# Patient Record
Sex: FEMALE | Race: WHITE | NOT HISPANIC OR LATINO | Employment: UNEMPLOYED | URBAN - METROPOLITAN AREA
[De-identification: names, ages, dates, MRNs, and addresses within clinical notes are randomized per-mention and may not be internally consistent; named-entity substitution may affect disease eponyms.]

---

## 2020-01-01 ENCOUNTER — HOSPITAL ENCOUNTER (INPATIENT)
Facility: HOSPITAL | Age: 55
LOS: 28 days | DRG: 885 | End: 2020-08-08
Attending: EMERGENCY MEDICINE | Admitting: EMERGENCY MEDICINE
Payer: MEDICAID

## 2020-01-01 VITALS
HEART RATE: 113 BPM | BODY MASS INDEX: 24.26 KG/M2 | SYSTOLIC BLOOD PRESSURE: 94 MMHG | OXYGEN SATURATION: 97 % | TEMPERATURE: 98 F | WEIGHT: 154.56 LBS | DIASTOLIC BLOOD PRESSURE: 50 MMHG | HEIGHT: 67 IN

## 2020-01-01 DIAGNOSIS — R94.31 QT PROLONGATION: ICD-10-CM

## 2020-01-01 DIAGNOSIS — C49.A0 MALIGNANT GASTROINTESTINAL STROMAL TUMOR, UNSPECIFIED SITE: ICD-10-CM

## 2020-01-01 DIAGNOSIS — F31.62 BIPOLAR 1 DISORDER, MIXED, MODERATE: ICD-10-CM

## 2020-01-01 DIAGNOSIS — D64.9 ANEMIA: ICD-10-CM

## 2020-01-01 DIAGNOSIS — Z51.5 PALLIATIVE CARE ENCOUNTER: ICD-10-CM

## 2020-01-01 DIAGNOSIS — D64.9 ANEMIA, UNSPECIFIED TYPE: Primary | ICD-10-CM

## 2020-01-01 DIAGNOSIS — F54 PSYCHOLOGICAL FACTORS AFFECTING MEDICAL CONDITION: ICD-10-CM

## 2020-01-01 DIAGNOSIS — C49.A2 MALIGNANT GASTROINTESTINAL STROMAL TUMOR (GIST) OF STOMACH: ICD-10-CM

## 2020-01-01 DIAGNOSIS — F22 DELUSIONAL DISORDER: ICD-10-CM

## 2020-01-01 DIAGNOSIS — Z71.89 ADVANCE CARE PLANNING: ICD-10-CM

## 2020-01-01 DIAGNOSIS — D64.9 SYMPTOMATIC ANEMIA: ICD-10-CM

## 2020-01-01 DIAGNOSIS — F22 DELUSION: ICD-10-CM

## 2020-01-01 DIAGNOSIS — R00.0 TACHYCARDIA: ICD-10-CM

## 2020-01-01 LAB
ABO + RH BLD: NORMAL
ALBUMIN SERPL BCP-MCNC: 1.6 G/DL (ref 3.5–5.2)
ALBUMIN SERPL BCP-MCNC: 1.7 G/DL (ref 3.5–5.2)
ALBUMIN SERPL BCP-MCNC: 1.7 G/DL (ref 3.5–5.2)
ALBUMIN SERPL BCP-MCNC: 1.8 G/DL (ref 3.5–5.2)
ALBUMIN SERPL BCP-MCNC: 1.9 G/DL (ref 3.5–5.2)
ALBUMIN SERPL BCP-MCNC: 2 G/DL (ref 3.5–5.2)
ALBUMIN SERPL BCP-MCNC: 2.1 G/DL (ref 3.5–5.2)
ALBUMIN SERPL BCP-MCNC: 2.1 G/DL (ref 3.5–5.2)
ALBUMIN SERPL BCP-MCNC: 2.2 G/DL (ref 3.5–5.2)
ALBUMIN SERPL BCP-MCNC: 2.3 G/DL (ref 3.5–5.2)
ALBUMIN SERPL BCP-MCNC: 2.5 G/DL (ref 3.5–5.2)
ALBUMIN SERPL BCP-MCNC: 2.9 G/DL (ref 3.5–5.2)
ALP SERPL-CCNC: 102 U/L (ref 55–135)
ALP SERPL-CCNC: 102 U/L (ref 55–135)
ALP SERPL-CCNC: 103 U/L (ref 55–135)
ALP SERPL-CCNC: 105 U/L (ref 55–135)
ALP SERPL-CCNC: 123 U/L (ref 55–135)
ALP SERPL-CCNC: 129 U/L (ref 55–135)
ALP SERPL-CCNC: 131 U/L (ref 55–135)
ALP SERPL-CCNC: 131 U/L (ref 55–135)
ALP SERPL-CCNC: 163 U/L (ref 55–135)
ALP SERPL-CCNC: 167 U/L (ref 55–135)
ALP SERPL-CCNC: 172 U/L (ref 55–135)
ALP SERPL-CCNC: 183 U/L (ref 55–135)
ALP SERPL-CCNC: 185 U/L (ref 55–135)
ALP SERPL-CCNC: 261 U/L (ref 55–135)
ALP SERPL-CCNC: 337 U/L (ref 55–135)
ALP SERPL-CCNC: 364 U/L (ref 55–135)
ALP SERPL-CCNC: 84 U/L (ref 55–135)
ALP SERPL-CCNC: 84 U/L (ref 55–135)
ALP SERPL-CCNC: 90 U/L (ref 55–135)
ALP SERPL-CCNC: 95 U/L (ref 55–135)
ALP SERPL-CCNC: 99 U/L (ref 55–135)
ALP SERPL-CCNC: 99 U/L (ref 55–135)
ALT SERPL W/O P-5'-P-CCNC: 10 U/L (ref 10–44)
ALT SERPL W/O P-5'-P-CCNC: 11 U/L (ref 10–44)
ALT SERPL W/O P-5'-P-CCNC: 11 U/L (ref 10–44)
ALT SERPL W/O P-5'-P-CCNC: 112 U/L (ref 10–44)
ALT SERPL W/O P-5'-P-CCNC: 12 U/L (ref 10–44)
ALT SERPL W/O P-5'-P-CCNC: 12 U/L (ref 10–44)
ALT SERPL W/O P-5'-P-CCNC: 14 U/L (ref 10–44)
ALT SERPL W/O P-5'-P-CCNC: 15 U/L (ref 10–44)
ALT SERPL W/O P-5'-P-CCNC: 18 U/L (ref 10–44)
ALT SERPL W/O P-5'-P-CCNC: 28 U/L (ref 10–44)
ALT SERPL W/O P-5'-P-CCNC: 36 U/L (ref 10–44)
ALT SERPL W/O P-5'-P-CCNC: 6 U/L (ref 10–44)
ALT SERPL W/O P-5'-P-CCNC: 6 U/L (ref 10–44)
ALT SERPL W/O P-5'-P-CCNC: 7 U/L (ref 10–44)
ALT SERPL W/O P-5'-P-CCNC: 7 U/L (ref 10–44)
ALT SERPL W/O P-5'-P-CCNC: 8 U/L (ref 10–44)
ALT SERPL W/O P-5'-P-CCNC: 9 U/L (ref 10–44)
AMPHET+METHAMPHET UR QL: NEGATIVE
AMPHET+METHAMPHET UR QL: NEGATIVE
ANION GAP SERPL CALC-SCNC: 11 MMOL/L (ref 8–16)
ANION GAP SERPL CALC-SCNC: 21 MMOL/L (ref 8–16)
ANION GAP SERPL CALC-SCNC: 22 MMOL/L (ref 8–16)
ANION GAP SERPL CALC-SCNC: 3 MMOL/L (ref 8–16)
ANION GAP SERPL CALC-SCNC: 3 MMOL/L (ref 8–16)
ANION GAP SERPL CALC-SCNC: 4 MMOL/L (ref 8–16)
ANION GAP SERPL CALC-SCNC: 5 MMOL/L (ref 8–16)
ANION GAP SERPL CALC-SCNC: 6 MMOL/L (ref 8–16)
ANION GAP SERPL CALC-SCNC: 7 MMOL/L (ref 8–16)
ANION GAP SERPL CALC-SCNC: 8 MMOL/L (ref 8–16)
ANION GAP SERPL CALC-SCNC: 9 MMOL/L (ref 8–16)
ANISOCYTOSIS BLD QL SMEAR: ABNORMAL
ANISOCYTOSIS BLD QL SMEAR: SLIGHT
APAP SERPL-MCNC: <3 UG/ML (ref 10–20)
APTT BLDCRRT: <21 SEC (ref 21–32)
AST SERPL-CCNC: 12 U/L (ref 10–40)
AST SERPL-CCNC: 13 U/L (ref 10–40)
AST SERPL-CCNC: 14 U/L (ref 10–40)
AST SERPL-CCNC: 150 U/L (ref 10–40)
AST SERPL-CCNC: 16 U/L (ref 10–40)
AST SERPL-CCNC: 20 U/L (ref 10–40)
AST SERPL-CCNC: 20 U/L (ref 10–40)
AST SERPL-CCNC: 25 U/L (ref 10–40)
AST SERPL-CCNC: 26 U/L (ref 10–40)
AST SERPL-CCNC: 33 U/L (ref 10–40)
AST SERPL-CCNC: 43 U/L (ref 10–40)
AST SERPL-CCNC: 46 U/L (ref 10–40)
BACTERIA #/AREA URNS AUTO: ABNORMAL /HPF
BACTERIA BLD CULT: NORMAL
BACTERIA BLD CULT: NORMAL
BACTERIA FLD CULT: NORMAL
BARBITURATES UR QL SCN>200 NG/ML: NEGATIVE
BARBITURATES UR QL SCN>200 NG/ML: NEGATIVE
BASO STIPL BLD QL SMEAR: ABNORMAL
BASOPHILS # BLD AUTO: 0.01 K/UL (ref 0–0.2)
BASOPHILS # BLD AUTO: 0.02 K/UL (ref 0–0.2)
BASOPHILS # BLD AUTO: 0.03 K/UL (ref 0–0.2)
BASOPHILS # BLD AUTO: 0.04 K/UL (ref 0–0.2)
BASOPHILS # BLD AUTO: 0.04 K/UL (ref 0–0.2)
BASOPHILS # BLD AUTO: 0.05 K/UL (ref 0–0.2)
BASOPHILS # BLD AUTO: 0.07 K/UL (ref 0–0.2)
BASOPHILS NFR BLD: 0.1 % (ref 0–1.9)
BASOPHILS NFR BLD: 0.2 % (ref 0–1.9)
BASOPHILS NFR BLD: 0.3 % (ref 0–1.9)
BASOPHILS NFR BLD: 0.5 % (ref 0–1.9)
BENZODIAZ UR QL SCN>200 NG/ML: NEGATIVE
BENZODIAZ UR QL SCN>200 NG/ML: NEGATIVE
BILIRUB DIRECT SERPL-MCNC: 0.3 MG/DL (ref 0.1–0.3)
BILIRUB SERPL-MCNC: 0.2 MG/DL (ref 0.1–1)
BILIRUB SERPL-MCNC: 0.3 MG/DL (ref 0.1–1)
BILIRUB SERPL-MCNC: 0.3 MG/DL (ref 0.1–1)
BILIRUB SERPL-MCNC: 0.4 MG/DL (ref 0.1–1)
BILIRUB SERPL-MCNC: 0.5 MG/DL (ref 0.1–1)
BILIRUB SERPL-MCNC: 0.6 MG/DL (ref 0.1–1)
BILIRUB SERPL-MCNC: 0.7 MG/DL (ref 0.1–1)
BILIRUB UR QL STRIP: NEGATIVE
BILIRUB UR QL STRIP: NEGATIVE
BLD GP AB SCN CELLS X3 SERPL QL: NORMAL
BLD PROD TYP BPU: NORMAL
BLOOD UNIT EXPIRATION DATE: NORMAL
BLOOD UNIT TYPE CODE: 5100
BLOOD UNIT TYPE: NORMAL
BUN SERPL-MCNC: 10 MG/DL (ref 6–20)
BUN SERPL-MCNC: 19 MG/DL (ref 6–20)
BUN SERPL-MCNC: 21 MG/DL (ref 6–20)
BUN SERPL-MCNC: 5 MG/DL (ref 6–20)
BUN SERPL-MCNC: 5 MG/DL (ref 6–20)
BUN SERPL-MCNC: 6 MG/DL (ref 6–20)
BUN SERPL-MCNC: 7 MG/DL (ref 6–20)
BUN SERPL-MCNC: 8 MG/DL (ref 6–20)
BURR CELLS BLD QL SMEAR: ABNORMAL
BZE UR QL SCN: NEGATIVE
BZE UR QL SCN: NEGATIVE
CALCIUM SERPL-MCNC: 7.6 MG/DL (ref 8.7–10.5)
CALCIUM SERPL-MCNC: 7.8 MG/DL (ref 8.7–10.5)
CALCIUM SERPL-MCNC: 7.9 MG/DL (ref 8.7–10.5)
CALCIUM SERPL-MCNC: 7.9 MG/DL (ref 8.7–10.5)
CALCIUM SERPL-MCNC: 8 MG/DL (ref 8.7–10.5)
CALCIUM SERPL-MCNC: 8.1 MG/DL (ref 8.7–10.5)
CALCIUM SERPL-MCNC: 8.2 MG/DL (ref 8.7–10.5)
CALCIUM SERPL-MCNC: 8.2 MG/DL (ref 8.7–10.5)
CALCIUM SERPL-MCNC: 8.3 MG/DL (ref 8.7–10.5)
CALCIUM SERPL-MCNC: 8.4 MG/DL (ref 8.7–10.5)
CALCIUM SERPL-MCNC: 8.4 MG/DL (ref 8.7–10.5)
CALCIUM SERPL-MCNC: 9.2 MG/DL (ref 8.7–10.5)
CANNABINOIDS UR QL SCN: NEGATIVE
CANNABINOIDS UR QL SCN: NEGATIVE
CHLORIDE SERPL-SCNC: 100 MMOL/L (ref 95–110)
CHLORIDE SERPL-SCNC: 101 MMOL/L (ref 95–110)
CHLORIDE SERPL-SCNC: 102 MMOL/L (ref 95–110)
CHLORIDE SERPL-SCNC: 103 MMOL/L (ref 95–110)
CHLORIDE SERPL-SCNC: 104 MMOL/L (ref 95–110)
CHLORIDE SERPL-SCNC: 104 MMOL/L (ref 95–110)
CHLORIDE SERPL-SCNC: 105 MMOL/L (ref 95–110)
CHLORIDE SERPL-SCNC: 96 MMOL/L (ref 95–110)
CHLORIDE SERPL-SCNC: 97 MMOL/L (ref 95–110)
CHLORIDE SERPL-SCNC: 97 MMOL/L (ref 95–110)
CHLORIDE SERPL-SCNC: 98 MMOL/L (ref 95–110)
CK SERPL-CCNC: 42 U/L (ref 20–180)
CLARITY UR REFRACT.AUTO: ABNORMAL
CLARITY UR REFRACT.AUTO: CLEAR
CO2 SERPL-SCNC: 11 MMOL/L (ref 23–29)
CO2 SERPL-SCNC: 24 MMOL/L (ref 23–29)
CO2 SERPL-SCNC: 24 MMOL/L (ref 23–29)
CO2 SERPL-SCNC: 25 MMOL/L (ref 23–29)
CO2 SERPL-SCNC: 26 MMOL/L (ref 23–29)
CO2 SERPL-SCNC: 26 MMOL/L (ref 23–29)
CO2 SERPL-SCNC: 27 MMOL/L (ref 23–29)
CO2 SERPL-SCNC: 28 MMOL/L (ref 23–29)
CO2 SERPL-SCNC: 29 MMOL/L (ref 23–29)
CO2 SERPL-SCNC: 29 MMOL/L (ref 23–29)
CO2 SERPL-SCNC: 9 MMOL/L (ref 23–29)
CODING SYSTEM: NORMAL
COLOR UR AUTO: ABNORMAL
COLOR UR AUTO: YELLOW
CREAT SERPL-MCNC: 0.6 MG/DL (ref 0.5–1.4)
CREAT SERPL-MCNC: 0.7 MG/DL (ref 0.5–1.4)
CREAT SERPL-MCNC: 0.8 MG/DL (ref 0.5–1.4)
CREAT SERPL-MCNC: 0.8 MG/DL (ref 0.5–1.4)
CREAT SERPL-MCNC: 1.3 MG/DL (ref 0.5–1.4)
CREAT SERPL-MCNC: 1.4 MG/DL (ref 0.5–1.4)
CREAT UR-MCNC: 48 MG/DL (ref 15–325)
CREAT UR-MCNC: 48 MG/DL (ref 15–325)
DAT IGG-SP REAG RBC-IMP: NORMAL
DIFFERENTIAL METHOD: ABNORMAL
DISPENSE STATUS: NORMAL
EOSINOPHIL # BLD AUTO: 0 K/UL (ref 0–0.5)
EOSINOPHIL # BLD AUTO: 0.1 K/UL (ref 0–0.5)
EOSINOPHIL # BLD AUTO: 0.2 K/UL (ref 0–0.5)
EOSINOPHIL NFR BLD: 0 % (ref 0–8)
EOSINOPHIL NFR BLD: 0.1 % (ref 0–8)
EOSINOPHIL NFR BLD: 0.2 % (ref 0–8)
EOSINOPHIL NFR BLD: 0.2 % (ref 0–8)
EOSINOPHIL NFR BLD: 0.3 % (ref 0–8)
EOSINOPHIL NFR BLD: 0.4 % (ref 0–8)
EOSINOPHIL NFR BLD: 0.4 % (ref 0–8)
EOSINOPHIL NFR BLD: 0.5 % (ref 0–8)
EOSINOPHIL NFR BLD: 0.5 % (ref 0–8)
EOSINOPHIL NFR BLD: 0.6 % (ref 0–8)
EOSINOPHIL NFR BLD: 0.7 % (ref 0–8)
EOSINOPHIL NFR BLD: 0.8 % (ref 0–8)
EOSINOPHIL NFR BLD: 1 % (ref 0–8)
EOSINOPHIL NFR BLD: 1.1 % (ref 0–8)
EOSINOPHIL NFR BLD: 1.3 % (ref 0–8)
EOSINOPHIL NFR BLD: 1.4 % (ref 0–8)
EOSINOPHIL NFR BLD: 1.4 % (ref 0–8)
EOSINOPHIL NFR BLD: 1.6 % (ref 0–8)
EOSINOPHIL NFR BLD: 1.7 % (ref 0–8)
ERYTHROCYTE [DISTWIDTH] IN BLOOD BY AUTOMATED COUNT: 16.6 % (ref 11.5–14.5)
ERYTHROCYTE [DISTWIDTH] IN BLOOD BY AUTOMATED COUNT: 16.6 % (ref 11.5–14.5)
ERYTHROCYTE [DISTWIDTH] IN BLOOD BY AUTOMATED COUNT: 16.9 % (ref 11.5–14.5)
ERYTHROCYTE [DISTWIDTH] IN BLOOD BY AUTOMATED COUNT: 17.2 % (ref 11.5–14.5)
ERYTHROCYTE [DISTWIDTH] IN BLOOD BY AUTOMATED COUNT: 17.4 % (ref 11.5–14.5)
ERYTHROCYTE [DISTWIDTH] IN BLOOD BY AUTOMATED COUNT: 17.7 % (ref 11.5–14.5)
ERYTHROCYTE [DISTWIDTH] IN BLOOD BY AUTOMATED COUNT: 17.8 % (ref 11.5–14.5)
ERYTHROCYTE [DISTWIDTH] IN BLOOD BY AUTOMATED COUNT: 18.1 % (ref 11.5–14.5)
ERYTHROCYTE [DISTWIDTH] IN BLOOD BY AUTOMATED COUNT: 18.3 % (ref 11.5–14.5)
ERYTHROCYTE [DISTWIDTH] IN BLOOD BY AUTOMATED COUNT: 18.5 % (ref 11.5–14.5)
ERYTHROCYTE [DISTWIDTH] IN BLOOD BY AUTOMATED COUNT: 18.5 % (ref 11.5–14.5)
ERYTHROCYTE [DISTWIDTH] IN BLOOD BY AUTOMATED COUNT: 18.6 % (ref 11.5–14.5)
ERYTHROCYTE [DISTWIDTH] IN BLOOD BY AUTOMATED COUNT: 18.8 % (ref 11.5–14.5)
ERYTHROCYTE [DISTWIDTH] IN BLOOD BY AUTOMATED COUNT: 18.9 % (ref 11.5–14.5)
ERYTHROCYTE [DISTWIDTH] IN BLOOD BY AUTOMATED COUNT: 19.1 % (ref 11.5–14.5)
ERYTHROCYTE [DISTWIDTH] IN BLOOD BY AUTOMATED COUNT: 19.1 % (ref 11.5–14.5)
ERYTHROCYTE [DISTWIDTH] IN BLOOD BY AUTOMATED COUNT: 19.4 % (ref 11.5–14.5)
ERYTHROCYTE [DISTWIDTH] IN BLOOD BY AUTOMATED COUNT: 19.5 % (ref 11.5–14.5)
ERYTHROCYTE [DISTWIDTH] IN BLOOD BY AUTOMATED COUNT: 19.6 % (ref 11.5–14.5)
ERYTHROCYTE [DISTWIDTH] IN BLOOD BY AUTOMATED COUNT: 19.6 % (ref 11.5–14.5)
ERYTHROCYTE [DISTWIDTH] IN BLOOD BY AUTOMATED COUNT: 19.8 % (ref 11.5–14.5)
ERYTHROCYTE [DISTWIDTH] IN BLOOD BY AUTOMATED COUNT: 19.9 % (ref 11.5–14.5)
ERYTHROCYTE [DISTWIDTH] IN BLOOD BY AUTOMATED COUNT: 20.1 % (ref 11.5–14.5)
EST. GFR  (AFRICAN AMERICAN): 49.5 ML/MIN/1.73 M^2
EST. GFR  (AFRICAN AMERICAN): 54.1 ML/MIN/1.73 M^2
EST. GFR  (AFRICAN AMERICAN): >60 ML/MIN/1.73 M^2
EST. GFR  (NON AFRICAN AMERICAN): 42.9 ML/MIN/1.73 M^2
EST. GFR  (NON AFRICAN AMERICAN): 47 ML/MIN/1.73 M^2
EST. GFR  (NON AFRICAN AMERICAN): >60 ML/MIN/1.73 M^2
ESTIMATED AVG GLUCOSE: 134 MG/DL (ref 68–131)
ETHANOL SERPL-MCNC: <10 MG/DL
ETHANOL UR-MCNC: <10 MG/DL
FERRITIN SERPL-MCNC: 56 NG/ML (ref 20–300)
FOLATE SERPL-MCNC: 7.4 NG/ML (ref 4–24)
GIANT PLATELETS BLD QL SMEAR: PRESENT
GLUCOSE SERPL-MCNC: 154 MG/DL (ref 70–110)
GLUCOSE SERPL-MCNC: 183 MG/DL (ref 70–110)
GLUCOSE SERPL-MCNC: 184 MG/DL (ref 70–110)
GLUCOSE SERPL-MCNC: 187 MG/DL (ref 70–110)
GLUCOSE SERPL-MCNC: 201 MG/DL (ref 70–110)
GLUCOSE SERPL-MCNC: 206 MG/DL (ref 70–110)
GLUCOSE SERPL-MCNC: 228 MG/DL (ref 70–110)
GLUCOSE SERPL-MCNC: 234 MG/DL (ref 70–110)
GLUCOSE SERPL-MCNC: 235 MG/DL (ref 70–110)
GLUCOSE SERPL-MCNC: 240 MG/DL (ref 70–110)
GLUCOSE SERPL-MCNC: 264 MG/DL (ref 70–110)
GLUCOSE SERPL-MCNC: 282 MG/DL (ref 70–110)
GLUCOSE SERPL-MCNC: 282 MG/DL (ref 70–110)
GLUCOSE SERPL-MCNC: 285 MG/DL (ref 70–110)
GLUCOSE SERPL-MCNC: 285 MG/DL (ref 70–110)
GLUCOSE SERPL-MCNC: 290 MG/DL (ref 70–110)
GLUCOSE SERPL-MCNC: 309 MG/DL (ref 70–110)
GLUCOSE SERPL-MCNC: 314 MG/DL (ref 70–110)
GLUCOSE SERPL-MCNC: 315 MG/DL (ref 70–110)
GLUCOSE SERPL-MCNC: 315 MG/DL (ref 70–110)
GLUCOSE SERPL-MCNC: 352 MG/DL (ref 70–110)
GLUCOSE SERPL-MCNC: 363 MG/DL (ref 70–110)
GLUCOSE UR QL STRIP: ABNORMAL
GLUCOSE UR QL STRIP: ABNORMAL
HAPTOGLOB SERPL-MCNC: 239 MG/DL (ref 30–250)
HAPTOGLOB SERPL-MCNC: <10 MG/DL (ref 30–250)
HAPTOGLOB SERPL-MCNC: <10 MG/DL (ref 30–250)
HBA1C MFR BLD HPLC: 6.3 % (ref 4–5.6)
HCT VFR BLD AUTO: 19.4 % (ref 37–48.5)
HCT VFR BLD AUTO: 20.4 % (ref 37–48.5)
HCT VFR BLD AUTO: 20.5 % (ref 37–48.5)
HCT VFR BLD AUTO: 20.7 % (ref 37–48.5)
HCT VFR BLD AUTO: 21.1 % (ref 37–48.5)
HCT VFR BLD AUTO: 21.2 % (ref 37–48.5)
HCT VFR BLD AUTO: 21.4 % (ref 37–48.5)
HCT VFR BLD AUTO: 21.8 % (ref 37–48.5)
HCT VFR BLD AUTO: 22.2 % (ref 37–48.5)
HCT VFR BLD AUTO: 22.5 % (ref 37–48.5)
HCT VFR BLD AUTO: 22.7 % (ref 37–48.5)
HCT VFR BLD AUTO: 22.7 % (ref 37–48.5)
HCT VFR BLD AUTO: 23 % (ref 37–48.5)
HCT VFR BLD AUTO: 23.3 % (ref 37–48.5)
HCT VFR BLD AUTO: 23.4 % (ref 37–48.5)
HCT VFR BLD AUTO: 24 % (ref 37–48.5)
HCT VFR BLD AUTO: 24 % (ref 37–48.5)
HCT VFR BLD AUTO: 24.2 % (ref 37–48.5)
HCT VFR BLD AUTO: 24.4 % (ref 37–48.5)
HCT VFR BLD AUTO: 24.4 % (ref 37–48.5)
HCT VFR BLD AUTO: 24.9 % (ref 37–48.5)
HCT VFR BLD AUTO: 25.3 % (ref 37–48.5)
HCT VFR BLD AUTO: 25.8 % (ref 37–48.5)
HCT VFR BLD AUTO: 25.9 % (ref 37–48.5)
HCT VFR BLD AUTO: 26 % (ref 37–48.5)
HCT VFR BLD AUTO: 26.3 % (ref 37–48.5)
HCT VFR BLD AUTO: 26.5 % (ref 37–48.5)
HCT VFR BLD AUTO: 26.9 % (ref 37–48.5)
HCT VFR BLD AUTO: 27 % (ref 37–48.5)
HCT VFR BLD AUTO: 27.2 % (ref 37–48.5)
HGB BLD-MCNC: 6 G/DL (ref 12–16)
HGB BLD-MCNC: 6.2 G/DL (ref 12–16)
HGB BLD-MCNC: 6.3 G/DL (ref 12–16)
HGB BLD-MCNC: 6.4 G/DL (ref 12–16)
HGB BLD-MCNC: 6.4 G/DL (ref 12–16)
HGB BLD-MCNC: 6.5 G/DL (ref 12–16)
HGB BLD-MCNC: 6.6 G/DL (ref 12–16)
HGB BLD-MCNC: 6.6 G/DL (ref 12–16)
HGB BLD-MCNC: 6.7 G/DL (ref 12–16)
HGB BLD-MCNC: 6.8 G/DL (ref 12–16)
HGB BLD-MCNC: 6.9 G/DL (ref 12–16)
HGB BLD-MCNC: 7 G/DL (ref 12–16)
HGB BLD-MCNC: 7.1 G/DL (ref 12–16)
HGB BLD-MCNC: 7.3 G/DL (ref 12–16)
HGB BLD-MCNC: 7.4 G/DL (ref 12–16)
HGB BLD-MCNC: 7.6 G/DL (ref 12–16)
HGB BLD-MCNC: 7.8 G/DL (ref 12–16)
HGB BLD-MCNC: 7.9 G/DL (ref 12–16)
HGB BLD-MCNC: 8 G/DL (ref 12–16)
HGB BLD-MCNC: 8 G/DL (ref 12–16)
HGB BLD-MCNC: 8.1 G/DL (ref 12–16)
HGB BLD-MCNC: 8.1 G/DL (ref 12–16)
HGB BLD-MCNC: 8.2 G/DL (ref 12–16)
HGB BLD-MCNC: 8.3 G/DL (ref 12–16)
HGB BLD-MCNC: 8.3 G/DL (ref 12–16)
HGB UR QL STRIP: NEGATIVE
HGB UR QL STRIP: NEGATIVE
HOWELL-JOLLY BOD BLD QL SMEAR: ABNORMAL
HOWELL-JOLLY BOD BLD QL SMEAR: ABNORMAL
HYALINE CASTS UR QL AUTO: 0 /LPF
HYPOCHROMIA BLD QL SMEAR: ABNORMAL
IMM GRANULOCYTES # BLD AUTO: 0.04 K/UL (ref 0–0.04)
IMM GRANULOCYTES # BLD AUTO: 0.05 K/UL (ref 0–0.04)
IMM GRANULOCYTES # BLD AUTO: 0.05 K/UL (ref 0–0.04)
IMM GRANULOCYTES # BLD AUTO: 0.06 K/UL (ref 0–0.04)
IMM GRANULOCYTES # BLD AUTO: 0.07 K/UL (ref 0–0.04)
IMM GRANULOCYTES # BLD AUTO: 0.09 K/UL (ref 0–0.04)
IMM GRANULOCYTES # BLD AUTO: 0.09 K/UL (ref 0–0.04)
IMM GRANULOCYTES # BLD AUTO: 0.1 K/UL (ref 0–0.04)
IMM GRANULOCYTES # BLD AUTO: 0.11 K/UL (ref 0–0.04)
IMM GRANULOCYTES # BLD AUTO: 0.12 K/UL (ref 0–0.04)
IMM GRANULOCYTES # BLD AUTO: 0.13 K/UL (ref 0–0.04)
IMM GRANULOCYTES # BLD AUTO: 0.15 K/UL (ref 0–0.04)
IMM GRANULOCYTES # BLD AUTO: 0.17 K/UL (ref 0–0.04)
IMM GRANULOCYTES # BLD AUTO: 0.18 K/UL (ref 0–0.04)
IMM GRANULOCYTES # BLD AUTO: 0.18 K/UL (ref 0–0.04)
IMM GRANULOCYTES # BLD AUTO: 0.2 K/UL (ref 0–0.04)
IMM GRANULOCYTES # BLD AUTO: 0.2 K/UL (ref 0–0.04)
IMM GRANULOCYTES # BLD AUTO: 0.21 K/UL (ref 0–0.04)
IMM GRANULOCYTES # BLD AUTO: 0.28 K/UL (ref 0–0.04)
IMM GRANULOCYTES # BLD AUTO: 0.28 K/UL (ref 0–0.04)
IMM GRANULOCYTES # BLD AUTO: 0.34 K/UL (ref 0–0.04)
IMM GRANULOCYTES # BLD AUTO: 0.39 K/UL (ref 0–0.04)
IMM GRANULOCYTES # BLD AUTO: 0.39 K/UL (ref 0–0.04)
IMM GRANULOCYTES # BLD AUTO: 0.41 K/UL (ref 0–0.04)
IMM GRANULOCYTES # BLD AUTO: 0.52 K/UL (ref 0–0.04)
IMM GRANULOCYTES # BLD AUTO: 0.52 K/UL (ref 0–0.04)
IMM GRANULOCYTES # BLD AUTO: 0.59 K/UL (ref 0–0.04)
IMM GRANULOCYTES NFR BLD AUTO: 0.4 % (ref 0–0.5)
IMM GRANULOCYTES NFR BLD AUTO: 0.5 % (ref 0–0.5)
IMM GRANULOCYTES NFR BLD AUTO: 0.6 % (ref 0–0.5)
IMM GRANULOCYTES NFR BLD AUTO: 0.7 % (ref 0–0.5)
IMM GRANULOCYTES NFR BLD AUTO: 0.9 % (ref 0–0.5)
IMM GRANULOCYTES NFR BLD AUTO: 1 % (ref 0–0.5)
IMM GRANULOCYTES NFR BLD AUTO: 1 % (ref 0–0.5)
IMM GRANULOCYTES NFR BLD AUTO: 1.2 % (ref 0–0.5)
IMM GRANULOCYTES NFR BLD AUTO: 1.4 % (ref 0–0.5)
IMM GRANULOCYTES NFR BLD AUTO: 1.6 % (ref 0–0.5)
IMM GRANULOCYTES NFR BLD AUTO: 1.6 % (ref 0–0.5)
IMM GRANULOCYTES NFR BLD AUTO: 2.1 % (ref 0–0.5)
IMM GRANULOCYTES NFR BLD AUTO: 2.5 % (ref 0–0.5)
IMM GRANULOCYTES NFR BLD AUTO: 2.6 % (ref 0–0.5)
IMM GRANULOCYTES NFR BLD AUTO: 2.6 % (ref 0–0.5)
IMM GRANULOCYTES NFR BLD AUTO: 3.2 % (ref 0–0.5)
IMM GRANULOCYTES NFR BLD AUTO: 3.5 % (ref 0–0.5)
IMM GRANULOCYTES NFR BLD AUTO: 3.5 % (ref 0–0.5)
IMM GRANULOCYTES NFR BLD AUTO: 4.6 % (ref 0–0.5)
INR PPP: 1 (ref 0.8–1.2)
IRON SERPL-MCNC: 17 UG/DL (ref 30–160)
KETONES UR QL STRIP: NEGATIVE
KETONES UR QL STRIP: NEGATIVE
LACTATE SERPL-SCNC: >12 MMOL/L (ref 0.5–2.2)
LDH SERPL L TO P-CCNC: 289 U/L (ref 110–260)
LDH SERPL L TO P-CCNC: 463 U/L (ref 110–260)
LEUKOCYTE ESTERASE UR QL STRIP: ABNORMAL
LEUKOCYTE ESTERASE UR QL STRIP: NEGATIVE
LIPASE SERPL-CCNC: 22 U/L (ref 4–60)
LYMPHOCYTES # BLD AUTO: 1.6 K/UL (ref 1–4.8)
LYMPHOCYTES # BLD AUTO: 1.8 K/UL (ref 1–4.8)
LYMPHOCYTES # BLD AUTO: 2.1 K/UL (ref 1–4.8)
LYMPHOCYTES # BLD AUTO: 2.1 K/UL (ref 1–4.8)
LYMPHOCYTES # BLD AUTO: 2.2 K/UL (ref 1–4.8)
LYMPHOCYTES # BLD AUTO: 2.2 K/UL (ref 1–4.8)
LYMPHOCYTES # BLD AUTO: 2.4 K/UL (ref 1–4.8)
LYMPHOCYTES # BLD AUTO: 2.5 K/UL (ref 1–4.8)
LYMPHOCYTES # BLD AUTO: 2.5 K/UL (ref 1–4.8)
LYMPHOCYTES # BLD AUTO: 2.7 K/UL (ref 1–4.8)
LYMPHOCYTES # BLD AUTO: 2.8 K/UL (ref 1–4.8)
LYMPHOCYTES # BLD AUTO: 2.8 K/UL (ref 1–4.8)
LYMPHOCYTES # BLD AUTO: 2.9 K/UL (ref 1–4.8)
LYMPHOCYTES # BLD AUTO: 2.9 K/UL (ref 1–4.8)
LYMPHOCYTES # BLD AUTO: 3 K/UL (ref 1–4.8)
LYMPHOCYTES # BLD AUTO: 3.1 K/UL (ref 1–4.8)
LYMPHOCYTES # BLD AUTO: 3.3 K/UL (ref 1–4.8)
LYMPHOCYTES # BLD AUTO: 3.4 K/UL (ref 1–4.8)
LYMPHOCYTES # BLD AUTO: 3.6 K/UL (ref 1–4.8)
LYMPHOCYTES # BLD AUTO: 3.6 K/UL (ref 1–4.8)
LYMPHOCYTES # BLD AUTO: 4.1 K/UL (ref 1–4.8)
LYMPHOCYTES NFR BLD: 11.3 % (ref 18–48)
LYMPHOCYTES NFR BLD: 11.9 % (ref 18–48)
LYMPHOCYTES NFR BLD: 11.9 % (ref 18–48)
LYMPHOCYTES NFR BLD: 14.1 % (ref 18–48)
LYMPHOCYTES NFR BLD: 15.5 % (ref 18–48)
LYMPHOCYTES NFR BLD: 15.6 % (ref 18–48)
LYMPHOCYTES NFR BLD: 16.2 % (ref 18–48)
LYMPHOCYTES NFR BLD: 16.5 % (ref 18–48)
LYMPHOCYTES NFR BLD: 16.5 % (ref 18–48)
LYMPHOCYTES NFR BLD: 16.8 % (ref 18–48)
LYMPHOCYTES NFR BLD: 17.4 % (ref 18–48)
LYMPHOCYTES NFR BLD: 18.5 % (ref 18–48)
LYMPHOCYTES NFR BLD: 19.6 % (ref 18–48)
LYMPHOCYTES NFR BLD: 20.5 % (ref 18–48)
LYMPHOCYTES NFR BLD: 20.7 % (ref 18–48)
LYMPHOCYTES NFR BLD: 21.2 % (ref 18–48)
LYMPHOCYTES NFR BLD: 21.3 % (ref 18–48)
LYMPHOCYTES NFR BLD: 21.3 % (ref 18–48)
LYMPHOCYTES NFR BLD: 22.5 % (ref 18–48)
LYMPHOCYTES NFR BLD: 22.5 % (ref 18–48)
LYMPHOCYTES NFR BLD: 22.7 % (ref 18–48)
LYMPHOCYTES NFR BLD: 22.9 % (ref 18–48)
LYMPHOCYTES NFR BLD: 23.7 % (ref 18–48)
LYMPHOCYTES NFR BLD: 23.9 % (ref 18–48)
LYMPHOCYTES NFR BLD: 24.8 % (ref 18–48)
LYMPHOCYTES NFR BLD: 25 % (ref 18–48)
LYMPHOCYTES NFR BLD: 25.1 % (ref 18–48)
LYMPHOCYTES NFR BLD: 25.6 % (ref 18–48)
LYMPHOCYTES NFR BLD: 7.3 % (ref 18–48)
MAGNESIUM SERPL-MCNC: 1.6 MG/DL (ref 1.6–2.6)
MAGNESIUM SERPL-MCNC: 1.6 MG/DL (ref 1.6–2.6)
MAGNESIUM SERPL-MCNC: 1.7 MG/DL (ref 1.6–2.6)
MAGNESIUM SERPL-MCNC: 1.8 MG/DL (ref 1.6–2.6)
MAGNESIUM SERPL-MCNC: 1.9 MG/DL (ref 1.6–2.6)
MAGNESIUM SERPL-MCNC: 1.9 MG/DL (ref 1.6–2.6)
MAGNESIUM SERPL-MCNC: 2 MG/DL (ref 1.6–2.6)
MCH RBC QN AUTO: 28.1 PG (ref 27–31)
MCH RBC QN AUTO: 28.6 PG (ref 27–31)
MCH RBC QN AUTO: 28.7 PG (ref 27–31)
MCH RBC QN AUTO: 28.7 PG (ref 27–31)
MCH RBC QN AUTO: 28.9 PG (ref 27–31)
MCH RBC QN AUTO: 29 PG (ref 27–31)
MCH RBC QN AUTO: 29.1 PG (ref 27–31)
MCH RBC QN AUTO: 29.2 PG (ref 27–31)
MCH RBC QN AUTO: 29.2 PG (ref 27–31)
MCH RBC QN AUTO: 29.3 PG (ref 27–31)
MCH RBC QN AUTO: 29.3 PG (ref 27–31)
MCH RBC QN AUTO: 29.4 PG (ref 27–31)
MCH RBC QN AUTO: 29.5 PG (ref 27–31)
MCH RBC QN AUTO: 29.6 PG (ref 27–31)
MCH RBC QN AUTO: 30 PG (ref 27–31)
MCH RBC QN AUTO: 30.1 PG (ref 27–31)
MCH RBC QN AUTO: 30.2 PG (ref 27–31)
MCH RBC QN AUTO: 30.5 PG (ref 27–31)
MCH RBC QN AUTO: 30.5 PG (ref 27–31)
MCH RBC QN AUTO: 30.7 PG (ref 27–31)
MCH RBC QN AUTO: 30.8 PG (ref 27–31)
MCH RBC QN AUTO: 30.9 PG (ref 27–31)
MCH RBC QN AUTO: 30.9 PG (ref 27–31)
MCH RBC QN AUTO: 31.5 PG (ref 27–31)
MCHC RBC AUTO-ENTMCNC: 28.3 G/DL (ref 32–36)
MCHC RBC AUTO-ENTMCNC: 28.7 G/DL (ref 32–36)
MCHC RBC AUTO-ENTMCNC: 29.4 G/DL (ref 32–36)
MCHC RBC AUTO-ENTMCNC: 29.5 G/DL (ref 32–36)
MCHC RBC AUTO-ENTMCNC: 29.5 G/DL (ref 32–36)
MCHC RBC AUTO-ENTMCNC: 29.9 G/DL (ref 32–36)
MCHC RBC AUTO-ENTMCNC: 30 G/DL (ref 32–36)
MCHC RBC AUTO-ENTMCNC: 30 G/DL (ref 32–36)
MCHC RBC AUTO-ENTMCNC: 30.1 G/DL (ref 32–36)
MCHC RBC AUTO-ENTMCNC: 30.2 G/DL (ref 32–36)
MCHC RBC AUTO-ENTMCNC: 30.3 G/DL (ref 32–36)
MCHC RBC AUTO-ENTMCNC: 30.4 G/DL (ref 32–36)
MCHC RBC AUTO-ENTMCNC: 30.5 G/DL (ref 32–36)
MCHC RBC AUTO-ENTMCNC: 30.6 G/DL (ref 32–36)
MCHC RBC AUTO-ENTMCNC: 30.6 G/DL (ref 32–36)
MCHC RBC AUTO-ENTMCNC: 30.7 G/DL (ref 32–36)
MCHC RBC AUTO-ENTMCNC: 30.9 G/DL (ref 32–36)
MCHC RBC AUTO-ENTMCNC: 31.3 G/DL (ref 32–36)
MCHC RBC AUTO-ENTMCNC: 31.4 G/DL (ref 32–36)
MCHC RBC AUTO-ENTMCNC: 31.5 G/DL (ref 32–36)
MCHC RBC AUTO-ENTMCNC: 31.7 G/DL (ref 32–36)
MCHC RBC AUTO-ENTMCNC: 32.1 G/DL (ref 32–36)
MCV RBC AUTO: 100 FL (ref 82–98)
MCV RBC AUTO: 100 FL (ref 82–98)
MCV RBC AUTO: 102 FL (ref 82–98)
MCV RBC AUTO: 103 FL (ref 82–98)
MCV RBC AUTO: 93 FL (ref 82–98)
MCV RBC AUTO: 94 FL (ref 82–98)
MCV RBC AUTO: 95 FL (ref 82–98)
MCV RBC AUTO: 96 FL (ref 82–98)
MCV RBC AUTO: 97 FL (ref 82–98)
MCV RBC AUTO: 97 FL (ref 82–98)
MCV RBC AUTO: 98 FL (ref 82–98)
MCV RBC AUTO: 99 FL (ref 82–98)
MCV RBC AUTO: 99 FL (ref 82–98)
METHADONE UR QL SCN>300 NG/ML: NEGATIVE
METHADONE UR QL SCN>300 NG/ML: NEGATIVE
MICROSCOPIC COMMENT: ABNORMAL
MONOCYTES # BLD AUTO: 0.8 K/UL (ref 0.3–1)
MONOCYTES # BLD AUTO: 0.8 K/UL (ref 0.3–1)
MONOCYTES # BLD AUTO: 0.9 K/UL (ref 0.3–1)
MONOCYTES # BLD AUTO: 1.1 K/UL (ref 0.3–1)
MONOCYTES # BLD AUTO: 1.2 K/UL (ref 0.3–1)
MONOCYTES # BLD AUTO: 1.2 K/UL (ref 0.3–1)
MONOCYTES # BLD AUTO: 1.3 K/UL (ref 0.3–1)
MONOCYTES # BLD AUTO: 1.4 K/UL (ref 0.3–1)
MONOCYTES # BLD AUTO: 1.5 K/UL (ref 0.3–1)
MONOCYTES # BLD AUTO: 1.6 K/UL (ref 0.3–1)
MONOCYTES # BLD AUTO: 1.7 K/UL (ref 0.3–1)
MONOCYTES # BLD AUTO: 1.8 K/UL (ref 0.3–1)
MONOCYTES # BLD AUTO: 2.2 K/UL (ref 0.3–1)
MONOCYTES # BLD AUTO: 2.3 K/UL (ref 0.3–1)
MONOCYTES NFR BLD: 10.3 % (ref 4–15)
MONOCYTES NFR BLD: 10.7 % (ref 4–15)
MONOCYTES NFR BLD: 11 % (ref 4–15)
MONOCYTES NFR BLD: 11 % (ref 4–15)
MONOCYTES NFR BLD: 11.4 % (ref 4–15)
MONOCYTES NFR BLD: 11.5 % (ref 4–15)
MONOCYTES NFR BLD: 11.5 % (ref 4–15)
MONOCYTES NFR BLD: 11.8 % (ref 4–15)
MONOCYTES NFR BLD: 12 % (ref 4–15)
MONOCYTES NFR BLD: 12.1 % (ref 4–15)
MONOCYTES NFR BLD: 12.2 % (ref 4–15)
MONOCYTES NFR BLD: 12.2 % (ref 4–15)
MONOCYTES NFR BLD: 12.3 % (ref 4–15)
MONOCYTES NFR BLD: 12.6 % (ref 4–15)
MONOCYTES NFR BLD: 12.7 % (ref 4–15)
MONOCYTES NFR BLD: 12.7 % (ref 4–15)
MONOCYTES NFR BLD: 12.9 % (ref 4–15)
MONOCYTES NFR BLD: 13.7 % (ref 4–15)
MONOCYTES NFR BLD: 5.2 % (ref 4–15)
MONOCYTES NFR BLD: 6.3 % (ref 4–15)
MONOCYTES NFR BLD: 6.6 % (ref 4–15)
MONOCYTES NFR BLD: 6.8 % (ref 4–15)
MONOCYTES NFR BLD: 7.5 % (ref 4–15)
MONOCYTES NFR BLD: 7.9 % (ref 4–15)
MONOCYTES NFR BLD: 8.1 % (ref 4–15)
MONOCYTES NFR BLD: 8.8 % (ref 4–15)
MONOCYTES NFR BLD: 9 % (ref 4–15)
MONOCYTES NFR BLD: 9.3 % (ref 4–15)
MONOCYTES NFR BLD: 9.4 % (ref 4–15)
NEUTROPHILS # BLD AUTO: 11.5 K/UL (ref 1.8–7.7)
NEUTROPHILS # BLD AUTO: 11.9 K/UL (ref 1.8–7.7)
NEUTROPHILS # BLD AUTO: 12.3 K/UL (ref 1.8–7.7)
NEUTROPHILS # BLD AUTO: 12.7 K/UL (ref 1.8–7.7)
NEUTROPHILS # BLD AUTO: 13.3 K/UL (ref 1.8–7.7)
NEUTROPHILS # BLD AUTO: 13.9 K/UL (ref 1.8–7.7)
NEUTROPHILS # BLD AUTO: 15.7 K/UL (ref 1.8–7.7)
NEUTROPHILS # BLD AUTO: 18.6 K/UL (ref 1.8–7.7)
NEUTROPHILS # BLD AUTO: 19.8 K/UL (ref 1.8–7.7)
NEUTROPHILS # BLD AUTO: 6.5 K/UL (ref 1.8–7.7)
NEUTROPHILS # BLD AUTO: 7.2 K/UL (ref 1.8–7.7)
NEUTROPHILS # BLD AUTO: 7.4 K/UL (ref 1.8–7.7)
NEUTROPHILS # BLD AUTO: 7.5 K/UL (ref 1.8–7.7)
NEUTROPHILS # BLD AUTO: 7.8 K/UL (ref 1.8–7.7)
NEUTROPHILS # BLD AUTO: 8.1 K/UL (ref 1.8–7.7)
NEUTROPHILS # BLD AUTO: 8.5 K/UL (ref 1.8–7.7)
NEUTROPHILS # BLD AUTO: 8.5 K/UL (ref 1.8–7.7)
NEUTROPHILS # BLD AUTO: 8.7 K/UL (ref 1.8–7.7)
NEUTROPHILS # BLD AUTO: 8.8 K/UL (ref 1.8–7.7)
NEUTROPHILS # BLD AUTO: 8.8 K/UL (ref 1.8–7.7)
NEUTROPHILS # BLD AUTO: 9 K/UL (ref 1.8–7.7)
NEUTROPHILS # BLD AUTO: 9.1 K/UL (ref 1.8–7.7)
NEUTROPHILS # BLD AUTO: 9.2 K/UL (ref 1.8–7.7)
NEUTROPHILS # BLD AUTO: 9.3 K/UL (ref 1.8–7.7)
NEUTROPHILS # BLD AUTO: 9.4 K/UL (ref 1.8–7.7)
NEUTROPHILS # BLD AUTO: 9.4 K/UL (ref 1.8–7.7)
NEUTROPHILS NFR BLD: 56.5 % (ref 38–73)
NEUTROPHILS NFR BLD: 57.8 % (ref 38–73)
NEUTROPHILS NFR BLD: 59.5 % (ref 38–73)
NEUTROPHILS NFR BLD: 61.5 % (ref 38–73)
NEUTROPHILS NFR BLD: 62.3 % (ref 38–73)
NEUTROPHILS NFR BLD: 62.3 % (ref 38–73)
NEUTROPHILS NFR BLD: 63.4 % (ref 38–73)
NEUTROPHILS NFR BLD: 63.7 % (ref 38–73)
NEUTROPHILS NFR BLD: 63.8 % (ref 38–73)
NEUTROPHILS NFR BLD: 63.8 % (ref 38–73)
NEUTROPHILS NFR BLD: 63.9 % (ref 38–73)
NEUTROPHILS NFR BLD: 64.7 % (ref 38–73)
NEUTROPHILS NFR BLD: 65.1 % (ref 38–73)
NEUTROPHILS NFR BLD: 68.1 % (ref 38–73)
NEUTROPHILS NFR BLD: 68.4 % (ref 38–73)
NEUTROPHILS NFR BLD: 68.4 % (ref 38–73)
NEUTROPHILS NFR BLD: 68.5 % (ref 38–73)
NEUTROPHILS NFR BLD: 69.8 % (ref 38–73)
NEUTROPHILS NFR BLD: 70.7 % (ref 38–73)
NEUTROPHILS NFR BLD: 71 % (ref 38–73)
NEUTROPHILS NFR BLD: 71 % (ref 38–73)
NEUTROPHILS NFR BLD: 72 % (ref 38–73)
NEUTROPHILS NFR BLD: 72.2 % (ref 38–73)
NEUTROPHILS NFR BLD: 75.5 % (ref 38–73)
NEUTROPHILS NFR BLD: 75.7 % (ref 38–73)
NEUTROPHILS NFR BLD: 76.1 % (ref 38–73)
NEUTROPHILS NFR BLD: 79 % (ref 38–73)
NEUTROPHILS NFR BLD: 81.4 % (ref 38–73)
NEUTROPHILS NFR BLD: 83.6 % (ref 38–73)
NITRITE UR QL STRIP: NEGATIVE
NITRITE UR QL STRIP: NEGATIVE
NRBC BLD-RTO: 0 /100 WBC
NRBC BLD-RTO: 1 /100 WBC
NUM UNITS TRANS PACKED RBC: NORMAL
OPIATES UR QL SCN: NORMAL
OPIATES UR QL SCN: NORMAL
OVALOCYTES BLD QL SMEAR: ABNORMAL
PATH REV BLD -IMP: NORMAL
PCP UR QL SCN>25 NG/ML: NEGATIVE
PCP UR QL SCN>25 NG/ML: NEGATIVE
PH UR STRIP: 5 [PH] (ref 5–8)
PH UR STRIP: 6 [PH] (ref 5–8)
PHOSPHATE SERPL-MCNC: 2.5 MG/DL (ref 2.7–4.5)
PHOSPHATE SERPL-MCNC: 2.8 MG/DL (ref 2.7–4.5)
PHOSPHATE SERPL-MCNC: 2.9 MG/DL (ref 2.7–4.5)
PHOSPHATE SERPL-MCNC: 2.9 MG/DL (ref 2.7–4.5)
PHOSPHATE SERPL-MCNC: 3 MG/DL (ref 2.7–4.5)
PHOSPHATE SERPL-MCNC: 3 MG/DL (ref 2.7–4.5)
PHOSPHATE SERPL-MCNC: 3.2 MG/DL (ref 2.7–4.5)
PHOSPHATE SERPL-MCNC: 3.4 MG/DL (ref 2.7–4.5)
PHOSPHATE SERPL-MCNC: 3.5 MG/DL (ref 2.7–4.5)
PHOSPHATE SERPL-MCNC: 3.6 MG/DL (ref 2.7–4.5)
PHOSPHATE SERPL-MCNC: 3.6 MG/DL (ref 2.7–4.5)
PHOSPHATE SERPL-MCNC: 6.8 MG/DL (ref 2.7–4.5)
PLATELET # BLD AUTO: 276 K/UL (ref 150–350)
PLATELET # BLD AUTO: 298 K/UL (ref 150–350)
PLATELET # BLD AUTO: 309 K/UL (ref 150–350)
PLATELET # BLD AUTO: 317 K/UL (ref 150–350)
PLATELET # BLD AUTO: 318 K/UL (ref 150–350)
PLATELET # BLD AUTO: 326 K/UL (ref 150–350)
PLATELET # BLD AUTO: 327 K/UL (ref 150–350)
PLATELET # BLD AUTO: 330 K/UL (ref 150–350)
PLATELET # BLD AUTO: 331 K/UL (ref 150–350)
PLATELET # BLD AUTO: 345 K/UL (ref 150–350)
PLATELET # BLD AUTO: 360 K/UL (ref 150–350)
PLATELET # BLD AUTO: 362 K/UL (ref 150–350)
PLATELET # BLD AUTO: 390 K/UL (ref 150–350)
PLATELET # BLD AUTO: 396 K/UL (ref 150–350)
PLATELET # BLD AUTO: 407 K/UL (ref 150–350)
PLATELET # BLD AUTO: 414 K/UL (ref 150–350)
PLATELET # BLD AUTO: 419 K/UL (ref 150–350)
PLATELET # BLD AUTO: 425 K/UL (ref 150–350)
PLATELET # BLD AUTO: 449 K/UL (ref 150–350)
PLATELET # BLD AUTO: 451 K/UL (ref 150–350)
PLATELET # BLD AUTO: 457 K/UL (ref 150–350)
PLATELET # BLD AUTO: 457 K/UL (ref 150–350)
PLATELET # BLD AUTO: 458 K/UL (ref 150–350)
PLATELET # BLD AUTO: 459 K/UL (ref 150–350)
PLATELET # BLD AUTO: 485 K/UL (ref 150–350)
PLATELET # BLD AUTO: 488 K/UL (ref 150–350)
PLATELET # BLD AUTO: 520 K/UL (ref 150–350)
PLATELET BLD QL SMEAR: ABNORMAL
PMV BLD AUTO: 10 FL (ref 9.2–12.9)
PMV BLD AUTO: 10.1 FL (ref 9.2–12.9)
PMV BLD AUTO: 10.2 FL (ref 9.2–12.9)
PMV BLD AUTO: 10.4 FL (ref 9.2–12.9)
PMV BLD AUTO: 10.4 FL (ref 9.2–12.9)
PMV BLD AUTO: 10.5 FL (ref 9.2–12.9)
PMV BLD AUTO: 10.5 FL (ref 9.2–12.9)
PMV BLD AUTO: 9 FL (ref 9.2–12.9)
PMV BLD AUTO: 9.1 FL (ref 9.2–12.9)
PMV BLD AUTO: 9.2 FL (ref 9.2–12.9)
PMV BLD AUTO: 9.3 FL (ref 9.2–12.9)
PMV BLD AUTO: 9.5 FL (ref 9.2–12.9)
PMV BLD AUTO: 9.6 FL (ref 9.2–12.9)
PMV BLD AUTO: 9.7 FL (ref 9.2–12.9)
PMV BLD AUTO: 9.7 FL (ref 9.2–12.9)
PMV BLD AUTO: 9.8 FL (ref 9.2–12.9)
PMV BLD AUTO: 9.8 FL (ref 9.2–12.9)
PMV BLD AUTO: 9.9 FL (ref 9.2–12.9)
POCT GLUCOSE: 143 MG/DL (ref 70–110)
POCT GLUCOSE: 144 MG/DL (ref 70–110)
POCT GLUCOSE: 169 MG/DL (ref 70–110)
POCT GLUCOSE: 175 MG/DL (ref 70–110)
POCT GLUCOSE: 176 MG/DL (ref 70–110)
POCT GLUCOSE: 179 MG/DL (ref 70–110)
POCT GLUCOSE: 187 MG/DL (ref 70–110)
POCT GLUCOSE: 191 MG/DL (ref 70–110)
POCT GLUCOSE: 195 MG/DL (ref 70–110)
POCT GLUCOSE: 197 MG/DL (ref 70–110)
POCT GLUCOSE: 197 MG/DL (ref 70–110)
POCT GLUCOSE: 198 MG/DL (ref 70–110)
POCT GLUCOSE: 199 MG/DL (ref 70–110)
POCT GLUCOSE: 202 MG/DL (ref 70–110)
POCT GLUCOSE: 202 MG/DL (ref 70–110)
POCT GLUCOSE: 209 MG/DL (ref 70–110)
POCT GLUCOSE: 210 MG/DL (ref 70–110)
POCT GLUCOSE: 211 MG/DL (ref 70–110)
POCT GLUCOSE: 212 MG/DL (ref 70–110)
POCT GLUCOSE: 214 MG/DL (ref 70–110)
POCT GLUCOSE: 217 MG/DL (ref 70–110)
POCT GLUCOSE: 219 MG/DL (ref 70–110)
POCT GLUCOSE: 219 MG/DL (ref 70–110)
POCT GLUCOSE: 221 MG/DL (ref 70–110)
POCT GLUCOSE: 224 MG/DL (ref 70–110)
POCT GLUCOSE: 224 MG/DL (ref 70–110)
POCT GLUCOSE: 226 MG/DL (ref 70–110)
POCT GLUCOSE: 227 MG/DL (ref 70–110)
POCT GLUCOSE: 229 MG/DL (ref 70–110)
POCT GLUCOSE: 232 MG/DL (ref 70–110)
POCT GLUCOSE: 234 MG/DL (ref 70–110)
POCT GLUCOSE: 236 MG/DL (ref 70–110)
POCT GLUCOSE: 239 MG/DL (ref 70–110)
POCT GLUCOSE: 240 MG/DL (ref 70–110)
POCT GLUCOSE: 242 MG/DL (ref 70–110)
POCT GLUCOSE: 246 MG/DL (ref 70–110)
POCT GLUCOSE: 246 MG/DL (ref 70–110)
POCT GLUCOSE: 249 MG/DL (ref 70–110)
POCT GLUCOSE: 249 MG/DL (ref 70–110)
POCT GLUCOSE: 250 MG/DL (ref 70–110)
POCT GLUCOSE: 251 MG/DL (ref 70–110)
POCT GLUCOSE: 251 MG/DL (ref 70–110)
POCT GLUCOSE: 253 MG/DL (ref 70–110)
POCT GLUCOSE: 255 MG/DL (ref 70–110)
POCT GLUCOSE: 256 MG/DL (ref 70–110)
POCT GLUCOSE: 259 MG/DL (ref 70–110)
POCT GLUCOSE: 261 MG/DL (ref 70–110)
POCT GLUCOSE: 261 MG/DL (ref 70–110)
POCT GLUCOSE: 269 MG/DL (ref 70–110)
POCT GLUCOSE: 271 MG/DL (ref 70–110)
POCT GLUCOSE: 274 MG/DL (ref 70–110)
POCT GLUCOSE: 277 MG/DL (ref 70–110)
POCT GLUCOSE: 279 MG/DL (ref 70–110)
POCT GLUCOSE: 286 MG/DL (ref 70–110)
POCT GLUCOSE: 287 MG/DL (ref 70–110)
POCT GLUCOSE: 291 MG/DL (ref 70–110)
POCT GLUCOSE: 297 MG/DL (ref 70–110)
POCT GLUCOSE: 298 MG/DL (ref 70–110)
POCT GLUCOSE: 300 MG/DL (ref 70–110)
POCT GLUCOSE: 302 MG/DL (ref 70–110)
POCT GLUCOSE: 303 MG/DL (ref 70–110)
POCT GLUCOSE: 303 MG/DL (ref 70–110)
POCT GLUCOSE: 304 MG/DL (ref 70–110)
POCT GLUCOSE: 304 MG/DL (ref 70–110)
POCT GLUCOSE: 309 MG/DL (ref 70–110)
POCT GLUCOSE: 310 MG/DL (ref 70–110)
POCT GLUCOSE: 314 MG/DL (ref 70–110)
POCT GLUCOSE: 314 MG/DL (ref 70–110)
POCT GLUCOSE: 318 MG/DL (ref 70–110)
POCT GLUCOSE: 321 MG/DL (ref 70–110)
POCT GLUCOSE: 325 MG/DL (ref 70–110)
POCT GLUCOSE: 328 MG/DL (ref 70–110)
POCT GLUCOSE: 329 MG/DL (ref 70–110)
POCT GLUCOSE: 333 MG/DL (ref 70–110)
POCT GLUCOSE: 333 MG/DL (ref 70–110)
POCT GLUCOSE: 335 MG/DL (ref 70–110)
POCT GLUCOSE: 340 MG/DL (ref 70–110)
POCT GLUCOSE: 341 MG/DL (ref 70–110)
POCT GLUCOSE: 343 MG/DL (ref 70–110)
POCT GLUCOSE: 347 MG/DL (ref 70–110)
POCT GLUCOSE: 394 MG/DL (ref 70–110)
POCT GLUCOSE: 398 MG/DL (ref 70–110)
POIKILOCYTOSIS BLD QL SMEAR: SLIGHT
POLYCHROMASIA BLD QL SMEAR: ABNORMAL
POTASSIUM SERPL-SCNC: 2.9 MMOL/L (ref 3.5–5.1)
POTASSIUM SERPL-SCNC: 3 MMOL/L (ref 3.5–5.1)
POTASSIUM SERPL-SCNC: 3.3 MMOL/L (ref 3.5–5.1)
POTASSIUM SERPL-SCNC: 3.8 MMOL/L (ref 3.5–5.1)
POTASSIUM SERPL-SCNC: 3.9 MMOL/L (ref 3.5–5.1)
POTASSIUM SERPL-SCNC: 4.1 MMOL/L (ref 3.5–5.1)
POTASSIUM SERPL-SCNC: 4.1 MMOL/L (ref 3.5–5.1)
POTASSIUM SERPL-SCNC: 4.2 MMOL/L (ref 3.5–5.1)
POTASSIUM SERPL-SCNC: 4.2 MMOL/L (ref 3.5–5.1)
POTASSIUM SERPL-SCNC: 4.3 MMOL/L (ref 3.5–5.1)
POTASSIUM SERPL-SCNC: 4.3 MMOL/L (ref 3.5–5.1)
POTASSIUM SERPL-SCNC: 4.4 MMOL/L (ref 3.5–5.1)
POTASSIUM SERPL-SCNC: 4.4 MMOL/L (ref 3.5–5.1)
POTASSIUM SERPL-SCNC: 4.9 MMOL/L (ref 3.5–5.1)
POTASSIUM SERPL-SCNC: 5 MMOL/L (ref 3.5–5.1)
PROT SERPL-MCNC: 5.2 G/DL (ref 6–8.4)
PROT SERPL-MCNC: 5.2 G/DL (ref 6–8.4)
PROT SERPL-MCNC: 5.4 G/DL (ref 6–8.4)
PROT SERPL-MCNC: 5.5 G/DL (ref 6–8.4)
PROT SERPL-MCNC: 5.6 G/DL (ref 6–8.4)
PROT SERPL-MCNC: 5.7 G/DL (ref 6–8.4)
PROT SERPL-MCNC: 5.8 G/DL (ref 6–8.4)
PROT SERPL-MCNC: 5.8 G/DL (ref 6–8.4)
PROT SERPL-MCNC: 5.9 G/DL (ref 6–8.4)
PROT SERPL-MCNC: 6 G/DL (ref 6–8.4)
PROT SERPL-MCNC: 6.2 G/DL (ref 6–8.4)
PROT SERPL-MCNC: 6.5 G/DL (ref 6–8.4)
PROT SERPL-MCNC: 7 G/DL (ref 6–8.4)
PROT UR QL STRIP: ABNORMAL
PROT UR QL STRIP: NEGATIVE
PROTHROMBIN TIME: 10.1 SEC (ref 9–12.5)
RBC # BLD AUTO: 1.99 M/UL (ref 4–5.4)
RBC # BLD AUTO: 2.06 M/UL (ref 4–5.4)
RBC # BLD AUTO: 2.1 M/UL (ref 4–5.4)
RBC # BLD AUTO: 2.15 M/UL (ref 4–5.4)
RBC # BLD AUTO: 2.2 M/UL (ref 4–5.4)
RBC # BLD AUTO: 2.21 M/UL (ref 4–5.4)
RBC # BLD AUTO: 2.22 M/UL (ref 4–5.4)
RBC # BLD AUTO: 2.22 M/UL (ref 4–5.4)
RBC # BLD AUTO: 2.23 M/UL (ref 4–5.4)
RBC # BLD AUTO: 2.25 M/UL (ref 4–5.4)
RBC # BLD AUTO: 2.27 M/UL (ref 4–5.4)
RBC # BLD AUTO: 2.29 M/UL (ref 4–5.4)
RBC # BLD AUTO: 2.3 M/UL (ref 4–5.4)
RBC # BLD AUTO: 2.36 M/UL (ref 4–5.4)
RBC # BLD AUTO: 2.36 M/UL (ref 4–5.4)
RBC # BLD AUTO: 2.44 M/UL (ref 4–5.4)
RBC # BLD AUTO: 2.49 M/UL (ref 4–5.4)
RBC # BLD AUTO: 2.53 M/UL (ref 4–5.4)
RBC # BLD AUTO: 2.53 M/UL (ref 4–5.4)
RBC # BLD AUTO: 2.54 M/UL (ref 4–5.4)
RBC # BLD AUTO: 2.65 M/UL (ref 4–5.4)
RBC # BLD AUTO: 2.65 M/UL (ref 4–5.4)
RBC # BLD AUTO: 2.66 M/UL (ref 4–5.4)
RBC # BLD AUTO: 2.67 M/UL (ref 4–5.4)
RBC # BLD AUTO: 2.75 M/UL (ref 4–5.4)
RBC # BLD AUTO: 2.75 M/UL (ref 4–5.4)
RBC # BLD AUTO: 2.76 M/UL (ref 4–5.4)
RBC # BLD AUTO: 2.77 M/UL (ref 4–5.4)
RBC # BLD AUTO: 2.82 M/UL (ref 4–5.4)
RBC #/AREA URNS AUTO: 2 /HPF (ref 0–4)
RETICS/RBC NFR AUTO: 12.4 % (ref 0.5–2.5)
RETICS/RBC NFR AUTO: 4.9 % (ref 0.5–2.5)
SARS-COV-2 RDRP RESP QL NAA+PROBE: NEGATIVE
SARS-COV-2 RDRP RESP QL NAA+PROBE: NEGATIVE
SARS-COV-2 RNA RESP QL NAA+PROBE: NOT DETECTED
SATURATED IRON: 4 % (ref 20–50)
SODIUM SERPL-SCNC: 128 MMOL/L (ref 136–145)
SODIUM SERPL-SCNC: 129 MMOL/L (ref 136–145)
SODIUM SERPL-SCNC: 131 MMOL/L (ref 136–145)
SODIUM SERPL-SCNC: 131 MMOL/L (ref 136–145)
SODIUM SERPL-SCNC: 132 MMOL/L (ref 136–145)
SODIUM SERPL-SCNC: 133 MMOL/L (ref 136–145)
SODIUM SERPL-SCNC: 134 MMOL/L (ref 136–145)
SODIUM SERPL-SCNC: 135 MMOL/L (ref 136–145)
SODIUM SERPL-SCNC: 136 MMOL/L (ref 136–145)
SODIUM SERPL-SCNC: 137 MMOL/L (ref 136–145)
SODIUM SERPL-SCNC: 139 MMOL/L (ref 136–145)
SODIUM SERPL-SCNC: 139 MMOL/L (ref 136–145)
SODIUM SERPL-SCNC: 140 MMOL/L (ref 136–145)
SP GR UR STRIP: 1.01 (ref 1–1.03)
SP GR UR STRIP: 1.02 (ref 1–1.03)
SPHEROCYTES BLD QL SMEAR: ABNORMAL
SQUAMOUS #/AREA URNS AUTO: 1 /HPF
TARGETS BLD QL SMEAR: ABNORMAL
TOTAL IRON BINDING CAPACITY: 451 UG/DL (ref 250–450)
TOXICOLOGY INFORMATION: NORMAL
TOXICOLOGY INFORMATION: NORMAL
TRANS ERYTHROCYTES VOL PATIENT: NORMAL ML
TRANSFERRIN SERPL-MCNC: 305 MG/DL (ref 200–375)
TSH SERPL DL<=0.005 MIU/L-ACNC: 3.16 UIU/ML (ref 0.4–4)
URATE CRY UR QL COMP ASSIST: ABNORMAL
URN SPEC COLLECT METH UR: ABNORMAL
URN SPEC COLLECT METH UR: ABNORMAL
VIT B12 SERPL-MCNC: 192 PG/ML (ref 210–950)
WBC # BLD AUTO: 11.44 K/UL (ref 3.9–12.7)
WBC # BLD AUTO: 11.79 K/UL (ref 3.9–12.7)
WBC # BLD AUTO: 11.81 K/UL (ref 3.9–12.7)
WBC # BLD AUTO: 12.17 K/UL (ref 3.9–12.7)
WBC # BLD AUTO: 12.42 K/UL (ref 3.9–12.7)
WBC # BLD AUTO: 12.6 K/UL (ref 3.9–12.7)
WBC # BLD AUTO: 12.84 K/UL (ref 3.9–12.7)
WBC # BLD AUTO: 12.9 K/UL (ref 3.9–12.7)
WBC # BLD AUTO: 13 K/UL (ref 3.9–12.7)
WBC # BLD AUTO: 13.11 K/UL (ref 3.9–12.7)
WBC # BLD AUTO: 13.32 K/UL (ref 3.9–12.7)
WBC # BLD AUTO: 13.32 K/UL (ref 3.9–12.7)
WBC # BLD AUTO: 13.54 K/UL (ref 3.9–12.7)
WBC # BLD AUTO: 13.87 K/UL (ref 3.9–12.7)
WBC # BLD AUTO: 14.53 K/UL (ref 3.9–12.7)
WBC # BLD AUTO: 14.73 K/UL (ref 3.9–12.7)
WBC # BLD AUTO: 14.73 K/UL (ref 3.9–12.7)
WBC # BLD AUTO: 14.99 K/UL (ref 3.9–12.7)
WBC # BLD AUTO: 14.99 K/UL (ref 3.9–12.7)
WBC # BLD AUTO: 15.07 K/UL (ref 3.9–12.7)
WBC # BLD AUTO: 17.64 K/UL (ref 3.9–12.7)
WBC # BLD AUTO: 17.67 K/UL (ref 3.9–12.7)
WBC # BLD AUTO: 17.94 K/UL (ref 3.9–12.7)
WBC # BLD AUTO: 18.38 K/UL (ref 3.9–12.7)
WBC # BLD AUTO: 18.86 K/UL (ref 3.9–12.7)
WBC # BLD AUTO: 20.71 K/UL (ref 3.9–12.7)
WBC # BLD AUTO: 22.21 K/UL (ref 3.9–12.7)
WBC # BLD AUTO: 24.29 K/UL (ref 3.9–12.7)
WBC # BLD AUTO: 9.97 K/UL (ref 3.9–12.7)
WBC #/AREA URNS AUTO: 6 /HPF (ref 0–5)
YEAST UR QL AUTO: ABNORMAL

## 2020-01-01 PROCEDURE — 80053 COMPREHEN METABOLIC PANEL: CPT

## 2020-01-01 PROCEDURE — 99225 PR SUBSEQUENT OBSERVATION CARE,LEVEL II: ICD-10-PCS | Mod: ,,, | Performed by: INTERNAL MEDICINE

## 2020-01-01 PROCEDURE — 99215 PR OFFICE/OUTPT VISIT, EST, LEVL V, 40-54 MIN: ICD-10-PCS | Mod: ,,, | Performed by: INTERNAL MEDICINE

## 2020-01-01 PROCEDURE — 96375 TX/PRO/DX INJ NEW DRUG ADDON: CPT | Mod: 59 | Performed by: EMERGENCY MEDICINE

## 2020-01-01 PROCEDURE — 11000001 HC ACUTE MED/SURG PRIVATE ROOM

## 2020-01-01 PROCEDURE — 99232 PR SUBSEQUENT HOSPITAL CARE,LEVL II: ICD-10-PCS | Mod: ,,, | Performed by: INTERNAL MEDICINE

## 2020-01-01 PROCEDURE — 99213 OFFICE O/P EST LOW 20 MIN: CPT | Mod: ,,, | Performed by: PSYCHIATRY & NEUROLOGY

## 2020-01-01 PROCEDURE — 93005 ELECTROCARDIOGRAM TRACING: CPT

## 2020-01-01 PROCEDURE — 99232 PR SUBSEQUENT HOSPITAL CARE,LEVL II: ICD-10-PCS | Mod: ,,, | Performed by: HOSPITALIST

## 2020-01-01 PROCEDURE — 99232 SBSQ HOSP IP/OBS MODERATE 35: CPT | Mod: AF,HB,S$PBB, | Performed by: PSYCHIATRY & NEUROLOGY

## 2020-01-01 PROCEDURE — 25000003 PHARM REV CODE 250: Performed by: HOSPITALIST

## 2020-01-01 PROCEDURE — U0002 COVID-19 LAB TEST NON-CDC: HCPCS

## 2020-01-01 PROCEDURE — 63600175 PHARM REV CODE 636 W HCPCS: Performed by: HOSPITALIST

## 2020-01-01 PROCEDURE — 82728 ASSAY OF FERRITIN: CPT

## 2020-01-01 PROCEDURE — 85025 COMPLETE CBC W/AUTO DIFF WBC: CPT

## 2020-01-01 PROCEDURE — 99204 OFFICE O/P NEW MOD 45 MIN: CPT | Mod: ,,, | Performed by: INTERNAL MEDICINE

## 2020-01-01 PROCEDURE — 99232 SBSQ HOSP IP/OBS MODERATE 35: CPT | Mod: ,,, | Performed by: HOSPITALIST

## 2020-01-01 PROCEDURE — 96372 THER/PROPH/DIAG INJ SC/IM: CPT | Mod: 59 | Performed by: EMERGENCY MEDICINE

## 2020-01-01 PROCEDURE — G0378 HOSPITAL OBSERVATION PER HR: HCPCS

## 2020-01-01 PROCEDURE — 93010 ELECTROCARDIOGRAM REPORT: CPT | Mod: ,,, | Performed by: INTERNAL MEDICINE

## 2020-01-01 PROCEDURE — 25000003 PHARM REV CODE 250: Performed by: PSYCHIATRY & NEUROLOGY

## 2020-01-01 PROCEDURE — 25000003 PHARM REV CODE 250: Performed by: INTERNAL MEDICINE

## 2020-01-01 PROCEDURE — 99232 PR SUBSEQUENT HOSPITAL CARE,LEVL II: ICD-10-PCS | Mod: AF,HB,S$PBB, | Performed by: PSYCHIATRY & NEUROLOGY

## 2020-01-01 PROCEDURE — 99226 PR SUBSEQUENT OBSERVATION CARE,LEVEL III: ICD-10-PCS | Mod: ,,, | Performed by: HOSPITALIST

## 2020-01-01 PROCEDURE — 36415 COLL VENOUS BLD VENIPUNCTURE: CPT

## 2020-01-01 PROCEDURE — 99232 SBSQ HOSP IP/OBS MODERATE 35: CPT | Mod: ,,, | Performed by: INTERNAL MEDICINE

## 2020-01-01 PROCEDURE — 63600175 PHARM REV CODE 636 W HCPCS: Performed by: INTERNAL MEDICINE

## 2020-01-01 PROCEDURE — 99233 PR SUBSEQUENT HOSPITAL CARE,LEVL III: ICD-10-PCS | Mod: ,,, | Performed by: HOSPITALIST

## 2020-01-01 PROCEDURE — 94761 N-INVAS EAR/PLS OXIMETRY MLT: CPT

## 2020-01-01 PROCEDURE — 84100 ASSAY OF PHOSPHORUS: CPT

## 2020-01-01 PROCEDURE — 83605 ASSAY OF LACTIC ACID: CPT

## 2020-01-01 PROCEDURE — 85025 COMPLETE CBC W/AUTO DIFF WBC: CPT | Mod: 91

## 2020-01-01 PROCEDURE — 83690 ASSAY OF LIPASE: CPT

## 2020-01-01 PROCEDURE — 99233 PR SUBSEQUENT HOSPITAL CARE,LEVL III: ICD-10-PCS | Mod: AF,HB,S$PBB, | Performed by: PSYCHIATRY & NEUROLOGY

## 2020-01-01 PROCEDURE — 99233 SBSQ HOSP IP/OBS HIGH 50: CPT | Mod: AF,HB,, | Performed by: PSYCHIATRY & NEUROLOGY

## 2020-01-01 PROCEDURE — P9021 RED BLOOD CELLS UNIT: HCPCS

## 2020-01-01 PROCEDURE — 83735 ASSAY OF MAGNESIUM: CPT

## 2020-01-01 PROCEDURE — 93010 EKG 12-LEAD: ICD-10-PCS | Mod: 59,76,, | Performed by: INTERNAL MEDICINE

## 2020-01-01 PROCEDURE — 99497 PR ADVNCD CARE PLAN 30 MIN: ICD-10-PCS | Mod: ,,, | Performed by: CLINICAL NURSE SPECIALIST

## 2020-01-01 PROCEDURE — 25000003 PHARM REV CODE 250: Performed by: PHYSICIAN ASSISTANT

## 2020-01-01 PROCEDURE — 99233 PR SUBSEQUENT HOSPITAL CARE,LEVL III: ICD-10-PCS | Mod: ,,, | Performed by: INTERNAL MEDICINE

## 2020-01-01 PROCEDURE — 96376 TX/PRO/DX INJ SAME DRUG ADON: CPT | Performed by: EMERGENCY MEDICINE

## 2020-01-01 PROCEDURE — 99233 PR SUBSEQUENT HOSPITAL CARE,LEVL III: ICD-10-PCS | Mod: AF,HB,, | Performed by: PSYCHIATRY & NEUROLOGY

## 2020-01-01 PROCEDURE — 82550 ASSAY OF CK (CPK): CPT

## 2020-01-01 PROCEDURE — 36430 TRANSFUSION BLD/BLD COMPNT: CPT

## 2020-01-01 PROCEDURE — 99233 PR SUBSEQUENT HOSPITAL CARE,LEVL III: ICD-10-PCS | Mod: ,,, | Performed by: CLINICAL NURSE SPECIALIST

## 2020-01-01 PROCEDURE — 99233 SBSQ HOSP IP/OBS HIGH 50: CPT | Mod: AF,HB,S$PBB, | Performed by: PSYCHIATRY & NEUROLOGY

## 2020-01-01 PROCEDURE — 85610 PROTHROMBIN TIME: CPT

## 2020-01-01 PROCEDURE — 93010 EKG 12-LEAD: ICD-10-PCS | Mod: 77,,, | Performed by: INTERNAL MEDICINE

## 2020-01-01 PROCEDURE — 80053 COMPREHEN METABOLIC PANEL: CPT | Mod: 91

## 2020-01-01 PROCEDURE — 93010 EKG 12-LEAD: ICD-10-PCS | Mod: ,,, | Performed by: INTERNAL MEDICINE

## 2020-01-01 PROCEDURE — 83615 LACTATE (LD) (LDH) ENZYME: CPT

## 2020-01-01 PROCEDURE — 99497 ADVNCD CARE PLAN 30 MIN: CPT | Mod: ,,, | Performed by: CLINICAL NURSE SPECIALIST

## 2020-01-01 PROCEDURE — 99231 PR SUBSEQUENT HOSPITAL CARE,LEVL I: ICD-10-PCS | Mod: ,,, | Performed by: INTERNAL MEDICINE

## 2020-01-01 PROCEDURE — 99215 OFFICE O/P EST HI 40 MIN: CPT | Mod: ,,, | Performed by: INTERNAL MEDICINE

## 2020-01-01 PROCEDURE — 99233 SBSQ HOSP IP/OBS HIGH 50: CPT | Mod: ,,, | Performed by: CLINICAL NURSE SPECIALIST

## 2020-01-01 PROCEDURE — C9113 INJ PANTOPRAZOLE SODIUM, VIA: HCPCS | Performed by: HOSPITALIST

## 2020-01-01 PROCEDURE — 99220 PR INITIAL OBSERVATION CARE,LEVL III: CPT | Mod: ,,, | Performed by: HOSPITALIST

## 2020-01-01 PROCEDURE — 96375 TX/PRO/DX INJ NEW DRUG ADDON: CPT | Performed by: EMERGENCY MEDICINE

## 2020-01-01 PROCEDURE — 99285 PR EMERGENCY DEPT VISIT,LEVEL V: ICD-10-PCS | Mod: ,,, | Performed by: PHYSICIAN ASSISTANT

## 2020-01-01 PROCEDURE — 85045 AUTOMATED RETICULOCYTE COUNT: CPT

## 2020-01-01 PROCEDURE — 63600175 PHARM REV CODE 636 W HCPCS: Performed by: PSYCHIATRY & NEUROLOGY

## 2020-01-01 PROCEDURE — 82248 BILIRUBIN DIRECT: CPT

## 2020-01-01 PROCEDURE — 99232 PR SUBSEQUENT HOSPITAL CARE,LEVL II: ICD-10-PCS | Mod: AF,HB,, | Performed by: PSYCHIATRY & NEUROLOGY

## 2020-01-01 PROCEDURE — 99226 PR SUBSEQUENT OBSERVATION CARE,LEVEL III: CPT | Mod: ,,, | Performed by: HOSPITALIST

## 2020-01-01 PROCEDURE — 83540 ASSAY OF IRON: CPT

## 2020-01-01 PROCEDURE — 83010 ASSAY OF HAPTOGLOBIN QUANT: CPT

## 2020-01-01 PROCEDURE — 36410 VNPNXR 3YR/> PHY/QHP DX/THER: CPT

## 2020-01-01 PROCEDURE — 82607 VITAMIN B-12: CPT

## 2020-01-01 PROCEDURE — 81001 URINALYSIS AUTO W/SCOPE: CPT

## 2020-01-01 PROCEDURE — 86920 COMPATIBILITY TEST SPIN: CPT

## 2020-01-01 PROCEDURE — 93010 ELECTROCARDIOGRAM REPORT: CPT | Mod: 59,76,, | Performed by: INTERNAL MEDICINE

## 2020-01-01 PROCEDURE — P9016 RBC LEUKOCYTES REDUCED: HCPCS

## 2020-01-01 PROCEDURE — 99231 SBSQ HOSP IP/OBS SF/LOW 25: CPT | Mod: ,,, | Performed by: INTERNAL MEDICINE

## 2020-01-01 PROCEDURE — 99233 SBSQ HOSP IP/OBS HIGH 50: CPT | Mod: ,,, | Performed by: INTERNAL MEDICINE

## 2020-01-01 PROCEDURE — 82746 ASSAY OF FOLIC ACID SERUM: CPT

## 2020-01-01 PROCEDURE — 99204 OFFICE O/P NEW MOD 45 MIN: CPT | Mod: ,,, | Performed by: SURGERY

## 2020-01-01 PROCEDURE — 25000003 PHARM REV CODE 250: Performed by: FAMILY MEDICINE

## 2020-01-01 PROCEDURE — 87070 CULTURE OTHR SPECIMN AEROBIC: CPT

## 2020-01-01 PROCEDURE — 99900035 HC TECH TIME PER 15 MIN (STAT)

## 2020-01-01 PROCEDURE — 99239 PR HOSPITAL DISCHARGE DAY,>30 MIN: ICD-10-PCS | Mod: ,,, | Performed by: INTERNAL MEDICINE

## 2020-01-01 PROCEDURE — 99232 SBSQ HOSP IP/OBS MODERATE 35: CPT | Mod: AF,HB,, | Performed by: PSYCHIATRY & NEUROLOGY

## 2020-01-01 PROCEDURE — 99204 PR OFFICE/OUTPT VISIT, NEW, LEVL IV, 45-59 MIN: ICD-10-PCS | Mod: ,,, | Performed by: SURGERY

## 2020-01-01 PROCEDURE — 86901 BLOOD TYPING SEROLOGIC RH(D): CPT

## 2020-01-01 PROCEDURE — 99356 PR PROLONGED SERV,INPATIENT,1ST HR: CPT | Mod: ,,, | Performed by: CLINICAL NURSE SPECIALIST

## 2020-01-01 PROCEDURE — 93010 ELECTROCARDIOGRAM REPORT: CPT | Mod: 77,,, | Performed by: INTERNAL MEDICINE

## 2020-01-01 PROCEDURE — 87040 BLOOD CULTURE FOR BACTERIA: CPT

## 2020-01-01 PROCEDURE — 80329 ANALGESICS NON-OPIOID 1 OR 2: CPT

## 2020-01-01 PROCEDURE — 99285 EMERGENCY DEPT VISIT HI MDM: CPT | Mod: 25

## 2020-01-01 PROCEDURE — 99231 PR SUBSEQUENT HOSPITAL CARE,LEVL I: ICD-10-PCS | Mod: AF,HB,, | Performed by: PSYCHIATRY & NEUROLOGY

## 2020-01-01 PROCEDURE — 96376 TX/PRO/DX INJ SAME DRUG ADON: CPT | Mod: 59 | Performed by: EMERGENCY MEDICINE

## 2020-01-01 PROCEDURE — 27000221 HC OXYGEN, UP TO 24 HOURS

## 2020-01-01 PROCEDURE — 83036 HEMOGLOBIN GLYCOSYLATED A1C: CPT

## 2020-01-01 PROCEDURE — 85018 HEMOGLOBIN: CPT

## 2020-01-01 PROCEDURE — 99202 OFFICE O/P NEW SF 15 MIN: CPT | Mod: ,,, | Performed by: PSYCHIATRY & NEUROLOGY

## 2020-01-01 PROCEDURE — 96374 THER/PROPH/DIAG INJ IV PUSH: CPT | Performed by: EMERGENCY MEDICINE

## 2020-01-01 PROCEDURE — 99239 HOSP IP/OBS DSCHRG MGMT >30: CPT | Mod: ,,, | Performed by: INTERNAL MEDICINE

## 2020-01-01 PROCEDURE — 25500020 PHARM REV CODE 255: Performed by: HOSPITALIST

## 2020-01-01 PROCEDURE — 86850 RBC ANTIBODY SCREEN: CPT

## 2020-01-01 PROCEDURE — 99204 PR OFFICE/OUTPT VISIT, NEW, LEVL IV, 45-59 MIN: ICD-10-PCS | Mod: ,,, | Performed by: INTERNAL MEDICINE

## 2020-01-01 PROCEDURE — 84443 ASSAY THYROID STIM HORMONE: CPT

## 2020-01-01 PROCEDURE — 97803 MED NUTRITION INDIV SUBSEQ: CPT

## 2020-01-01 PROCEDURE — 99285 EMERGENCY DEPT VISIT HI MDM: CPT | Mod: ,,, | Performed by: PHYSICIAN ASSISTANT

## 2020-01-01 PROCEDURE — 80307 DRUG TEST PRSMV CHEM ANLYZR: CPT

## 2020-01-01 PROCEDURE — C1751 CATH, INF, PER/CENT/MIDLINE: HCPCS

## 2020-01-01 PROCEDURE — 99233 SBSQ HOSP IP/OBS HIGH 50: CPT | Mod: ,,, | Performed by: HOSPITALIST

## 2020-01-01 PROCEDURE — 99356 PR PROLONGED SERV,INPATIENT,1ST HR: ICD-10-PCS | Mod: ,,, | Performed by: CLINICAL NURSE SPECIALIST

## 2020-01-01 PROCEDURE — 99202 PR OFFICE/OUTPT VISIT, NEW, LEVL II, 15-29 MIN: ICD-10-PCS | Mod: ,,, | Performed by: PSYCHIATRY & NEUROLOGY

## 2020-01-01 PROCEDURE — 86860 RBC ANTIBODY ELUTION: CPT

## 2020-01-01 PROCEDURE — 90792 PR PSYCHIATRIC DIAGNOSTIC EVALUATION W/MEDICAL SERVICES: ICD-10-PCS | Mod: ,,, | Performed by: PSYCHIATRY & NEUROLOGY

## 2020-01-01 PROCEDURE — 85730 THROMBOPLASTIN TIME PARTIAL: CPT

## 2020-01-01 PROCEDURE — 85014 HEMATOCRIT: CPT

## 2020-01-01 PROCEDURE — U0003 INFECTIOUS AGENT DETECTION BY NUCLEIC ACID (DNA OR RNA); SEVERE ACUTE RESPIRATORY SYNDROME CORONAVIRUS 2 (SARS-COV-2) (CORONAVIRUS DISEASE [COVID-19]), AMPLIFIED PROBE TECHNIQUE, MAKING USE OF HIGH THROUGHPUT TECHNOLOGIES AS DESCRIBED BY CMS-2020-01-R: HCPCS

## 2020-01-01 PROCEDURE — 99220 PR INITIAL OBSERVATION CARE,LEVL III: ICD-10-PCS | Mod: ,,, | Performed by: HOSPITALIST

## 2020-01-01 PROCEDURE — 25000003 PHARM REV CODE 250: Performed by: NURSE PRACTITIONER

## 2020-01-01 PROCEDURE — 86880 COOMBS TEST DIRECT: CPT

## 2020-01-01 PROCEDURE — 99225 PR SUBSEQUENT OBSERVATION CARE,LEVEL II: CPT | Mod: ,,, | Performed by: INTERNAL MEDICINE

## 2020-01-01 PROCEDURE — 76937 US GUIDE VASCULAR ACCESS: CPT

## 2020-01-01 PROCEDURE — 25500020 PHARM REV CODE 255: Performed by: STUDENT IN AN ORGANIZED HEALTH CARE EDUCATION/TRAINING PROGRAM

## 2020-01-01 PROCEDURE — 99231 SBSQ HOSP IP/OBS SF/LOW 25: CPT | Mod: AF,HB,, | Performed by: PSYCHIATRY & NEUROLOGY

## 2020-01-01 PROCEDURE — 96374 THER/PROPH/DIAG INJ IV PUSH: CPT | Mod: 59 | Performed by: EMERGENCY MEDICINE

## 2020-01-01 PROCEDURE — 90792 PSYCH DIAG EVAL W/MED SRVCS: CPT | Mod: ,,, | Performed by: PSYCHIATRY & NEUROLOGY

## 2020-01-01 PROCEDURE — 99213 PR OFFICE/OUTPT VISIT, EST, LEVL III, 20-29 MIN: ICD-10-PCS | Mod: ,,, | Performed by: PSYCHIATRY & NEUROLOGY

## 2020-01-01 PROCEDURE — 80320 DRUG SCREEN QUANTALCOHOLS: CPT

## 2020-01-01 PROCEDURE — 81003 URINALYSIS AUTO W/O SCOPE: CPT

## 2020-01-01 RX ORDER — SODIUM CHLORIDE 450 MG/100ML
INJECTION, SOLUTION INTRAVENOUS CONTINUOUS
Status: ACTIVE | OUTPATIENT
Start: 2020-01-01 | End: 2020-01-01

## 2020-01-01 RX ORDER — NALOXONE HCL 0.4 MG/ML
0.4 VIAL (ML) INJECTION
Status: DISCONTINUED | OUTPATIENT
Start: 2020-01-01 | End: 2020-01-01 | Stop reason: HOSPADM

## 2020-01-01 RX ORDER — DIPHENHYDRAMINE HYDROCHLORIDE 50 MG/ML
50 INJECTION INTRAMUSCULAR; INTRAVENOUS EVERY 6 HOURS PRN
Status: DISCONTINUED | OUTPATIENT
Start: 2020-01-01 | End: 2020-01-01 | Stop reason: HOSPADM

## 2020-01-01 RX ORDER — HYDROMORPHONE HYDROCHLORIDE 1 MG/ML
1 INJECTION, SOLUTION INTRAMUSCULAR; INTRAVENOUS; SUBCUTANEOUS EVERY 6 HOURS PRN
Status: DISCONTINUED | OUTPATIENT
Start: 2020-01-01 | End: 2020-01-01

## 2020-01-01 RX ORDER — ARIPIPRAZOLE 5 MG/1
15 TABLET ORAL DAILY
Status: COMPLETED | OUTPATIENT
Start: 2020-01-01 | End: 2020-01-01

## 2020-01-01 RX ORDER — CYANOCOBALAMIN 1000 UG/ML
1000 INJECTION, SOLUTION INTRAMUSCULAR; SUBCUTANEOUS
Status: DISCONTINUED | OUTPATIENT
Start: 2020-08-16 | End: 2020-01-01 | Stop reason: HOSPADM

## 2020-01-01 RX ORDER — METOCLOPRAMIDE HYDROCHLORIDE 5 MG/ML
10 INJECTION INTRAMUSCULAR; INTRAVENOUS EVERY 6 HOURS PRN
Status: DISCONTINUED | OUTPATIENT
Start: 2020-01-01 | End: 2020-01-01 | Stop reason: HOSPADM

## 2020-01-01 RX ORDER — VANCOMYCIN HCL IN 5 % DEXTROSE 1G/250ML
1000 PLASTIC BAG, INJECTION (ML) INTRAVENOUS
Status: DISCONTINUED | OUTPATIENT
Start: 2020-01-01 | End: 2020-01-01

## 2020-01-01 RX ORDER — POTASSIUM CHLORIDE 750 MG/1
30 CAPSULE, EXTENDED RELEASE ORAL ONCE
Status: DISCONTINUED | OUTPATIENT
Start: 2020-01-01 | End: 2020-01-01 | Stop reason: HOSPADM

## 2020-01-01 RX ORDER — LORAZEPAM 2 MG/ML
2 INJECTION INTRAMUSCULAR EVERY 6 HOURS PRN
Status: DISCONTINUED | OUTPATIENT
Start: 2020-01-01 | End: 2020-01-01 | Stop reason: HOSPADM

## 2020-01-01 RX ORDER — OXYCODONE HYDROCHLORIDE 5 MG/1
5 TABLET ORAL EVERY 8 HOURS
Status: DISCONTINUED | OUTPATIENT
Start: 2020-01-01 | End: 2020-01-01

## 2020-01-01 RX ORDER — HYDROMORPHONE HYDROCHLORIDE 1 MG/ML
0.5 INJECTION, SOLUTION INTRAMUSCULAR; INTRAVENOUS; SUBCUTANEOUS EVERY 6 HOURS PRN
Status: DISCONTINUED | OUTPATIENT
Start: 2020-01-01 | End: 2020-01-01

## 2020-01-01 RX ORDER — PROCHLORPERAZINE EDISYLATE 5 MG/ML
5 INJECTION INTRAMUSCULAR; INTRAVENOUS EVERY 6 HOURS PRN
Status: DISCONTINUED | OUTPATIENT
Start: 2020-01-01 | End: 2020-01-01

## 2020-01-01 RX ORDER — POTASSIUM CHLORIDE 20 MEQ/1
40 TABLET, EXTENDED RELEASE ORAL ONCE
Status: DISCONTINUED | OUTPATIENT
Start: 2020-01-01 | End: 2020-01-01 | Stop reason: HOSPADM

## 2020-01-01 RX ORDER — HYDROMORPHONE HYDROCHLORIDE 2 MG/1
4 TABLET ORAL ONCE
Status: COMPLETED | OUTPATIENT
Start: 2020-01-01 | End: 2020-01-01

## 2020-01-01 RX ORDER — ARIPIPRAZOLE 30 MG/1
30 TABLET ORAL DAILY
Status: COMPLETED | OUTPATIENT
Start: 2020-01-01 | End: 2020-01-01

## 2020-01-01 RX ORDER — HYDROCODONE BITARTRATE AND ACETAMINOPHEN 500; 5 MG/1; MG/1
TABLET ORAL
Status: DISCONTINUED | OUTPATIENT
Start: 2020-01-01 | End: 2020-01-01

## 2020-01-01 RX ORDER — IBUPROFEN 200 MG
16 TABLET ORAL
Status: DISCONTINUED | OUTPATIENT
Start: 2020-01-01 | End: 2020-01-01 | Stop reason: HOSPADM

## 2020-01-01 RX ORDER — AMOXICILLIN 250 MG
1 CAPSULE ORAL 2 TIMES DAILY PRN
Status: DISCONTINUED | OUTPATIENT
Start: 2020-01-01 | End: 2020-01-01 | Stop reason: HOSPADM

## 2020-01-01 RX ORDER — HALOPERIDOL 5 MG/ML
5 INJECTION INTRAMUSCULAR EVERY 6 HOURS PRN
Status: DISCONTINUED | OUTPATIENT
Start: 2020-01-01 | End: 2020-01-01 | Stop reason: HOSPADM

## 2020-01-01 RX ORDER — HALOPERIDOL 5 MG/1
5 TABLET ORAL NIGHTLY
Status: DISCONTINUED | OUTPATIENT
Start: 2020-01-01 | End: 2020-01-01

## 2020-01-01 RX ORDER — OLANZAPINE 10 MG/2ML
10 INJECTION, POWDER, FOR SOLUTION INTRAMUSCULAR EVERY 8 HOURS PRN
Status: DISCONTINUED | OUTPATIENT
Start: 2020-01-01 | End: 2020-01-01 | Stop reason: HOSPADM

## 2020-01-01 RX ORDER — HYDROMORPHONE HYDROCHLORIDE 1 MG/ML
0.2 INJECTION, SOLUTION INTRAMUSCULAR; INTRAVENOUS; SUBCUTANEOUS EVERY 4 HOURS PRN
Status: DISCONTINUED | OUTPATIENT
Start: 2020-01-01 | End: 2020-01-01

## 2020-01-01 RX ORDER — PANTOPRAZOLE SODIUM 40 MG/10ML
40 INJECTION, POWDER, LYOPHILIZED, FOR SOLUTION INTRAVENOUS 2 TIMES DAILY
Status: DISCONTINUED | OUTPATIENT
Start: 2020-01-01 | End: 2020-01-01

## 2020-01-01 RX ORDER — SODIUM CHLORIDE 9 MG/ML
INJECTION, SOLUTION INTRAVENOUS CONTINUOUS
Status: ACTIVE | OUTPATIENT
Start: 2020-01-01 | End: 2020-01-01

## 2020-01-01 RX ORDER — LIDOCAINE 50 MG/G
1 PATCH TOPICAL
Status: DISCONTINUED | OUTPATIENT
Start: 2020-01-01 | End: 2020-01-01 | Stop reason: HOSPADM

## 2020-01-01 RX ORDER — SODIUM CHLORIDE 0.9 % (FLUSH) 0.9 %
5 SYRINGE (ML) INJECTION
Status: DISCONTINUED | OUTPATIENT
Start: 2020-01-01 | End: 2020-01-01 | Stop reason: HOSPADM

## 2020-01-01 RX ORDER — CYANOCOBALAMIN 1000 UG/ML
1000 INJECTION, SOLUTION INTRAMUSCULAR; SUBCUTANEOUS DAILY
Status: DISPENSED | OUTPATIENT
Start: 2020-01-01 | End: 2020-01-01

## 2020-01-01 RX ORDER — ACETAMINOPHEN 325 MG/1
650 TABLET ORAL EVERY 4 HOURS PRN
Status: DISCONTINUED | OUTPATIENT
Start: 2020-01-01 | End: 2020-01-01 | Stop reason: HOSPADM

## 2020-01-01 RX ORDER — IBUPROFEN 200 MG
24 TABLET ORAL
Status: DISCONTINUED | OUTPATIENT
Start: 2020-01-01 | End: 2020-01-01 | Stop reason: HOSPADM

## 2020-01-01 RX ORDER — CYANOCOBALAMIN 1000 UG/ML
1000 INJECTION, SOLUTION INTRAMUSCULAR; SUBCUTANEOUS
Status: DISCONTINUED | OUTPATIENT
Start: 2020-01-01 | End: 2020-01-01 | Stop reason: HOSPADM

## 2020-01-01 RX ORDER — HYDROMORPHONE HYDROCHLORIDE 1 MG/ML
0.5 INJECTION, SOLUTION INTRAMUSCULAR; INTRAVENOUS; SUBCUTANEOUS ONCE
Status: DISCONTINUED | OUTPATIENT
Start: 2020-01-01 | End: 2020-01-01

## 2020-01-01 RX ORDER — GLUCAGON 1 MG
1 KIT INJECTION
Status: DISCONTINUED | OUTPATIENT
Start: 2020-01-01 | End: 2020-01-01 | Stop reason: HOSPADM

## 2020-01-01 RX ORDER — HYDROMORPHONE HYDROCHLORIDE 1 MG/ML
0.2 INJECTION, SOLUTION INTRAMUSCULAR; INTRAVENOUS; SUBCUTANEOUS EVERY 6 HOURS PRN
Status: DISCONTINUED | OUTPATIENT
Start: 2020-01-01 | End: 2020-01-01

## 2020-01-01 RX ORDER — METRONIDAZOLE 500 MG/1
500 TABLET ORAL EVERY 8 HOURS
Status: DISCONTINUED | OUTPATIENT
Start: 2020-01-01 | End: 2020-01-01 | Stop reason: HOSPADM

## 2020-01-01 RX ORDER — SODIUM CHLORIDE 0.9 % (FLUSH) 0.9 %
10 SYRINGE (ML) INJECTION
Status: DISCONTINUED | OUTPATIENT
Start: 2020-01-01 | End: 2020-01-01 | Stop reason: HOSPADM

## 2020-01-01 RX ORDER — HYDROMORPHONE HYDROCHLORIDE 1 MG/ML
2 INJECTION, SOLUTION INTRAMUSCULAR; INTRAVENOUS; SUBCUTANEOUS EVERY 4 HOURS PRN
Status: DISCONTINUED | OUTPATIENT
Start: 2020-01-01 | End: 2020-01-01

## 2020-01-01 RX ORDER — SODIUM,POTASSIUM PHOSPHATES 280-250MG
2 POWDER IN PACKET (EA) ORAL ONCE
Status: COMPLETED | OUTPATIENT
Start: 2020-01-01 | End: 2020-01-01

## 2020-01-01 RX ORDER — OXYCODONE HYDROCHLORIDE 5 MG/1
5 TABLET ORAL ONCE AS NEEDED
Status: COMPLETED | OUTPATIENT
Start: 2020-01-01 | End: 2020-01-01

## 2020-01-01 RX ORDER — ARIPIPRAZOLE 5 MG/1
10 TABLET ORAL DAILY
Status: DISCONTINUED | OUTPATIENT
Start: 2020-01-01 | End: 2020-01-01

## 2020-01-01 RX ORDER — METFORMIN HYDROCHLORIDE 500 MG/1
500 TABLET ORAL
COMMUNITY
Start: 2020-01-01 | End: 2021-06-04

## 2020-01-01 RX ORDER — PROMETHAZINE HYDROCHLORIDE 12.5 MG/1
12.5 TABLET ORAL EVERY 6 HOURS PRN
Status: DISCONTINUED | OUTPATIENT
Start: 2020-01-01 | End: 2020-01-01

## 2020-01-01 RX ORDER — ONDANSETRON 2 MG/ML
4 INJECTION INTRAMUSCULAR; INTRAVENOUS DAILY PRN
Status: CANCELLED | OUTPATIENT
Start: 2020-01-01

## 2020-01-01 RX ORDER — PANTOPRAZOLE SODIUM 40 MG/1
40 TABLET, DELAYED RELEASE ORAL 2 TIMES DAILY
Status: DISCONTINUED | OUTPATIENT
Start: 2020-01-01 | End: 2020-01-01 | Stop reason: HOSPADM

## 2020-01-01 RX ORDER — PROCHLORPERAZINE EDISYLATE 5 MG/ML
5 INJECTION INTRAMUSCULAR; INTRAVENOUS EVERY 6 HOURS PRN
Status: DISCONTINUED | OUTPATIENT
Start: 2020-01-01 | End: 2020-01-01 | Stop reason: HOSPADM

## 2020-01-01 RX ORDER — CIPROFLOXACIN 500 MG/1
500 TABLET ORAL EVERY 12 HOURS
Status: DISCONTINUED | OUTPATIENT
Start: 2020-01-01 | End: 2020-01-01 | Stop reason: HOSPADM

## 2020-01-01 RX ORDER — HYDROMORPHONE HYDROCHLORIDE 2 MG/1
2 TABLET ORAL EVERY 4 HOURS PRN
Status: DISCONTINUED | OUTPATIENT
Start: 2020-01-01 | End: 2020-01-01

## 2020-01-01 RX ORDER — OLANZAPINE 10 MG/2ML
10 INJECTION, POWDER, FOR SOLUTION INTRAMUSCULAR EVERY 8 HOURS PRN
Status: DISCONTINUED | OUTPATIENT
Start: 2020-01-01 | End: 2020-01-01

## 2020-01-01 RX ORDER — SODIUM CHLORIDE 450 MG/100ML
INJECTION, SOLUTION INTRAVENOUS CONTINUOUS
Status: DISCONTINUED | OUTPATIENT
Start: 2020-01-01 | End: 2020-01-01

## 2020-01-01 RX ORDER — HALOPERIDOL 5 MG/ML
2 INJECTION INTRAMUSCULAR EVERY 6 HOURS PRN
Status: DISCONTINUED | OUTPATIENT
Start: 2020-01-01 | End: 2020-01-01

## 2020-01-01 RX ORDER — POTASSIUM CHLORIDE 20 MEQ/1
40 TABLET, EXTENDED RELEASE ORAL ONCE
Status: COMPLETED | OUTPATIENT
Start: 2020-01-01 | End: 2020-01-01

## 2020-01-01 RX ORDER — OXYCODONE HYDROCHLORIDE 5 MG/1
5 TABLET ORAL EVERY 6 HOURS
Status: DISCONTINUED | OUTPATIENT
Start: 2020-01-01 | End: 2020-01-01

## 2020-01-01 RX ORDER — ARIPIPRAZOLE 5 MG/1
15 TABLET ORAL DAILY
Status: DISCONTINUED | OUTPATIENT
Start: 2020-01-01 | End: 2020-01-01

## 2020-01-01 RX ORDER — ARIPIPRAZOLE 30 MG/1
30 TABLET ORAL DAILY
Status: DISCONTINUED | OUTPATIENT
Start: 2020-01-01 | End: 2020-01-01

## 2020-01-01 RX ORDER — HYDROXYZINE HYDROCHLORIDE 50 MG/1
50 TABLET, FILM COATED ORAL NIGHTLY
Status: DISCONTINUED | OUTPATIENT
Start: 2020-01-01 | End: 2020-01-01 | Stop reason: HOSPADM

## 2020-01-01 RX ORDER — INSULIN ASPART 100 [IU]/ML
1-10 INJECTION, SOLUTION INTRAVENOUS; SUBCUTANEOUS
Status: DISCONTINUED | OUTPATIENT
Start: 2020-01-01 | End: 2020-01-01 | Stop reason: HOSPADM

## 2020-01-01 RX ORDER — IBUPROFEN 600 MG/1
600 TABLET ORAL EVERY 6 HOURS PRN
Status: DISCONTINUED | OUTPATIENT
Start: 2020-01-01 | End: 2020-01-01 | Stop reason: HOSPADM

## 2020-01-01 RX ORDER — OXYCODONE HYDROCHLORIDE 10 MG/1
20 TABLET ORAL EVERY 6 HOURS PRN
Status: DISCONTINUED | OUTPATIENT
Start: 2020-01-01 | End: 2020-01-01

## 2020-01-01 RX ORDER — MIRTAZAPINE 15 MG/1
15 TABLET, FILM COATED ORAL NIGHTLY
Status: DISCONTINUED | OUTPATIENT
Start: 2020-01-01 | End: 2020-01-01

## 2020-01-01 RX ORDER — HYDROMORPHONE HYDROCHLORIDE 1 MG/ML
1 INJECTION, SOLUTION INTRAMUSCULAR; INTRAVENOUS; SUBCUTANEOUS EVERY 4 HOURS PRN
Status: DISCONTINUED | OUTPATIENT
Start: 2020-01-01 | End: 2020-01-01 | Stop reason: HOSPADM

## 2020-01-01 RX ORDER — HYDROCODONE BITARTRATE AND ACETAMINOPHEN 500; 5 MG/1; MG/1
TABLET ORAL
Status: DISCONTINUED | OUTPATIENT
Start: 2020-01-01 | End: 2020-01-01 | Stop reason: HOSPADM

## 2020-01-01 RX ORDER — ONDANSETRON 8 MG/1
8 TABLET, ORALLY DISINTEGRATING ORAL
COMMUNITY
Start: 2020-01-01 | End: 2021-05-14

## 2020-01-01 RX ORDER — MORPHINE SULFATE/0.9% NACL/PF 1 MG/ML
0.5 PLASTIC BAG, INJECTION (ML) INTRAVENOUS CONTINUOUS
Status: DISCONTINUED | OUTPATIENT
Start: 2020-01-01 | End: 2020-01-01 | Stop reason: HOSPADM

## 2020-01-01 RX ORDER — POLYETHYLENE GLYCOL 3350, SODIUM SULFATE ANHYDROUS, SODIUM BICARBONATE, SODIUM CHLORIDE, POTASSIUM CHLORIDE 236; 22.74; 6.74; 5.86; 2.97 G/4L; G/4L; G/4L; G/4L; G/4L
4000 POWDER, FOR SOLUTION ORAL ONCE
Status: COMPLETED | OUTPATIENT
Start: 2020-01-01 | End: 2020-01-01

## 2020-01-01 RX ORDER — ONDANSETRON 2 MG/ML
8 INJECTION INTRAMUSCULAR; INTRAVENOUS EVERY 8 HOURS PRN
Status: DISCONTINUED | OUTPATIENT
Start: 2020-01-01 | End: 2020-01-01

## 2020-01-01 RX ORDER — HYDROCODONE BITARTRATE AND ACETAMINOPHEN 10; 325 MG/1; MG/1
1 TABLET ORAL EVERY 4 HOURS PRN
Status: DISCONTINUED | OUTPATIENT
Start: 2020-01-01 | End: 2020-01-01

## 2020-01-01 RX ORDER — HYDROMORPHONE HYDROCHLORIDE 1 MG/ML
0.2 INJECTION, SOLUTION INTRAMUSCULAR; INTRAVENOUS; SUBCUTANEOUS ONCE
Status: COMPLETED | OUTPATIENT
Start: 2020-01-01 | End: 2020-01-01

## 2020-01-01 RX ORDER — HYDROMORPHONE HYDROCHLORIDE 1 MG/ML
1 INJECTION, SOLUTION INTRAMUSCULAR; INTRAVENOUS; SUBCUTANEOUS EVERY 4 HOURS PRN
Status: DISCONTINUED | OUTPATIENT
Start: 2020-01-01 | End: 2020-01-01

## 2020-01-01 RX ORDER — NALOXONE HYDROCHLORIDE 4 MG/.1ML
4 SPRAY NASAL
COMMUNITY
Start: 2020-01-01

## 2020-01-01 RX ORDER — OLANZAPINE 10 MG/2ML
5 INJECTION, POWDER, FOR SOLUTION INTRAMUSCULAR ONCE AS NEEDED
Status: DISCONTINUED | OUTPATIENT
Start: 2020-01-01 | End: 2020-01-01

## 2020-01-01 RX ORDER — ONDANSETRON 4 MG/1
4 TABLET, ORALLY DISINTEGRATING ORAL EVERY 8 HOURS PRN
Status: DISCONTINUED | OUTPATIENT
Start: 2020-01-01 | End: 2020-01-01 | Stop reason: HOSPADM

## 2020-01-01 RX ORDER — TALC
6 POWDER (GRAM) TOPICAL NIGHTLY PRN
Status: DISCONTINUED | OUTPATIENT
Start: 2020-01-01 | End: 2020-01-01 | Stop reason: HOSPADM

## 2020-01-01 RX ORDER — HYDROCODONE BITARTRATE AND ACETAMINOPHEN 5; 325 MG/1; MG/1
1 TABLET ORAL EVERY 4 HOURS PRN
Status: DISCONTINUED | OUTPATIENT
Start: 2020-01-01 | End: 2020-01-01

## 2020-01-01 RX ADMIN — PROMETHAZINE HYDROCHLORIDE 12.5 MG: 12.5 TABLET ORAL at 11:07

## 2020-01-01 RX ADMIN — PROCHLORPERAZINE EDISYLATE 5 MG: 5 INJECTION INTRAMUSCULAR; INTRAVENOUS at 12:08

## 2020-01-01 RX ADMIN — HYDROMORPHONE HYDROCHLORIDE 0.2 MG: 1 INJECTION, SOLUTION INTRAMUSCULAR; INTRAVENOUS; SUBCUTANEOUS at 02:07

## 2020-01-01 RX ADMIN — METRONIDAZOLE 500 MG: 500 TABLET ORAL at 09:08

## 2020-01-01 RX ADMIN — PROCHLORPERAZINE EDISYLATE 5 MG: 5 INJECTION INTRAMUSCULAR; INTRAVENOUS at 09:08

## 2020-01-01 RX ADMIN — ACETAMINOPHEN 650 MG: 325 TABLET ORAL at 08:07

## 2020-01-01 RX ADMIN — HYDROMORPHONE HYDROCHLORIDE 1 MG: 1 INJECTION, SOLUTION INTRAMUSCULAR; INTRAVENOUS; SUBCUTANEOUS at 09:07

## 2020-01-01 RX ADMIN — CIPROFLOXACIN HYDROCHLORIDE 500 MG: 500 TABLET, FILM COATED ORAL at 08:07

## 2020-01-01 RX ADMIN — ARIPIPRAZOLE 15 MG: 5 TABLET ORAL at 08:08

## 2020-01-01 RX ADMIN — PANTOPRAZOLE SODIUM 40 MG: 40 TABLET, DELAYED RELEASE ORAL at 08:08

## 2020-01-01 RX ADMIN — INSULIN ASPART 4 UNITS: 100 INJECTION, SOLUTION INTRAVENOUS; SUBCUTANEOUS at 05:07

## 2020-01-01 RX ADMIN — PANTOPRAZOLE SODIUM 40 MG: 40 TABLET, DELAYED RELEASE ORAL at 08:07

## 2020-01-01 RX ADMIN — METOCLOPRAMIDE 10 MG: 5 INJECTION, SOLUTION INTRAMUSCULAR; INTRAVENOUS at 10:08

## 2020-01-01 RX ADMIN — INSULIN ASPART 8 UNITS: 100 INJECTION, SOLUTION INTRAVENOUS; SUBCUTANEOUS at 05:07

## 2020-01-01 RX ADMIN — INSULIN ASPART 8 UNITS: 100 INJECTION, SOLUTION INTRAVENOUS; SUBCUTANEOUS at 08:07

## 2020-01-01 RX ADMIN — HYDROMORPHONE HYDROCHLORIDE 1 MG: 1 INJECTION, SOLUTION INTRAMUSCULAR; INTRAVENOUS; SUBCUTANEOUS at 03:07

## 2020-01-01 RX ADMIN — SENNOSIDES AND DOCUSATE SODIUM 1 TABLET: 8.6; 5 TABLET ORAL at 01:07

## 2020-01-01 RX ADMIN — OXYCODONE HYDROCHLORIDE 20 MG: 10 TABLET ORAL at 09:07

## 2020-01-01 RX ADMIN — MIRTAZAPINE 15 MG: 15 TABLET, FILM COATED ORAL at 08:07

## 2020-01-01 RX ADMIN — HYDROMORPHONE HYDROCHLORIDE 1 MG: 1 INJECTION, SOLUTION INTRAMUSCULAR; INTRAVENOUS; SUBCUTANEOUS at 12:07

## 2020-01-01 RX ADMIN — HYDROMORPHONE HYDROCHLORIDE 1 MG: 1 INJECTION, SOLUTION INTRAMUSCULAR; INTRAVENOUS; SUBCUTANEOUS at 01:07

## 2020-01-01 RX ADMIN — LORAZEPAM 2 MG: 2 INJECTION INTRAMUSCULAR; INTRAVENOUS at 11:07

## 2020-01-01 RX ADMIN — HYDROMORPHONE HYDROCHLORIDE 1 MG: 1 INJECTION, SOLUTION INTRAMUSCULAR; INTRAVENOUS; SUBCUTANEOUS at 06:07

## 2020-01-01 RX ADMIN — PANTOPRAZOLE SODIUM 40 MG: 40 INJECTION, POWDER, LYOPHILIZED, FOR SOLUTION INTRAVENOUS at 09:07

## 2020-01-01 RX ADMIN — HYDROMORPHONE HYDROCHLORIDE 0.2 MG: 1 INJECTION, SOLUTION INTRAMUSCULAR; INTRAVENOUS; SUBCUTANEOUS at 10:07

## 2020-01-01 RX ADMIN — HYDROMORPHONE HYDROCHLORIDE 2 MG: 1 INJECTION, SOLUTION INTRAMUSCULAR; INTRAVENOUS; SUBCUTANEOUS at 12:08

## 2020-01-01 RX ADMIN — HYDROMORPHONE HYDROCHLORIDE 1 MG: 1 INJECTION, SOLUTION INTRAMUSCULAR; INTRAVENOUS; SUBCUTANEOUS at 02:07

## 2020-01-01 RX ADMIN — PANTOPRAZOLE SODIUM 40 MG: 40 TABLET, DELAYED RELEASE ORAL at 09:07

## 2020-01-01 RX ADMIN — HYDROMORPHONE HYDROCHLORIDE 2 MG: 1 INJECTION, SOLUTION INTRAMUSCULAR; INTRAVENOUS; SUBCUTANEOUS at 06:08

## 2020-01-01 RX ADMIN — HYDROMORPHONE HYDROCHLORIDE 2 MG: 1 INJECTION, SOLUTION INTRAMUSCULAR; INTRAVENOUS; SUBCUTANEOUS at 05:08

## 2020-01-01 RX ADMIN — HYDROMORPHONE HYDROCHLORIDE 1 MG: 1 INJECTION, SOLUTION INTRAMUSCULAR; INTRAVENOUS; SUBCUTANEOUS at 11:07

## 2020-01-01 RX ADMIN — METRONIDAZOLE 500 MG: 500 TABLET ORAL at 02:07

## 2020-01-01 RX ADMIN — HYDROMORPHONE HYDROCHLORIDE 2 MG: 1 INJECTION, SOLUTION INTRAMUSCULAR; INTRAVENOUS; SUBCUTANEOUS at 01:08

## 2020-01-01 RX ADMIN — ONDANSETRON 4 MG: 4 TABLET, ORALLY DISINTEGRATING ORAL at 08:08

## 2020-01-01 RX ADMIN — ONDANSETRON 4 MG: 4 TABLET, ORALLY DISINTEGRATING ORAL at 05:07

## 2020-01-01 RX ADMIN — PROCHLORPERAZINE EDISYLATE 5 MG: 5 INJECTION INTRAMUSCULAR; INTRAVENOUS at 02:08

## 2020-01-01 RX ADMIN — HYDROMORPHONE HYDROCHLORIDE 1 MG: 1 INJECTION, SOLUTION INTRAMUSCULAR; INTRAVENOUS; SUBCUTANEOUS at 10:07

## 2020-01-01 RX ADMIN — HYDROMORPHONE HYDROCHLORIDE 2 MG: 1 INJECTION, SOLUTION INTRAMUSCULAR; INTRAVENOUS; SUBCUTANEOUS at 04:08

## 2020-01-01 RX ADMIN — CIPROFLOXACIN HYDROCHLORIDE 500 MG: 500 TABLET, FILM COATED ORAL at 09:08

## 2020-01-01 RX ADMIN — LIDOCAINE 1 PATCH: 50 PATCH TOPICAL at 12:08

## 2020-01-01 RX ADMIN — ARIPIPRAZOLE 30 MG: 30 TABLET ORAL at 12:07

## 2020-01-01 RX ADMIN — OXYCODONE HYDROCHLORIDE 20 MG: 10 TABLET ORAL at 11:07

## 2020-01-01 RX ADMIN — METRONIDAZOLE 500 MG: 500 TABLET ORAL at 05:08

## 2020-01-01 RX ADMIN — CIPROFLOXACIN HYDROCHLORIDE 500 MG: 500 TABLET, FILM COATED ORAL at 09:07

## 2020-01-01 RX ADMIN — HYDROCODONE BITARTRATE AND ACETAMINOPHEN 1 TABLET: 10; 325 TABLET ORAL at 09:07

## 2020-01-01 RX ADMIN — CYANOCOBALAMIN 1000 MCG: 1000 INJECTION INTRAMUSCULAR; SUBCUTANEOUS at 09:07

## 2020-01-01 RX ADMIN — HYDROCODONE BITARTRATE AND ACETAMINOPHEN 1 TABLET: 10; 325 TABLET ORAL at 04:07

## 2020-01-01 RX ADMIN — ARIPIPRAZOLE 30 MG: 30 TABLET ORAL at 11:07

## 2020-01-01 RX ADMIN — PROMETHAZINE HYDROCHLORIDE 12.5 MG: 12.5 TABLET ORAL at 08:07

## 2020-01-01 RX ADMIN — PANTOPRAZOLE SODIUM 40 MG: 40 TABLET, DELAYED RELEASE ORAL at 09:08

## 2020-01-01 RX ADMIN — METOCLOPRAMIDE 10 MG: 5 INJECTION, SOLUTION INTRAMUSCULAR; INTRAVENOUS at 12:08

## 2020-01-01 RX ADMIN — ARIPIPRAZOLE 30 MG: 30 TABLET ORAL at 08:07

## 2020-01-01 RX ADMIN — INSULIN ASPART 4 UNITS: 100 INJECTION, SOLUTION INTRAVENOUS; SUBCUTANEOUS at 12:07

## 2020-01-01 RX ADMIN — METRONIDAZOLE 500 MG: 500 TABLET ORAL at 09:07

## 2020-01-01 RX ADMIN — MIRTAZAPINE 15 MG: 15 TABLET, FILM COATED ORAL at 09:07

## 2020-01-01 RX ADMIN — OXYCODONE HYDROCHLORIDE 20 MG: 10 TABLET ORAL at 05:07

## 2020-01-01 RX ADMIN — HYDROMORPHONE HYDROCHLORIDE 0.5 MG: 1 INJECTION, SOLUTION INTRAMUSCULAR; INTRAVENOUS; SUBCUTANEOUS at 12:07

## 2020-01-01 RX ADMIN — LIDOCAINE 1 PATCH: 50 PATCH TOPICAL at 11:07

## 2020-01-01 RX ADMIN — HYDROMORPHONE HYDROCHLORIDE 4 MG: 2 TABLET ORAL at 02:07

## 2020-01-01 RX ADMIN — HYDROMORPHONE HYDROCHLORIDE 0.2 MG: 1 INJECTION, SOLUTION INTRAMUSCULAR; INTRAVENOUS; SUBCUTANEOUS at 06:07

## 2020-01-01 RX ADMIN — POTASSIUM & SODIUM PHOSPHATES POWDER PACK 280-160-250 MG 2 PACKET: 280-160-250 PACK at 01:07

## 2020-01-01 RX ADMIN — ARIPIPRAZOLE 30 MG: 30 TABLET ORAL at 09:07

## 2020-01-01 RX ADMIN — HYDROMORPHONE HYDROCHLORIDE 1 MG: 1 INJECTION, SOLUTION INTRAMUSCULAR; INTRAVENOUS; SUBCUTANEOUS at 08:07

## 2020-01-01 RX ADMIN — INSULIN ASPART 6 UNITS: 100 INJECTION, SOLUTION INTRAVENOUS; SUBCUTANEOUS at 08:07

## 2020-01-01 RX ADMIN — HYDROMORPHONE HYDROCHLORIDE 0.2 MG: 1 INJECTION, SOLUTION INTRAMUSCULAR; INTRAVENOUS; SUBCUTANEOUS at 09:07

## 2020-01-01 RX ADMIN — ACETAMINOPHEN 650 MG: 325 TABLET ORAL at 08:08

## 2020-01-01 RX ADMIN — INSULIN ASPART 2 UNITS: 100 INJECTION, SOLUTION INTRAVENOUS; SUBCUTANEOUS at 12:07

## 2020-01-01 RX ADMIN — ONDANSETRON 4 MG: 4 TABLET, ORALLY DISINTEGRATING ORAL at 02:07

## 2020-01-01 RX ADMIN — HYDROMORPHONE HYDROCHLORIDE 0.2 MG: 1 INJECTION, SOLUTION INTRAMUSCULAR; INTRAVENOUS; SUBCUTANEOUS at 01:07

## 2020-01-01 RX ADMIN — INSULIN ASPART 6 UNITS: 100 INJECTION, SOLUTION INTRAVENOUS; SUBCUTANEOUS at 11:07

## 2020-01-01 RX ADMIN — CIPROFLOXACIN HYDROCHLORIDE 500 MG: 500 TABLET, FILM COATED ORAL at 08:08

## 2020-01-01 RX ADMIN — LORAZEPAM 2 MG: 2 INJECTION INTRAMUSCULAR; INTRAVENOUS at 10:07

## 2020-01-01 RX ADMIN — HYDROMORPHONE HYDROCHLORIDE 0.2 MG: 1 INJECTION, SOLUTION INTRAMUSCULAR; INTRAVENOUS; SUBCUTANEOUS at 04:07

## 2020-01-01 RX ADMIN — INSULIN ASPART 4 UNITS: 100 INJECTION, SOLUTION INTRAVENOUS; SUBCUTANEOUS at 04:07

## 2020-01-01 RX ADMIN — HYDROMORPHONE HYDROCHLORIDE 1 MG: 1 INJECTION, SOLUTION INTRAMUSCULAR; INTRAVENOUS; SUBCUTANEOUS at 04:07

## 2020-01-01 RX ADMIN — METRONIDAZOLE 500 MG: 500 TABLET ORAL at 04:07

## 2020-01-01 RX ADMIN — ARIPIPRAZOLE 10 MG: 5 TABLET ORAL at 09:07

## 2020-01-01 RX ADMIN — INSULIN ASPART 4 UNITS: 100 INJECTION, SOLUTION INTRAVENOUS; SUBCUTANEOUS at 11:07

## 2020-01-01 RX ADMIN — ONDANSETRON 4 MG: 4 TABLET, ORALLY DISINTEGRATING ORAL at 09:07

## 2020-01-01 RX ADMIN — MIRTAZAPINE 15 MG: 15 TABLET, FILM COATED ORAL at 09:08

## 2020-01-01 RX ADMIN — INSULIN ASPART 6 UNITS: 100 INJECTION, SOLUTION INTRAVENOUS; SUBCUTANEOUS at 04:07

## 2020-01-01 RX ADMIN — HYDROXYZINE HYDROCHLORIDE 50 MG: 50 TABLET, FILM COATED ORAL at 08:07

## 2020-01-01 RX ADMIN — METRONIDAZOLE 500 MG: 500 TABLET ORAL at 01:07

## 2020-01-01 RX ADMIN — METRONIDAZOLE 500 MG: 500 TABLET ORAL at 06:08

## 2020-01-01 RX ADMIN — METRONIDAZOLE 500 MG: 500 TABLET ORAL at 06:07

## 2020-01-01 RX ADMIN — SODIUM CHLORIDE 500 ML: 0.9 INJECTION, SOLUTION INTRAVENOUS at 11:08

## 2020-01-01 RX ADMIN — ONDANSETRON 4 MG: 4 TABLET, ORALLY DISINTEGRATING ORAL at 05:08

## 2020-01-01 RX ADMIN — METRONIDAZOLE 500 MG: 500 TABLET ORAL at 05:07

## 2020-01-01 RX ADMIN — POTASSIUM CHLORIDE 40 MEQ: 1500 TABLET, EXTENDED RELEASE ORAL at 10:07

## 2020-01-01 RX ADMIN — INSULIN ASPART 2 UNITS: 100 INJECTION, SOLUTION INTRAVENOUS; SUBCUTANEOUS at 05:07

## 2020-01-01 RX ADMIN — ONDANSETRON 4 MG: 4 TABLET, ORALLY DISINTEGRATING ORAL at 11:07

## 2020-01-01 RX ADMIN — HYDROXYZINE HYDROCHLORIDE 50 MG: 50 TABLET, FILM COATED ORAL at 09:08

## 2020-01-01 RX ADMIN — METRONIDAZOLE 500 MG: 500 TABLET ORAL at 02:08

## 2020-01-01 RX ADMIN — PROMETHAZINE HYDROCHLORIDE 12.5 MG: 12.5 TABLET ORAL at 04:07

## 2020-01-01 RX ADMIN — ONDANSETRON 4 MG: 4 TABLET, ORALLY DISINTEGRATING ORAL at 04:07

## 2020-01-01 RX ADMIN — CYANOCOBALAMIN 1000 MCG: 1000 INJECTION INTRAMUSCULAR; SUBCUTANEOUS at 10:07

## 2020-01-01 RX ADMIN — OXYCODONE HYDROCHLORIDE 5 MG: 5 TABLET ORAL at 02:07

## 2020-01-01 RX ADMIN — ARIPIPRAZOLE 30 MG: 30 TABLET ORAL at 08:08

## 2020-01-01 RX ADMIN — METRONIDAZOLE 500 MG: 500 TABLET ORAL at 10:07

## 2020-01-01 RX ADMIN — INSULIN ASPART 8 UNITS: 100 INJECTION, SOLUTION INTRAVENOUS; SUBCUTANEOUS at 09:08

## 2020-01-01 RX ADMIN — HALOPERIDOL LACTATE 5 MG: 5 INJECTION, SOLUTION INTRAMUSCULAR at 10:07

## 2020-01-01 RX ADMIN — METRONIDAZOLE 500 MG: 500 TABLET ORAL at 04:08

## 2020-01-01 RX ADMIN — METRONIDAZOLE 500 MG: 500 TABLET ORAL at 08:07

## 2020-01-01 RX ADMIN — ONDANSETRON 4 MG: 4 TABLET, ORALLY DISINTEGRATING ORAL at 09:08

## 2020-01-01 RX ADMIN — HYDROMORPHONE HYDROCHLORIDE 1 MG: 1 INJECTION, SOLUTION INTRAMUSCULAR; INTRAVENOUS; SUBCUTANEOUS at 05:07

## 2020-01-01 RX ADMIN — ONDANSETRON 4 MG: 4 TABLET, ORALLY DISINTEGRATING ORAL at 03:07

## 2020-01-01 RX ADMIN — PROMETHAZINE HYDROCHLORIDE 12.5 MG: 12.5 TABLET ORAL at 05:07

## 2020-01-01 RX ADMIN — HYDROXYZINE HYDROCHLORIDE 50 MG: 50 TABLET, FILM COATED ORAL at 09:07

## 2020-01-01 RX ADMIN — INSULIN ASPART 6 UNITS: 100 INJECTION, SOLUTION INTRAVENOUS; SUBCUTANEOUS at 01:08

## 2020-01-01 RX ADMIN — VANCOMYCIN HYDROCHLORIDE 1500 MG: 1.5 INJECTION, POWDER, LYOPHILIZED, FOR SOLUTION INTRAVENOUS at 05:07

## 2020-01-01 RX ADMIN — INSULIN ASPART 2 UNITS: 100 INJECTION, SOLUTION INTRAVENOUS; SUBCUTANEOUS at 08:07

## 2020-01-01 RX ADMIN — POLYETHYLENE GLYCOL 3350, SODIUM SULFATE ANHYDROUS, SODIUM BICARBONATE, SODIUM CHLORIDE, POTASSIUM CHLORIDE 4000 ML: 236; 22.74; 6.74; 5.86; 2.97 POWDER, FOR SOLUTION ORAL at 03:07

## 2020-01-01 RX ADMIN — INSULIN ASPART 3 UNITS: 100 INJECTION, SOLUTION INTRAVENOUS; SUBCUTANEOUS at 08:07

## 2020-01-01 RX ADMIN — HYDROMORPHONE HYDROCHLORIDE 1 MG: 1 INJECTION, SOLUTION INTRAMUSCULAR; INTRAVENOUS; SUBCUTANEOUS at 06:08

## 2020-01-01 RX ADMIN — PANTOPRAZOLE SODIUM 40 MG: 40 INJECTION, POWDER, LYOPHILIZED, FOR SOLUTION INTRAVENOUS at 10:07

## 2020-01-01 RX ADMIN — HYDROMORPHONE HYDROCHLORIDE 1 MG: 1 INJECTION, SOLUTION INTRAMUSCULAR; INTRAVENOUS; SUBCUTANEOUS at 10:08

## 2020-01-01 RX ADMIN — MIRTAZAPINE 15 MG: 15 TABLET, FILM COATED ORAL at 08:08

## 2020-01-01 RX ADMIN — Medication 6 MG: at 08:07

## 2020-01-01 RX ADMIN — HYDROMORPHONE HYDROCHLORIDE 2 MG: 1 INJECTION, SOLUTION INTRAMUSCULAR; INTRAVENOUS; SUBCUTANEOUS at 09:08

## 2020-01-01 RX ADMIN — ONDANSETRON 4 MG: 4 TABLET, ORALLY DISINTEGRATING ORAL at 10:07

## 2020-01-01 RX ADMIN — ARIPIPRAZOLE 10 MG: 5 TABLET ORAL at 10:07

## 2020-01-01 RX ADMIN — METOCLOPRAMIDE 10 MG: 5 INJECTION, SOLUTION INTRAMUSCULAR; INTRAVENOUS at 06:07

## 2020-01-01 RX ADMIN — PROMETHAZINE HYDROCHLORIDE 12.5 MG: 12.5 TABLET ORAL at 06:07

## 2020-01-01 RX ADMIN — CYANOCOBALAMIN 1000 MCG: 1000 INJECTION, SOLUTION INTRAMUSCULAR at 10:07

## 2020-01-01 RX ADMIN — HYDROMORPHONE HYDROCHLORIDE 2 MG: 1 INJECTION, SOLUTION INTRAMUSCULAR; INTRAVENOUS; SUBCUTANEOUS at 08:08

## 2020-01-01 RX ADMIN — ONDANSETRON 4 MG: 4 TABLET, ORALLY DISINTEGRATING ORAL at 08:07

## 2020-01-01 RX ADMIN — PROMETHAZINE HYDROCHLORIDE 12.5 MG: 12.5 TABLET ORAL at 05:08

## 2020-01-01 RX ADMIN — PANTOPRAZOLE SODIUM 40 MG: 40 INJECTION, POWDER, LYOPHILIZED, FOR SOLUTION INTRAVENOUS at 08:07

## 2020-01-01 RX ADMIN — SODIUM CHLORIDE 500 ML: 0.9 INJECTION, SOLUTION INTRAVENOUS at 06:08

## 2020-01-01 RX ADMIN — PROCHLORPERAZINE EDISYLATE 5 MG: 5 INJECTION INTRAMUSCULAR; INTRAVENOUS at 08:08

## 2020-01-01 RX ADMIN — INSULIN ASPART 3 UNITS: 100 INJECTION, SOLUTION INTRAVENOUS; SUBCUTANEOUS at 10:07

## 2020-01-01 RX ADMIN — ARIPIPRAZOLE LAUROXIL 441 MG: 441 INJECTION, SUSPENSION, EXTENDED RELEASE INTRAMUSCULAR at 01:07

## 2020-01-01 RX ADMIN — DIPHENHYDRAMINE HYDROCHLORIDE 50 MG: 50 INJECTION, SOLUTION INTRAMUSCULAR; INTRAVENOUS at 11:07

## 2020-01-01 RX ADMIN — HYDROCODONE BITARTRATE AND ACETAMINOPHEN 1 TABLET: 10; 325 TABLET ORAL at 06:07

## 2020-01-01 RX ADMIN — SODIUM CHLORIDE 500 ML: 0.9 INJECTION, SOLUTION INTRAVENOUS at 09:08

## 2020-01-01 RX ADMIN — INSULIN ASPART 4 UNITS: 100 INJECTION, SOLUTION INTRAVENOUS; SUBCUTANEOUS at 12:08

## 2020-01-01 RX ADMIN — ONDANSETRON 8 MG: 2 INJECTION INTRAMUSCULAR; INTRAVENOUS at 04:07

## 2020-01-01 RX ADMIN — CIPROFLOXACIN HYDROCHLORIDE 500 MG: 500 TABLET, FILM COATED ORAL at 10:07

## 2020-01-01 RX ADMIN — SODIUM CHLORIDE 500 ML: 0.9 INJECTION, SOLUTION INTRAVENOUS at 12:07

## 2020-01-01 RX ADMIN — ONDANSETRON 8 MG: 2 INJECTION INTRAMUSCULAR; INTRAVENOUS at 09:07

## 2020-01-01 RX ADMIN — INSULIN ASPART 2 UNITS: 100 INJECTION, SOLUTION INTRAVENOUS; SUBCUTANEOUS at 12:08

## 2020-01-01 RX ADMIN — HYDROCODONE BITARTRATE AND ACETAMINOPHEN 1 TABLET: 10; 325 TABLET ORAL at 11:07

## 2020-01-01 RX ADMIN — Medication 6 MG: at 09:07

## 2020-01-01 RX ADMIN — HALOPERIDOL LACTATE 5 MG: 5 INJECTION, SOLUTION INTRAMUSCULAR at 02:08

## 2020-01-01 RX ADMIN — PROCHLORPERAZINE EDISYLATE 5 MG: 5 INJECTION INTRAMUSCULAR; INTRAVENOUS at 05:08

## 2020-01-01 RX ADMIN — PROCHLORPERAZINE EDISYLATE 5 MG: 5 INJECTION INTRAMUSCULAR; INTRAVENOUS at 03:08

## 2020-01-01 RX ADMIN — METRONIDAZOLE 500 MG: 500 TABLET ORAL at 10:08

## 2020-01-01 RX ADMIN — HYDROMORPHONE HYDROCHLORIDE 1 MG: 1 INJECTION, SOLUTION INTRAMUSCULAR; INTRAVENOUS; SUBCUTANEOUS at 12:08

## 2020-01-01 RX ADMIN — INSULIN ASPART 4 UNITS: 100 INJECTION, SOLUTION INTRAVENOUS; SUBCUTANEOUS at 10:07

## 2020-01-01 RX ADMIN — ARIPIPRAZOLE 15 MG: 5 TABLET ORAL at 09:07

## 2020-01-01 RX ADMIN — ONDANSETRON 4 MG: 4 TABLET, ORALLY DISINTEGRATING ORAL at 04:08

## 2020-01-01 RX ADMIN — METOCLOPRAMIDE 10 MG: 5 INJECTION, SOLUTION INTRAMUSCULAR; INTRAVENOUS at 04:08

## 2020-01-01 RX ADMIN — INSULIN ASPART 10 UNITS: 100 INJECTION, SOLUTION INTRAVENOUS; SUBCUTANEOUS at 08:07

## 2020-01-01 RX ADMIN — IBUPROFEN 600 MG: 600 TABLET ORAL at 09:08

## 2020-01-01 RX ADMIN — METRONIDAZOLE 500 MG: 500 TABLET ORAL at 03:07

## 2020-01-01 RX ADMIN — INSULIN ASPART 8 UNITS: 100 INJECTION, SOLUTION INTRAVENOUS; SUBCUTANEOUS at 12:07

## 2020-01-01 RX ADMIN — HALOPERIDOL LACTATE 2 MG: 5 INJECTION, SOLUTION INTRAMUSCULAR at 12:07

## 2020-01-01 RX ADMIN — INSULIN ASPART 8 UNITS: 100 INJECTION, SOLUTION INTRAVENOUS; SUBCUTANEOUS at 04:07

## 2020-01-01 RX ADMIN — INSULIN ASPART 6 UNITS: 100 INJECTION, SOLUTION INTRAVENOUS; SUBCUTANEOUS at 05:07

## 2020-01-01 RX ADMIN — DIPHENHYDRAMINE HYDROCHLORIDE 50 MG: 50 INJECTION, SOLUTION INTRAMUSCULAR; INTRAVENOUS at 02:08

## 2020-01-01 RX ADMIN — HYDROMORPHONE HYDROCHLORIDE 1 MG: 1 INJECTION, SOLUTION INTRAMUSCULAR; INTRAVENOUS; SUBCUTANEOUS at 07:07

## 2020-01-01 RX ADMIN — INSULIN ASPART 8 UNITS: 100 INJECTION, SOLUTION INTRAVENOUS; SUBCUTANEOUS at 04:08

## 2020-01-01 RX ADMIN — PIPERACILLIN AND TAZOBACTAM 4.5 G: 4; .5 INJECTION, POWDER, FOR SOLUTION INTRAVENOUS at 05:07

## 2020-01-01 RX ADMIN — IOHEXOL 75 ML: 350 INJECTION, SOLUTION INTRAVENOUS at 11:07

## 2020-01-01 RX ADMIN — PROMETHAZINE HYDROCHLORIDE 12.5 MG: 12.5 TABLET ORAL at 04:08

## 2020-01-01 RX ADMIN — PROCHLORPERAZINE EDISYLATE 5 MG: 5 INJECTION INTRAMUSCULAR; INTRAVENOUS at 06:08

## 2020-01-01 RX ADMIN — HYDROCODONE BITARTRATE AND ACETAMINOPHEN 1 TABLET: 10; 325 TABLET ORAL at 07:07

## 2020-01-01 RX ADMIN — MORPHINE SULFATE 0.5 MG/HR: 10 INJECTION INTRAVENOUS at 08:08

## 2020-01-01 RX ADMIN — LIDOCAINE 1 PATCH: 50 PATCH TOPICAL at 12:07

## 2020-01-01 RX ADMIN — BACITRACIN, NEOMYCIN, POLYMYXIN B 1 EACH: 400; 3.5; 5 OINTMENT TOPICAL at 07:07

## 2020-01-01 RX ADMIN — ONDANSETRON 4 MG: 4 TABLET, ORALLY DISINTEGRATING ORAL at 07:07

## 2020-01-01 RX ADMIN — MIRTAZAPINE 15 MG: 15 TABLET, FILM COATED ORAL at 11:07

## 2020-01-01 RX ADMIN — CIPROFLOXACIN HYDROCHLORIDE 500 MG: 500 TABLET, FILM COATED ORAL at 02:07

## 2020-01-01 RX ADMIN — LORAZEPAM 2 MG: 2 INJECTION INTRAMUSCULAR; INTRAVENOUS at 02:08

## 2020-01-01 RX ADMIN — INSULIN ASPART 6 UNITS: 100 INJECTION, SOLUTION INTRAVENOUS; SUBCUTANEOUS at 09:08

## 2020-01-01 RX ADMIN — INSULIN ASPART 8 UNITS: 100 INJECTION, SOLUTION INTRAVENOUS; SUBCUTANEOUS at 11:07

## 2020-01-01 RX ADMIN — INSULIN ASPART 3 UNITS: 100 INJECTION, SOLUTION INTRAVENOUS; SUBCUTANEOUS at 09:07

## 2020-01-01 RX ADMIN — INSULIN ASPART 8 UNITS: 100 INJECTION, SOLUTION INTRAVENOUS; SUBCUTANEOUS at 08:08

## 2020-01-01 RX ADMIN — SODIUM CHLORIDE: 0.9 INJECTION, SOLUTION INTRAVENOUS at 11:07

## 2020-01-01 RX ADMIN — PROMETHAZINE HYDROCHLORIDE 12.5 MG: 12.5 TABLET ORAL at 12:08

## 2020-01-01 RX ADMIN — INSULIN ASPART 4 UNITS: 100 INJECTION, SOLUTION INTRAVENOUS; SUBCUTANEOUS at 08:07

## 2020-01-01 RX ADMIN — HYDROMORPHONE HYDROCHLORIDE 2 MG: 2 TABLET ORAL at 08:08

## 2020-01-01 RX ADMIN — ARIPIPRAZOLE 15 MG: 5 TABLET ORAL at 09:08

## 2020-01-01 RX ADMIN — INSULIN ASPART 8 UNITS: 100 INJECTION, SOLUTION INTRAVENOUS; SUBCUTANEOUS at 12:08

## 2020-01-01 RX ADMIN — ONDANSETRON 4 MG: 4 TABLET, ORALLY DISINTEGRATING ORAL at 01:07

## 2020-01-01 RX ADMIN — INSULIN ASPART 4 UNITS: 100 INJECTION, SOLUTION INTRAVENOUS; SUBCUTANEOUS at 05:08

## 2020-01-01 RX ADMIN — METRONIDAZOLE 500 MG: 500 TABLET ORAL at 01:08

## 2020-01-01 RX ADMIN — INSULIN ASPART 3 UNITS: 100 INJECTION, SOLUTION INTRAVENOUS; SUBCUTANEOUS at 09:08

## 2020-01-01 RX ADMIN — INSULIN ASPART 2 UNITS: 100 INJECTION, SOLUTION INTRAVENOUS; SUBCUTANEOUS at 09:08

## 2020-01-01 RX ADMIN — PROMETHAZINE HYDROCHLORIDE 12.5 MG: 12.5 TABLET ORAL at 01:07

## 2020-01-01 RX ADMIN — HYDROMORPHONE HYDROCHLORIDE 0.2 MG: 1 INJECTION, SOLUTION INTRAMUSCULAR; INTRAVENOUS; SUBCUTANEOUS at 05:07

## 2020-01-01 RX ADMIN — HALOPERIDOL 5 MG: 5 TABLET ORAL at 09:08

## 2020-01-01 RX ADMIN — INSULIN ASPART 6 UNITS: 100 INJECTION, SOLUTION INTRAVENOUS; SUBCUTANEOUS at 08:08

## 2020-01-01 RX ADMIN — INSULIN ASPART 4 UNITS: 100 INJECTION, SOLUTION INTRAVENOUS; SUBCUTANEOUS at 08:08

## 2020-01-01 RX ADMIN — DIPHENHYDRAMINE HYDROCHLORIDE 50 MG: 50 INJECTION, SOLUTION INTRAMUSCULAR; INTRAVENOUS at 10:07

## 2020-01-01 RX ADMIN — ONDANSETRON 8 MG: 2 INJECTION INTRAMUSCULAR; INTRAVENOUS at 12:07

## 2020-01-01 RX ADMIN — HYDROCODONE BITARTRATE AND ACETAMINOPHEN 1 TABLET: 10; 325 TABLET ORAL at 10:07

## 2020-01-01 RX ADMIN — IOHEXOL 15 ML: 350 INJECTION, SOLUTION INTRAVENOUS at 05:07

## 2020-01-01 RX ADMIN — PROCHLORPERAZINE EDISYLATE 5 MG: 5 INJECTION INTRAMUSCULAR; INTRAVENOUS at 04:08

## 2020-01-01 RX ADMIN — PANTOPRAZOLE SODIUM 40 MG: 40 TABLET, DELAYED RELEASE ORAL at 10:07

## 2020-01-01 RX ADMIN — INSULIN ASPART 2 UNITS: 100 INJECTION, SOLUTION INTRAVENOUS; SUBCUTANEOUS at 04:08

## 2020-01-01 RX ADMIN — HYDROMORPHONE HYDROCHLORIDE 2 MG: 1 INJECTION, SOLUTION INTRAMUSCULAR; INTRAVENOUS; SUBCUTANEOUS at 02:08

## 2020-01-01 RX ADMIN — ARIPIPRAZOLE 30 MG: 30 TABLET ORAL at 10:07

## 2020-01-01 RX ADMIN — HYDROMORPHONE HYDROCHLORIDE 2 MG: 1 INJECTION, SOLUTION INTRAMUSCULAR; INTRAVENOUS; SUBCUTANEOUS at 10:08

## 2020-01-01 RX ADMIN — PIPERACILLIN AND TAZOBACTAM 4.5 G: 4; .5 INJECTION, POWDER, FOR SOLUTION INTRAVENOUS at 11:07

## 2020-01-01 RX ADMIN — ONDANSETRON 4 MG: 4 TABLET, ORALLY DISINTEGRATING ORAL at 12:08

## 2020-01-01 RX ADMIN — HALOPERIDOL LACTATE 5 MG: 5 INJECTION, SOLUTION INTRAMUSCULAR at 11:07

## 2020-01-01 RX ADMIN — INSULIN ASPART 2 UNITS: 100 INJECTION, SOLUTION INTRAVENOUS; SUBCUTANEOUS at 09:07

## 2020-01-01 RX ADMIN — INSULIN ASPART 2 UNITS: 100 INJECTION, SOLUTION INTRAVENOUS; SUBCUTANEOUS at 04:07

## 2020-01-01 RX ADMIN — INSULIN ASPART 8 UNITS: 100 INJECTION, SOLUTION INTRAVENOUS; SUBCUTANEOUS at 10:07

## 2020-01-01 RX ADMIN — HYDROMORPHONE HYDROCHLORIDE 4 MG: 2 TABLET ORAL at 10:07

## 2020-07-11 PROBLEM — M54.41 CHRONIC BILATERAL LOW BACK PAIN WITH RIGHT-SIDED SCIATICA: Status: ACTIVE | Noted: 2019-01-01

## 2020-07-11 PROBLEM — D64.9 ANEMIA: Status: ACTIVE | Noted: 2020-01-01

## 2020-07-11 PROBLEM — G89.29 CHRONIC BILATERAL LOW BACK PAIN WITH RIGHT-SIDED SCIATICA: Status: ACTIVE | Noted: 2019-01-01

## 2020-07-11 PROBLEM — F31.70 BIPOLAR DISORDER IN PARTIAL REMISSION: Status: ACTIVE | Noted: 2019-01-01

## 2020-07-11 PROBLEM — G89.29 CHRONIC NECK PAIN: Status: ACTIVE | Noted: 2019-01-01

## 2020-07-11 PROBLEM — E11.9 TYPE 2 DIABETES MELLITUS WITHOUT COMPLICATION, WITHOUT LONG-TERM CURRENT USE OF INSULIN: Status: ACTIVE | Noted: 2019-01-01

## 2020-07-11 PROBLEM — F17.210 CIGARETTE NICOTINE DEPENDENCE WITHOUT COMPLICATION: Status: ACTIVE | Noted: 2019-01-01

## 2020-07-11 PROBLEM — I82.432 ACUTE DEEP VEIN THROMBOSIS (DVT) OF POPLITEAL VEIN OF LEFT LOWER EXTREMITY: Status: ACTIVE | Noted: 2020-01-01

## 2020-07-11 PROBLEM — F22 DELUSIONAL DISORDER: Status: ACTIVE | Noted: 2020-01-01

## 2020-07-11 PROBLEM — C49.A2 MALIGNANT GASTROINTESTINAL STROMAL TUMOR (GIST) OF STOMACH: Status: ACTIVE | Noted: 2019-01-01

## 2020-07-11 PROBLEM — M54.2 CHRONIC NECK PAIN: Status: ACTIVE | Noted: 2019-01-01

## 2020-07-11 PROBLEM — E87.6 HYPOKALEMIA: Status: ACTIVE | Noted: 2020-01-01
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
## 2020-07-12 NOTE — NURSING
"Patient admitted to [facility] from ED, awake, alert, delusional. Refusing to give hand over personal belongings, including cellphone, stating "my  is going to be mad as hell that ya'll are taking his [employer] shit!" Security called by charge nurse. Security arrived and 4 point restraints were applied, belongings taken out of room to charge office. Sitter at bedside.  "
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
## 2020-07-12 NOTE — PROGRESS NOTES
Progress Note  Hospital Medicine    Admit Date: 7/11/2020  Length of Stay:  LOS: 0 days     SUBJECTIVE:         Follow-up For:  Symptomatic anemia    HPI/Interval history (See H&P for complete P,F,SHx) :     Ms. Violeta Boudreaux is a 54 y.o. female with Bipolar Disorder, delusional disorder, GIST on Sunitinib, and recent LLE DVT s/p IVC filter (June 2020) who [place] [facility] [place].  Her estranged  reports that she showed up at his house, claiming that she was  to Geovany Ring from [employer] and he was being hid inside the estranged 's house. He reports that she is homeless, and hasn't been taking her psych medications.  She denies any complaints at this time.  Labs on arrival showed a Hemoglobin 6.2 down from 9.7 in June 2020.  She endorses midepigastric abdominal abdomina.  She denies any blood in her stools, dark stools, or vaginal bleeding.  She mentions that a few weeks ago, she was having bad headaches and took a lot of Advil, then causing her to have hematemesis.  She denies further bleeding or use of Aspirin or NSAIDS.  Of note, at the end of May, she was hospitalized at [facility] for delusional disorder.  At that time, she was found to have a Hemoglobin 6.4.  She was given blood and her hemolgobin improved to 9.7.  It was thought that her bleeding with 2/2 her GIST tumor.  She was not evaluated by GI.  Also during that admission, she was found to have a LLE DVT.  HemeOnc suggested IVC filter, given her lack of consistency with medications.  She was discharged to the [facility].  She was discharged on Abilify 10mg and Mirtazapine 15mg qHS.       In the ER, she was PECed for grave disability.  SUMIT was positive.  She was transfused 1 unit PRBCs.  She mentions that Bert Gorman will pay for every drop of blood we take from her, and that she wants to return to her house in [place] when discharged.  She was admitted to Hospital Medicine for GI and Psych evaluations.        Interval  history  7/12   presenting to the ED via DANIELLE with OPC stating patient was at her estranged 's house  threatening she would kill the estranged .  PEC placed for delusional behavior. Work-up significant for H/H 6.2/20 (baseline 9/30 through care everywhere). Rectal exam performed without evidence of blood or melena. FOBT positive.   alert, delusional. Refusing to give  personal belongings, and agitation with staff.  4 point restraints were applied. repeat CBC at 6.6 . transfusion with 1 unit of PRBC. GI recommends EGD and colonoscopy for further evaluation of iron deficiency anemia patient adamantly refuses exam and EGD/ colonoscopy    Review of Systems:   Pain scale:   Constitutional: neg for fever or chills     Respiratory: neg for cough or shortness of breath  Cardiovascular: neg for chest pain or palpitations  Gastrointestinal: neg for nausea or vomiting, neg for abdominal pain or change in bowel habits  Genitourinary: neg for hematuria or dysuria  Integument/Breast: neg for rash or pruritis  Hematologic/Lymphatic: neg for easy bruising or lymphadenopathy  Musculoskeletal: neg for arthralgias or myalgias  Neurological: neg for seizures or tremors  Behavioral/Psych: neg for depression or anxiety+ delusions    OBJECTIVE:     Vital Signs Range (Last 24H):  Temp:  [98 °F (36.7 °C)-98.9 °F (37.2 °C)]   Pulse:  [65-92]   Resp:  [16-18]   BP: ()/(53-58)   SpO2:  [96 %-99 %]     Physical Exam:  Constitutional: Appears well-developed and well-nourished.   Head: Normocephalic and atraumatic. sitter at the bedside  Neck: Normal range of motion. Neck supple.   Cardiovascular: Normal heart rate.  Regular heart rhythm.  Pulmonary/Chest: Effort normal.   Abdominal: No distension.  tender epigastrium  Musculoskeletal: Normal range of motion. No edema. restraints  Neurological: Alert and oriented to person, place, and time.   Skin: Skin is warm and dry.   Psychiatric: Normal mood and affect. delusional        Medications:  Medication list was reviewed and changes noted under Assessment/Plan.      Current Facility-Administered Medications:     0.9%  NaCl infusion (for blood administration), , Intravenous, Q24H PRN, Ghassan Roca PA-C    acetaminophen tablet 650 mg, 650 mg, Oral, Q4H PRN, Tracy Ospina MD    ARIPiprazole tablet 10 mg, 10 mg, Oral, Daily, Tracy Ospina MD    dextrose 50% injection 12.5 g, 12.5 g, Intravenous, PRN, Tracy Ospina MD    dextrose 50% injection 25 g, 25 g, Intravenous, PRN, Tracy Ospina MD    glucagon (human recombinant) injection 1 mg, 1 mg, Intramuscular, PRN, Tracy Ospina MD    glucose chewable tablet 16 g, 16 g, Oral, PRN, Tracy Ospina MD    glucose chewable tablet 24 g, 24 g, Oral, PRN, Tracy Ospina MD    HYDROcodone-acetaminophen  mg per tablet 1 tablet, 1 tablet, Oral, Q4H PRN, Tracy Ospina MD, 1 tablet at 07/12/20 0428    HYDROcodone-acetaminophen 5-325 mg per tablet 1 tablet, 1 tablet, Oral, Q4H PRN, Tracy Ospina MD    insulin aspart U-100 pen 1-10 Units, 1-10 Units, Subcutaneous, QID (AC + HS) PRN, Tracy Ospina MD    melatonin tablet 6 mg, 6 mg, Oral, Nightly PRN, Tracy Ospina MD    mirtazapine tablet 15 mg, 15 mg, Oral, QHS, Tracy Ospina MD, 15 mg at 07/11/20 2338    OLANZapine injection 5 mg, 5 mg, Intramuscular, Once PRN, Vick Monge PA-C    ondansetron injection 8 mg, 8 mg, Intravenous, Q8H PRN, Tracy Ospina MD, 8 mg at 07/12/20 0054    pantoprazole injection 40 mg, 40 mg, Intravenous, BID, Tracy Ospina MD, 40 mg at 07/11/20 2246    promethazine (PHENERGAN) 25 mg in dextrose 5 % 50 mL IVPB, 25 mg, Intravenous, Q6H PRN, Tracy Ospina MD    senna-docusate 8.6-50 mg per tablet 1 tablet, 1 tablet, Oral, BID PRN, Tracy Ospina MD    sodium chloride 0.9% flush 5 mL, 5 mL, Intravenous, PRN, Tracy Ospina MD    sodium chloride, acetaminophen, dextrose 50%, dextrose 50%, glucagon (human  "recombinant), glucose, glucose, HYDROcodone-acetaminophen, HYDROcodone-acetaminophen, insulin aspart U-100, melatonin, OLANZapine, ondansetron, promethazine (PHENERGAN) IVPB, senna-docusate 8.6-50 mg, sodium chloride 0.9%    Laboratory/Diagnostic Data:  Reviewed and noted in plan where applicable- Please see chart for full lab data.    Recent Labs   Lab 07/11/20 1932   WBC 12.17   HGB 6.2*   HCT 20.4*   *       Recent Labs   Lab 07/11/20 1932      K 2.9*      CO2 24   BUN 10   CREATININE 0.8   *   CALCIUM 9.2   MG 1.8       Recent Labs   Lab 07/11/20 1932 07/11/20 1956   ALKPHOS 123  --    ALT 12  --    AST 13  --    ALBUMIN 2.9*  --    PROT 7.0  --    BILITOT 0.5  --    INR  --  1.0        Microbiology labs for the last week  Microbiology Results (last 7 days)     ** No results found for the last 168 hours. **           Imaging Results    None         Estimated body mass index is 23.49 kg/m² as calculated from the following:    Height as of this encounter: 5' 7" (1.702 m).    Weight as of this encounter: 68 kg (150 lb).    I & O (Last 24H):  No intake or output data in the 24 hours ending 07/12/20 0654    Body mass index is 23.49 kg/m².    Estimated Creatinine Clearance: 79.1 mL/min (based on SCr of 0.8 mg/dL).      Cardiac:      ASSESSMENT/PLAN:     Active Problems:      Active Hospital Problems    Diagnosis  POA    *Symptomatic anemia [D64.9]GIB Pathway initiated  Likely bleeding from GISt  Hgb 6.2 on admit, down from 9.7 beginning of June  Check iron studies and hemolysis labs  Protonix 40mg IV BID  GI consulted, appreciate assistance  Continue diet as no procedures on Sunday  Type and screen, blood consent obtained  CBCq 8h.  Transfuse for Hemoglobin <7.  Transfuse 1 unit PRBCs  7/12 haptoglobin normal and reticulocyte count elevated.  Gastroenterology consulted  repeat CBC at 6.6 . transfusion with 1 unit of PRBC. GI recommends EGD and colonoscopy for further evaluation of iron " deficiency anemia patient adamantly refuses exam and EGD/ colonoscopy  Yes    Hypokalemia [E87.6]K 2.9-->3  Check Mag  Replaced      B12 deficiency- levels of 192 started on vitamin B12 subcutaneous dissection  Yes    Acute deep vein thrombosis (DVT) of popliteal vein of left lower extremity [I82.432]June 2020  S/p IVC filter     Yes     S/p IVC filter June 2020      Delusional disorder [F22]/12   presenting to the ED via DANIELLE with OPC stating patient was at her estranged 's house  threatening she would kill the estranged .  PEC placed for delusional behavior. Work-up significant for H/H 6.2/20 (baseline 9/30 through care everywhere). Rectal exam performed without evidence of blood or melena. FOBT positive.   alert, delusional. Refusing to give r personal belongings, and agitation with staff.  4 point restraints were applied.  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   Last A1c reviewed-   Lab Results   Component Value Date    HGBA1C 6.3 (H) 07/11/2020     Home DM regimen:  Metformin  SSI with POCT accuchecks and Diabetic die  Yes      Resolved Hospital Problems   No resolved problems to display.         Disposition- PECd, likely psychiatric hospital    DVT prophylaxis addressed with:  SCds          Subsequent Hospital Care     Level 3 33455 Total visit time was 35 minutes or greater with greater than 50% of time spent in counseling and coordination of care.     We discussed in detail the plan of care, the patient's response to treatment, the discharge plan.   Total time includes time spent reviewing the medical record, examining the patient, writing notes and communicating with other professionals.         Mukund Chi MD  Attending Staff Physician  [facility]  pager- [phone] [id number] - [phone]

## 2020-07-12 NOTE — ED NOTES
Pt states she is unable to provide a urine specimen at this time. Reminded patient again that a specimen is needed.

## 2020-07-12 NOTE — SUBJECTIVE & OBJECTIVE
History reviewed. No pertinent past medical history.  Past Surgical History:   Procedure Laterality Date    CHOLECYSTECTOMY       Family History     None        Tobacco Use    Smoking status: Current Every Day Smoker     Packs/day: 10.00     Types: Cigarettes    Smokeless tobacco: Never Used   Substance and Sexual Activity    Alcohol use: No    Drug use: No    Sexual activity: Not on file     Review of patient's allergies indicates:   Allergen Reactions    Toradol [ketorolac] Hives       No current facility-administered medications on file prior to encounter.      Current Outpatient Medications on File Prior to Encounter   Medication Sig    metFORMIN (GLUCOPHAGE) 500 MG tablet Take 500 mg by mouth.    naloxone (NARCAN) 4 mg/actuation Spry 4 mg by Nasal route.    ondansetron (ZOFRAN-ODT) 8 MG TbDL Take 8 mg by mouth.    clonazePAM (KLONOPIN) 1 MG tablet Take 1 mg by mouth every evening.    loperamide (IMODIUM) 2 mg capsule Take 1 capsule (2 mg total) by mouth 3 (three) times daily.    omeprazole (PRILOSEC) 20 MG capsule Take 1 capsule (20 mg total) by mouth once daily.    ondansetron (ZOFRAN) 4 MG tablet Take 1 tablet (4 mg total) by mouth every 8 (eight) hours as needed.    oxycodone-acetaminophen (PERCOCET) 5-325 mg per tablet 1-2 tab po q4-6 hrs prn pain, take with small meal/applesauce    sucralfate (CARAFATE) 1 gram tablet Take 1 tablet (1 g total) by mouth 4 (four) times daily before meals and nightly.    ziprasidone (GEODON) 80 MG capsule Take 200 mg by mouth 2 (two) times daily with meals.     Psychotherapeutics (From admission, onward)    Start     Stop Route Frequency Ordered    07/12/20 0900  ARIPiprazole tablet 10 mg      -- Oral Daily 07/11/20 2132 07/12/20 0044  OLANZapine injection 5 mg      12/08 1544 IM Once as needed 07/11/20 2344    07/11/20 2245  mirtazapine tablet 15 mg      -- Oral Nightly 07/11/20 2132        Review of Systems  Strengths and Liabilities: Strength: Patient is  "intelligent., Liability: Patient has no suport network.    Objective:     Vital Signs (Most Recent):  Temp: 98.6 °F (37 °C) (07/12/20 1300)  Pulse: 95 (07/12/20 1300)  Resp: 18 (07/12/20 1300)  BP: (!) 107/58 (07/12/20 1300)  SpO2: 96 % (07/12/20 1300) Vital Signs (24h Range):  Temp:  [98 °F (36.7 °C)-98.9 °F (37.2 °C)] 98.6 °F (37 °C)  Pulse:  [65-95] 95  Resp:  [16-20] 18  SpO2:  [95 %-99 %] 96 %  BP: ()/(53-58) 107/58     Height: 5' 7" (170.2 cm)  Weight: 68 kg (150 lb)  Body mass index is 23.49 kg/m².    No intake or output data in the 24 hours ending 07/12/20 1537    Physical Exam    Physical Exam  Psychiatric:      Comments: CONSTITUTIONAL  General Appearance: unremarkable, age appropriate   assistance.    PSYCHIATRIC   Level of Consciousness: drowsy  Orientation: person, place, month of year  Grooming: fair, casually dressed  Behavior/Cooperation: reluctant to participate  Psychomotor: unremarkable and within normal limits  Speech: normal tone, normal rate, normal pitch, normal volume  Language: Fluent, answers questions appropriately, follows commands.  Mood: "fine"  Affect: blunted  Thought Process: normal and logical  Associations: normal and logical  Thought Content: normal, no suicidality, no homicidality, delusions, or paranoia, delusions: yes  Memory: Grossly intact  Attention: intact  Fund of Knowledge: Aware of current events  Insight: limited  Judgment: good      Significant Labs: All pertinent labs within the past 24 hours have been reviewed.    Significant Imaging: I have reviewed all pertinent imaging results/findings within the past 24 hours.  "

## 2020-07-12 NOTE — SUBJECTIVE & OBJECTIVE
Patient History           Medical as of 7/13/2020    Past Medical History: Patient provided no pertinent medical history.           Surgical as of 7/13/2020     Past Surgical History     Procedure Laterality Date Comments Source    CHOLECYSTECTOMY -- -- -- Provider                  Family as of 7/13/2020    None           Tobacco Use as of 7/13/2020     Smoking Status Smoking Start Date Smoking Quit Date Packs/Day Years Used    Current Every Day Smoker -- -- 10.00 --    Types Comments Smokeless Tobacco Status Smokeless Tobacco Quit Date Source     Cigarettes -- Never Used -- Provider            Alcohol Use as of 7/13/2020     Alcohol Use Drinks/Week Alcohol/Week Comments Source    No   -- -- Provider    Frequency Typical Drinks Binge Drinking        -- -- --              Drug Use as of 7/13/2020     Drug Use Types Frequency Comments Source    No -- -- -- Provider            Sexual Activity as of 7/13/2020     Sexually Active Birth Control Partners Comments Source    -- -- -- -- Provider            Activities of Daily Living as of 7/13/2020    None           Social Documentation as of 7/13/2020    None           Occupational as of 7/13/2020    None           Socioeconomic as of 7/13/2020     Marital Status Spouse Name Number of Children Years Education Education Level Preferred Language Ethnicity Race Source    Single -- -- -- -- English /White White --    Financial Resource Strain Food Insecurity: Worry Food Insecurity: Inability Transportation Needs: Medical Transportation Needs: Non-medical    -- -- -- -- --            Pertinent History     Question Response Comments    Lives with -- --    Place in Birth Order -- --    Lives in -- --    Number of Siblings -- --    Raised by -- --    Legal Involvement -- --    Childhood Trauma -- --    Criminal History of -- --    Financial Status -- --    Highest Level of Education -- --    Does patient have access to a firearm? -- --     Service -- --     Primary Leisure Activity -- --    Spirituality -- --        History reviewed. No pertinent past medical history.  Past Surgical History:   Procedure Laterality Date    CHOLECYSTECTOMY       Family History     None        Tobacco Use    Smoking status: Current Every Day Smoker     Packs/day: 10.00     Types: Cigarettes    Smokeless tobacco: Never Used   Substance and Sexual Activity    Alcohol use: No    Drug use: No    Sexual activity: Not on file     Review of patient's allergies indicates:   Allergen Reactions    Toradol [ketorolac] Hives       No current facility-administered medications on file prior to encounter.      Current Outpatient Medications on File Prior to Encounter   Medication Sig    metFORMIN (GLUCOPHAGE) 500 MG tablet Take 500 mg by mouth.    naloxone (NARCAN) 4 mg/actuation Spry 4 mg by Nasal route.    ondansetron (ZOFRAN-ODT) 8 MG TbDL Take 8 mg by mouth.    clonazePAM (KLONOPIN) 1 MG tablet Take 1 mg by mouth every evening.    loperamide (IMODIUM) 2 mg capsule Take 1 capsule (2 mg total) by mouth 3 (three) times daily.    omeprazole (PRILOSEC) 20 MG capsule Take 1 capsule (20 mg total) by mouth once daily.    ondansetron (ZOFRAN) 4 MG tablet Take 1 tablet (4 mg total) by mouth every 8 (eight) hours as needed.    oxycodone-acetaminophen (PERCOCET) 5-325 mg per tablet 1-2 tab po q4-6 hrs prn pain, take with small meal/applesauce    sucralfate (CARAFATE) 1 gram tablet Take 1 tablet (1 g total) by mouth 4 (four) times daily before meals and nightly.    ziprasidone (GEODON) 80 MG capsule Take 200 mg by mouth 2 (two) times daily with meals.     Psychotherapeutics (From admission, onward)    Start     Stop Route Frequency Ordered    07/12/20 0900  ARIPiprazole tablet 10 mg      -- Oral Daily 07/11/20 2132 07/12/20 0044  OLANZapine injection 5 mg      12/08 1544 IM Once as needed 07/11/20 2344    07/11/20 2245  mirtazapine tablet 15 mg      -- Oral Nightly 07/11/20 2132        Review  "of Systems  MEDICAL REVIEW OF SYSTEMS  History obtained from the patient VS unobtainable from patient due to mental status / lack of cooperation   General : NO chills or fever   Eyes: NO  visual changes   ENT: NO hearing change, nasal discharge or sore throat   Endocrine: NO weight changes or polydipsia/polyuria   Dermatological: NO rashes   Respiratory: NO cough, shortness of breath   Cardiovascular: NO chest pain, palpitations or racing heart   Gastrointestinal: NO nausea, vomiting, constipation or diarrhea, YES abdominal pain   Musculoskeletal: NO muscle pain or stiffness   Neurological: NO confusion, dizziness, headaches or tremors   Psychiatric: please see HPI      Strengths and Liabilities: Strength: Patient is intelligent., Liability: Patient is defensive.    Objective:     Vital Signs (Most Recent):  Temp: 98.6 °F (37 °C) (07/12/20 1300)  Pulse: 95 (07/12/20 1300)  Resp: 18 (07/12/20 1300)  BP: (!) 107/58 (07/12/20 1300)  SpO2: 96 % (07/12/20 1300) Vital Signs (24h Range):  Temp:  [98 °F (36.7 °C)-98.9 °F (37.2 °C)] 98.6 °F (37 °C)  Pulse:  [65-95] 95  Resp:  [16-20] 18  SpO2:  [95 %-99 %] 96 %  BP: ()/(53-58) 107/58     Height: 5' 7" (170.2 cm)  Weight: 68 kg (150 lb)  Body mass index is 23.49 kg/m².    No intake or output data in the 24 hours ending 07/12/20 1443    Physical Exam:  General appearance: NAD  Head: NCAT  Lungs: CTAB, no increased work of breath  Heart: RRR  Abdomen: soft, NT/ND  Extremities: extremities normal, atraumatic  Skin: warm, dry        Mental Status Exam:  Appearance: older than stated age  Behavior/Cooperation: cooperative, eye contact normal  Speech: normal tone, normal rate, normal pitch, normal volume  Mood: fine  Affect: normal and euthymic  Thought Process: logical  Thought Content: delusions: yes , erotomanic, persecutory  Orientation: grossly intact  Memory: Grossly intact  Attention Span/Concentration: Normal  Insight: poor  Judgment: poor    Significant " Labs: All pertinent labs within the past 24 hours have been reviewed.    Significant Imaging: I have reviewed all pertinent imaging results/findings within the past 24 hours.

## 2020-07-12 NOTE — NURSING
"Attempted to reassess patient's need for restraints. Patient stating she would "beat my ass" if she wasn't in restraints.  "

## 2020-07-12 NOTE — SUBJECTIVE & OBJECTIVE
History reviewed. No pertinent past medical history.    Past Surgical History:   Procedure Laterality Date    CHOLECYSTECTOMY         Review of patient's allergies indicates:   Allergen Reactions    Toradol [ketorolac] Hives     Family History     None        Tobacco Use    Smoking status: Current Every Day Smoker     Packs/day: 10.00     Types: Cigarettes    Smokeless tobacco: Never Used   Substance and Sexual Activity    Alcohol use: No    Drug use: No    Sexual activity: Not on file     Review of Systems   Unable to perform ROS: Mental status change     Objective:     Vital Signs (Most Recent):  Temp: 98.1 °F (36.7 °C) (07/12/20 0819)  Pulse: 71 (07/12/20 1117)  Resp: 20 (07/12/20 0927)  BP: (!) 115/55 (07/12/20 0819)  SpO2: 95 % (07/12/20 0819) Vital Signs (24h Range):  Temp:  [98 °F (36.7 °C)-98.9 °F (37.2 °C)] 98.1 °F (36.7 °C)  Pulse:  [65-92] 71  Resp:  [16-20] 20  SpO2:  [95 %-99 %] 95 %  BP: ()/(53-58) 115/55     Weight: 68 kg (150 lb) (07/11/20 1903)  Body mass index is 23.49 kg/m².    No intake or output data in the 24 hours ending 07/12/20 1142    Lines/Drains/Airways     Peripheral Intravenous Line                 Peripheral IV - Single Lumen 07/11/20 2215 20 G Right;Posterior Forearm less than 1 day                Physical Exam    Patient refused all physical exam maneuvers     Significant Labs:  CBC:   Recent Labs   Lab 07/12/20  0651 07/12/20  0818 07/12/20  1022   WBC 24.29* 20.71* 17.94*   HGB 6.8* 6.8* 6.5*   HCT 21.2* 22.2* 20.5*    326 327     CMP:   Recent Labs   Lab 07/12/20  0651   *   CALCIUM 8.4*   ALBUMIN 2.5*   PROT 5.9*      K 3.0*   CO2 27      BUN 8   CREATININE 0.8   ALKPHOS 103   ALT 10   AST 12   BILITOT 0.5     Coagulation:   Recent Labs   Lab 07/11/20 1956   INR 1.0   APTT <21.0       Significant Imaging:  Imaging results within the past 24 hours have been reviewed.

## 2020-07-12 NOTE — CONSULTS
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  Reportedly with grandiose delusions of being marred to Geovany Ring from [employer].  She was also noted to have anemia with Hgb 9.7->6.2 on initial labs.  She reports in ED that she took aleve for headaches recently.      Upon my interview, she refused to participate in interview or exam.  Starred directly at wall and would not respond to commands or questions.  Would not participate in physical exam.     Remains HDS with stable VS.  Hgb notable for 6.2-->6.5 1u pRBCs.  Otherwise labs notable for iron deficiency.    History reviewed. No pertinent past medical history.    Past Surgical History:   Procedure Laterality Date    CHOLECYSTECTOMY         Review of patient's allergies indicates:   Allergen Reactions    Toradol [ketorolac] Hives     Family History     None        Tobacco Use    Smoking status: Current Every Day Smoker     Packs/day: 10.00     Types: Cigarettes    Smokeless tobacco: Never Used   Substance and Sexual Activity    Alcohol use: No    Drug use: No    Sexual activity: Not on file     Review of Systems   Unable to perform ROS: Mental status change     Objective:     Vital Signs (Most Recent):  Temp: 98.1 °F (36.7 °C) (07/12/20 0819)  Pulse: 71 (07/12/20 1117)  Resp: 20 (07/12/20 0927)  BP: (!) 115/55 (07/12/20 0819)  SpO2: 95 %  (07/12/20 0819) Vital Signs (24h Range):  Temp:  [98 °F (36.7 °C)-98.9 °F (37.2 °C)] 98.1 °F (36.7 °C)  Pulse:  [65-92] 71  Resp:  [16-20] 20  SpO2:  [95 %-99 %] 95 %  BP: ()/(53-58) 115/55     Weight: 68 kg (150 lb) (07/11/20 1903)  Body mass index is 23.49 kg/m².    No intake or output data in the 24 hours ending 07/12/20 1142    Lines/Drains/Airways     Peripheral Intravenous Line                 Peripheral IV - Single Lumen 07/11/20 2215 20 G Right;Posterior Forearm less than 1 day                Physical Exam    Patient refused all physical exam maneuvers     Significant Labs:  CBC:   Recent Labs   Lab 07/12/20  0651 07/12/20  0818 07/12/20  1022   WBC 24.29* 20.71* 17.94*   HGB 6.8* 6.8* 6.5*   HCT 21.2* 22.2* 20.5*    326 327     CMP:   Recent Labs   Lab 07/12/20  0651   *   CALCIUM 8.4*   ALBUMIN 2.5*   PROT 5.9*      K 3.0*   CO2 27      BUN 8   CREATININE 0.8   ALKPHOS 103   ALT 10   AST 12   BILITOT 0.5     Coagulation:   Recent Labs   Lab 07/11/20  1956   INR 1.0   APTT <21.0       Significant Imaging:  Imaging results within the past 24 hours have been reviewed.    Assessment/Plan:     * Symptomatic anemia  Patient refused all interview and exam during time spent.  -Patient warrants evaluation for iron deficiency anemia with EGD/Colonoscopy but patient must be willing/able to tolerate prep and anesthesia prior to discussing procedure  -If patient's mental status improves/she would like to discuss further evaluation of YOUNG please re-consult GI  -PO PPI BID  -Tranfuse Hgb <7, Plt<50  -Trend CBC          Thank you for your consult. I will sign off. Please contact us if you have any additional questions.    Prosper Jerome MD  Gastroenterology  [facility]

## 2020-07-12 NOTE — CONSULTS
Delusional disorder  - known history with consistent delusion  - consistent delusion of marriage to Geovany Ring    Bipolar disorder in partial remission  - on Abilify Aristada BAUM, last received this month according to patient  - Recommend calling FACT team ([phone]) to confirm BAUM type, dose, and when last given  - Continue mirtazapine 15mg nightly  - Continue Abilify 10mg daily  - Continue PEC at this time, but will re-evaulate need for PEC tomorrow
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
## 2020-07-12 NOTE — H&P
Hospital Medicine  History and Physical  [facility]      Patient Name: Violeta Boudreaux  MRN:  [id number]  Hospital Medicine Team: Networked reference to record PCT  Tracy Ospina MD  Date of Admission:  7/11/2020     Principal Problem:  Symptomatic anemia   Primary Care Physician: Matthew Mccall MD       History of Present Illness:    Ms. Violeta Boudreaux is a 54 y.o. 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 for delusions.  Her estranged  reports that she showed up at his house, claiming that she was  to Geovany Ring from [employer] and he was being hid inside the estranged 's house. He reports that she is homeless, and hasn't been taking her psych medications.  She denies any complaints at this time.  Labs on arrival showed a Hemoglobin 6.2 down from 9.7 in June 2020.  She endorses midepigastric abdominal abdomina.  She denies any blood in her stools, dark stools, or vaginal bleeding.  She mentions that a few weeks ago, she was having bad headaches and took a lot of Advil, then causing her to have hematemesis.  She denies further bleeding or use of Aspirin or NSAIDS.  Of note, at the end of May, she was hospitalized at [facility] for delusional disorder.  At that time, she was found to have a Hemoglobin 6.4.  She was given blood and her hemolgobin improved to 9.7.  It was thought that her bleeding with 2/2 her GIST tumor.  She was not evaluated by GI.  Also during that admission, she was found to have a LLE DVT.  HemeOnc suggested IVC filter, given her lack of consistency with medications.  She was [facility] to the [facility].  She was discharged on Abilify 10mg and Mirtazapine 15mg qHS.      In the ER, she was PECed for grave disability.  SUMIT was positive.  She was transfused 1 unit PRBCs.  She mentions that Bert Gorman will pay for every drop of blood we take from her, and that  she wants to return to her house in [place] when discharged.  She was admitted to Hospital Medicine for GI and Psych evaluations.        Review of Systems:  Constitutional: Negative for chills, fever, fatigue, weakness  HENT: Negative for sore throat, trouble swallowing.    Eyes: Negative for photophobia, visual disturbance.   Respiratory: Negative for cough, shortness of breath.    Cardiovascular: Negative for chest pain, palpitations, leg swelling.   Gastrointestinal: Negative for abdominal pain, nausea, vomiting, diarrhea, constipation  Endocrine: Negative for cold intolerance, heat intolerance.   Genitourinary: Negative for dysuria, frequency.   Musculoskeletal: Negative for arthralgias, myalgias.   Skin: Negative for rash, wound, erythema   Neurological: Negative for numbness, paresthesias  Psychiatric/Behavioral: + delusions  All other systems reviewed and are negative.      Past Medical History: Patient has no past medical history on file.      Past Surgical History: Patient has a past surgical history that includes Cholecystectomy.      Social History: Patient reports that she has been smoking cigarettes. She has been smoking about 10.00 packs per day. She has never used smokeless tobacco. She reports that she does not drink alcohol or use drugs.      Family History: Patient's family history is not on file.      Medications: Scheduled Meds:   [START ON 7/12/2020] ARIPiprazole  10 mg Oral Daily    mirtazapine  15 mg Oral QHS    pantoprazole  40 mg Intravenous BID    potassium chloride  40 mEq Oral Once     Continuous Infusions:  PRN Meds:.sodium chloride, acetaminophen, dextrose 50%, dextrose 50%, glucagon (human recombinant), glucose, glucose, HYDROcodone-acetaminophen, HYDROcodone-acetaminophen, insulin aspart U-100, melatonin, ondansetron, promethazine (PHENERGAN) IVPB, senna-docusate 8.6-50 mg, sodium chloride 0.9%      Allergies: Patient is allergic to toradol [ketorolac].      Physical  Exam:    Temp:  [98.7 °F (37.1 °C)]   Pulse:  [92]   Resp:  [18]   BP: (113)/(58)   SpO2:  [97 %]     Constitutional: Appears well developed and well nourished  Head: Normocephalic and atraumatic.   Mouth/Throat: Oropharynx is clear and moist.   Eyes: EOM are normal. Pupils are equal, round, and reactive to light. No scleral icterus.   Neck: Normal range of motion. Neck supple.   Cardiovascular: Normal heart rate.  Regular heart rhythm.  No murmur heard.  Pulmonary/Chest: Effort normal. No respiratory distress. No wheezes, rales, or rhonchi  Abdominal: Soft. Bowel sounds are normal.  No distension.  TTP in midepigastrium  Musculoskeletal: Normal range of motion. No edema.   Neurological: Alert and oriented to person, place, and time.   Skin: Skin is warm and dry.   Psychiatric: Normal mood and affect. Behavior is normal.       No intake or output data in the 24 hours ending 07/11/20 2139  Recent Labs   Lab 07/11/20 1932   WBC 12.17   HGB 6.2*   HCT 20.4*   *     Recent Labs   Lab 07/11/20 1932      K 2.9*      CO2 24   BUN 10   CREATININE 0.8   *   CALCIUM 9.2     Recent Labs   Lab 07/11/20 1932 07/11/20 1956   ALKPHOS 123  --    ALT 12  --    AST 13  --    ALBUMIN 2.9*  --    PROT 7.0  --    BILITOT 0.5  --    INR  --  1.0      No results for input(s): LACTATE in the last 72 hours.   No results for input(s): CPK, CPKMB, MB, TROPONINI in the last 72 hours.      Assessment and Plan:    Ms. Violeta Boudreaux is a 54 y.o. female who presented to [facility] on 7/11/2020 with anemia.    GI Hemorrhage  Symptomatic anemia  · GIB Pathway initiated  · Likely bleeding from GISt  · Hgb 6.2 on admit, down from 9.7 beginning of June  · Check iron studies and hemolysis labs  · Protonix 40mg IV BID  · GI consulted, appreciate assistance  · Continue diet as no procedures on Sunday  · Type and screen, blood consent obtained  · CBCq 8h.  Transfuse for Hemoglobin <7.  Transfuse 1 unit PRBCs    Acute DVT of  LLE  · June 2020  · S/p IVC filter    Bipolar Disorder  Delusional Disorder  · PECed in the ER  · Psych consult  · Recent admission to [facility] from [facility] beginning of June  · Continue Abilify 10mg  · Continue Remeron 15mg PO qHS    GIST  · Known tumor  · Follows with HemeOnc at [facility]  · On Sunitinib outpatient    Hypokalemia  · K 2.9  · Check Mag  · Replace    Type 2 DM without Long Term Insulin Use  · HbA1c pending  · Home DM regimen:  Metformin  · SSI with POCT accuchecks and Diabetic diet       Diet:  Diabetic  VTE PPx:  IVC filter and holding with anemia  Goals of Care:  Full      Disposition:  Pending Psych likely [facility]  Discharge Needs:  TBD      Tracy Ospina MD  [facility] Medicine  Cell:  [phone]  Spectra:  [id number]
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
## 2020-07-12 NOTE — ASSESSMENT & PLAN NOTE
Patient refused all interview and exam during time spent.  -Patient warrants evaluation for iron deficiency anemia with EGD/Colonoscopy but patient must be willing/able to tolerate prep and anesthesia prior to discussing procedure  -If patient's mental status improves/she would like to discuss further evaluation of YOUNG please re-consult GI  -PO PPI BID  -Tranfuse Hgb <7, Plt<50  -Trend CBC
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
## 2020-07-13 NOTE — PROGRESS NOTES
Progress Note  Hospital Medicine    Admit Date: 7/11/2020  Length of Stay:  LOS: 0 days     SUBJECTIVE:         Follow-up For:  Symptomatic anemia    HPI/Interval history (See H&P for complete P,F,SHx) :     Ms. Violeta Boudreaux is a 54 y.o. female with Bipolar Disorder, delusional disorder, GIST on Sunitinib, and recent LLE DVT s/p IVC filter (June 2020) who presents to the ER by [facility] for delusions.  Her estranged  reports that she showed up at his house, claiming that she was  to Geovany Ring from [employer] and he was being hid inside the estranged 's house. He reports that she is homeless, and hasn't been taking her psych medications.  She denies any complaints at this time.  Labs on arrival showed a Hemoglobin 6.2 down from 9.7 in June 2020.  She endorses midepigastric abdominal abdomina.  She denies any blood in her stools, dark stools, or vaginal bleeding.  She mentions that a few weeks ago, she was having bad headaches and took a lot of Advil, then causing her to have hematemesis.  She denies further bleeding or use of Aspirin or NSAIDS.  Of note, at the end of May, she was hospitalized at [facility] for delusional disorder.  At that time, she was found to have a Hemoglobin 6.4.  She was given blood and her hemolgobin improved to 9.7.  It was thought that her bleeding with 2/2 her GIST tumor.  She was not evaluated by GI.  Also during that admission, she was found to have a LLE DVT.  HemeOnc suggested IVC filter, given her lack of consistency with medications.  She was discharged to the [facility].  She was discharged on Abilify 10mg and Mirtazapine 15mg qHS.       In the ER, she was PECed for grave disability.  SUMIT was positive.  She was transfused 1 unit PRBCs.  She mentions that Bert [employer]orman will pay for every drop of blood we take from her, and that she wants to return to her house in [place] when discharged.  She was admitted to Hospital Medicine for GI and Psych evaluations.        Interval  history  7/12   presenting to the ED via [facility] with [facility] stating patient was at her estranged 's house  threatening she would kill the estranged .  PEC placed for delusional behavior. Work-up significant for H/H 6.2/20 (baseline 9/30 through care everywhere). Rectal exam performed without evidence of blood or melena. FOBT positive.   alert, delusional. Refusing to give  personal belongings, and agitation with staff.  4 point restraints were applied. repeat CBC at 6.6 . transfusion with 1 unit of PRBC. GI recommends EGD and colonoscopy for further evaluation of iron deficiency anemia patient adamantly refuses exam and EGD/ colonoscopy  7/13 agitated overnight requesting cell phone.  Patient was placed 4 point  restraints hemoglobin at 8 3 status post 2 units of pack RBC transfusion . agrees for EGD/ colonoscopy.Increased Zyprexa from 5 mg to 10 mg q8h IM PRN for agitation.daily EKG to monitor QTc    Review of Systems:   Pain scale:   Constitutional: neg for fever or chills     Respiratory: neg for cough or shortness of breath  Cardiovascular: neg for chest pain or palpitations  Gastrointestinal: neg for nausea or vomiting, neg for abdominal pain or change in bowel habits  Genitourinary: neg for hematuria or dysuria  Integument/Breast: neg for rash or pruritis  Hematologic/Lymphatic: neg for easy bruising or lymphadenopathy  Musculoskeletal: neg for arthralgias or myalgias  Neurological: neg for seizures or tremors  Behavioral/Psych: neg for depression or anxiety+ delusions    OBJECTIVE:     Vital Signs Range (Last 24H):  Temp:  [98.1 °F (36.7 °C)-98.6 °F (37 °C)]   Pulse:  [66-95]   Resp:  [16-20]   BP: (101-115)/(55-68)   SpO2:  [92 %-99 %]     Physical Exam:  Constitutional: Appears well-developed and well-nourished.   Head: Normocephalic and atraumatic. sitter at the bedside  Neck: Normal range of motion. Neck supple.   Cardiovascular: Normal heart rate.  Regular heart rhythm.  Pulmonary/Chest: Effort  normal.   Abdominal: No distension.  tender epigastrium  Musculoskeletal: Normal range of motion. No edema.   Neurological: Alert and oriented to person, place, and time.   Skin: Skin is warm and dry.   Psychiatric: Normal mood and affect. delusional       Medications:  Medication list was reviewed and changes noted under Assessment/Plan.      Current Facility-Administered Medications:     0.9%  NaCl infusion (for blood administration), , Intravenous, Q24H PRN, Ghassan Roca PA-C    0.9%  NaCl infusion (for blood administration), , Intravenous, Q24H PRN, Mukund Chi MD    acetaminophen tablet 650 mg, 650 mg, Oral, Q4H PRN, Tracy Ospina MD    ARIPiprazole tablet 10 mg, 10 mg, Oral, Daily, Tracy Ospina MD, 10 mg at 07/12/20 0926    cyanocobalamin injection 1,000 mcg, 1,000 mcg, Subcutaneous, Daily, 1,000 mcg at 07/12/20 0927 **FOLLOWED BY** [START ON 7/19/2020] cyanocobalamin injection 1,000 mcg, 1,000 mcg, Subcutaneous, Q7 Days **FOLLOWED BY** [START ON 8/16/2020] cyanocobalamin injection 1,000 mcg, 1,000 mcg, Subcutaneous, Q30 Days, Mukund Chi MD    dextrose 50% injection 12.5 g, 12.5 g, Intravenous, PRN, Tracy Ospina MD    dextrose 50% injection 25 g, 25 g, Intravenous, PRN, Tracy Ospina MD    glucagon (human recombinant) injection 1 mg, 1 mg, Intramuscular, PRN, Tracy Ospina MD    glucose chewable tablet 16 g, 16 g, Oral, PRN, Tracy Ospina MD    glucose chewable tablet 24 g, 24 g, Oral, PRN, Tracy Ospina MD    HYDROcodone-acetaminophen  mg per tablet 1 tablet, 1 tablet, Oral, Q4H PRN, Tracy Ospina MD, 1 tablet at 07/12/20 6268    HYDROcodone-acetaminophen 5-325 mg per tablet 1 tablet, 1 tablet, Oral, Q4H PRN, Tracy Ospina MD    insulin aspart U-100 pen 1-10 Units, 1-10 Units, Subcutaneous, QID (AC + HS) PRN, Tracy Ospina MD, 2 Units at 07/12/20 6045    lidocaine 5 % patch 1 patch, 1 patch, Transdermal, Q24H, Vick Monge PA-C, 1 patch  at 07/12/20 2337    melatonin tablet 6 mg, 6 mg, Oral, Nightly PRN, Tracy Ospina MD    mirtazapine tablet 15 mg, 15 mg, Oral, QHS, Tracy Ospina MD, 15 mg at 07/12/20 2135    OLANZapine injection 5 mg, 5 mg, Intramuscular, Once PRN, Vick Monge PA-C    ondansetron injection 8 mg, 8 mg, Intravenous, Q8H PRN, Tracy Ospina MD, 8 mg at 07/12/20 0054    pantoprazole injection 40 mg, 40 mg, Intravenous, BID, Tracy Ospina MD, 40 mg at 07/12/20 2135    potassium chloride CR capsule 30 mEq, 30 mEq, Oral, Once, Mukund Chi MD    potassium chloride SA CR tablet 40 mEq, 40 mEq, Oral, Once, Mukund Chi MD    promethazine (PHENERGAN) 25 mg in dextrose 5 % 50 mL IVPB, 25 mg, Intravenous, Q6H PRN, Tracy Ospina MD    senna-docusate 8.6-50 mg per tablet 1 tablet, 1 tablet, Oral, BID PRN, Tracy Ospina MD    sodium chloride 0.9% flush 5 mL, 5 mL, Intravenous, PRN, Tracy Ospina MD    sodium chloride, sodium chloride, acetaminophen, dextrose 50%, dextrose 50%, glucagon (human recombinant), glucose, glucose, HYDROcodone-acetaminophen, HYDROcodone-acetaminophen, insulin aspart U-100, melatonin, OLANZapine, ondansetron, promethazine (PHENERGAN) IVPB, senna-docusate 8.6-50 mg, sodium chloride 0.9%    Laboratory/Diagnostic Data:  Reviewed and noted in plan where applicable- Please see chart for full lab data.    Recent Labs   Lab 07/12/20  0818 07/12/20  1022 07/12/20  2104   WBC 20.71* 17.94* 12.42   HGB 6.8* 6.5* 8.3*   HCT 22.2* 20.5* 27.0*    327 330       Recent Labs   Lab 07/11/20  1932 07/12/20  0651    139   K 2.9* 3.0*    103   CO2 24 27   BUN 10 8   CREATININE 0.8 0.8   * 234*   CALCIUM 9.2 8.4*   MG 1.8 1.9   PHOS  --  3.0       Recent Labs   Lab 07/11/20 1932 07/11/20 1956 07/12/20  0651   ALKPHOS 123  --  103   ALT 12  --  10   AST 13  --  12   ALBUMIN 2.9*  --  2.5*   PROT 7.0  --  5.9*   BILITOT 0.5  --  0.5   INR  --  1.0  --         Microbiology  "labs for the last week  Microbiology Results (last 7 days)     ** No results found for the last 168 hours. **           Imaging Results    None         Estimated body mass index is 23.49 kg/m² as calculated from the following:    Height as of this encounter: 5' 7" (1.702 m).    Weight as of this encounter: 68 kg (150 lb).    I & O (Last 24H):    Intake/Output Summary (Last 24 hours) at 7/13/2020 0659  Last data filed at 7/13/2020 0400  Gross per 24 hour   Intake 288.33 ml   Output 400 ml   Net -111.67 ml       Body mass index is 23.49 kg/m².    Estimated Creatinine Clearance: 79.1 mL/min (based on SCr of 0.8 mg/dL).      Cardiac:  ECG Results          EKG 12-lead (Final result)  Result time 07/12/20 10:59:23    Final result by Interface, Lab In [facility] (07/12/20 10:59:23)             Narrative:    Test Reason : D64.9,    Vent. Rate : 080 BPM     Atrial Rate : 080 BPM     P-R Int : 136 ms          QRS Dur : 086 ms      QT Int : 402 ms       P-R-T Axes : 076 063 012 degrees     QTc Int : 463 ms    Sinus rhythm with occasional Premature ventricular complexes  Low voltage QRS  Low septal forces  Abnormal ECG  No previous ECGs available  Confirmed by Dylan OCHOA MD ([id number]) on 7/12/2020 10:59:17 AM    Referred By: AAAREFCHRISTINE   SELF           Confirmed By:Dylan OCHOA MD                            ASSESSMENT/PLAN:     Active Problems:      Active Hospital Problems    Diagnosis  POA    *Symptomatic anemia [D64.9]GIB Pathway initiated  Likely bleeding from GISt  Hgb 6.2 on admit, down from 9.7 beginning of June  Check iron studies and hemolysis labs  Protonix 40mg IV BID  GI consulted, appreciate assistance  Continue diet as no procedures on Sunday  Type and screen, blood consent obtained  CBCq 8h.  Transfuse for Hemoglobin <7.  Transfuse 1 unit PRBCs  7/12 haptoglobin normal and reticulocyte count elevated.  Gastroenterology consulted  repeat CBC at 6.6 . transfusion with 1 unit of PRBC. GI recommends EGD and colonoscopy for " further evaluation of iron deficiency anemia patient adamantly refuses exam and EGD/ colonoscopy  7/13  hemoglobin at 8 3 status post 2 units of pack RBC transfusion.agrees for EGD/ colonoscopy.    Yes    Hypokalemia [E87.6]K 2.9-->3  Check Mag  Replaced      B12 deficiency- levels of 192 started on vitamin B12 subcutaneous dissection  Yes    Acute deep vein thrombosis (DVT) of popliteal vein of left lower extremity [I82.432]June 2020  S/p IVC filter     Yes     S/p IVC filter June 2020      Delusional disorder [F22]/12   presenting to the ED via [facility] with [facility] stating patient was at her estranged 's house  threatening she would kill the estranged .  PEC placed for delusional behavior. Work-up significant for H/H 6.2/20 (baseline 9/30 through care everywhere). Rectal exam performed without evidence of blood or melena. FOBT positive.   alert, delusional. Refusing to give r personal belongings, and agitation with staff.  4 point restraints  7/13 agitated overnight requesting cell phone.  Patient was placed 4 point  restraints     Yes    Bipolar disorder in partial remission [F31.70]PECed in the ER  Recent admission to [facility] from [facility] beginning of June  Continue Abilify 10mg  Continue Remeron 15mg PO qHS  on Abilify Aristada BAUM, last received this month according to patient.  needs to be checked with primary psychiatrist   7/13  Increased Zyprexa from 5 mg to 10 mg q8h IM PRN for agitation.daily EKG to monitor QTc. 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   Last A1c reviewed-   Lab Results   Component Value Date    HGBA1C 6.3 (H) 07/11/2020     Home DM regimen:  Metformin  SSI with POCT accuchecks and Diabetic die  Yes      Resolved Hospital  Problems   No resolved problems to display.         Disposition- PECd, likely psychiatric hospital    DVT prophylaxis addressed with:  SCds          Subsequent Hospital Care     Level 3 53385 Total visit time was 35 minutes or greater with greater than 50% of time spent in counseling and coordination of care.     We discussed in detail the plan of care, the patient's response to treatment, the discharge plan.  Total time includes time spent reviewing the medical record, examining the patient, writing notes and communicating with other professionals.         Mukund Chi MD  Attending Staff Physician  [facility]  pager- [phone]  [facility] - [phone]
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
## 2020-07-13 NOTE — PLAN OF CARE
Problem: Adult Inpatient Plan of Care  Goal: Plan of Care Review  7/13/2020 0414 by Bill Goodman, RN  Outcome: Ongoing, Progressing  7/13/2020 0414 by Bill Goodman, RN  Outcome: Ongoing, Progressing     Patient aaox3. Vitals wnl. Cardiac monitoring in place. Pain managed with prn hydrocodone x1 dose this shift. Patient able to make needs known to staff. Bed in lowest position. Call light within reach. Sitter at bedside throughout shift. Room cleared of potentially harmful items per PEC/CEC protocol. Restraint order expires 7/13 at 2315. Monitoring.

## 2020-07-13 NOTE — PLAN OF CARE
07/13/20 1640   Discharge Assessment   Assessment Type Discharge Planning Assessment   Confirmed/corrected address and phone number on facesheet? Yes   Assessment information obtained from? Patient   Expected Length of Stay (days) 3   Communicated expected length of stay with patient/caregiver yes   Prior to hospitilization cognitive status: Alert/Oriented   Prior to hospitalization functional status: Independent   Current cognitive status: Alert/Oriented   Current Functional Status: Independent   Lives With other (see comments)  (homeless)   Able to Return to Prior Arrangements   (pt unsure where she will go following discharge)   Is patient able to care for self after discharge? Unable to determine at this time (comments)   Patient's perception of discharge disposition other (comments)  (unsure)   Readmission Within the Last 30 Days no previous admission in last 30 days   Patient currently being followed by outpatient case management? No   Patient currently receives any other outside agency services? No   Equipment Currently Used at Home none   Do you have any problems affording any of your prescribed medications? Yes   Is the patient taking medications as prescribed? no   Does the patient have transportation home? No   Does the patient receive services at the [facility]? No   Discharge Plan A Psychiatric hospital   Discharge Plan B Shelter   DME Needed Upon Discharge  other (see comments)  (tbd)   Patient/Family in Agreement with Plan unable to assess       Dx: symptomatic anemia & delusions  Consult: GI & psych    [id number] - [place] - [address]  AT [facility] [address]  [address] [place] [address]  Phone: [phone] Fax: [phone]      Otf Brownlee MD (PCP)   Dr. Josefa Greer (psychiatrist)    Active PEC/CEC order noted. Documentation in the patient's chart. Sitter at the bedside.     Will continue to follow.

## 2020-07-14 NOTE — PLAN OF CARE
Hospital follow up appointment scheduled for the patient with Dr. Pranay Sanchez on 7/21/2020 at 1300. Dr. Brownlee not longer working at the [facility] resident clinic. Will continue to follow.

## 2020-07-14 NOTE — PROGRESS NOTES
Progress Note  Hospital Medicine    Admit Date: 7/11/2020  Length of Stay:  LOS: 0 days     SUBJECTIVE:         Follow-up For:  Symptomatic anemia    HPI/Interval history (See H&P for complete P,F,SHx) :     Ms. Violeta Boudreaux is a 54 y.o. female with Bipolar Disorder, delusional disorder, GIST on Sunitinib, and recent LLE DVT s/p IVC filter (June 2020) who presents to the ER by [facility] for delusions.  Her estranged  reports that she showed up at his house, claiming that she was  to Geovany Ring from [place] and he was being hid inside the estranged 's house. He reports that she is homeless, and hasn't been taking her psych medications.  She denies any complaints at this time.  Labs on arrival showed a Hemoglobin 6.2 down from 9.7 in June 2020.  She endorses midepigastric abdominal abdomina.  She denies any blood in her stools, dark stools, or vaginal bleeding.  She mentions that a few weeks ago, she was having bad headaches and took a lot of Advil, then causing her to have hematemesis.  She denies further bleeding or use of Aspirin or NSAIDS.  Of note, at the end of May, she was hospitalized at [facility] for delusional disorder.  At that time, she was found to have a Hemoglobin 6.4.  She was given blood and her hemolgobin improved to 9.7.  It was thought that her bleeding with 2/2 her GIST tumor.  She was not evaluated by GI.  Also during that admission, she was found to have a LLE DVT.  HemeOnc suggested IVC filter, given her lack of consistency with medications.  She was [facility] to the [facility].  She was discharged on Abilify 10mg and Mirtazapine 15mg qHS.       In the ER, she was PECed for grave disability.  SUMIT was positive.  She was transfused 1 unit PRBCs.  She mentions that Bert Gorman will pay for every drop of blood we take from her, and that she wants to return to her house in [place] when discharged.  She was admitted to Hospital Medicine for GI and Psych evaluations.        Interval  history  7/12   presenting to the ED via [facility] with OPC stating patient was at her estranged 's house  threatening she would kill the estranged .  PEC placed for delusional behavior. Work-up significant for H/H 6.2/20 (baseline 9/30 through care everywhere). Rectal exam performed without evidence of blood or melena. FOBT positive.   alert, delusional. Refusing to give  personal belongings, and agitation with staff.  4 point restraints were applied. repeat CBC at 6.6 . transfusion with 1 unit of PRBC. GI recommends EGD and colonoscopy for further evaluation of iron deficiency anemia patient adamantly refuses exam and EGD/ colonoscopy  7/13 agitated overnight requesting cell phone.  Patient was placed 4 point  restraints hemoglobin at 8 3 status post 2 units of pack RBC transfusion . agrees for EGD/ colonoscopy.Patient off restraints.Continue home Abilify 10 mg and Remeron 15 mg nightly. Increased Zyprexa from 5 mg to 10 mg q8h IM PRN for agitation.daily EKG to monitor QTc- 447ms   7/14 Hb 8.1 --> 7.6. Requesting  her purse and  belongings she needs to get to her (Geovany Ring).  Clear liquids today and NPO from midnight EGD/colonoscopy in a.m. complains of abdominal, takes oxycodone 30 mg Q 6 hourly as per chart review (reviewed  last filled on 06/23).  Norco discontinued and started oxycodone 20 mg Q 6 hourly p.r.n.    Review of Systems:   Pain scale:  abdominal pain 10/10   Constitutional: neg for fever or chills     Respiratory: neg for cough or shortness of breath  Cardiovascular: neg for chest pain or palpitations  Gastrointestinal: neg for nausea or vomiting, positive  for abdominal pain or change in bowel habits  Genitourinary: neg for hematuria or dysuria  Integument/Breast: neg for rash or pruritis  Hematologic/Lymphatic: neg for easy bruising or lymphadenopathy  Musculoskeletal: neg for arthralgias or myalgias  Neurological: neg for seizures or tremors  Behavioral/Psych: neg for  depression or anxiety+ delusions    OBJECTIVE:     Vital Signs Range (Last 24H):  Temp:  [98.3 °F (36.8 °C)-99.6 °F (37.6 °C)]   Pulse:  [73-85]   Resp:  [16-20]   BP: (103-127)/(57-75)   SpO2:  [92 %-99 %]     Physical Exam:  Constitutional: Appears well-developed and well-nourished.   Head: Normocephalic and atraumatic. sitter at the bedside  Neck: Normal range of motion. Neck supple.   Cardiovascular: Normal heart rate.  Regular heart rhythm.  Pulmonary/Chest: Effort normal.   Abdominal: No distension.  tender epigastrium  Musculoskeletal: Normal range of motion. No edema.   Neurological: Alert and oriented to person, place, and time.   Skin: Skin is warm and dry.   Psychiatric: Normal mood and affect. delusional       Medications:  Medication list was reviewed and changes noted under Assessment/Plan.      Current Facility-Administered Medications:     0.9%  NaCl infusion (for blood administration), , Intravenous, Q24H PRN, Ghassan Roca PA-C    0.9%  NaCl infusion (for blood administration), , Intravenous, Q24H PRN, Mukund Chi MD    acetaminophen tablet 650 mg, 650 mg, Oral, Q4H PRN, Tracy Ospina MD, 650 mg at 07/13/20 2037    ARIPiprazole tablet 10 mg, 10 mg, Oral, Daily, Tracy Ospina MD, 10 mg at 07/13/20 0905    cyanocobalamin injection 1,000 mcg, 1,000 mcg, Subcutaneous, Daily, 1,000 mcg at 07/13/20 0905 **FOLLOWED BY** [START ON 7/19/2020] cyanocobalamin injection 1,000 mcg, 1,000 mcg, Subcutaneous, Q7 Days **FOLLOWED BY** [START ON 8/16/2020] cyanocobalamin injection 1,000 mcg, 1,000 mcg, Subcutaneous, Q30 Days, Mukund Chi MD    dextrose 50% injection 12.5 g, 12.5 g, Intravenous, PRN, Tracy Ospina MD    dextrose 50% injection 25 g, 25 g, Intravenous, PRN, Tracy Ospina MD    glucagon (human recombinant) injection 1 mg, 1 mg, Intramuscular, PRN, Tracy Ospina MD    glucose chewable tablet 16 g, 16 g, Oral, PRN, Tracy Ospina MD    glucose chewable tablet 24  g, 24 g, Oral, PRN, Tracy Ospina MD    HYDROcodone-acetaminophen  mg per tablet 1 tablet, 1 tablet, Oral, Q4H PRN, Tracy Ospina MD, 1 tablet at 07/14/20 0748    HYDROcodone-acetaminophen 5-325 mg per tablet 1 tablet, 1 tablet, Oral, Q4H PRN, Tracy Ospina MD    insulin aspart U-100 pen 1-10 Units, 1-10 Units, Subcutaneous, QID (AC + HS) PRN, Tracy Ospina MD, 4 Units at 07/14/20 0817    lidocaine 5 % patch 1 patch, 1 patch, Transdermal, Q24H, Vick Monge PA-C, 1 patch at 07/14/20 0030    melatonin tablet 6 mg, 6 mg, Oral, Nightly PRN, Tracy Ospina MD, 6 mg at 07/13/20 2037    mirtazapine tablet 15 mg, 15 mg, Oral, QHS, Tracy Ospina MD, 15 mg at 07/13/20 2037    OLANZapine injection 10 mg, 10 mg, Intramuscular, Q8H PRN, Go Garcia MD    ondansetron injection 8 mg, 8 mg, Intravenous, Q8H PRN, Tracy Ospina MD, 8 mg at 07/12/20 0054    pantoprazole injection 40 mg, 40 mg, Intravenous, BID, Tracy Ospina MD, 40 mg at 07/13/20 2040    potassium chloride CR capsule 30 mEq, 30 mEq, Oral, Once, Mukund Chi MD    potassium chloride SA CR tablet 40 mEq, 40 mEq, Oral, Once, Mukund Chi MD    potassium chloride SA CR tablet 40 mEq, 40 mEq, Oral, Once, Mukund Chi MD    promethazine (PHENERGAN) 25 mg in dextrose 5 % 50 mL IVPB, 25 mg, Intravenous, Q6H PRN, Tracy Ospina MD    senna-docusate 8.6-50 mg per tablet 1 tablet, 1 tablet, Oral, BID PRN, Tracy Ospina MD, 1 tablet at 07/14/20 0135    sodium chloride 0.9% flush 5 mL, 5 mL, Intravenous, PRN, Tracy Ospina MD    sodium chloride, sodium chloride, acetaminophen, dextrose 50%, dextrose 50%, glucagon (human recombinant), glucose, glucose, HYDROcodone-acetaminophen, HYDROcodone-acetaminophen, insulin aspart U-100, melatonin, OLANZapine, ondansetron, promethazine (PHENERGAN) IVPB, senna-docusate 8.6-50 mg, sodium chloride 0.9%    Laboratory/Diagnostic Data:  Reviewed and noted in plan where  "applicable- Please see chart for full lab data.    Recent Labs   Lab 07/12/20 2104 07/13/20  0653 07/14/20  0454   WBC 12.42 9.97 12.60   HGB 8.3* 8.1* 7.6*   HCT 27.0* 25.8* 25.3*    318 309       Recent Labs   Lab 07/12/20 0651 07/13/20  0653 07/14/20  0454    140 139   K 3.0* 3.3* 3.9    104 105   CO2 27 28 27   BUN 8 7 7   CREATININE 0.8 0.7 0.7   * 154* 240*   CALCIUM 8.4* 8.0* 8.1*   MG 1.9 1.7 1.8   PHOS 3.0 2.8 2.9       Recent Labs   Lab 07/11/20 1956 07/12/20 0651 07/13/20  0653 07/14/20  0454   ALKPHOS  --  103 131 131   ALT  --  10 15 14   AST  --  12 26 16   ALBUMIN  --  2.5* 2.3* 2.2*   PROT  --  5.9* 5.5* 5.5*   BILITOT  --  0.5 0.4 0.2   INR 1.0  --   --   --         Microbiology labs for the last week  Microbiology Results (last 7 days)     ** No results found for the last 168 hours. **           Imaging Results    None         Estimated body mass index is 23.49 kg/m² as calculated from the following:    Height as of this encounter: 5' 7" (1.702 m).    Weight as of this encounter: 68 kg (150 lb).    I & O (Last 24H):    Intake/Output Summary (Last 24 hours) at 7/14/2020 0827  Last data filed at 7/14/2020 0500  Gross per 24 hour   Intake 600 ml   Output no documentation   Net 600 ml       Body mass index is 23.49 kg/m².    Estimated Creatinine Clearance: 90.4 mL/min (based on SCr of 0.7 mg/dL).      Cardiac:  ECG Results          EKG 12-lead (Final result)  Result time 07/12/20 10:59:23    Final result by Interface, Lab In [facility] (07/12/20 10:59:23)             Narrative:    Test Reason : D64.9,    Vent. Rate : 080 BPM     Atrial Rate : 080 BPM     P-R Int : 136 ms          QRS Dur : 086 ms      QT Int : 402 ms       P-R-T Axes : 076 063 012 degrees     QTc Int : 463 ms    Sinus rhythm with occasional Premature ventricular complexes  Low voltage QRS  Low septal forces  Abnormal ECG  No previous ECGs available  Confirmed by Dylan OCHOA MD ([id number]) on 7/12/2020 10:59:17 " AM    Referred By: HALLE   SELF           Confirmed By:Dylan OCHOA MD                            ASSESSMENT/PLAN:     Active Problems:      Active Hospital Problems    Diagnosis  POA    *Symptomatic anemia [D64.9]GIB Pathway initiated  Likely bleeding from GISt  Hgb 6.2 on admit, down from 9.7 beginning of June  Check iron studies and hemolysis labs  Protonix 40mg IV BID  GI consulted, appreciate assistance  Continue diet as no procedures on Sunday  Type and screen, blood consent obtained  CBCq 8h.  Transfuse for Hemoglobin <7.  Transfuse 1 unit PRBCs  7/12 haptoglobin normal and reticulocyte count elevated.  Gastroenterology consulted  repeat CBC at 6.6 . transfusion with 1 unit of PRBC. GI recommends EGD and colonoscopy for further evaluation of iron deficiency anemia patient adamantly refuses exam and EGD/ colonoscopy  7/13  hemoglobin at 8 3 status post 2 units of pack RBC transfusion.agrees for EGD/ colonoscopy.  7/14 Hb 8.1 --> 7.6.Clear liquids today and NPO from midnight EGD/colonoscopy in a.m.    Yes    Hypokalemia [E87.6]K 2.9-->3  Check Mag  Replaced      B12 deficiency- levels of 192 started on vitamin B12 subcutaneous dissection  Yes    Acute deep vein thrombosis (DVT) of popliteal vein of left lower extremity [I82.432]June 2020  S/p IVC filter     Yes     S/p IVC filter June 2020      Delusional disorder [F22]/12   presenting to the ED via [facility] with OPC stating patient was at her estranged 's house  threatening she would kill the estranged .  PEC placed for delusional behavior. Work-up significant for H/H 6.2/20 (baseline 9/30 through care everywhere). Rectal exam performed without evidence of blood or melena. FOBT positive.   alert, delusional. Refusing to give  personal belongings, and agitation with staff.  4 point restraints  7/13 agitated overnight requesting cell phone.  Patient off restraints.Continue home Abilify 10 mg and Remeron 15 mg nightly. Increased Zyprexa from 5 mg to  10 mg q8h IM PRN for agitation  7/14 Hb 8.1 --> 7.6. Requesting  her purse and  belongings she needs to get to her (Geovany Ring)    Yes    Bipolar disorder in partial remission [F31.70]PECed in the ER  Recent admission to [facility] from [facility] beginning of June  Continue Abilify 10mg  Continue Remeron 15mg PO qHS  on Abilify Aristada BAUM, last received this month according to patient.  needs to be checked with primary psychiatrist   7/13  Increased Zyprexa from 5 mg to 10 mg q8h IM PRN for agitation.daily EKG to monitor QTc. 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  Norco discontinued and started oxycodone 20 mg Q 6 hourly p.r.n.    Yes    Type 2 diabetes mellitus without complication, without long-term current use of insulin [E11.9]   Patient's FSGs are controlled on current hypoglycemics.   Last A1c reviewed-   Lab Results   Component Value Date    HGBA1C 6.3 (H) 07/11/2020     Home DM regimen:  Metformin  SSI with POCT accuchecks and Diabetic diet  Yes      Resolved Hospital Problems   No resolved problems to display.         Disposition- CECd, likely psychiatric hospital    DVT prophylaxis addressed with: SCDs            Subsequent Hospital Care     Level 3 14307 Total visit time was 35 minutes or greater with greater than 50% of time spent in counseling and coordination of care.     We discussed in detail the plan of care, the patient's response to treatment, the discharge plan,.  Total time includes time spent reviewing the medical record, examining the patient, writing notes and communicating with other professionals.    Mukund Chi MD  Attending Staff Physician  [facility]  pager- [phone] [id number] - [id number]               I

## 2020-07-14 NOTE — ASSESSMENT & PLAN NOTE
ASSESSMENT     Violeta Boudreaux is a 54 y.o. female with a past psychiatric history of BPAD and delusional disorder, who presented to the [facility] due to OPC from estranged  for threatening to kill him. Admitted to [facility] for symptomatic anemia. Per FACT team, patient non-compliant with medications, did not receive most recent scheduled Aristada BAUM.    IMPRESSION  Bipolar disorder, in partial remission  Delusional disorder  R/o Antisocial behavior  Medication non-adherence    RECOMMENDATION(S)      1. Scheduled Medication(s):  - Continue home Abilify 10 mg daily  - Continue home Remeron 15 mg nightly    2. PRN Medication(s):  - Zyprexa 10 mg q8h IM PRN for agitation    3.  Monitor:  - Please obtain daily EKG to monitor QTc    4. Legal Status/Precaution(s):  - Continue CEC (expires 7/26 @ 8:32 pm) as patient is in imminent danger of hurting self or others and is gravely disabled due to a psychiatric illness.

## 2020-07-14 NOTE — PLAN OF CARE
No acute events throughout night. Pt able to express needs. Requesting to get her purse that has her belongings she needs to get to her (Geovany Ring from [employer]).   C/o pain  and no bowel movement in the last 6 or 7 days.  Recent BM charted.  Senna given for constipation.  Safety maintained.  Bed in low position,  call  light in reach.    Will continue to monitor
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
## 2020-07-14 NOTE — SUBJECTIVE & OBJECTIVE
Patient History           Medical as of 7/14/2020    Past Medical History: Patient provided no pertinent medical history.           Surgical as of 7/14/2020     Past Surgical History     Procedure Laterality Date Comments Source    CHOLECYSTECTOMY -- -- -- Provider                  Family as of 7/14/2020    None           Tobacco Use as of 7/14/2020     Smoking Status Smoking Start Date Smoking Quit Date Packs/Day Years Used    Current Every Day Smoker -- -- 10.00 --    Types Comments Smokeless Tobacco Status Smokeless Tobacco Quit Date Source     Cigarettes -- Never Used -- Provider            Alcohol Use as of 7/14/2020     Alcohol Use Drinks/Week Alcohol/Week Comments Source    No   -- -- Provider    Frequency Typical Drinks Binge Drinking        -- -- --              Drug Use as of 7/14/2020     Drug Use Types Frequency Comments Source    No -- -- -- Provider            Sexual Activity as of 7/14/2020     Sexually Active Birth Control Partners Comments Source    -- -- -- -- Provider            Activities of Daily Living as of 7/14/2020    None           Social Documentation as of 7/14/2020    None           Occupational as of 7/14/2020    None           Socioeconomic as of 7/14/2020     Marital Status Spouse Name Number of Children Years Education Education Level Preferred Language Ethnicity Race Source    Single -- -- -- -- English /White White --    Financial Resource Strain Food Insecurity: Worry Food Insecurity: Inability Transportation Needs: Medical Transportation Needs: Non-medical    -- -- -- -- --            Pertinent History     Question Response Comments    Lives with -- --    Place in Birth Order -- --    Lives in -- --    Number of Siblings -- --    Raised by -- --    Legal Involvement -- --    Childhood Trauma -- --    Criminal History of -- --    Financial Status -- --    Highest Level of Education -- --    Does patient have access to a firearm? -- --     Service -- --     Primary Leisure Activity -- --    Spirituality -- --        History reviewed. No pertinent past medical history.  Past Surgical History:   Procedure Laterality Date    CHOLECYSTECTOMY       Family History     None        Tobacco Use    Smoking status: Current Every Day Smoker     Packs/day: 10.00     Types: Cigarettes    Smokeless tobacco: Never Used   Substance and Sexual Activity    Alcohol use: No    Drug use: No    Sexual activity: Not on file     Review of patient's allergies indicates:   Allergen Reactions    Toradol [ketorolac] Hives       No current facility-administered medications on file prior to encounter.      Current Outpatient Medications on File Prior to Encounter   Medication Sig    metFORMIN (GLUCOPHAGE) 500 MG tablet Take 500 mg by mouth.    naloxone (NARCAN) 4 mg/actuation Spry 4 mg by Nasal route.    ondansetron (ZOFRAN-ODT) 8 MG TbDL Take 8 mg by mouth.    clonazePAM (KLONOPIN) 1 MG tablet Take 1 mg by mouth every evening.    loperamide (IMODIUM) 2 mg capsule Take 1 capsule (2 mg total) by mouth 3 (three) times daily.    omeprazole (PRILOSEC) 20 MG capsule Take 1 capsule (20 mg total) by mouth once daily.    ondansetron (ZOFRAN) 4 MG tablet Take 1 tablet (4 mg total) by mouth every 8 (eight) hours as needed.    oxycodone-acetaminophen (PERCOCET) 5-325 mg per tablet 1-2 tab po q4-6 hrs prn pain, take with small meal/applesauce    sucralfate (CARAFATE) 1 gram tablet Take 1 tablet (1 g total) by mouth 4 (four) times daily before meals and nightly.    ziprasidone (GEODON) 80 MG capsule Take 200 mg by mouth 2 (two) times daily with meals.     Psychotherapeutics (From admission, onward)    Start     Stop Route Frequency Ordered    07/13/20 1720  OLANZapine injection 10 mg      -- IM Every 8 hours PRN 07/13/20 1621    07/12/20 0900  ARIPiprazole tablet 10 mg      -- Oral Daily 07/11/20 2132 07/11/20 2245  mirtazapine tablet 15 mg      -- Oral Nightly 07/11/20 2132        Review of  "Systems    MEDICAL REVIEW OF SYSTEMS  History obtained from the patient   General : NO chills or fever   Eyes: NO  visual changes   ENT: NO hearing change, nasal discharge or sore throat   Endocrine: NO weight changes or polydipsia/polyuria   Dermatological: NO rashes   Respiratory: NO cough, shortness of breath   Cardiovascular: NO chest pain, palpitations or racing heart   Gastrointestinal: NO nausea, vomiting, constipation or diarrhea, YES abdominal pain   Musculoskeletal: NO muscle pain or stiffness   Neurological: NO confusion, dizziness, headaches or tremors   Psychiatric: please see HPI      Strengths and Liabilities: Strength: Patient is intelligent., Liability: Patient is defensive.    Objective:     Vital Signs (Most Recent):  Temp: 98.2 °F (36.8 °C) (07/14/20 0922)  Pulse: 92 (07/14/20 0922)  Resp: 16 (07/14/20 0922)  BP: (!) 117/56 (07/14/20 0922)  SpO2: 100 % (07/14/20 0922) Vital Signs (24h Range):  Temp:  [98.2 °F (36.8 °C)-99.6 °F (37.6 °C)] 98.2 °F (36.8 °C)  Pulse:  [73-92] 92  Resp:  [16-20] 16  SpO2:  [96 %-100 %] 100 %  BP: (103-127)/(56-75) 117/56     Height: 5' 7" (170.2 cm)  Weight: 68 kg (150 lb)  Body mass index is 23.49 kg/m².      Intake/Output Summary (Last 24 hours) at 7/14/2020 0958  Last data filed at 7/14/2020 0500  Gross per 24 hour   Intake 600 ml   Output --   Net 600 ml       Physical Exam:  General appearance: NAD  Head: NCAT  Lungs: CTAB, no increased work of breath  Heart: RRR  Abdomen: soft, ND  Extremities: extremities normal, atraumatic  Skin: warm, dry          Mental Status Exam:  Appearance: older than stated age  Behavior/Cooperation: cooperative, eye contact normal  Speech: normal tone, normal rate, normal pitch, normal volume  Mood: better  Affect: normal, normal range  Thought Process: logical  Thought Content: delusions: yes, erotomanic, persecutory  Orientation: grossly intact  Memory: Grossly intact  Attention Span/Concentration: Normal  Insight: " poor  Judgment: poor    Significant Labs: All pertinent labs within the past 24 hours have been reviewed.    Significant Imaging: I have reviewed all pertinent imaging results/findings within the past 24 hours.

## 2020-07-15 NOTE — PLAN OF CARE
Patient remained in stable condition through shift. Remained free of falls and other injuries. Able to reposition self independently. Frequently complained of pain by abdominal mass. Patient refused to drink golytely despite constant encouragement. Team notified. Bed in locked and lowest position, call light in reach, all questions answered, declines any further needs at this time. Sitter at bedside. Will continue to monitor.
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
## 2020-07-15 NOTE — PROGRESS NOTES
Patient refusing to drink golytely despite constant encouragement from sitter and nurse. IM CHANDLER notified. Will continue to monitor.

## 2020-07-15 NOTE — PROGRESS NOTES
Progress Note  Hospital Medicine    Admit Date: 7/11/2020  Length of Stay:  LOS: 0 days     SUBJECTIVE:         Follow-up For:  Symptomatic anemia    HPI/Interval history (See H&P for complete P,F,SHx) :     Ms. Violeta Boudreaux is a 54 y.o. female with Bipolar Disorder, delusional disorder, GIST on Sunitinib, and recent LLE DVT s/p IVC filter (June 2020) who presents to the ER by [facility] for delusions.  Her estranged  reports that she showed up at his house, claiming that she was  to Geovany Ring from [employer] and he was being hid inside the estranged 's house. He reports that she is homeless, and hasn't been taking her psych medications.  She denies any complaints at this time.  Labs on arrival showed a Hemoglobin 6.2 down from 9.7 in June 2020.  She endorses midepigastric abdominal abdomina.  She denies any blood in her stools, dark stools, or vaginal bleeding.  She mentions that a few weeks ago, she was having bad headaches and took a lot of Advil, then causing her to have hematemesis.  She denies further bleeding or use of Aspirin or NSAIDS.  Of note, at the end of May, she was hospitalized at [facility] for delusional disorder.  At that time, she was found to have a Hemoglobin 6.4.  She was given blood and her hemolgobin improved to 9.7.  It was thought that her bleeding with 2/2 her GIST tumor.  She was not evaluated by GI.  Also during that admission, she was found to have a LLE DVT.  HemeOnc suggested IVC filter, given her lack of consistency with medications.  She was [facility] to the [facility].  She was discharged on Abilify 10mg and Mirtazapine 15mg qHS.       In the ER, she was PECed for grave disability.  SUMIT was positive.  She was transfused 1 unit PRBCs.  She mentions that Bert Gorman will pay for every drop of blood we take from her, and that she wants to return to her house in [place] when discharged.  She was admitted to Hospital Medicine for GI and Psych evaluations.        Interval  history  7/12   presenting to the ED via [facility] with [facility] stating patient was at her estranged 's house  threatening she would kill the estranged .  PEC placed for delusional behavior. Work-up significant for H/H 6.2/20 (baseline 9/30 through care everywhere). Rectal exam performed without evidence of blood or melena. FOBT positive.   alert, delusional. Refusing to give  personal belongings, and agitation with staff.  4 point restraints were applied. repeat CBC at 6.6 . transfusion with 1 unit of PRBC. GI recommends EGD and colonoscopy for further evaluation of iron deficiency anemia patient adamantly refuses exam and EGD/ colonoscopy  7/13 agitated overnight requesting cell phone.  Patient was placed 4 point  restraints hemoglobin at 8 3 status post 2 units of pack RBC transfusion . agrees for EGD/ colonoscopy.Patient off restraints.Continue home Abilify 10 mg and Remeron 15 mg nightly. Increased Zyprexa from 5 mg to 10 mg q8h IM PRN for agitation.daily EKG to monitor QTc- 447ms   7/14 Hb 8.1 --> 7.6. Requesting  her purse and  belongings she needs to get to her (Geovany Ring).  Clear liquids today and NPO from midnight EGD/colonoscopy in a.m. complains of abdominal, takes oxycodone 30 mg Q 6 hourly as per chart review (reviewed  last filled on 06/23).  Norco discontinued and started oxycodone 20 mg Q 6 hourly p.r.n.  7/15 refused to drink GoLYTELY overnight.  Hemoglobin stable at 7.8 leukocytosis of 15 but afebrile.  Transaminitis AST//112 with normal bilirubin and mildly elevated alk-phos at 337.  Significant left upper quadrant abdominal pain prior to endoscopy today.  Endoscopy canceled.  CT abdomen with IV and oral contrast ordered.  General surgery consulted.  Started on Zosyn and vancomycin empirically.  Blood cultures x2 pending    Review of Systems:   Pain scale:  abdominal pain 10/10   Constitutional: neg for fever or chills     Respiratory: neg for cough or shortness of  breath  Cardiovascular: neg for chest pain or palpitations  Gastrointestinal: neg for nausea or vomiting, positive  for abdominal pain or change in bowel habits  Genitourinary: neg for hematuria or dysuria  Integument/Breast: neg for rash or pruritis  Hematologic/Lymphatic: neg for easy bruising or lymphadenopathy  Musculoskeletal: neg for arthralgias or myalgias  Neurological: neg for seizures or tremors  Behavioral/Psych: neg for depression or anxiety+ delusions    OBJECTIVE:     Vital Signs Range (Last 24H):  Temp:  [98.2 °F (36.8 °C)-99.6 °F (37.6 °C)]   Pulse:  [69-92]   Resp:  [16-20]   BP: (107-130)/(53-62)   SpO2:  [95 %-100 %]     Physical Exam:  Constitutional: Appears well-developed and well-nourished.   Head: Normocephalic and atraumatic. sitter at the bedside  Neck: Normal range of motion. Neck supple.   Cardiovascular: Normal heart rate.  Regular heart rhythm.  Pulmonary/Chest: Effort normal.   Abdominal: No distension.  tender epigastrium and left quadrant  Musculoskeletal: Normal range of motion. No edema.   Neurological: Alert and oriented to person, place, and time.   Skin: Skin is warm and dry.   Psychiatric: Normal mood and affect. delusional       Medications:  Medication list was reviewed and changes noted under Assessment/Plan.      Current Facility-Administered Medications:     0.9%  NaCl infusion (for blood administration), , Intravenous, Q24H PRN, Ghassan Roca PA-C    0.9%  NaCl infusion (for blood administration), , Intravenous, Q24H PRN, Mukund Chi MD    acetaminophen tablet 650 mg, 650 mg, Oral, Q4H PRN, Tracy Ospina MD, 650 mg at 07/13/20 2037    ARIPiprazole tablet 10 mg, 10 mg, Oral, Daily, Tracy Ospina MD, 10 mg at 07/14/20 0925    cyanocobalamin injection 1,000 mcg, 1,000 mcg, Subcutaneous, Daily, 1,000 mcg at 07/14/20 0925 **FOLLOWED BY** [START ON 7/19/2020] cyanocobalamin injection 1,000 mcg, 1,000 mcg, Subcutaneous, Q7 Days **FOLLOWED BY** [START ON  8/16/2020] cyanocobalamin injection 1,000 mcg, 1,000 mcg, Subcutaneous, Q30 Days, Mukund Chi MD    dextrose 50% injection 12.5 g, 12.5 g, Intravenous, PRN, Tracy Ospina MD    dextrose 50% injection 25 g, 25 g, Intravenous, PRN, Tracy Ospina MD    glucagon (human recombinant) injection 1 mg, 1 mg, Intramuscular, PRN, Tracy Ospina MD    glucose chewable tablet 16 g, 16 g, Oral, PRN, Tracy Ospina MD    glucose chewable tablet 24 g, 24 g, Oral, PRN, Tracy Ospina MD    insulin aspart U-100 pen 1-10 Units, 1-10 Units, Subcutaneous, QID (AC + HS) PRN, Tracy Ospina MD, 2 Units at 07/14/20 1655    lidocaine 5 % patch 1 patch, 1 patch, Transdermal, Q24H, Vick Monge PA-C, 1 patch at 07/14/20 0030    melatonin tablet 6 mg, 6 mg, Oral, Nightly PRN, Tracy Ospina MD, 6 mg at 07/14/20 2132    mirtazapine tablet 15 mg, 15 mg, Oral, QHS, Tracy Ospina MD, 15 mg at 07/14/20 2132    naloxone 0.4 mg/mL injection 0.4 mg, 0.4 mg, Intravenous, PRN, Mukund Chi MD    OLANZapine injection 10 mg, 10 mg, Intramuscular, Q8H PRN, Go Garcia MD    ondansetron injection 8 mg, 8 mg, Intravenous, Q8H PRN, Tracy Ospina MD, 8 mg at 07/12/20 0054    oxyCODONE immediate release tablet Tab 20 mg, 20 mg, Oral, Q6H PRN, Mukund Chi MD, 20 mg at 07/14/20 1700    pantoprazole injection 40 mg, 40 mg, Intravenous, BID, Tracy Ospina MD, 40 mg at 07/14/20 2132    potassium chloride CR capsule 30 mEq, 30 mEq, Oral, Once, Mukund Chi MD    potassium chloride SA CR tablet 40 mEq, 40 mEq, Oral, Once, Mukund Chi MD    potassium chloride SA CR tablet 40 mEq, 40 mEq, Oral, Once, Mukund Chi MD    promethazine (PHENERGAN) 25 mg in dextrose 5 % 50 mL IVPB, 25 mg, Intravenous, Q6H PRN, Tracy Ospina MD    senna-docusate 8.6-50 mg per tablet 1 tablet, 1 tablet, Oral, BID PRN, Tracy Ospina MD, 1 tablet at 07/14/20 0135    sodium chloride 0.9% flush 5 mL,  "5 mL, Intravenous, PRN, Tracy Ospina MD    sodium chloride, sodium chloride, acetaminophen, dextrose 50%, dextrose 50%, glucagon (human recombinant), glucose, glucose, insulin aspart U-100, melatonin, naloxone, OLANZapine, ondansetron, oxyCODONE, promethazine (PHENERGAN) IVPB, senna-docusate 8.6-50 mg, sodium chloride 0.9%    Laboratory/Diagnostic Data:  Reviewed and noted in plan where applicable- Please see chart for full lab data.    Recent Labs   Lab 07/14/20  0454 07/14/20  2015 07/15/20  0420   WBC 12.60 11.81 15.07*   HGB 7.6* 7.9* 7.8*   HCT 25.3* 26.3* 24.9*    298 317       Recent Labs   Lab 07/13/20  0653 07/14/20  0454 07/15/20  0420    139 135*   K 3.3* 3.9 4.4    105 102   CO2 28 27 25   BUN 7 7 5*   CREATININE 0.7 0.7 0.7   * 240* 183*   CALCIUM 8.0* 8.1* 8.3*   MG 1.7 1.8 1.6   PHOS 2.8 2.9 3.2   LIPASE  --  22  --        Recent Labs   Lab 07/11/20  1956  07/13/20  0653 07/14/20  0454 07/15/20  0420   ALKPHOS  --    < > 131 131 337*   ALT  --    < > 15 14 112*   AST  --    < > 26 16 150*   ALBUMIN  --    < > 2.3* 2.2* 2.2*   PROT  --    < > 5.5* 5.5* 5.7*   BILITOT  --    < > 0.4 0.2 0.7   INR 1.0  --   --   --   --     < > = values in this interval not displayed.        Microbiology labs for the last week  Microbiology Results (last 7 days)     ** No results found for the last 168 hours. **           Imaging Results    None         Estimated body mass index is 23.49 kg/m² as calculated from the following:    Height as of this encounter: 5' 7" (1.702 m).    Weight as of this encounter: 68 kg (150 lb).    I & O (Last 24H):    Intake/Output Summary (Last 24 hours) at 7/15/2020 0703  Last data filed at 7/15/2020 0300  Gross per 24 hour   Intake 1250 ml   Output 6 ml   Net 1244 ml       Body mass index is 23.49 kg/m².    Estimated Creatinine Clearance: 90.4 mL/min (based on SCr of 0.7 mg/dL).      Cardiac:  ECG Results          EKG 12-lead (Final result)  Result time " 07/12/20 10:59:23    Final result by Interface, [facility] (07/12/20 10:59:23)             Narrative:    Test Reason : D64.9,    Vent. Rate : 080 BPM     Atrial Rate : 080 BPM     P-R Int : 136 ms          QRS Dur : 086 ms      QT Int : 402 ms       P-R-T Axes : 076 063 012 degrees     QTc Int : 463 ms    Sinus rhythm with occasional Premature ventricular complexes  Low voltage QRS  Low septal forces  Abnormal ECG  No previous ECGs available  Confirmed by Dylan OCHOA MD ([id number]) on 7/12/2020 10:59:17 AM    Referred By: HALLE   SELF           Confirmed By:Dylan OCHOA MD                            ASSESSMENT/PLAN:     Active Problems:      Active Hospital Problems    Diagnosis  POA    *Symptomatic anemia [D64.9]GIB Pathway initiated  Likely bleeding from GISt  Hgb 6.2 on admit, down from 9.7 beginning of June  Check iron studies and hemolysis labs  Protonix 40mg IV BID  GI consulted, appreciate assistance  Continue diet as no procedures on Sunday  Type and screen, blood consent obtained  CBCq 8h.  Transfuse for Hemoglobin <7.  Transfuse 1 unit PRBCs  7/12 haptoglobin normal and reticulocyte count elevated.  Gastroenterology consulted  repeat CBC at 6.6 . transfusion with 1 unit of PRBC. GI recommends EGD and colonoscopy for further evaluation of iron deficiency anemia patient adamantly refuses exam and EGD/ colonoscopy  7/13  hemoglobin at 8 3 status post 2 units of pack RBC transfusion.agrees for EGD/ colonoscopy.  7/14 Hb 8.1 --> 7.6.Clear liquids today and NPO from midnight EGD/colonoscopy in a.m.  7/15 refused to drink GoLYTELY overnight.       Transaminitis 7/15-  AST//112 with normal bilirubin and mildly elevated alk-phos at 337. Consider right upper quadrant sonogram if not trending down        Yes    Hypokalemia [E87.6]K 2.9-->3  Check Mag  Resolved      B12 deficiency- levels of 192 started on vitamin B12 subcutaneous dissection  Yes    Acute deep vein thrombosis (DVT) of popliteal vein of left  lower extremity [I82.432]June 2020  S/p IVC filter     Yes     S/p IVC filter June 2020      Delusional disorder [F22]/12   presenting to the ED via [facility] with [facility] stating patient was at her estranged 's house  threatening she would kill the estranged .  PEC placed for delusional behavior. Work-up significant for H/H 6.2/20 (baseline 9/30 through care everywhere). Rectal exam performed without evidence of blood or melena. FOBT positive.   alert, delusional. Refusing to give  personal belongings, and agitation with staff.  4 point restraints  7/13 agitated overnight requesting cell phone.  Patient off restraints.Continue home Abilify 10 mg and Remeron 15 mg nightly. Increased Zyprexa from 5 mg to 10 mg q8h IM PRN for agitation  7/14 Hb 8.1 --> 7.6. Requesting  her purse and  belongings she needs to get to her (Geovany Ring)  7/15. Hemoglobin stable at 7.8 leukocytosis of 15 but afebrile.        Yes    Bipolar disorder in partial remission [F31.70]PECed in the ER  Recent admission to [facility] from [facility] beginning of June  Continue Abilify 10mg  Continue Remeron 15mg PO qHS  on Abilify Aristada BAUM, last received this month according to patient.  needs to be checked with primary psychiatrist   7/13  Increased Zyprexa from 5 mg to 10 mg q8h IM PRN for agitation.daily EKG to monitor QTc. 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  Norco discontinued and started oxycodone 20 mg Q 6 hourly p.r.n.     Significant left upper quadrant abdominal pain prior to endoscopy today.  Endoscopy canceled.  CT abdomen with IV and oral contrast ordered.  General surgery consulted.  Started on Zosyn and vancomycin empirically.  Blood cultures x2 pending    Yes    Type 2 diabetes mellitus without  complication, without long-term current use of insulin [E11.9]   Patient's FSGs are controlled on current hypoglycemics.   Last A1c reviewed-   Lab Results   Component Value Date    HGBA1C 6.3 (H) 07/11/2020     Home DM regimen:  Metformin  SSI with POCT accuchecks and Diabetic diet  Yes      Resolved Hospital Problems   No resolved problems to display.         Disposition- CECd, likely psychiatric hospital    DVT prophylaxis addressed with: SCDs        Subsequent Hospital Care     Level 3 54088 Total visit time was 35 minutes or greater with greater than 50% of time spent in counseling and coordination of care.     We discussed in detail the plan of care, the patient's response to treatment, the discharge plan,.  Total time includes time spent reviewing the medical record, examining the patient, writing notes and communicating with other professionals.    Mukund Chi MD  Attending Staff Physician  [facility]  pager- [phone] [id number] - [phone]               I

## 2020-07-15 NOTE — PROGRESS NOTES
Pharmacokinetic Initial Assessment: IV Vancomycin    Assessment/Plan:    Initiate intravenous vancomycin with loading dose of 20 mg/kg once followed by a maintenance dose of vancomycin 1000mg IV every 12 hours  Desired empiric serum trough concentration is 15 to 20 mcg/mL  Draw vancomycin trough level 30 min prior to fourth dose on 07/17 at approximately 5:30 am  Pharmacy will continue to follow and monitor vancomycin.      Please contact pharmacy at extension [phone] with any questions regarding this assessment.     Thank you for the consult,   Alina Worthington       Patient brief summary:  Violeta Boudreaux is a 54 y.o. female initiated on antimicrobial therapy with IV Vancomycin for treatment of suspected intra-abdominal infection    Drug Allergies:   Review of patient's allergies indicates:   Allergen Reactions    Toradol [ketorolac] Hives       Actual Body Weight:   68 kg    Renal Function:   Estimated Creatinine Clearance: 90.4 mL/min (based on SCr of 0.7 mg/dL)., Patient creatinine level is at baseline.     Dialysis Method (if applicable):  N/A    CBC (last 72 hours):  Recent Labs   Lab Result Units 07/12/20  2104 07/13/20  0653 07/14/20  0454 07/14/20  2015 07/15/20  0420   WBC K/uL 12.42 9.97 12.60 11.81 15.07*   Hemoglobin g/dL 8.3* 8.1* 7.6* 7.9* 7.8*   Hematocrit % 27.0* 25.8* 25.3* 26.3* 24.9*   Platelets K/uL 330 318 309 298 317   Gran% % 68.4 65.1 72.2 75.7* 79.0*   Lymph% % 22.9 23.9 16.5* 15.6* 11.9*   Mono% % 6.8 8.8 9.0 6.6 7.9   Eosinophil% % 1.3 1.7 1.6 1.4 0.5   Basophil% % 0.2 0.1 0.1 0.1 0.1   Differential Method  Automated Automated Automated Automated Automated       Metabolic Panel (last 72 hours):  Recent Labs   Lab Result Units 07/13/20  0406 07/13/20  0407 07/13/20  0653 07/14/20  0454 07/15/20  0420   Sodium mmol/L  --   --  140 139 135*   Potassium mmol/L  --   --  3.3* 3.9 4.4   Chloride mmol/L  --   --  104 105 102   CO2 mmol/L  --   --  28 27 25   Glucose mg/dL  --   --  154* 240* 183*    Glucose, UA   --  2+*  --   --   --    BUN, Bld mg/dL  --   --  7 7 5*   Creatinine mg/dL  --   --  0.7 0.7 0.7   Creatinine, Random Ur mg/dL 48.0  48.0  --   --   --   --    Albumin g/dL  --   --  2.3* 2.2* 2.2*   Total Bilirubin mg/dL  --   --  0.4 0.2 0.7   Alkaline Phosphatase U/L  --   --  131 131 337*   AST U/L  --   --  26 16 150*   ALT U/L  --   --  15 14 112*   Magnesium mg/dL  --   --  1.7 1.8 1.6   Phosphorus mg/dL  --   --  2.8 2.9 3.2       Drug levels (last 3 results):  No results for input(s): VANCOMYCINRA, VANCOMYCINPE, VANCOMYCINTR in the last 72 hours.    Microbiologic Results:  Microbiology Results (last 7 days)     Procedure Component Value Units Date/Time    Blood culture [[id number]] Collected: 07/15/20 1711    Order Status: Sent Specimen: Blood from Antecubital, Right Arm Updated: 07/15/20 1712    Blood culture [[id number]] Collected: 07/15/20 1711    Order Status: Sent Specimen: Blood from Antecubital, Left Arm Updated: 07/15/20 1712

## 2020-07-15 NOTE — PROGRESS NOTES
"Went to bedside to speak with pt as she requested to see her nurse.  Upon walking into room, pt immediately states "I want to get the f* outta here and go to another hospital, I want to go to [facility]."  Told the patient I would contact the doctor regarding her request.  Transport at the door to take pt to and US and pt refused as she continued to state "I want to get out of this hospital."  Dr. Chi is aware and will speak to pt during rounds.    "

## 2020-07-15 NOTE — SUBJECTIVE & OBJECTIVE
Patient History           Medical as of 7/15/2020    Past Medical History: Patient provided no pertinent medical history.           Surgical as of 7/15/2020     Past Surgical History     Procedure Laterality Date Comments Source    CHOLECYSTECTOMY -- -- -- Provider                  Family as of 7/15/2020    None           Tobacco Use as of 7/15/2020     Smoking Status Smoking Start Date Smoking Quit Date Packs/Day Years Used    Current Every Day Smoker -- -- 10.00 --    Types Comments Smokeless Tobacco Status Smokeless Tobacco Quit Date Source     Cigarettes -- Never Used -- Provider            Alcohol Use as of 7/15/2020     Alcohol Use Drinks/Week Alcohol/Week Comments Source    No   -- -- Provider    Frequency Typical Drinks Binge Drinking        -- -- --              Drug Use as of 7/15/2020     Drug Use Types Frequency Comments Source    No -- -- -- Provider            Sexual Activity as of 7/15/2020     Sexually Active Birth Control Partners Comments Source    -- -- -- -- Provider            Activities of Daily Living as of 7/15/2020    None           Social Documentation as of 7/15/2020    None           Occupational as of 7/15/2020    None           Socioeconomic as of 7/15/2020     Marital Status Spouse Name Number of Children Years Education Education Level Preferred Language Ethnicity Race Source    Single -- -- -- -- English /White White --    Financial Resource Strain Food Insecurity: Worry Food Insecurity: Inability Transportation Needs: Medical Transportation Needs: Non-medical    -- -- -- -- --            Pertinent History     Question Response Comments    Lives with -- --    Place in Birth Order -- --    Lives in -- --    Number of Siblings -- --    Raised by -- --    Legal Involvement -- --    Childhood Trauma -- --    Criminal History of -- --    Financial Status -- --    Highest Level of Education -- --    Does patient have access to a firearm? -- --     Service -- --     Primary Leisure Activity -- --    Spirituality -- --        History reviewed. No pertinent past medical history.  Past Surgical History:   Procedure Laterality Date    CHOLECYSTECTOMY       Family History     None        Tobacco Use    Smoking status: Current Every Day Smoker     Packs/day: 10.00     Types: Cigarettes    Smokeless tobacco: Never Used   Substance and Sexual Activity    Alcohol use: No    Drug use: No    Sexual activity: Not on file     Review of patient's allergies indicates:   Allergen Reactions    Toradol [ketorolac] Hives       No current facility-administered medications on file prior to encounter.      Current Outpatient Medications on File Prior to Encounter   Medication Sig    metFORMIN (GLUCOPHAGE) 500 MG tablet Take 500 mg by mouth.    naloxone (NARCAN) 4 mg/actuation Spry 4 mg by Nasal route.    ondansetron (ZOFRAN-ODT) 8 MG TbDL Take 8 mg by mouth.    clonazePAM (KLONOPIN) 1 MG tablet Take 1 mg by mouth every evening.    loperamide (IMODIUM) 2 mg capsule Take 1 capsule (2 mg total) by mouth 3 (three) times daily.    omeprazole (PRILOSEC) 20 MG capsule Take 1 capsule (20 mg total) by mouth once daily.    ondansetron (ZOFRAN) 4 MG tablet Take 1 tablet (4 mg total) by mouth every 8 (eight) hours as needed.    oxycodone-acetaminophen (PERCOCET) 5-325 mg per tablet 1-2 tab po q4-6 hrs prn pain, take with small meal/applesauce    sucralfate (CARAFATE) 1 gram tablet Take 1 tablet (1 g total) by mouth 4 (four) times daily before meals and nightly.    ziprasidone (GEODON) 80 MG capsule Take 200 mg by mouth 2 (two) times daily with meals.     Psychotherapeutics (From admission, onward)    Start     Stop Route Frequency Ordered    07/13/20 1720  OLANZapine injection 10 mg      -- IM Every 8 hours PRN 07/13/20 1621    07/12/20 0900  ARIPiprazole tablet 10 mg      -- Oral Daily 07/11/20 2132 07/11/20 2245  mirtazapine tablet 15 mg      -- Oral Nightly 07/11/20 2132        Review of  "Systems    MEDICAL REVIEW OF SYSTEMS  History obtained from the patient   General : NO chills or fever   Eyes: NO  visual changes   ENT: NO hearing change, nasal discharge or sore throat   Endocrine: NO weight changes or polydipsia/polyuria   Dermatological: NO rashes   Respiratory: NO cough, shortness of breath   Cardiovascular: NO chest pain, palpitations or racing heart   Gastrointestinal: NO nausea, vomiting, constipation or diarrhea, YES abdominal pain   Musculoskeletal: NO muscle pain or stiffness   Neurological: NO confusion, dizziness, headaches or tremors   Psychiatric: please see HPI      Strengths and Liabilities: Strength: Patient is intelligent., Liability: Patient is defensive.    Objective:     Vital Signs (Most Recent):  Temp: 99.4 °F (37.4 °C) (07/15/20 0317)  Pulse: 92 (07/15/20 0733)  Resp: 20 (07/15/20 0951)  BP: (!) 114/56 (07/15/20 0317)  SpO2: 95 % (07/15/20 0317) Vital Signs (24h Range):  Temp:  [98.5 °F (36.9 °C)-99.6 °F (37.6 °C)] 99.4 °F (37.4 °C)  Pulse:  [69-92] 92  Resp:  [17-20] 20  SpO2:  [95 %-98 %] 95 %  BP: (107-130)/(53-62) 114/56     Height: 5' 7" (170.2 cm)  Weight: 68 kg (150 lb)  Body mass index is 23.49 kg/m².      Intake/Output Summary (Last 24 hours) at 7/15/2020 1120  Last data filed at 7/15/2020 0300  Gross per 24 hour   Intake 1130 ml   Output 2 ml   Net 1128 ml       Physical Exam:  General appearance: NAD  Head: NCAT  Lungs: CTAB, no increased work of breath  Heart: RRR  Abdomen: soft, ND  Extremities: extremities normal, atraumatic  Skin: warm, dry         Mental Status Exam:  Appearance: older than stated age  Behavior/Cooperation: eye contact normal  Speech: normal tone, normal rate, normal pitch, normal volume  Mood: irritable  Affect: mood congruent  Thought Process: logical  Thought Content: delusions: yes, erotomanic, persecutory  Orientation: grossly intact  Memory: Grossly intact  Attention Span/Concentration: Normal  Insight: poor  Judgment: " poor    Significant Labs: All pertinent labs within the past 24 hours have been reviewed.    Significant Imaging: I have reviewed all pertinent imaging results/findings within the past 24 hours.

## 2020-07-15 NOTE — TREATMENT PLAN
"GI Treatment Plan    Violeta Boudreaux is a 54 y.o. female admitted to hospital 7/11/2020 (Hospital Day: 5) due to Symptomatic anemia.     Interval History  Called back by hospitalist team to schedule EGD/colonoscopy on 7/14.  Patient was agreeable to procedure on 7/14 when discussed, but patient refused to drink prep overnight.  She was very agitated when this was discussed and informed that colonoscopy would be unable to be performed.  Patient screamed "I'm getting my f** procedure" and other expletives.      In setting of symptomatic anemia, GI will proceed with EGD today, but will not be able to perform colonoscopy in setting of patient's agitation and refusal to prep.      Objective  Temp:  [98.2 °F (36.8 °C)-99.6 °F (37.6 °C)] 99.4 °F (37.4 °C) (07/15 0317)  Pulse:  [69-92] 92 (07/15 0733)  BP: (107-130)/(53-62) 114/56 (07/15 0317)  Resp:  [16-20] 20 (07/15 0317)  SpO2:  [95 %-100 %] 95 % (07/15 0317)    General: Alert, Oriented x3, no distress  Abdomen: Normoactive bowel sounds. Non-distended. Normal tympany. Soft. Non-tender. No peritoneal signs.    Laboratory    Recent Labs   Lab 07/14/20  0454 07/14/20  2015 07/15/20  0420   HGB 7.6* 7.9* 7.8*       Lab Results   Component Value Date    WBC 15.07 (H) 07/15/2020    HGB 7.8 (L) 07/15/2020    HCT 24.9 (L) 07/15/2020    MCV 93 07/15/2020     07/15/2020       Lab Results   Component Value Date     (L) 07/15/2020    K 4.4 07/15/2020     07/15/2020    CO2 25 07/15/2020    BUN 5 (L) 07/15/2020    CREATININE 0.7 07/15/2020    CALCIUM 8.3 (L) 07/15/2020    ANIONGAP 8 07/15/2020    ESTGFRAFRICA >60.0 07/15/2020    EGFRNONAA >60.0 07/15/2020       Lab Results   Component Value Date     (H) 07/15/2020     (H) 07/15/2020    ALKPHOS 337 (H) 07/15/2020    BILITOT 0.7 07/15/2020       Lab Results   Component Value Date    INR 1.0 07/11/2020    INR 1.0 06/25/2016       Plan  -EGD today as per above note  -discussed with primary team that she " will not be able to receive inpatient colonoscopy  -continue IV PPI  -trend Hgb, transfuse Hgb <7, Plt<50  - Plan of care was discussed with primary team.  - We will continue to follow.    Thank you for involving us in the care of Violeta Boudreaux. Please call with any additional questions, concerns or changes in the patient's clinical status.    Prosper Jerome MD  Gastroenterology Fellow  Spectralink: [phone]      Patient transported to Endoscopy today for EGD.  In endoscopy area, patient developed worsening abdominal pain and distention with concern for rebound tenderness.  Given concerning findings on physical exam, EGD was postponed giving findings, and primary team was contacted about ordering CT scan with contrast to further evaluate.    -CT Abd/Pelvis with contrast for abdominal pain  -consider surgical consult pending results of CT  -pending results of CT may be able to perform EGD tomorrow.    Prosper Jerome MD  GI Fellow

## 2020-07-16 PROBLEM — F31.0 BIPOLAR AFFECTIVE DISORDER, CURRENT EPISODE HYPOMANIC: Status: ACTIVE | Noted: 2019-01-01

## 2020-07-16 NOTE — PROGRESS NOTES
"Nurse called into the room because the patient is demanding to eat and threatening to pull out IV. When I got to the room the IV was on the floor and the patient is demanding to eat. I explained to her that she was NPO and that means that she can not eat or drink because of her scheduled test. She states " I am not going get no fucking test". " I want to eat." I explain to patient that I will let the doctor know and since she pulled out her IV that another one will be replaced. Patient went on to say that she has rights and she don't have to do anything. She then states " Im leaving today. I don't give a flying fuck get the doctor here now. You can tell him what I said word from word." Messaged MD to inform him of patient request for food. Sitter at bedside.     8:28am Patient threw all gloves on the ground and states " I will fuck up this whole room if I want to."     Patient is refusing vitals, meds, and labs at this time. Will try again later.  "

## 2020-07-16 NOTE — PLAN OF CARE
Problem: Adult Inpatient Plan of Care  Goal: Plan of Care Review  Outcome: Ongoing, Progressing     Problem: Fall Injury Risk  Goal: Absence of Fall and Fall-Related Injury  Outcome: Ongoing, Progressing     Pt in bed with sitter at the bed side pt continues to refuse care labs and VS insist that labs are not using her blood for the right thing new IV 20g LFA clean dry and intact continuous IV fluids 75mL iv patent. Will continue to monitor.

## 2020-07-16 NOTE — HPI
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 Per patient) who presented with delusions and found to be anemia. On admission her hemoglobin was 6.2 from baseline 9-10 She reported epigastric tenderness, nausea and dark bowel movements. She was PECed in the ED. Since admission she had required several units of PRBC. She was schedule to have EGD today however noted to have increasing abdominal distention and tenderness prior to procedure. She was also noted to have elevated WBC 15. CT scan ordered however when patient went down for scan her IV didn't work. She was unfortunately transferred back to her room for IV placement.   On my evaluation she reports continued nausea and abdominal pain. No flatus or bm since Monday. States her LUQ has gotten larger over the past few days.

## 2020-07-16 NOTE — PLAN OF CARE
LEXY spoke with Carito -  with the [facility] [phone] to discuss the patient's d/c plans and to inquire if the patient had health insurance. Per Carito, the patient has been on a downward spiral since her psychiatrist left the agency. Per Carito, patient left er group home and has been living in motels and shelters for the past month. Per Carito, the patient has been in an out of hospital for the past 5 months as well. Carito was able to provide LEXY with the patient's insurance information: [facility] - [id number]. Per Carito, the patient is current with their agency and would like updates, if possible. LEXY updated RISA Brooks. LEXY will continue to follow.     Sharee Rivas LMSW   - [facility]  Ext. [phone]

## 2020-07-16 NOTE — SUBJECTIVE & OBJECTIVE
Oncology Treatment Plan:   [No treatment plan]    Medications:  Continuous Infusions:  Scheduled Meds:   ARIPiprazole  10 mg Oral Daily    ciprofloxacin HCl  500 mg Oral Q12H    cyanocobalamin  1,000 mcg Subcutaneous Daily    Followed by    [START ON 7/19/2020] cyanocobalamin  1,000 mcg Subcutaneous Q7 Days    Followed by    [START ON 8/16/2020] cyanocobalamin  1,000 mcg Subcutaneous Q30 Days    lidocaine  1 patch Transdermal Q24H    metroNIDAZOLE  500 mg Oral Q8H    mirtazapine  15 mg Oral QHS    pantoprazole  40 mg Intravenous BID    potassium chloride  30 mEq Oral Once    potassium chloride  40 mEq Oral Once    potassium chloride  40 mEq Oral Once     PRN Meds:sodium chloride, sodium chloride, acetaminophen, dextrose 50%, dextrose 50%, glucagon (human recombinant), glucose, glucose, HYDROmorphone, HYDROmorphone, insulin aspart U-100, iohexol, melatonin, naloxone, OLANZapine, ondansetron, promethazine (PHENERGAN) IVPB, senna-docusate 8.6-50 mg, sodium chloride 0.9%, sodium chloride 0.9%     Review of patient's allergies indicates:   Allergen Reactions    Toradol [ketorolac] Hives        History reviewed. No pertinent past medical history.  Past Surgical History:   Procedure Laterality Date    CHOLECYSTECTOMY       Family History     None        Tobacco Use    Smoking status: Current Every Day Smoker     Packs/day: 10.00     Types: Cigarettes    Smokeless tobacco: Never Used   Substance and Sexual Activity    Alcohol use: No    Drug use: No    Sexual activity: Not on file       Review of Systems   Unable to perform ROS: Psychiatric disorder     Objective:     Vital Signs (Most Recent):  Temp: 99.6 °F (37.6 °C) (07/16/20 1609)  Pulse: 88 (07/16/20 1609)  Resp: 18 (07/16/20 1609)  BP: (!) 107/55 (07/16/20 1609)  SpO2: 99 % (07/16/20 1609) Vital Signs (24h Range):  Temp:  [99.6 °F (37.6 °C)-100.8 °F (38.2 °C)] 99.6 °F (37.6 °C)  Pulse:  [] 88  Resp:  [16-20] 18  SpO2:  [98 %-99 %] 99 %  BP:  (107-109)/(55-68) 107/55     Weight: 68 kg (150 lb)  Body mass index is 23.49 kg/m².  Body surface area is 1.79 meters squared.    No intake or output data in the 24 hours ending 07/16/20 1638    Physical Exam  Patient refused PE.   Laying in bed in blue scrubs.  NAD, appears disheveled.     Significant Labs:   CBC:   Recent Labs   Lab 07/14/20  2015 07/15/20  0420   WBC 11.81 15.07*   HGB 7.9* 7.8*   HCT 26.3* 24.9*    317    and CMP:   Recent Labs   Lab 07/15/20  0420   *   K 4.4      CO2 25   *   BUN 5*   CREATININE 0.7   CALCIUM 8.3*   PROT 5.7*   ALBUMIN 2.2*   BILITOT 0.7   ALKPHOS 337*   *   *   ANIONGAP 8   EGFRNONAA >60.0       Diagnostic Results:  I have reviewed all pertinent imaging results/findings within the past 24 hours.

## 2020-07-16 NOTE — PROGRESS NOTES
Pharmacist Renal Dose Adjustment Note    Violeta Boudreaux is a 54 y.o. female being treated with the medication Piperacillin/Tazobactam    Patient Data:    Vital Signs (Most Recent):  Temp: (!) 100.8 °F (38.2 °C) (07/15/20 2006)  Pulse: 85 (07/16/20 0734)  Resp: 16 (07/15/20 2219)  BP: 109/68 (07/15/20 2006)  SpO2: 98 % (07/15/20 2006)   Vital Signs (72h Range):  Temp:  [98.1 °F (36.7 °C)-100.8 °F (38.2 °C)]   Pulse:  []   Resp:  [16-20]   BP: (103-130)/(51-75)   SpO2:  [94 %-100 %]      Recent Labs   Lab 07/13/20  0653 07/14/20  0454 07/15/20  0420   CREATININE 0.7 0.7 0.7     Serum creatinine: 0.7 mg/dL 07/15/20 0420  Estimated creatinine clearance: 90.4 mL/min    Piperacillin/Tazobactam 4.5 gm IV q6h will be changed to Piperacillin/Tazobactam 4.5 gm IV q8h (extended infusion for CrCl > 20 mL/min)    Pharmacist's Name: Mirna Aden  Pharmacist's Extension: [phone]

## 2020-07-16 NOTE — PROGRESS NOTES
New IV placed for pt to go for STAT CT, pt cooperative. CT notified and will arrange for transport. Will continue to monitor.

## 2020-07-16 NOTE — ASSESSMENT & PLAN NOTE
ASSESSMENT     Violeta Boudreaux is a 54 y.o. female with a past psychiatric history of BPAD and delusional disorder, who presented to the [facility] due to OPC from estranged  for threatening to kill him. Admitted to [facility] for symptomatic anemia. Per FACT team, patient non-compliant with medications, did not receive most recent scheduled Aristada BAUM.    IMPRESSION  Bipolar disorder, in partial remission  Delusional disorder  R/o Antisocial behavior  Medication non-adherence    RECOMMENDATION(S)      1. Scheduled Medication(s):  - Continue home Abilify 10 mg daily  - Continue home Remeron 15 mg nightly    2. PRN Medication(s):  - Zyprexa 10 mg q8h IM PRN for agitation    3.  Monitor:  - Please obtain daily EKG to monitor QTc    4. Legal Status/Precaution(s):  - Continue CEC (expires 7/26 @ 8:32 pm) as patient is in imminent danger of hurting self or others and is gravely disabled due to a psychiatric illness.

## 2020-07-16 NOTE — PROGRESS NOTES
[facility] Hospital Medicine  Progress Note    Patient Name: Violeta Boudreaux  MRN: [id number]  Patient Class: OP- Observation   Admission Date: 7/11/2020  Length of Stay: 0 days  Attending Physician: Kahlil Banegas MD  Primary Care Provider: Otf Brownlee MD    [facility] Medicine Team: [facility] HOSP MED B Kahlil Banegas MD    HPI:    Ms. Violeta Boudreaux is a 54 y.o. female with Bipolar Disorder, delusional disorder, GIST on Sunitinib, and recent LLE DVT s/p IVC filter (June 2020) who [place] [facility] [place].  Her estranged  reports that she showed up at his house, claiming that she was  to Geovany Ring from [employer] and he was being hid inside the estranged 's house. He reports that she is homeless, and hasn't been taking her psych medications.  She denies any complaints at this time.  Labs on arrival showed a Hemoglobin 6.2 down from 9.7 in June 2020.  She endorses midepigastric abdominal pain.  She denies any blood in her stools, dark stools, or vaginal bleeding.  She mentions that a few weeks ago, she was having bad headaches and took a lot of Advil, then causing her to have hematemesis.  She denies further bleeding or use of Aspirin or NSAIDS.  Of note, at the end of May, she was hospitalized at [facility] for delusional disorder.  At that time, she was found to have a Hemoglobin 6.4.  She was given blood and her hemolgobin improved to 9.7.  It was thought that her bleeding with 2/2 her GIST tumor. She was not evaluated by GI.  Also during that admission, she was found to have a LLE DVT.  HemeOnc suggested IVC filter, given her lack of consistency with medications.  [facility] was [facility] to the [facility].  She was discharged on Abilify 10mg and Mirtazapine 15mg qHS.       In the ER, she was PECed for grave disability.  SUMIT was positive.  She was transfused 1 unit PRBCs.  She mentions that Bert Gorman will pay for every drop of blood we take from her, and that she wants  to return to her house in [place] when discharged.  She was admitted to Hospital Medicine for GI and Psych evaluations.    Subjective:     Principal Problem:Symptomatic anemia    Interval History: Patient lying in bed, moderate distress due to agitation. Refusing medications, IV placement and diagnostic tests and demanding to go home. Poor insight and judgement and currently CEC'd. Denies fever, chest pain, SOB, abdominal pain or blood in stool.    Review of Systems   Constitutional: Negative for chills and fever.   Eyes: Negative for visual disturbance.   Respiratory: Negative for cough and shortness of breath.    Gastrointestinal: Negative for abdominal distention, abdominal pain, nausea and vomiting.   Genitourinary: Negative for dysuria.   Neurological: Negative for weakness.   Psychiatric/Behavioral: Negative for suicidal ideas. The patient is nervous/anxious.         Objective:     Vital Signs (Most Recent):  Temp: (refused) (07/16/20 1129)  Pulse: (refused) (07/16/20 1129)  Resp: 16 (07/16/20 1129)  BP: (refused) (07/16/20 1129)  SpO2: (refused) (07/16/20 1129) Vital Signs (24h Range):  Temp:  [98.1 °F (36.7 °C)-100.8 °F (38.2 °C)] 100.8 °F (38.2 °C)  Pulse:  [] 85  Resp:  [16-20] 16  SpO2:  [96 %-98 %] 98 %  BP: (109-111)/(51-68) 109/68     Weight: 68 kg (150 lb)  Body mass index is 23.49 kg/m².  No intake or output data in the 24 hours ending 07/16/20 1221     Physical Exam  HENT:      Head: Normocephalic.   Eyes:      Pupils: Pupils are equal, round, and reactive to light.   Neck:      Musculoskeletal: Normal range of motion.   Cardiovascular:      Rate and Rhythm: Normal rate and regular rhythm.      Pulses: Normal pulses.      Heart sounds: Normal heart sounds.   Pulmonary:      Effort: Pulmonary effort is normal.      Breath sounds: Normal breath sounds.   Abdominal:      General: Bowel sounds are normal. There is no distension.      Palpations: Abdomen is soft.      Tenderness: There is no  abdominal tenderness.   Musculoskeletal: Normal range of motion.   Skin:     General: Skin is warm.   Neurological:      General: No focal deficit present.      Mental Status: She is alert.   Psychiatric:         Mood and Affect: Affect is labile.         Thought Content: Thought content is delusional.         Judgment: Judgment is inappropriate.           Overview/Hospital Course:     7/12   presenting to the ED via [facility] with [facility] stating patient was at her estranged 's house  threatening she would kill the estranged .  PEC placed for delusional behavior. Work-up significant for H/H 6.2/20 (baseline 9/30 through care everywhere). Rectal exam performed without evidence of blood or melena. FOBT positive.   alert, delusional. Refusing to give  personal belongings, and agitation with staff.  4 point restraints were applied. repeat CBC at 6.6 . transfusion with 1 unit of PRBC. GI recommends EGD and colonoscopy for further evaluation of iron deficiency anemia patient adamantly refuses exam and EGD/ colonoscopy  7/13 agitated overnight requesting cell phone.  Patient was placed 4 point  restraints hemoglobin at 8 3 status post 2 units of pack RBC transfusion . agrees for EGD/ colonoscopy.Patient off restraints.Continue home Abilify 10 mg and Remeron 15 mg nightly. Increased Zyprexa from 5 mg to 10 mg q8h IM PRN for agitation.daily EKG to monitor QTc- 447ms   7/14 Hb 8.1 --> 7.6. Requesting  her purse and  belongings she needs to get to her (Geovany Ring).  Clear liquids today and NPO from midnight EGD/colonoscopy in a.m. complains of abdominal, takes oxycodone 30 mg Q 6 hourly as per chart review (reviewed  last filled on 06/23).  Norco discontinued and started oxycodone 20 mg Q 6 hourly p.r.n.  7/15 refused to drink GoLYTELY overnight.  Hemoglobin stable at 7.8 leukocytosis of 15 but afebrile.  Transaminitis AST//112 with normal bilirubin and mildly elevated alk-phos at 337.  Significant left  upper quadrant abdominal pain prior to endoscopy today.  Endoscopy canceled.  CT abdomen with IV and oral contrast ordered.  General surgery consulted.  Started on Zosyn and vancomycin empirically.  Blood cultures x2 pending    Significant Labs:   A1C:   Recent Labs   Lab 07/11/20 1932   HGBA1C 6.3*     CBC:   Recent Labs   Lab 07/14/20  2015 07/15/20  0420   WBC 11.81 15.07*   HGB 7.9* 7.8*   HCT 26.3* 24.9*    317     CMP:   Recent Labs   Lab 07/15/20  0420   *   K 4.4      CO2 25   *   BUN 5*   CREATININE 0.7   CALCIUM 8.3*   PROT 5.7*   ALBUMIN 2.2*   BILITOT 0.7   ALKPHOS 337*   *   *   ANIONGAP 8   EGFRNONAA >60.0     Magnesium:   Recent Labs   Lab 07/15/20  0420   MG 1.6     POCT Glucose:   Recent Labs   Lab 07/15/20  1137 07/15/20  1647 07/16/20  0819   POCTGLUCOSE 199* 143* 169*     TSH:   Recent Labs   Lab 07/11/20 1932   TSH 3.160       Significant Imaging: I have reviewed and interpreted all pertinent imaging results/findings within the past 24 hours.    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.A2] 09/09/2019 Yes    Type 2 diabetes mellitus without complication, without long-term current use of insulin [E11.9] 09/09/2019 Yes      Problems Resolved During this Admission:     Scheduled Meds:   ARIPiprazole  10 mg Oral Daily    cyanocobalamin  1,000 mcg Subcutaneous Daily    Followed by    [START ON 7/19/2020] cyanocobalamin  1,000 mcg Subcutaneous Q7 Days    Followed by    [START ON 8/16/2020]  cyanocobalamin  1,000 mcg Subcutaneous Q30 Days    lidocaine  1 patch Transdermal Q24H    mirtazapine  15 mg Oral QHS    pantoprazole  40 mg Intravenous BID    piperacillin-tazobactam (ZOSYN) IVPB  4.5 g Intravenous Q8H    potassium chloride  30 mEq Oral Once    potassium chloride  40 mEq Oral Once    potassium chloride  40 mEq Oral Once    vancomycin (VANCOCIN) IVPB  1,000 mg Intravenous Q12H     Continuous Infusions:  PRN Meds:.sodium chloride, sodium chloride, acetaminophen, dextrose 50%, dextrose 50%, glucagon (human recombinant), glucose, glucose, HYDROmorphone, HYDROmorphone, insulin aspart U-100, iohexol, melatonin, naloxone, OLANZapine, ondansetron, promethazine (PHENERGAN) IVPB, senna-docusate 8.6-50 mg, sodium chloride 0.9%, sodium chloride 0.9%, Pharmacy to dose Vancomycin consult **AND** vancomycin - pharmacy to dose    PLAN:    Symptomatic anemia   GIB   - GIB Pathway initiated  Likely bleeding from GIST  Hgb 6.2 on admit, down from 9.7 beginning of June  - H/H stable on 7/16  Check iron studies and hemolysis labs  GI consulted. apprec rec s  -- Discussed with primary team that EGD in this patient would be high risk given worsening/progression of her cancer and that given the likely lower GI pathology that is improving the risks outweigh the benefits  -- continue PO PPI  -- trend Hgb, transfuse Hgb <7, Plt<50  -- would recommend heme/onc consult for evaluation/treatment of progressing tumor  -- if it is determined that tissue is needed, AES consult would be appropriate at that time.    - 7/12: haptoglobin normal and reticulocyte count elevated. Gastroenterology consulted  repeat CBC at 6.6 . transfusion with 1 unit of PRBC. GI recommends EGD and colonoscopy for further evaluation of iron deficiency anemia patient adamantly refuses exam and EGD/ colonoscopy  7/13  hemoglobin at 8 3 status post 2 units of pack RBC transfusion.agrees for EGD/ colonoscopy.  7/14 Hb 8.1 --> 7.6.Clear liquids today  and NPO from midnight EGD/colonoscopy in a.m.  7/15 refused to drink GoLYTELY overnight.     Leukocytosis  - WBC: 11 --> 15  - afebrile since 7/15   - denies cough or SOB  - Initially on empiric IV abx but switched to po cipro/flagyl due to patient refusing IV access   - plan to discontinue abx is remains afebrile for 24 hours  - bcx no growth to date  - U/A negative for UTI  - CBC daily     Transaminitis   - 7/15:  AST//112 with normal bilirubin and mildly elevated alk-phos at 337. Consider right upper quadrant sonogram if not trending down        Acute deep vein thrombosis (DVT) of popliteal vein of left lower extremity [I82.432]June 2020  S/p IVC filter    Hypokalemia- resolved   - K 2.9-->3  Check Mag    B12 deficiency  - levels of 192 started on vitamin B12 subcutaneous dissection        Delusional disorder   - presenting to the ED via [facility] with [facility] stating patient was at her estranged 's house  threatening she would kill the estranged .  PEC placed for delusional behavior. Work-up significant for H/H 6.2/20 (baseline 9/30 through care everywhere). Rectal exam performed without evidence of blood or melena. FOBT positive.   alert, delusional. Refusing to give  personal belongings, and agitation with staff.  4 point restraints  - patient will be medically ready for discharge to inpatient psychiatric facility on 7/17  - psychiatry consulted. apprec recs  -- Continue home Abilify 10 mg daily  -- Continue home Remeron 15 mg nightly  -- Zyprexa 10 mg q8h IM prn for agitation   -- Please obtain daily EKG to monitor QTc  -- Continue CEC (expires 7/26 @ 8:32 pm) as patient is in imminent danger of hurting self or others and is gravely disabled due to a psychiatric illness.      - 7/13 agitated overnight requesting cell phone.  Patient off restraints.Continue home Abilify 10 mg and Remeron 15 mg nightly. Increased Zyprexa from 5 mg to 10 mg q8h IM PRN for agitation  7/14 Hb 8.1 --> 7.6. Requesting  her  purse and  belongings she needs to get to her (Geovany Ring)  7/15. Hemoglobin stable at 7.8 leukocytosis of 15 but afebrile.        Bipolar disorder in partial remission [F31.70]PECed in the ER  Recent admission to [facility] from [facility] beginning of June  Continue Abilify 10mg   Continue Remeron 15mg PO qHS  on Abilify Aristada BAUM, last received this month according to patient.  needs to be checked with primary psychiatrist   7/13  Increased Zyprexa from 5 mg to 10 mg q8h IM PRN for agitation.daily EKG to monitor QTc. off restraints      Malignant gastrointestinal stromal tumor (GIST) of stomach   - Follows with HemeOnc at [facility]  - On Sunitinib outpatient  - CT A/P showed increased size of mass  - Heme/onc consulted. apprec recs   -- poor prognosis d/t mental health issue  -- will need to f/u with primary oncologist at discharge to obtain sunitinib   -- /social work consults for possible placement  -- hospice reasonable if patient has to be d/c to previous living situation       7/14: complains of abdominal pain, takes oxycodone 30 mg Q 6 hourly as per chart review (reviewed  last filled on 06/23).  Norco discontinued and started oxycodone 20 mg Q 6 hourly p.r.n.      Significant left upper quadrant abdominal pain prior to endoscopy today.  Endoscopy canceled.  CT abdomen with IV and oral contrast ordered.  General surgery consulted.  Started on Zosyn and vancomycin empirically.  Blood cultures x2 pending      Type 2 diabetes mellitus without complication, without long-term current use of insulin [E11.9]   Patient's FSGs are controlled on current hypoglycemics.  - Home DM regimen:  Metformin  - SSI with POCT accuchecks and Diabetic diet       VTE Risk Mitigation (From admission, onward)         Ordered     IP VTE HIGH RISK PATIENT  Once      07/11/20 2130     Place sequential compression device  Until discontinued      07/11/20 2130              Time spent in care of patient: > 35 minutes     Kahlil CUNNINGHAM  MD Bassam  Department of Hospital Medicine   [facility]

## 2020-07-16 NOTE — SUBJECTIVE & OBJECTIVE
No current facility-administered medications on file prior to encounter.      Current Outpatient Medications on File Prior to Encounter   Medication Sig    metFORMIN (GLUCOPHAGE) 500 MG tablet Take 500 mg by mouth.    naloxone (NARCAN) 4 mg/actuation Spry 4 mg by Nasal route.    ondansetron (ZOFRAN-ODT) 8 MG TbDL Take 8 mg by mouth.    clonazePAM (KLONOPIN) 1 MG tablet Take 1 mg by mouth every evening.    loperamide (IMODIUM) 2 mg capsule Take 1 capsule (2 mg total) by mouth 3 (three) times daily.    omeprazole (PRILOSEC) 20 MG capsule Take 1 capsule (20 mg total) by mouth once daily.    ondansetron (ZOFRAN) 4 MG tablet Take 1 tablet (4 mg total) by mouth every 8 (eight) hours as needed.    oxycodone-acetaminophen (PERCOCET) 5-325 mg per tablet 1-2 tab po q4-6 hrs prn pain, take with small meal/applesauce    sucralfate (CARAFATE) 1 gram tablet Take 1 tablet (1 g total) by mouth 4 (four) times daily before meals and nightly.    ziprasidone (GEODON) 80 MG capsule Take 200 mg by mouth 2 (two) times daily with meals.       Review of patient's allergies indicates:   Allergen Reactions    Toradol [ketorolac] Hives       History reviewed. No pertinent past medical history.  Past Surgical History:   Procedure Laterality Date    CHOLECYSTECTOMY       Family History     None        Tobacco Use    Smoking status: Current Every Day Smoker     Packs/day: 10.00     Types: Cigarettes    Smokeless tobacco: Never Used   Substance and Sexual Activity    Alcohol use: No    Drug use: No    Sexual activity: Not on file     Review of Systems   Constitutional: Negative for activity change, appetite change, diaphoresis, fatigue and fever.   Respiratory: Negative for cough, choking and shortness of breath.    Cardiovascular: Negative for chest pain and palpitations.   Gastrointestinal: Positive for abdominal distention (LUQ), abdominal pain (LUQ), blood in stool (dark stools), nausea and vomiting. Negative for constipation  and diarrhea.   Genitourinary: Negative for difficulty urinating and dyspareunia.   Skin: Negative for color change and wound.   Neurological: Negative for dizziness and weakness.   Hematological: Negative for adenopathy. Does not bruise/bleed easily.     Objective:     Vital Signs (Most Recent):  Temp: (!) 100.8 °F (38.2 °C) (07/15/20 2006)  Pulse: 77 (07/15/20 2200)  Resp: 17 (07/15/20 2006)  BP: 109/68 (07/15/20 2006)  SpO2: 98 % (07/15/20 2006) Vital Signs (24h Range):  Temp:  [98.1 °F (36.7 °C)-100.8 °F (38.2 °C)] 100.8 °F (38.2 °C)  Pulse:  [] 77  Resp:  [16-20] 17  SpO2:  [94 %-98 %] 98 %  BP: (107-114)/(51-71) 109/68     Weight: 68 kg (150 lb)  Body mass index is 23.49 kg/m².    Physical Exam  Vitals signs and nursing note reviewed.   Constitutional:       Appearance: Normal appearance.   Cardiovascular:      Rate and Rhythm: Normal rate and regular rhythm.      Pulses: Normal pulses.      Heart sounds: Normal heart sounds.   Pulmonary:      Effort: Pulmonary effort is normal.      Breath sounds: Normal breath sounds.   Abdominal:      Comments: Midline laparotomy incision and RUQ incision well healed.   RUQ, RLQ and LLQ soft, non-tender   LUQ and epigastric region with significant distention with tenderness to palpation. Almost appears mass-like   Skin:     General: Skin is warm and dry.      Capillary Refill: Capillary refill takes less than 2 seconds.      Coloration: Skin is pale. Skin is not jaundiced.   Neurological:      General: No focal deficit present.      Mental Status: She is alert.         Significant Labs:  CBC:   Recent Labs   Lab 07/15/20  0420   WBC 15.07*   RBC 2.67*   HGB 7.8*   HCT 24.9*      MCV 93   MCH 29.2   MCHC 31.3*     BMP:   Recent Labs   Lab 07/15/20  0420   *   *   K 4.4      CO2 25   BUN 5*   CREATININE 0.7   CALCIUM 8.3*   MG 1.6     CMP:   Recent Labs   Lab 07/15/20  0420   *   CALCIUM 8.3*   ALBUMIN 2.2*   PROT 5.7*   *   K 4.4    CO2 25      BUN 5*   CREATININE 0.7   ALKPHOS 337*   *   *   BILITOT 0.7     LFTs:   Recent Labs   Lab 07/15/20  0420   *   *   ALKPHOS 337*   BILITOT 0.7   PROT 5.7*   ALBUMIN 2.2*     Coagulation:   Recent Labs   Lab 07/11/20 1956   LABPROT 10.1   INR 1.0   APTT <21.0       Significant Diagnostics:  CT scan ordered- not performed

## 2020-07-16 NOTE — PROGRESS NOTES
Patient resting comfortably on and off since early afternoon. Patient agreed to let me get a set of vitals and take her antibiotic by mouth. Sitter at bedside. Will continue to monitor.

## 2020-07-16 NOTE — TREATMENT PLAN
"GI Treatment Plan    Violeta Boudreaux is a 54 y.o. female admitted to hospital 7/11/2020 (Hospital Day: 6) due to Symptomatic anemia.     Interval History  Patient evaluated prior to EGD yesterday with worsening abdominal pain and distention.  Case was post-poned and STAT CT abdomen/pelvis was obtained showing "In this patient with history of GIST tumor status post resection, there are multiple large masses in the upper abdomen significantly increased in size and number compared to prior exam dated 06/24/2016 and concerning for disease progression.  Resultant mass effect on the IVC and left renal artery, as above.  IVC filter present."      Seen by surgery with no acute intervention recommended.    Objective  Temp:  [98.1 °F (36.7 °C)-100.8 °F (38.2 °C)] 100.8 °F (38.2 °C) (07/15 2006)  Pulse:  [] 85 (07/16 0734)  BP: (109-114)/(51-71) 109/68 (07/15 2006)  Resp:  [16-20] 16 (07/15 2219)  SpO2:  [94 %-98 %] 98 % (07/15 2006)    General: Alert, Oriented x3, no distress  Abdomen: Normoactive bowel sounds. Non-distended. Normal tympany. Soft. Tender to palpation LUQ. No peritoneal signs.    Laboratory    Recent Labs   Lab 07/14/20  0454 07/14/20  2015 07/15/20  0420   HGB 7.6* 7.9* 7.8*       Lab Results   Component Value Date    WBC 15.07 (H) 07/15/2020    HGB 7.8 (L) 07/15/2020    HCT 24.9 (L) 07/15/2020    MCV 93 07/15/2020     07/15/2020       Lab Results   Component Value Date     (L) 07/15/2020    K 4.4 07/15/2020     07/15/2020    CO2 25 07/15/2020    BUN 5 (L) 07/15/2020    CREATININE 0.7 07/15/2020    CALCIUM 8.3 (L) 07/15/2020    ANIONGAP 8 07/15/2020    ESTGFRAFRICA >60.0 07/15/2020    EGFRNONAA >60.0 07/15/2020       Lab Results   Component Value Date     (H) 07/15/2020     (H) 07/15/2020    ALKPHOS 337 (H) 07/15/2020    BILITOT 0.7 07/15/2020       Lab Results   Component Value Date    INR 1.0 07/11/2020    INR 1.0 06/25/2016       Plan  -Discussed with primary team that " EGD in this patient would be high risk given worsening/progression of her cancer and that given the likely lower GI pathology that is improving the risks outweigh the benefits  -continue PO PPI  -trend Hgb, transfuse Hgb <7, Plt<50  -would recommend heme/onc consult for evaluation/treatment of progressing tumor  -if it is determined that tissue is needed, AES consult would be appropriate at that time.  - Plan of care was discussed with primary team.  - We are signing-off. Please call with any questions.    Thank you for involving us in the care of Violeta Boudreaux. Please call with any additional questions, concerns or changes in the patient's clinical status.    Prosper Jerome MD  Gastroenterology Fellow  Spectralink: [id number]

## 2020-07-16 NOTE — PLAN OF CARE
0915  CM questioned if the admit office could determine if the patient has any medical insurance coverage. CM was informed by the admit office that as far as she can tell the patient does not have Medicaid but will send a message to [facility] requesting that the patient be evaluated.     1135  CM was informed by LEXY Tolliver that the patient has medical insurance with [facility] (policy #[id number]). CM informed the admit office of above.     1220  CM questioned Dr. Banegas when the  patient will be medically stable to dc to in-pt psych. MD stated that hem/onc has been consulted & will see the patient today.     1540  CM questioned Dr. Banegas if hem/onc saw the patient & if she is medically stable discharge so in-pt psych referrals can be sent. MD to call hem/onc. CM informed ELXY Tolliver of status.    Will continue to follow.

## 2020-07-16 NOTE — CONSULTS
[facility]  Hematology/Oncology  Consult Note    Patient Name: Violeta Boudreaux  MRN: [id number]  Admission Date: 7/11/2020  Hospital Length of Stay: 0 days  Code Status: Full Code   Attending Provider: Kahlil Banegas MD  Consulting Provider: Robert Cristobal MD  Primary Care Physician: Otf Brownlee MD  Principal Problem:Symptomatic anemia    Inpatient consult to Hematology/Oncology  Consult performed by: Robert Cristobal MD  Consult ordered by: Kahlil Banegas MD  Reason for consult: hx GIST        Subjective:     HPI:  Violeta Boudreaux is a 54 y.o. female with what appears to be an extremely complicated psychosocial history compounded by current (reported) homelessness has a history of r/r stage IV GIST.    Majority of medical history was taken from EMR as patient was not very cooperative during our interview today.     She was admitted for an acute psychiatric situation and was placed under PEC.     Upon arrival she was anemic, with a Hgb of 6.2 and required a transfusion. CT abd/pel showed worsening disease when compared to 2016.     Oncology was consulted to assist in care with her history of GIST.     Oncology Treatment Plan:   [No treatment plan]    Medications:  Continuous Infusions:  Scheduled Meds:   ARIPiprazole  10 mg Oral Daily    ciprofloxacin HCl  500 mg Oral Q12H    cyanocobalamin  1,000 mcg Subcutaneous Daily    Followed by    [START ON 7/19/2020] cyanocobalamin  1,000 mcg Subcutaneous Q7 Days    Followed by    [START ON 8/16/2020] cyanocobalamin  1,000 mcg Subcutaneous Q30 Days    lidocaine  1 patch Transdermal Q24H    metroNIDAZOLE  500 mg Oral Q8H    mirtazapine  15 mg Oral QHS    pantoprazole  40 mg Intravenous BID    potassium chloride  30 mEq Oral Once    potassium chloride  40 mEq Oral Once    potassium chloride  40 mEq Oral Once     PRN Meds:sodium chloride, sodium chloride, acetaminophen, dextrose 50%, dextrose 50%, glucagon (human recombinant), glucose,  glucose, HYDROmorphone, HYDROmorphone, insulin aspart U-100, iohexol, melatonin, naloxone, OLANZapine, ondansetron, promethazine (PHENERGAN) IVPB, senna-docusate 8.6-50 mg, sodium chloride 0.9%, sodium chloride 0.9%     Review of patient's allergies indicates:   Allergen Reactions    Toradol [ketorolac] Hives        History reviewed. No pertinent past medical history.  Past Surgical History:   Procedure Laterality Date    CHOLECYSTECTOMY       Family History     None        Tobacco Use    Smoking status: Current Every Day Smoker     Packs/day: 10.00     Types: Cigarettes    Smokeless tobacco: Never Used   Substance and Sexual Activity    Alcohol use: No    Drug use: No    Sexual activity: Not on file       Review of Systems   Unable to perform ROS: Psychiatric disorder     Objective:     Vital Signs (Most Recent):  Temp: 99.6 °F (37.6 °C) (07/16/20 1609)  Pulse: 88 (07/16/20 1609)  Resp: 18 (07/16/20 1609)  BP: (!) 107/55 (07/16/20 1609)  SpO2: 99 % (07/16/20 1609) Vital Signs (24h Range):  Temp:  [99.6 °F (37.6 °C)-100.8 °F (38.2 °C)] 99.6 °F (37.6 °C)  Pulse:  [] 88  Resp:  [16-20] 18  SpO2:  [98 %-99 %] 99 %  BP: (107-109)/(55-68) 107/55     Weight: 68 kg (150 lb)  Body mass index is 23.49 kg/m².  Body surface area is 1.79 meters squared.    No intake or output data in the 24 hours ending 07/16/20 1638    Physical Exam  Patient refused PE.   Laying in bed in blue scrubs.  NAD, appears disheveled.     Significant Labs:   CBC:   Recent Labs   Lab 07/14/20  2015 07/15/20  0420   WBC 11.81 15.07*   HGB 7.9* 7.8*   HCT 26.3* 24.9*    317    and CMP:   Recent Labs   Lab 07/15/20  0420   *   K 4.4      CO2 25   *   BUN 5*   CREATININE 0.7   CALCIUM 8.3*   PROT 5.7*   ALBUMIN 2.2*   BILITOT 0.7   ALKPHOS 337*   *   *   ANIONGAP 8   EGFRNONAA >60.0       Diagnostic Results:  I have reviewed all pertinent imaging results/findings within the past 24  hours.    Assessment/Plan:     Malignant gastrointestinal stromal tumor (GIST) of stomach  This lady as an extremely complicated medical history and an even more complicated psychosocial history. It appears she follows periodically at [facility] with Dr. Glass. Her history of worsening metastatic disease see to be attributed to her lack of compliance with her medication and not necessarily 2/2 to progression through treatment. She admits to not taking her sunitinib for months with me on the brief conversation she would let me have with her.     Her history sees to include a diagnosis (from [facility]) in 2016 after she underwent resection with gastrectomy, splenectomy and pancreatectomy (R1 resection, path=T4 tumor, high grade). She was placed on imatinib and either progressed through treatment or was non-compliant and progressed (2019). She was then placed on sunitinib and has worsening of disease on this medication 2/2 to admitted non-compliance.    In regards to her prognosis, I think it is extremely poor and it is more associated with her non medical problems. An appropriate place of discharge that would be willing to obtain her medication is most important. If this is not possible, hospice would be appropriate. She will have to follow up with her regular oncologist for her sunitinib.    Her cancer is likely causing direct invasion of her GI tract and hence her anemia. This is also another negative prognostic indicated when you add everything up.     Unfortunately, I do not have anything to offer her at this time.     - poor prognosis d/t mental health issue  - will need to f/u with primary oncologist at discharge to obtain sunitinib   - /social work consults for possible placement  - hospice reasonable if patient has to be d/c to previous living situation  - transfuse Hgb <7 g      Thank you for your consult. I will sign off. Please contact us if you have any additional questions.    Robert Cristobal,  MD  Hematology/Oncology  [facility]

## 2020-07-16 NOTE — SUBJECTIVE & OBJECTIVE
Patient History           Medical as of 7/16/2020    Past Medical History: Patient provided no pertinent medical history.           Surgical as of 7/16/2020     Past Surgical History     Procedure Laterality Date Comments Source    CHOLECYSTECTOMY -- -- -- Provider                  Family as of 7/16/2020    None           Tobacco Use as of 7/16/2020     Smoking Status Smoking Start Date Smoking Quit Date Packs/Day Years Used    Current Every Day Smoker -- -- 10.00 --    Types Comments Smokeless Tobacco Status Smokeless Tobacco Quit Date Source     Cigarettes -- Never Used -- Provider            Alcohol Use as of 7/16/2020     Alcohol Use Drinks/Week Alcohol/Week Comments Source    No   -- -- Provider    Frequency Typical Drinks Binge Drinking        -- -- --              Drug Use as of 7/16/2020     Drug Use Types Frequency Comments Source    No -- -- -- Provider            Sexual Activity as of 7/16/2020     Sexually Active Birth Control Partners Comments Source    -- -- -- -- Provider            Activities of Daily Living as of 7/16/2020    None           Social Documentation as of 7/16/2020    None           Occupational as of 7/16/2020    None           Socioeconomic as of 7/16/2020     Marital Status Spouse Name Number of Children Years Education Education Level Preferred Language Ethnicity Race Source    Single -- -- -- -- English /White White --    Financial Resource Strain Food Insecurity: Worry Food Insecurity: Inability Transportation Needs: Medical Transportation Needs: Non-medical    -- -- -- -- --            Pertinent History     Question Response Comments    Lives with -- --    Place in Birth Order -- --    Lives in -- --    Number of Siblings -- --    Raised by -- --    Legal Involvement -- --    Childhood Trauma -- --    Criminal History of -- --    Financial Status -- --    Highest Level of Education -- --    Does patient have access to a firearm? -- --     Service -- --     Primary Leisure Activity -- --    Spirituality -- --        History reviewed. No pertinent past medical history.  Past Surgical History:   Procedure Laterality Date    CHOLECYSTECTOMY       Family History     None        Tobacco Use    Smoking status: Current Every Day Smoker     Packs/day: 10.00     Types: Cigarettes    Smokeless tobacco: Never Used   Substance and Sexual Activity    Alcohol use: No    Drug use: No    Sexual activity: Not on file     Review of patient's allergies indicates:   Allergen Reactions    Toradol [ketorolac] Hives       No current facility-administered medications on file prior to encounter.      Current Outpatient Medications on File Prior to Encounter   Medication Sig    metFORMIN (GLUCOPHAGE) 500 MG tablet Take 500 mg by mouth.    naloxone (NARCAN) 4 mg/actuation Spry 4 mg by Nasal route.    ondansetron (ZOFRAN-ODT) 8 MG TbDL Take 8 mg by mouth.    clonazePAM (KLONOPIN) 1 MG tablet Take 1 mg by mouth every evening.    loperamide (IMODIUM) 2 mg capsule Take 1 capsule (2 mg total) by mouth 3 (three) times daily.    omeprazole (PRILOSEC) 20 MG capsule Take 1 capsule (20 mg total) by mouth once daily.    ondansetron (ZOFRAN) 4 MG tablet Take 1 tablet (4 mg total) by mouth every 8 (eight) hours as needed.    oxycodone-acetaminophen (PERCOCET) 5-325 mg per tablet 1-2 tab po q4-6 hrs prn pain, take with small meal/applesauce    sucralfate (CARAFATE) 1 gram tablet Take 1 tablet (1 g total) by mouth 4 (four) times daily before meals and nightly.    ziprasidone (GEODON) 80 MG capsule Take 200 mg by mouth 2 (two) times daily with meals.     Psychotherapeutics (From admission, onward)    Start     Stop Route Frequency Ordered    07/13/20 1720  OLANZapine injection 10 mg      -- IM Every 8 hours PRN 07/13/20 1621    07/12/20 0900  ARIPiprazole tablet 10 mg      -- Oral Daily 07/11/20 2132 07/11/20 2245  mirtazapine tablet 15 mg      -- Oral Nightly 07/11/20 2132        Review of  "Systems    MEDICAL REVIEW OF SYSTEMS  History obtained from the patient   General : NO chills or fever   Eyes: NO  visual changes   ENT: NO hearing change, nasal discharge or sore throat   Endocrine: NO weight changes or polydipsia/polyuria   Dermatological: NO rashes   Respiratory: NO cough, shortness of breath   Cardiovascular: NO chest pain, palpitations or racing heart   Gastrointestinal: NO nausea, vomiting, constipation or diarrhea, YES abdominal pain   Musculoskeletal: NO muscle pain or stiffness   Neurological: NO confusion, dizziness, headaches or tremors   Psychiatric: please see HPI      Strengths and Liabilities: Strength: Patient is intelligent., Liability: Patient is defensive.    Objective:     Vital Signs (Most Recent):  Temp: (!) 100.8 °F (38.2 °C) (07/15/20 2006)  Pulse: 85 (07/16/20 0734)  Resp: 16 (07/15/20 2219)  BP: 109/68 (07/15/20 2006)  SpO2: 98 % (07/15/20 2006) Vital Signs (24h Range):  Temp:  [98.1 °F (36.7 °C)-100.8 °F (38.2 °C)] 100.8 °F (38.2 °C)  Pulse:  [] 85  Resp:  [16-20] 16  SpO2:  [94 %-98 %] 98 %  BP: (109-114)/(51-71) 109/68     Height: 5' 7" (170.2 cm)  Weight: 68 kg (150 lb)  Body mass index is 23.49 kg/m².    No intake or output data in the 24 hours ending 07/16/20 0906    Physical Exam:  General appearance: NAD  Head: NCAT  Lungs: CTAB, no increased work of breath  Heart: RRR  Abdomen: soft, ND  Extremities: extremities normal, atraumatic  Skin: warm, dry       Mental Status Exam:  Appearance: older than stated age  Behavior/Cooperation: eye contact normal, uncooperative  Speech: loud, profane  Mood: irritable  Affect: mood congruent  Thought Process: logical  Thought Content: delusions: yes, erotomanic, persecutory  Orientation: grossly intact  Memory: Grossly intact  Attention Span/Concentration: Normal  Insight: poor  Judgment: poor    Significant Labs: All pertinent labs within the past 24 hours have been reviewed.    Significant Imaging: I have " reviewed all pertinent imaging results/findings within the past 24 hours.

## 2020-07-16 NOTE — CONSULTS
[facility]  General Surgery  Consult Note    Patient Name: Violeta Boudreaux  MRN: [id number]  Code Status: Full Code  Admission Date: 7/11/2020  Hospital Length of Stay: 0 days  Attending Physician: Mukund Chi MD  Primary Care Provider: Otf Brownlee MD    Patient information was obtained from patient and past medical records.     Inpatient consult to General Surgery  Consult performed by: Carmen Topete MD  Consult ordered by: Mukund Chi MD        Subjective:     Principal Problem: Symptomatic anemia    History of Present Illness: 55 yo female with history of bipolar disorder, DVT (IVC filter in place) and GIST (s/p resection Oct 2019? Per patient) who presented with delusions and found to be anemia. On admission her hemoglobin was 6.2 from baseline 9-10 She reported epigastric tenderness, nausea and dark bowel movements. She was PECed in the ED. Since admission she had required several units of PRBC. She was schedule to have EGD today however noted to have increasing abdominal distention and tenderness prior to procedure. She was also noted to have elevated WBC 15. CT scan ordered however when patient went down for scan her IV didn't work. She was unfortunately transferred back to her room for IV placement.   On my evaluation she reports continued nausea and abdominal pain. No flatus or bm since Monday. States her LUQ has gotten larger over the past few days.       No current facility-administered medications on file prior to encounter.      Current Outpatient Medications on File Prior to Encounter   Medication Sig    metFORMIN (GLUCOPHAGE) 500 MG tablet Take 500 mg by mouth.    naloxone (NARCAN) 4 mg/actuation Spry 4 mg by Nasal route.    ondansetron (ZOFRAN-ODT) 8 MG TbDL Take 8 mg by mouth.    clonazePAM (KLONOPIN) 1 MG tablet Take 1 mg by mouth every evening.    loperamide (IMODIUM) 2 mg capsule Take 1 capsule (2 mg total) by mouth 3 (three) times daily.    omeprazole  (PRILOSEC) 20 MG capsule Take 1 capsule (20 mg total) by mouth once daily.    ondansetron (ZOFRAN) 4 MG tablet Take 1 tablet (4 mg total) by mouth every 8 (eight) hours as needed.    oxycodone-acetaminophen (PERCOCET) 5-325 mg per tablet 1-2 tab po q4-6 hrs prn pain, take with small meal/applesauce    sucralfate (CARAFATE) 1 gram tablet Take 1 tablet (1 g total) by mouth 4 (four) times daily before meals and nightly.    ziprasidone (GEODON) 80 MG capsule Take 200 mg by mouth 2 (two) times daily with meals.       Review of patient's allergies indicates:   Allergen Reactions    Toradol [ketorolac] Hives       History reviewed. No pertinent past medical history.  Past Surgical History:   Procedure Laterality Date    CHOLECYSTECTOMY       Family History     None        Tobacco Use    Smoking status: Current Every Day Smoker     Packs/day: 10.00     Types: Cigarettes    Smokeless tobacco: Never Used   Substance and Sexual Activity    Alcohol use: No    Drug use: No    Sexual activity: Not on file     Review of Systems   Constitutional: Negative for activity change, appetite change, diaphoresis, fatigue and fever.   Respiratory: Negative for cough, choking and shortness of breath.    Cardiovascular: Negative for chest pain and palpitations.   Gastrointestinal: Positive for abdominal distention (LUQ), abdominal pain (LUQ), blood in stool (dark stools), nausea and vomiting. Negative for constipation and diarrhea.   Genitourinary: Negative for difficulty urinating and dyspareunia.   Skin: Negative for color change and wound.   Neurological: Negative for dizziness and weakness.   Hematological: Negative for adenopathy. Does not bruise/bleed easily.     Objective:     Vital Signs (Most Recent):  Temp: (!) 100.8 °F (38.2 °C) (07/15/20 2006)  Pulse: 77 (07/15/20 2200)  Resp: 17 (07/15/20 2006)  BP: 109/68 (07/15/20 2006)  SpO2: 98 % (07/15/20 2006) Vital Signs (24h Range):  Temp:  [98.1 °F (36.7 °C)-100.8 °F (38.2  °C)] 100.8 °F (38.2 °C)  Pulse:  [] 77  Resp:  [16-20] 17  SpO2:  [94 %-98 %] 98 %  BP: (107-114)/(51-71) 109/68     Weight: 68 kg (150 lb)  Body mass index is 23.49 kg/m².    Physical Exam  Vitals signs and nursing note reviewed.   Constitutional:       Appearance: Normal appearance.   Cardiovascular:      Rate and Rhythm: Normal rate and regular rhythm.      Pulses: Normal pulses.      Heart sounds: Normal heart sounds.   Pulmonary:      Effort: Pulmonary effort is normal.      Breath sounds: Normal breath sounds.   Abdominal:      Comments: Midline laparotomy incision and RUQ incision well healed.   RUQ, RLQ and LLQ soft, non-tender   LUQ and epigastric region with significant distention with tenderness to palpation. Almost appears mass-like   Skin:     General: Skin is warm and dry.      Capillary Refill: Capillary refill takes less than 2 seconds.      Coloration: Skin is pale. Skin is not jaundiced.   Neurological:      General: No focal deficit present.      Mental Status: She is alert.         Significant Labs:  CBC:   Recent Labs   Lab 07/15/20  0420   WBC 15.07*   RBC 2.67*   HGB 7.8*   HCT 24.9*      MCV 93   MCH 29.2   MCHC 31.3*     BMP:   Recent Labs   Lab 07/15/20  0420   *   *   K 4.4      CO2 25   BUN 5*   CREATININE 0.7   CALCIUM 8.3*   MG 1.6     CMP:   Recent Labs   Lab 07/15/20  0420   *   CALCIUM 8.3*   ALBUMIN 2.2*   PROT 5.7*   *   K 4.4   CO2 25      BUN 5*   CREATININE 0.7   ALKPHOS 337*   *   *   BILITOT 0.7     LFTs:   Recent Labs   Lab 07/15/20  0420   *   *   ALKPHOS 337*   BILITOT 0.7   PROT 5.7*   ALBUMIN 2.2*     Coagulation:   Recent Labs   Lab 07/11/20 1956   LABPROT 10.1   INR 1.0   APTT <21.0       Significant Diagnostics:  CT scan ordered- not performed     Assessment/Plan:     * Symptomatic anemia  54 with significant psychiatric history, anemia, DVT and GIST presenting with iron deficiency anemia  "requiring intermittent transfusions. Patient planned for EGD today with GI however due to increase abdominal pain and LUQ distention procedure canceled. General surgery consulted for evaluation.    - Patient noted to have distention / enlargement vs abdominal mass in LUQ with tenderness, remaindered for abdominal exam benign. Otherwise patient is hemodynamically stable.   - Recommend CT scan to fully evaluate abdomen- remains pending due to IV status of patient.   -Will continue to follow along and place recommendations once CT scan performed    ADDENDUM 7/16/20 0015- CT scan performed and patient noted to have large intra-abdominal mass in the upper abdomen. Appears chronic and large than on previous scan. Comparison CT from 2016 reveals a small 18x9cm complex mass between the stomach and pancrease.Results discussed with radiology resident on call.   On further chart review she was seen by General Surgery at [facility] in June 2016 with known mass with reported peritoneal implants of unknown etiology. She was lost of follow up. Per care-everywhere she is being followed by [facility] by Dr. Glass. According to notes "8/15/2016: partial surgical resection of high grade GIST, positive for DOG1, , CD34, mitotic rate > 5/50 HPF. T4 with greatest dimension 311 cm. Positive margins. Partial gastrectomy, splenectomy, pancreatectomy. 10/29/2016 Imatinib 400 mgm initiated. EGD 9/6/2018 No evidence of recurrence. 5/13/2019: CT of abdomen and pelvis showing mass in stomach extending from the GE junction into the cardia and increasing in size. CT of chest 3.5 cm mass. gastric . 7/3/2019 EGD with biopsy showed recurrence. 8/9/2019: Sutent initiated. 50 mgm daily. Recurrent disease resected 10/23/2019. High probability that residual tumor exists and/or high risk of recurrence.  "  She is prescribed Sunitinib. Last heme/onc vist was 3/17/20    -No surgical intervention at this time. Mass chronic " however unable to r/o UGI bleed related to mass. Would still recommend GI evaluation   -Continue Sunitinib (if dosing question, please see notes in Care Everywhere Tab)   VTE Risk Mitigation (From admission, onward)         Ordered     IP VTE HIGH RISK PATIENT  Once      07/11/20 2130     Place sequential compression device  Until discontinued      07/11/20 2130                Thank you for your consult. I will follow-up with patient. Please contact us if you have any additional questions.    Carmen Topete MD  General Surgery  [facility]

## 2020-07-16 NOTE — ASSESSMENT & PLAN NOTE
54 with significant psychiatric history, anemia, DVT and GIST presenting with iron deficiency anemia requiring intermittent transfusions. Patient planned for EGD today with GI however due to increase abdominal pain and LUQ distention procedure canceled. General surgery consulted for evaluation.    - Patient noted to have distention / enlargement of abdomen in LUQ with tenderness, remaindered for abdominal exam benign. Otherwise patient is hemodynamically stable.   - Recommend CT scan to fully evaluate abdomen- remains pending due to IV status of patient.   -Will continue to follow along and place recommendations once CT scan performed

## 2020-07-16 NOTE — ASSESSMENT & PLAN NOTE
This lady as an extremely complicated medical history and an even more complicated psychosocial history. It appears she follows periodically at [facility] with Dr. Glass. Her history of worsening metastatic disease see to be attributed to her lack of compliance with her medication and not necessarily 2/2 to progression through treatment. She admits to not taking her sunitinib for months with me on the brief conversation she would let me have with her.     Her history sees to include a diagnosis (from [facility]) in 2016 after she underwent resection with gastrectomy, splenectomy and pancreatectomy (R1 resection, path=T4 tumor, high grade). She was placed on imatinib and either progressed through treatment or was non-compliant and progressed (2019). She was then placed on sunitinib and has worsening of disease on this medication 2/2 to admitted non-compliance.    In regards to her prognosis, I think it is extremely poor and it is more associated with her non medical problems. An appropriate place of discharge that would be willing to obtain her medication is most important. If this is not possible, hospice would be appropriate. She will have to follow up with her regular oncologist for her sunitinib.    Her cancer is likely causing direct invasion of her GI tract and hence her anemia. This is also another negative prognostic indicated when you add everything up.     Unfortunately, I do not have anything to offer her at this time.     - poor prognosis d/t mental health issue  - will need to f/u with primary oncologist at discharge to obtain sunitinib   - /social work consults for possible placement  - hospice reasonable if patient has to be d/c to previous living situation  - transfuse Hgb <7 g

## 2020-07-16 NOTE — PROGRESS NOTES
Pharmacokinetic Sign-off: IV Vancomycin    Therapy with vancomycin complete and/or consult discontinued by provider.  Pharmacy will sign off, please re-consult as needed.    Alina Worthington, PharmD  [phone]

## 2020-07-16 NOTE — HPI
Violeat Boudreaux is a 54 y.o. female with what appears to be an extremely complicated psychosocial history compounded by current (reported) homelessness has a history of r/r stage IV GIST.    Majority of medical history was taken from EMR as patient was not very cooperative during our interview today.     She was admitted for an acute psychiatric situation and was placed under PEC.     Upon arrival she was anemic, with a Hgb of 6.2 and required a transfusion. CT abd/pel showed worsening disease when compared to 2016.     Oncology was consulted to assist in care with her history of GIST.

## 2020-07-17 NOTE — NURSING
"Attempted to start IV on patient and she started yelling very loudly "call the F-ing doctor", "youre not doing anything without calling the doctor". The patient is unable to list any needs other than wanting the doctor at the bedside. Dr. Banegas made aware of patients refusal at this time and his requested presence at the bedside.  "

## 2020-07-17 NOTE — SUBJECTIVE & OBJECTIVE
Patient History           Medical as of 7/17/2020    Past Medical History: Patient provided no pertinent medical history.           Surgical as of 7/17/2020     Past Surgical History     Procedure Laterality Date Comments Source    CHOLECYSTECTOMY -- -- -- Provider                  Family as of 7/17/2020    None           Tobacco Use as of 7/17/2020     Smoking Status Smoking Start Date Smoking Quit Date Packs/Day Years Used    Current Every Day Smoker -- -- 10.00 --    Types Comments Smokeless Tobacco Status Smokeless Tobacco Quit Date Source     Cigarettes -- Never Used -- Provider            Alcohol Use as of 7/17/2020     Alcohol Use Drinks/Week Alcohol/Week Comments Source    No   -- -- Provider    Frequency Typical Drinks Binge Drinking        -- -- --              Drug Use as of 7/17/2020     Drug Use Types Frequency Comments Source    No -- -- -- Provider            Sexual Activity as of 7/17/2020     Sexually Active Birth Control Partners Comments Source    -- -- -- -- Provider            Activities of Daily Living as of 7/17/2020    None           Social Documentation as of 7/17/2020    None           Occupational as of 7/17/2020    None           Socioeconomic as of 7/17/2020     Marital Status Spouse Name Number of Children Years Education Education Level Preferred Language Ethnicity Race Source    Single -- -- -- -- English /White White --    Financial Resource Strain Food Insecurity: Worry Food Insecurity: Inability Transportation Needs: Medical Transportation Needs: Non-medical    -- -- -- -- --            Pertinent History     Question Response Comments    Lives with -- --    Place in Birth Order -- --    Lives in -- --    Number of Siblings -- --    Raised by -- --    Legal Involvement -- --    Childhood Trauma -- --    Criminal History of -- --    Financial Status -- --    Highest Level of Education -- --    Does patient have access to a firearm? -- --     Service -- --     Primary Leisure Activity -- --    Spirituality -- --        History reviewed. No pertinent past medical history.  Past Surgical History:   Procedure Laterality Date    CHOLECYSTECTOMY       Family History     None        Tobacco Use    Smoking status: Current Every Day Smoker     Packs/day: 10.00     Types: Cigarettes    Smokeless tobacco: Never Used   Substance and Sexual Activity    Alcohol use: No    Drug use: No    Sexual activity: Not on file     Review of patient's allergies indicates:   Allergen Reactions    Toradol [ketorolac] Hives       No current facility-administered medications on file prior to encounter.      Current Outpatient Medications on File Prior to Encounter   Medication Sig    metFORMIN (GLUCOPHAGE) 500 MG tablet Take 500 mg by mouth.    naloxone (NARCAN) 4 mg/actuation Spry 4 mg by Nasal route.    ondansetron (ZOFRAN-ODT) 8 MG TbDL Take 8 mg by mouth.    clonazePAM (KLONOPIN) 1 MG tablet Take 1 mg by mouth every evening.    loperamide (IMODIUM) 2 mg capsule Take 1 capsule (2 mg total) by mouth 3 (three) times daily.    omeprazole (PRILOSEC) 20 MG capsule Take 1 capsule (20 mg total) by mouth once daily.    ondansetron (ZOFRAN) 4 MG tablet Take 1 tablet (4 mg total) by mouth every 8 (eight) hours as needed.    oxycodone-acetaminophen (PERCOCET) 5-325 mg per tablet 1-2 tab po q4-6 hrs prn pain, take with small meal/applesauce    sucralfate (CARAFATE) 1 gram tablet Take 1 tablet (1 g total) by mouth 4 (four) times daily before meals and nightly.    ziprasidone (GEODON) 80 MG capsule Take 200 mg by mouth 2 (two) times daily with meals.     Psychotherapeutics (From admission, onward)    Start     Stop Route Frequency Ordered    07/16/20 1833  haloperidol lactate injection 2 mg      -- IV Every 6 hours PRN 07/16/20 1834 07/16/20 1833  OLANZapine injection 10 mg      -- IM Every 8 hours PRN 07/16/20 1834 07/12/20 0900  ARIPiprazole tablet 10 mg      -- Oral Daily 07/11/20 2138  "   07/11/20 2245  mirtazapine tablet 15 mg      -- Oral Nightly 07/11/20 2132        Review of Systems    MEDICAL REVIEW OF SYSTEMS  History obtained from the patient   General : NO chills or fever   Eyes: NO  visual changes   ENT: NO hearing change, nasal discharge or sore throat   Endocrine: NO weight changes or polydipsia/polyuria   Dermatological: NO rashes   Respiratory: NO cough, shortness of breath   Cardiovascular: NO chest pain, palpitations or racing heart   Gastrointestinal: NO nausea, vomiting, constipation or diarrhea, YES abdominal pain   Musculoskeletal: NO muscle pain or stiffness   Neurological: NO confusion, dizziness, headaches or tremors   Psychiatric: please see HPI      Strengths and Liabilities: Strength: Patient is intelligent., Liability: Patient is defensive.    Objective:     Vital Signs (Most Recent):  Temp: 99 °F (37.2 °C) (07/17/20 1239)  Pulse: 96 (07/17/20 1239)  Resp: 16 (07/17/20 1239)  BP: (!) 108/55 (07/17/20 1239)  SpO2: 99 % (07/17/20 1239) Vital Signs (24h Range):  Temp:  [98.4 °F (36.9 °C)-100.1 °F (37.8 °C)] 99 °F (37.2 °C)  Pulse:  [] 96  Resp:  [15-18] 16  SpO2:  [97 %-99 %] 99 %  BP: ()/(53-55) 108/55     Height: 5' 7" (170.2 cm)  Weight: 68 kg (150 lb)  Body mass index is 23.49 kg/m².    No intake or output data in the 24 hours ending 07/17/20 1249    Physical Exam:  General appearance: NAD  Head: NCAT  Lungs: CTAB, no increased work of breath  Heart: RRR  Abdomen: soft, ND  Extremities: extremities normal, atraumatic  Skin: warm, dry, pallor       Mental Status Exam:  Appearance: older than stated age  Behavior/Cooperation: eye contact normal, uncooperative  Speech: loud, profane  Mood: irritable  Affect: mood congruent  Thought Process: logical  Thought Content: delusions: yes, erotomanic, persecutory  Orientation: grossly intact  Memory: Grossly intact  Attention Span/Concentration: Normal  Insight: poor  Judgment: poor    Significant Labs: All " pertinent labs within the past 24 hours have been reviewed.    Significant Imaging: I have reviewed all pertinent imaging results/findings within the past 24 hours.

## 2020-07-17 NOTE — ASSESSMENT & PLAN NOTE
ASSESSMENT     Violeta Boudreaux is a 54 y.o. female with a past psychiatric history of BPAD and delusional disorder, who presented to the [facility] due to OPC from estranged  for threatening to kill him. Admitted to [facility] for symptomatic anemia. Per FACT team, patient non-compliant with medications, did not receive most recent scheduled Aristada BAUM.    IMPRESSION  Psychological factors affecting medical care  Cluster B personality d/o  Bipolar disorder hypomanic  Delusional disorder  Medication non-adherence    RECOMMENDATION(S)      1. Scheduled Medication(s):  - Increase Abilify from 10 mg to 15 mg daily, then if tolerates well increase to 30 mg daily on 7/19  - Continue home Remeron 15 mg nightly    2. PRN Medication(s):  - First try: Haldol 5 mg PO, Benadryl 50 mg PO, Ativan 2 mg PO PRN for non-redirectable agitation  - If refuses: Haldol 2 mg IV, Benadryl 50 mg IV, Ativan 2 mg IV PRN for non-redirectable agitation    3.  Monitor:  - Please obtain daily EKG to monitor QTc    4. Legal Status/Precaution(s):  - Continue CEC (expires 7/26 @ 8:32 pm) as patient is in imminent danger of hurting self or others and is gravely disabled due to a psychiatric illness.  - Maintain 1:1 sitter

## 2020-07-17 NOTE — PLAN OF CARE
Problem: Fall Injury Risk  Goal: Absence of Fall and Fall-Related Injury  Outcome: Ongoing, Progressing     Problem: Adult Inpatient Plan of Care  Goal: Plan of Care Review  Outcome: Ongoing, Progressing     Pt is very noncompliant with care very agitated and combative refuses most all care states the medicines she going to take even with attempting to educate pt about the importance of medicine and care she states it is a waist of time. Sitter at the bedside safety measures maintained.

## 2020-07-17 NOTE — PROGRESS NOTES
Pharmacist Intervention IV to PO Note    Violeta Boudreaux is a 54 y.o. female being treated with IV medication pantoprazole    Patient Data:    Vital Signs (Most Recent):  Temp: 99.6 °F (37.6 °C) (07/17/20 0500)  Pulse: 91 (07/17/20 0500)  Resp: 17 (07/17/20 0500)  BP: (!) 99/54 (07/17/20 0500)  SpO2: 97 % (07/17/20 0500) Vital Signs (72h Range):  Temp:  [98.1 °F (36.7 °C)-100.8 °F (38.2 °C)]   Pulse:  []   Resp:  [15-20]   BP: ()/(51-71)   SpO2:  [94 %-100 %]      CBC:  Recent Labs   Lab 07/14/20  2015 07/15/20  0420 07/17/20  0500   WBC 11.81 15.07* 18.38*   RBC 2.75* 2.67* 2.21*   HGB 7.9* 7.8* 6.4*   HCT 26.3* 24.9* 21.1*    317 345   MCV 96 93 96   MCH 28.7 29.2 29.0   MCHC 30.0* 31.3* 30.3*     CMP:     Recent Labs   Lab 07/14/20  0454 07/15/20  0420 07/17/20  0500   * 183* 352*   CALCIUM 8.1* 8.3* 8.0*   ALBUMIN 2.2* 2.2* 1.8*   PROT 5.5* 5.7* 5.4*    135* 135*   K 3.9 4.4 3.8   CO2 27 25 24    102 103   BUN 7 5* 6   CREATININE 0.7 0.7 0.7   ALKPHOS 131 337* 183*   ALT 14 112* 36   AST 16 150* 13   BILITOT 0.2 0.7 0.4       Dietary Orders:  Diet Orders          Diet diabetic [facility]; 2000 Calorie: Diabetic starting at 07/16 1717          Based on the following criteria, this patient qualifies for intravenous to oral conversion:  [x] The patients gastrointestinal tract is functioning (tolerating medications via oral or enteral route for 24 hours and tolerating food or enteral feeds for 24 hours).    IV medication Pantoprazole 40 mg BID will be changed to oral medication Pantoprazole 40 mg BID.    Pharmacist's Name: Thu Haley  Pharmacist's Extension: [phone]

## 2020-07-18 NOTE — PROGRESS NOTES
"Pt being very uncooperative and abrasive towards staff. Pt claiming that sitter is talking to her about her cell phone and her , but really wasn't. Pt called to speak to this nurse earlier in the shift wanting to know if she could have her cell phone and when I told her no, I immediately explained why. A few hours later, pt started yelling at the sitter, pushed the chair out of the room and slammed the door, yelling "I don't want you in here b--ch." Security and house supervisor were called. Once they all arrived to the floor, security tried talking to pt about being PEC'd and the reason for the sitter but the pt was too amped up. Pt was still asking for her cell phone stating that "It's against the law for ya'll to have my cell phone." Tried to assure pt that her belongings are locked up but she didn't believe me. Pt continued to be agitated, yell, curse at staff and refuse to lay down. Pt then placed in 4 point restraints and given haldol per MD orders. Sitter remains at bedside. Will cont to monitor pt.    "

## 2020-07-18 NOTE — PLAN OF CARE
Patient remained free of falls and injuries during shift.  Patient took medications without incident.  Patient had to be put into restraints when patient became agitated and started arguing with her sitter.  No other concerns noted.

## 2020-07-18 NOTE — PROGRESS NOTES
[facility] Hospital Medicine  Progress Note    Patient Name: Violeta Boudreaux  MRN: [id number]  Patient Class: IP- Inpatient   Admission Date: 7/11/2020  Length of Stay: 1 days  Attending Physician: Kahlil Banegas MD  Primary Care Provider: Otf Brownlee MD    [facility] Medicine Team: [facility] HOSP MED B Kahlil Banegas MD    HPI:    Ms. Violeta Boudreaux is a 54 y.o. female with Bipolar Disorder, delusional disorder, GIST on Sunitinib, and recent LLE DVT s/p IVC filter (June 2020) who presents to the ER by [facility] for delusions.  Her estranged  reports that she showed up at his house, claiming that she was  to Geovany Ring from [employer] and he was being hid inside the estranged 's house. He reports that she is homeless, and hasn't been taking her psych medications.  She denies any complaints at this time.  Labs on arrival showed a Hemoglobin 6.2 down from 9.7 in June 2020.  She endorses midepigastric abdominal pain.  She denies any blood in her stools, dark stools, or vaginal bleeding.  She mentions that a few weeks ago, she was having bad headaches and took a lot of Advil, then causing her to have hematemesis.  She denies further bleeding or use of Aspirin or NSAIDS.  Of note, at the end of May, she was hospitalized at [facility] for delusional disorder.  At that time, she was found to have a Hemoglobin 6.4.  She was given blood and her hemolgobin improved to 9.7.  It was thought that her bleeding with 2/2 her GIST tumor. She was not evaluated by GI.  Also during that admission, she was found to have a LLE DVT.  HemeOnc suggested IVC filter, given her lack of consistency with medications.  [facility] was [facility] to the [facility].  She was discharged on Abilify 10mg and Mirtazapine 15mg qHS.       In the ER, she was PECed for grave disability.  SUMIT was positive.  She was transfused 1 unit PRBCs.  She mentions that [employer] will pay for every drop of blood we take from her, and that she wants to  return to her house in [place] when discharged.  She was admitted to Hospital Medicine for GI and Psych evaluations.    Subjective:     Principal Problem:Psychological factors affecting medical condition    Interval History: Patient lying in bed, no acute distress. Calm and pleasant during encounter. No complaints. Restraints in place.     Review of Systems   Constitutional: Negative for chills and fever.   Eyes: Negative for visual disturbance.   Respiratory: Negative for cough and shortness of breath.    Gastrointestinal: Negative for abdominal distention, abdominal pain, nausea and vomiting.   Genitourinary: Negative for dysuria.   Neurological: Negative for weakness.   Psychiatric/Behavioral: Negative for suicidal ideas.        Objective:     Vital Signs (Most Recent):  Temp: 96.8 °F (36 °C) (07/18/20 0345)  Pulse: 77 (07/18/20 0905)  Resp: 16 (07/18/20 0906)  BP: (!) 116/59 (07/18/20 0905)  SpO2: (!) 91 % (07/18/20 0905) Vital Signs (24h Range):  Temp:  [96.8 °F (36 °C)-99 °F (37.2 °C)] 96.8 °F (36 °C)  Pulse:  [76-96] 77  Resp:  [15-19] 16  SpO2:  [91 %-99 %] 91 %  BP: (106-119)/(53-59) 116/59     Weight: 68 kg (150 lb)  Body mass index is 23.49 kg/m².    Intake/Output Summary (Last 24 hours) at 7/18/2020 1010  Last data filed at 7/17/2020 1500  Gross per 24 hour   Intake 254.17 ml   Output --   Net 254.17 ml        Physical Exam  HENT:      Head: Normocephalic.   Eyes:      Pupils: Pupils are equal, round, and reactive to light.   Neck:      Musculoskeletal: Normal range of motion.   Cardiovascular:      Rate and Rhythm: Normal rate and regular rhythm.      Pulses: Normal pulses.      Heart sounds: Normal heart sounds.   Pulmonary:      Effort: Pulmonary effort is normal.      Breath sounds: Normal breath sounds.   Abdominal:      General: Bowel sounds are normal. There is no distension.      Palpations: Abdomen is soft.      Tenderness: There is no abdominal tenderness.   Musculoskeletal: Normal range  of motion.   Skin:     General: Skin is warm.   Neurological:      General: No focal deficit present.      Mental Status: She is alert.   Psychiatric:         Mood and Affect: Affect is labile.         Thought Content: Thought content is delusional.         Judgment: Judgment is inappropriate.           Overview/Hospital Course:     7/12   presenting to the ED via [facility] with OPC stating patient was at her estranged 's house  threatening she would kill the estranged .  PEC placed for delusional behavior. Work-up significant for H/H 6.2/20 (baseline 9/30 through care everywhere). Rectal exam performed without evidence of blood or melena. FOBT positive.   alert, delusional. Refusing to give  personal belongings, and agitation with staff.  4 point restraints were applied. repeat CBC at 6.6 . transfusion with 1 unit of PRBC. GI recommends EGD and colonoscopy for further evaluation of iron deficiency anemia patient adamantly refuses exam and EGD/ colonoscopy  7/13 agitated overnight requesting cell phone.  Patient was placed 4 point  restraints hemoglobin at 8 3 status post 2 units of pack RBC transfusion . agrees for EGD/ colonoscopy.Patient off restraints.Continue home Abilify 10 mg and Remeron 15 mg nightly. Increased Zyprexa from 5 mg to 10 mg q8h IM PRN for agitation.daily EKG to monitor QTc- 447ms   7/14 Hb 8.1 --> 7.6. Requesting  her purse and  belongings she needs to get to her (Geovany Ring).  Clear liquids today and NPO from midnight EGD/colonoscopy in a.m. complains of abdominal, takes oxycodone 30 mg Q 6 hourly as per chart review (reviewed  last filled on 06/23).  Norco discontinued and started oxycodone 20 mg Q 6 hourly p.r.n.  7/15 refused to drink GoLYTELY overnight.  Hemoglobin stable at 7.8 leukocytosis of 15 but afebrile.  Transaminitis AST//112 with normal bilirubin and mildly elevated alk-phos at 337.  Significant left upper quadrant abdominal pain prior to endoscopy  today.  Endoscopy canceled.  CT abdomen with IV and oral contrast ordered.  General surgery consulted.  Started on Zosyn and vancomycin empirically.  Blood cultures x2 pending    Significant Labs:   A1C:   Recent Labs   Lab 07/11/20 1932   HGBA1C 6.3*     CBC:   Recent Labs   Lab 07/17/20  0500 07/18/20  0402   WBC 18.38* 17.64*   HGB 6.4* 7.4*   HCT 21.1* 24.2*    362*     CMP:   Recent Labs   Lab 07/17/20  0500 07/18/20  0402   * 137   K 3.8 3.8    103   CO2 24 26   * 235*   BUN 6 10   CREATININE 0.7 0.7   CALCIUM 8.0* 8.4*   PROT 5.4* 6.2   ALBUMIN 1.8* 2.0*   BILITOT 0.4 0.6   ALKPHOS 183* 185*   AST 13 16   ALT 36 28   ANIONGAP 8 8   EGFRNONAA >60.0 >60.0     Magnesium:   Recent Labs   Lab 07/17/20  0500 07/18/20  0402   MG 1.8 1.9     POCT Glucose:   Recent Labs   Lab 07/17/20  1648 07/17/20  2114 07/18/20  0730   POCTGLUCOSE 221* 274* 217*     TSH:   Recent Labs   Lab 07/11/20 1932   TSH 3.160       Significant Imaging: I have reviewed and interpreted all pertinent imaging results/findings within the past 24 hours.    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] 09/09/2019 Yes    Type 2 diabetes mellitus without complication, without long-term current use of insulin [E11.9] 09/09/2019 Yes      Problems Resolved During this Admission:    Diagnosis Date Noted Date Resolved POA    Bipolar 1 disorder, mixed, moderate [F31.62]  07/16/2020 Yes     Scheduled Meds:   [START ON 7/19/2020] ARIPiprazole  30 mg Oral  Daily    ciprofloxacin HCl  500 mg Oral Q12H    cyanocobalamin  1,000 mcg Subcutaneous Daily    Followed by    [START ON 7/19/2020] cyanocobalamin  1,000 mcg Subcutaneous Q7 Days    Followed by    [START ON 8/16/2020] cyanocobalamin  1,000 mcg Subcutaneous Q30 Days    HYDROmorphone  0.5 mg Intravenous Once    lidocaine  1 patch Transdermal Q24H    metroNIDAZOLE  500 mg Oral Q8H    mirtazapine  15 mg Oral QHS    [START ON 7/20/2020] NON FORMULARY MEDICATION 441 mg  441 mg Intramuscular Q30 Days    pantoprazole  40 mg Oral BID    potassium chloride  30 mEq Oral Once    potassium chloride  40 mEq Oral Once    potassium chloride  40 mEq Oral Once     Continuous Infusions:  PRN Meds:.sodium chloride, sodium chloride, acetaminophen, dextrose 50%, dextrose 50%, glucagon (human recombinant), glucose, glucose, haloperidol lactate, HYDROmorphone, HYDROmorphone, insulin aspart U-100, iohexol, melatonin, naloxone, OLANZapine, ondansetron, promethazine, senna-docusate 8.6-50 mg, sodium chloride 0.9%, sodium chloride 0.9%    PLAN:    Delusional disorder   Bipolar disorder  - presenting to the ED via [facility] with OPC stating patient was at her estranged 's house  threatening she would kill the estranged .  PEC placed for delusional behavior. Work-up significant for H/H 6.2/20 (baseline 9/30 through care everywhere). Rectal exam performed without evidence of blood or melena. FOBT positive.   alert, delusional.  - restraints in place due to severe agitation   - psychiatry consulted. apprec recs  -- Plan to up titrate Abilify   -- Saturday Abilify PO 15mg po qam   -- Sunday Abilify PO 30 mg po qam   -- Monday plan to give Aristada Long acting ; Aristada(R) Initial dose, 441 mg IM (deltoid or gluteal)  -- Continue home Remeron 15 mg nightly  -- First try: Haldol 5 mg PO, Benadryl 50 mg PO, Ativan 2 mg PO PRN for non-redirectable agitation  -- If refuses: Haldol 2 mg IV, Benadryl 50 mg IV, Ativan 2 mg IV PRN for  non-redirectable agitation  -- Please obtain daily EKG to monitor QTc  -- Continue CEC (expires 7/26 @ 8:32 pm) as patient is in imminent danger of hurting self or others and is gravely disabled due to a psychiatric illness.    Symptomatic anemia   GIB   - GIB Pathway initiated  Likely bleeding from GIST  Hgb 6.2 on admit, down from 9.7 beginning of June  - H/H stable on 7/16  Check iron studies and hemolysis labs  GI consulted. apprec recs  -- Discussed with primary team that EGD in this patient would be high risk given worsening/progression of her cancer and that given the likely lower GI pathology that is improving the risks outweigh the benefits  -- continue PO PPI  -- trend Hgb, transfuse Hgb <7, Plt<50  -- would recommend heme/onc consult for evaluation/treatment of progressing tumor  -- if it is determined that tissue is needed, AES consult would be appropriate at that time.  - s/p 1 unit pRBC on 7/17  - 7/12: haptoglobin normal and reticulocyte count elevated. Gastroenterology consulted  repeat CBC at 6.6 . transfusion with 1 unit of PRBC. GI recommends EGD and colonoscopy for further evaluation of iron deficiency anemia patient adamantly refuses exam and EGD/ colonoscopy  7/13  hemoglobin at 8 3 status post 2 units of pack RBC transfusion.agrees for EGD/ colonoscopy.  7/14 Hb 8.1 --> 7.6.Clear liquids today and NPO from midnight EGD/colonoscopy in a.m.  7/15 refused to drink GoLYTELY overnight.     Malignant gastrointestinal stromal tumor (GIST) of stomach   - Follows with Hem[facility]Onc at [facility]  - On Sunitinib outpatient  - CT A/P showed increased size of mass  - Heme/onc consulted. apprec recs   -- poor prognosis d/t mental health issue  -- will need to f/u with primary oncologist at discharge to obtain sunitinib   -- /social work consults for possible placement  -- hospice reasonable if patient has to be d/c to previous living situation    Leukocytosis  - WBC: 11 --> 15 --> 18 --> 17  - afebrile  since 7/15   - denies cough or SOB  - Initially on empiric IV abx but switched to po cipro/flagyl due to patient refusing IV access   - plan to discontinue abx is remains afebrile for 24 hours  - bcx no growth to date  - U/A negative for UTI  - CBC daily     Transaminitis   - 7/15:  AST//112 with normal bilirubin and mildly elevated alk-phos at 337. Consider right upper quadrant sonogram if not trending down        Acute deep vein thrombosis (DVT) of popliteal vein of left lower extremity   - S/p IVC filter on 6/2020    Hypokalemia- resolved   - K 2.9-->3  - replete prn     B12 deficiency  - levels of 192 started on vitamin B12 subcutaneous dissection     Type 2 diabetes mellitus without complication, without long-term current use of insulin  - Patient's FSGs are controlled on current hypoglycemics.  - Home DM regimen:  Metformin  - SSI with POCT accuchecks and Diabetic diet      VTE Risk Mitigation (From admission, onward)         Ordered     IP VTE HIGH RISK PATIENT  Once      07/11/20 2130     Place sequential compression device  Until discontinued      07/11/20 2130              Time spent in care of patient: > 35 minutes     Kahlil Banegas MD  Department of Hospital Medicine   [facility]

## 2020-07-18 NOTE — PROGRESS NOTES
[facility] Hospital Medicine  Progress Note    Patient Name: Violeta Boudreaux  MRN: [id number]  Patient Class: IP- Inpatient   Admission Date: 7/11/2020  Length of Stay: 1 days  Attending Physician: Kahlil Banegas MD  Primary Care Provider: Otf Brownlee MD    [facility] Medicine Team: [facility] HOSP MED B Kahlil Banegas MD    HPI:    Ms. Violeta Boudreaux is a 54 y.o. female with Bipolar Disorder, delusional disorder, GIST on Sunitinib, and recent LLE DVT s/p IVC filter (June 2020) who presents to the ER by [facility] for delusions.  Her estranged  reports that she showed up at his house, claiming that she was  to Geovany Ring from [employer] and he was being hid inside the estranged 's house. He reports that she is homeless, and hasn't been taking her psych medications.  She denies any complaints at this time.  Labs on arrival showed a Hemoglobin 6.2 down from 9.7 in June 2020.  She endorses midepigastric abdominal pain.  She denies any blood in her stools, dark stools, or vaginal bleeding.  She mentions that a few weeks ago, she was having bad headaches and took a lot of Advil, then causing her to have hematemesis.  She denies further bleeding or use of Aspirin or NSAIDS.  Of note, at the end of May, she was hospitalized at [facility] for delusional disorder.  At that time, she was found to have a Hemoglobin 6.4.  She was given blood and her hemolgobin improved to 9.7.  It was thought that her bleeding with 2/2 her GIST tumor. She was not evaluated by GI.  Also during that admission, she was found to have a LLE DVT.  HemeOnc suggested IVC filter, given her lack of consistency with medications.  She was [facility] to the [facility].  She was discharged on Abilify 10mg and Mirtazapine 15mg qHS.       In the ER, she was PECed for grave disability.  SUMIT was positive.  She was transfused 1 unit PRBCs.  She mentions that Bert [employer]orman will pay for every drop of blood we take from her, and that she wants to  return to her house in [place] when discharged.  She was admitted to Hospital Medicine for GI and Psych evaluations.    Subjective:     Principal Problem:Psychological factors affecting medical condition    Interval History: Patient lying in bed, moderate distress due to agitation. Refusing medications, IV placement. Continues to believe her  is the ayoub from [employer] and requesting to speak with him for any medical decisions.     Review of Systems   Constitutional: Negative for chills and fever.   Eyes: Negative for visual disturbance.   Respiratory: Negative for cough and shortness of breath.    Gastrointestinal: Negative for abdominal distention, abdominal pain, nausea and vomiting.   Genitourinary: Negative for dysuria.   Neurological: Negative for weakness.   Psychiatric/Behavioral: Negative for suicidal ideas. The patient is nervous/anxious.         Objective:     Vital Signs (Most Recent):  Temp: 96.8 °F (36 °C) (07/18/20 0345)  Pulse: 85 (07/18/20 0345)  Resp: 17 (07/18/20 0345)  BP: (!) 115/56 (07/18/20 0345)  SpO2: 98 % (07/18/20 0345) Vital Signs (24h Range):  Temp:  [96.8 °F (36 °C)-99 °F (37.2 °C)] 96.8 °F (36 °C)  Pulse:  [76-96] 85  Resp:  [15-19] 17  SpO2:  [94 %-99 %] 98 %  BP: (106-119)/(53-59) 115/56     Weight: 68 kg (150 lb)  Body mass index is 23.49 kg/m².    Intake/Output Summary (Last 24 hours) at 7/18/2020 0610  Last data filed at 7/17/2020 1500  Gross per 24 hour   Intake 254.17 ml   Output --   Net 254.17 ml        Physical Exam  HENT:      Head: Normocephalic.   Eyes:      Pupils: Pupils are equal, round, and reactive to light.   Neck:      Musculoskeletal: Normal range of motion.   Cardiovascular:      Rate and Rhythm: Normal rate and regular rhythm.      Pulses: Normal pulses.      Heart sounds: Normal heart sounds.   Pulmonary:      Effort: Pulmonary effort is normal.      Breath sounds: Normal breath sounds.   Abdominal:      General: Bowel sounds are normal. There is no  distension.      Palpations: Abdomen is soft.      Tenderness: There is no abdominal tenderness.   Musculoskeletal: Normal range of motion.   Skin:     General: Skin is warm.   Neurological:      General: No focal deficit present.      Mental Status: She is alert.   Psychiatric:         Mood and Affect: Affect is labile.         Thought Content: Thought content is delusional.         Judgment: Judgment is inappropriate.           Overview/Hospital Course:     7/12   presenting to the ED via [facility] with [facility] stating patient was at her estranged 's house  threatening she would kill the estranged .  PEC placed for delusional behavior. Work-up significant for H/H 6.2/20 (baseline 9/30 through care everywhere). Rectal exam performed without evidence of blood or melena. FOBT positive.   alert, delusional. Refusing to give  personal belongings, and agitation with staff.  4 point restraints were applied. repeat CBC at 6.6 . transfusion with 1 unit of PRBC. GI recommends EGD and colonoscopy for further evaluation of iron deficiency anemia patient adamantly refuses exam and EGD/ colonoscopy  7/13 agitated overnight requesting cell phone.  Patient was placed 4 point  restraints hemoglobin at 8 3 status post 2 units of pack RBC transfusion . agrees for EGD/ colonoscopy.Patient off restraints.Continue home Abilify 10 mg and Remeron 15 mg nightly. Increased Zyprexa from 5 mg to 10 mg q8h IM PRN for agitation.daily EKG to monitor QTc- 447ms   7/14 Hb 8.1 --> 7.6. Requesting  her purse and  belongings she needs to get to her (Geovany Ring).  Clear liquids today and NPO from midnight EGD/colonoscopy in a.m. complains of abdominal, takes oxycodone 30 mg Q 6 hourly as per chart review (reviewed  last filled on 06/23).  Norco discontinued and started oxycodone 20 mg Q 6 hourly p.r.n.  7/15 refused to drink GoLYTELY overnight.  Hemoglobin stable at 7.8 leukocytosis of 15 but afebrile.  Transaminitis AST//112  with normal bilirubin and mildly elevated alk-phos at 337.  Significant left upper quadrant abdominal pain prior to endoscopy today.  Endoscopy canceled.  CT abdomen with IV and oral contrast ordered.  General surgery consulted.  Started on Zosyn and vancomycin empirically.  Blood cultures x2 pending    Significant Labs:   A1C:   Recent Labs   Lab 07/11/20  1932   HGBA1C 6.3*     CBC:   Recent Labs   Lab 07/17/20  0500 07/18/20  0402   WBC 18.38* 17.64*   HGB 6.4* 7.4*   HCT 21.1* 24.2*    362*     CMP:   Recent Labs   Lab 07/17/20  0500 07/18/20  0402   * 137   K 3.8 3.8    103   CO2 24 26   * 235*   BUN 6 10   CREATININE 0.7 0.7   CALCIUM 8.0* 8.4*   PROT 5.4* 6.2   ALBUMIN 1.8* 2.0*   BILITOT 0.4 0.6   ALKPHOS 183* 185*   AST 13 16   ALT 36 28   ANIONGAP 8 8   EGFRNONAA >60.0 >60.0     Magnesium:   Recent Labs   Lab 07/17/20  0500 07/18/20  0402   MG 1.8 1.9     POCT Glucose:   Recent Labs   Lab 07/17/20  1235 07/17/20  1648 07/17/20  2114   POCTGLUCOSE 212* 221* 274*     TSH:   Recent Labs   Lab 07/11/20 1932   TSH 3.160       Significant Imaging: I have reviewed and interpreted all pertinent imaging results/findings within the past 24 hours.    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] 09/09/2019 Yes    Type 2 diabetes mellitus without complication, without long-term current use of insulin [E11.9] 09/09/2019 Yes      Problems Resolved During this Admission:    Diagnosis Date Noted Date Resolved POA     Bipolar 1 disorder, mixed, moderate [F31.62]  07/16/2020 Yes     Scheduled Meds:   ARIPiprazole  15 mg Oral Daily    [START ON 7/19/2020] ARIPiprazole  30 mg Oral Daily    ciprofloxacin HCl  500 mg Oral Q12H    cyanocobalamin  1,000 mcg Subcutaneous Daily    Followed by    [START ON 7/19/2020] cyanocobalamin  1,000 mcg Subcutaneous Q7 Days    Followed by    [START ON 8/16/2020] cyanocobalamin  1,000 mcg Subcutaneous Q30 Days    HYDROmorphone  0.5 mg Intravenous Once    lidocaine  1 patch Transdermal Q24H    metroNIDAZOLE  500 mg Oral Q8H    mirtazapine  15 mg Oral QHS    [START ON 7/20/2020] NON FORMULARY MEDICATION 441 mg  441 mg Intramuscular Q30 Days    pantoprazole  40 mg Oral BID    potassium chloride  30 mEq Oral Once    potassium chloride  40 mEq Oral Once    potassium chloride  40 mEq Oral Once     Continuous Infusions:  PRN Meds:.sodium chloride, sodium chloride, acetaminophen, dextrose 50%, dextrose 50%, glucagon (human recombinant), glucose, glucose, haloperidol lactate, HYDROmorphone, HYDROmorphone, insulin aspart U-100, iohexol, melatonin, naloxone, OLANZapine, ondansetron, promethazine, senna-docusate 8.6-50 mg, sodium chloride 0.9%, sodium chloride 0.9%    PLAN:    Symptomatic anemia   GIB   - GIB Pathway initiated  Likely bleeding from GIST  Hgb 6.2 on admit, down from 9.7 beginning of June  - H/H stable on 7/16  Check iron studies and hemolysis labs  GI consulted. apprec recs  -- Discussed with primary team that EGD in this patient would be high risk given worsening/progression of her cancer and that given the likely lower GI pathology that is improving the risks outweigh the benefits  -- continue PO PPI  -- trend Hgb, transfuse Hgb <7, Plt<50  -- would recommend heme/onc consult for evaluation/treatment of progressing tumor  -- if it is determined that tissue is needed, AES consult would be appropriate at that time.  - s/p 1 unit pRBC on 7/17  - 7/12: haptoglobin normal and  reticulocyte count elevated. Gastroenterology consulted  repeat CBC at 6.6 . transfusion with 1 unit of PRBC. GI recommends EGD and colonoscopy for further evaluation of iron deficiency anemia patient adamantly refuses exam and EGD/ colonoscopy  7/13  hemoglobin at 8 3 status post 2 units of pack RBC transfusion.agrees for EGD/ colonoscopy.  7/14 Hb 8.1 --> 7.6.Clear liquids today and NPO from midnight EGD/colonoscopy in a.m.  7/15 refused to drink GoLYTELY overnight.     Leukocytosis  - WBC: 11 --> 15 --> 18 --> 17  - afebrile since 7/15   - denies cough or SOB  - Initially on empiric IV abx but switched to po cipro/flagyl due to patient refusing IV access   - plan to discontinue abx is remains afebrile for 24 hours  - bcx no growth to date  - U/A negative for UTI  - CBC daily     Transaminitis   - 7/15:  AST//112 with normal bilirubin and mildly elevated alk-phos at 337. Consider right upper quadrant sonogram if not trending down        Acute deep vein thrombosis (DVT) of popliteal vein of left lower extremity   - S/p IVC filter on 6/2020    Hypokalemia- resolved   - K 2.9-->3  - replete prn     B12 deficiency  - levels of 192 started on vitamin B12 subcutaneous dissection     Delusional disorder   Bipolar disorder  - presenting to the ED via [facility] with [facility] stating patient was at her estranged 's house  threatening she would kill the estranged .  PEC placed for delusional behavior. Work-up significant for H/H 6.2/20 (baseline 9/30 through care everywhere). Rectal exam performed without evidence of blood or melena. FOBT positive.   alert, delusional. Refusing to give  personal belongings, and agitation with staff.  4 point restraints  - psychiatry consulted. apprec recs  -- Plan to up titrate Abilify   -- Saturday Abilify PO 15mg po qam   -- Sunday Abilify PO 30 mg po qam   -- Monday plan to give Aristada Long acting ; Aristada(R) Initial dose, 441 mg IM (deltoid or gluteal)  -- Continue home  Remeron 15 mg nightly  -- First try: Haldol 5 mg PO, Benadryl 50 mg PO, Ativan 2 mg PO PRN for non-redirectable agitation  -- If refuses: Haldol 2 mg IV, Benadryl 50 mg IV, Ativan 2 mg IV PRN for non-redirectable agitation  -- Please obtain daily EKG to monitor QTc  -- Continue CEC (expires 7/26 @ 8:32 pm) as patient is in imminent danger of hurting self or others and is gravely disabled due to a psychiatric illness.    Malignant gastrointestinal stromal tumor (GIST) of stomach   - [facility]s [facility] at [facility]  - On Sunitinib outpatient  - CT A/P showed increased size of mass  - Heme/onc consulted. apprec recs   -- poor prognosis d/t mental health issue  -- will need to f/u with primary oncologist at discharge to obtain sunitinib   -- /social work consults for possible placement  -- hospice reasonable if patient has to be d/c to previous living situation    Type 2 diabetes mellitus without complication, without long-term current use of insulin  - Patient's FSGs are controlled on current hypoglycemics.  - Home DM regimen:  Metformin  - SSI with POCT accuchecks and Diabetic diet      VTE Risk Mitigation (From admission, onward)         Ordered     IP VTE HIGH RISK PATIENT  Once      07/11/20 2130     Place sequential compression device  Until discontinued      07/11/20 2130              Time spent in care of patient: > 35 minutes     Kahlil Banegas MD  Department of Hospital Medicine   [facility]

## 2020-07-19 NOTE — SUBJECTIVE & OBJECTIVE
"Interval History:   Prn overnight for non redirectable agitation related to pt becoming upset over not being given access to her phone.  Per RN, pt was being uncooperative and abusive towards sitter and staff. Pt apologetic this AM.  Medication compliant. Plan for abilify increase tomorrow morning and aristada Monday.      Family History     None        Tobacco Use    Smoking status: Current Every Day Smoker     Packs/day: 10.00     Types: Cigarettes    Smokeless tobacco: Never Used   Substance and Sexual Activity    Alcohol use: No    Drug use: No    Sexual activity: Not on file     Psychotherapeutics (From admission, onward)    Start     Stop Route Frequency Ordered    07/19/20 0900  ARIPiprazole tablet 30 mg      07/20 0859 Oral Daily 07/17/20 1714    07/16/20 1833  haloperidol lactate injection 2 mg      -- IV Every 6 hours PRN 07/16/20 1834 07/16/20 1833  OLANZapine injection 10 mg      -- IM Every 8 hours PRN 07/16/20 1834 07/11/20 2245  mirtazapine tablet 15 mg      -- Oral Nightly 07/11/20 2132           Review of Systems  Objective:     Vital Signs (Most Recent):  Temp: 98.9 °F (37.2 °C) (07/18/20 2046)  Pulse: 84 (07/18/20 2046)  Resp: 18 (07/18/20 2056)  BP: (!) 114/58 (07/18/20 2046)  SpO2: 96 % (07/18/20 2046) Vital Signs (24h Range):  Temp:  [96.8 °F (36 °C)-98.9 °F (37.2 °C)] 98.9 °F (37.2 °C)  Pulse:  [77-89] 84  Resp:  [16-19] 18  SpO2:  [91 %-99 %] 96 %  BP: (114-117)/(53-59) 114/58     Height: 5' 7" (170.2 cm)  Weight: 68 kg (150 lb)  Body mass index is 23.49 kg/m².      Intake/Output Summary (Last 24 hours) at 7/18/2020 1765  Last data filed at 7/18/2020 1200  Gross per 24 hour   Intake 120 ml   Output --   Net 120 ml       PSYCHIATRIC   Orientation: self, location  Speech: normal  Language: irritable  Mood: ok  Affect: mood congruent  Thought Process: logical  Associations: intact  Thought Content: delusions: yes, erotomanic, persecutory  Memory: intact  Attention and Concentration: " normale  Insight: poor  Judgment: poor

## 2020-07-19 NOTE — PROGRESS NOTES
Patient has been calm this morning taking all her scheduled meds up until about noon when she started asking for her phone. When it was told to her that she can not have her phone due to being CEC, she started refusing things such as labs, threw her lunch tray on the floor, and pulled out her IV. Around 1500 when her IV pain med was open for her to get it, she requested it and then agreed to take her antibiotics as well. Patient agreed to let me place an IV for her to get pain meds. Patient is fixated that someone switch out the vials and that I should keep it to run fingerprints. Sitter at bedside. Will continue to monitor.
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
## 2020-07-20 PROBLEM — F31.10 BIPOLAR AFFECTIVE DISORDER, CURRENT EPISODE MANIC: Status: ACTIVE | Noted: 2019-01-01

## 2020-07-20 PROBLEM — Z91.199 NONCOMPLIANCE WITH TREATMENT: Status: ACTIVE | Noted: 2020-01-01

## 2020-07-20 NOTE — PLAN OF CARE
Ms Boudreaux had a decent night, rested well. Aaox3. Disoriented to situation. Pain managed with iv dilaudid. Medications reviewed. Infused 1u of prbc w/o incidence. Tolerated well.vss. sitter at bedside. Pt repositions independently. She is free of any trauma/injuries this shift. Bed locked and low. Safety measures maintained. Wctm
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
## 2020-07-20 NOTE — PLAN OF CARE
07/20/20 1608   Discharge Reassessment   Assessment Type Discharge Planning Reassessment   Discharge Plan A Psychiatric hospital   Discharge Plan B Psychiatric hospital   DME Needed Upon Discharge  none   Anticipated Discharge Disposition Psych   Can the patient/caregiver answer the patient profile reliably? No, cognitively impaired   Describe the patient's ability to walk at the present time. No restrictions   Number of comorbid conditions (as recorded on the chart) Four   Post-Acute Status   Post-Acute Authorization Placement   Post-Acute Placement Status Awaiting Internal Medical Clearance     Active CEC remains with documentation in the patient's chart. Patient not medically stable to dc to in-pt psych. WBC 17.67. Patient received 1U PRBC overnight for H&H 6.7/21.8 yesterday. H&H 7.3/24.4 this AM. Patient continues to be followed by psych. Will continue to follow.

## 2020-07-20 NOTE — SUBJECTIVE & OBJECTIVE
"Interval History:   As above    Family History     None        Tobacco Use    Smoking status: Current Every Day Smoker     Packs/day: 10.00     Types: Cigarettes    Smokeless tobacco: Never Used   Substance and Sexual Activity    Alcohol use: No    Drug use: No    Sexual activity: Not on file     Psychotherapeutics (From admission, onward)    Start     Stop Route Frequency Ordered    07/20/20 1036  lorazepam injection 2 mg      -- IM Every 6 hours PRN 07/20/20 0946    07/20/20 1034  haloperidol lactate injection 5 mg      -- IM Every 6 hours PRN 07/20/20 0946    07/20/20 1031  lorazepam injection 2 mg      -- IV Every 6 hours PRN 07/20/20 0933    07/20/20 1030  haloperidol lactate injection 5 mg      -- IV Every 6 hours PRN 07/20/20 0933    07/20/20 1030  ARIPiprazole tablet 30 mg      -- Oral Daily 07/20/20 0927    07/16/20 1833  OLANZapine injection 10 mg      -- IM Every 8 hours PRN 07/16/20 1834 07/11/20 2245  mirtazapine tablet 15 mg      -- Oral Nightly 07/11/20 2132           Review of Systems   Unable to perform ROS: Psychiatric disorder     Objective:     Vital Signs (Most Recent):  Temp: 96.8 °F (36 °C) (07/20/20 1054)  Pulse: 86 (07/20/20 1054)  Resp: 18 (07/20/20 1003)  BP: (!) 97/55 (07/20/20 1054)  SpO2: 97 % (07/20/20 1054) Vital Signs (24h Range):  Temp:  [96.8 °F (36 °C)-99.7 °F (37.6 °C)] 96.8 °F (36 °C)  Pulse:  [72-87] 86  Resp:  [14-18] 18  SpO2:  [95 %-100 %] 97 %  BP: ()/(49-60) 97/55     Height: 5' 7" (170.2 cm)  Weight: 68 kg (150 lb)  Body mass index is 23.49 kg/m².      Intake/Output Summary (Last 24 hours) at 7/20/2020 1056  Last data filed at 7/20/2020 0200  Gross per 24 hour   Intake 192.5 ml   Output --   Net 192.5 ml       Physical Exam:  General appearance: NAD  Head: NCAT  Lungs: CTAB, no increased work of breath  Heart: RRR  Abdomen: soft, ND  Extremities: extremities normal, atraumatic  Skin: warm, dry, pallor     Mental Status Exam:  Appearance: older than stated " age, disheveled  Behavior/Cooperation: eye contact intense, uncooperative  Speech: loud, profane, rapid  Mood: irritable  Affect: mood congruent  Thought Process: logical  Thought Content: delusions: yes, erotomanic, persecutory  Orientation: grossly intact  Memory: Grossly intact  Attention Span/Concentration: Normal  Insight: poor  Judgment: poor    Significant Labs:   Last 24 Hours:   Recent Lab Results       07/20/20  0734   07/20/20  0353   07/19/20  2059   07/19/20  1625        Albumin   1.9         Alkaline Phosphatase   129         ALT   14         Anion Gap   7         Aniso   Slight         AST   14         Baso #   0.05         Basophil%   0.3         BILIRUBIN TOTAL   0.5  Comment:  For infants and newborns, interpretation of results should be based  on gestational age, weight and in agreement with clinical  observations.  Premature Infant recommended reference ranges:  Up to 24 hours.............<8.0 mg/dL  Up to 48 hours............<12.0 mg/dL  3-5 days..................<15.0 mg/dL  6-29 days.................<15.0 mg/dL           BUN, Bld   7         Calcium   8.0         Chloride   102         CO2   25         Creatinine   0.6         Differential Method   Automated         eGFR if    >60.0         eGFR if non    >60.0  Comment:  Calculation used to obtain the estimated glomerular filtration  rate (eGFR) is the CKD-EPI equation.            Eos #   0.1         Eosinophil%   0.6         Glucose   228         Gran # (ANC)   11.5         Gran%   64.7         Hematocrit   24.4         Hemoglobin   7.3         Hypo   Occasional         Immature Grans (Abs)   0.28  Comment:  Mild elevation in immature granulocytes is non specific and   can be seen in a variety of conditions including stress response,   acute inflammation, trauma and pregnancy. Correlation with other   laboratory and clinical findings is essential.           Immature Granulocytes   1.6         Lymph #   3.6          Lymph%   20.5         Magnesium   1.7         MCH   28.9         MCHC   29.9         MCV   96         Mono #   2.2         Mono%   12.3         MPV   10.2         nRBC   1         Ovalocytes   Occasional         Phosphorus   3.0         Platelet Estimate   Increased         Platelets   407         POCT Glucose 279   239 329     Poik   Slight         Poly   Occasional         Potassium   3.9         PROTEIN TOTAL   5.6         RBC   2.53         RDW   16.6         Sodium   134         Target Cells   Occasional         WBC   17.67             Significant Imaging: None
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
## 2020-07-20 NOTE — SUBJECTIVE & OBJECTIVE
"Interval History:   07/19/2020    Patient seen at bedside and immediately demanded writer leave. She stated she wants her phone so that she can call her . Patient with wide eyed stare, disorganized behavior. Speech is loud, profane, pressured.     Family History     None        Tobacco Use    Smoking status: Current Every Day Smoker     Packs/day: 10.00     Types: Cigarettes    Smokeless tobacco: Never Used   Substance and Sexual Activity    Alcohol use: No    Drug use: No    Sexual activity: Not on file     Psychotherapeutics (From admission, onward)    Start     Stop Route Frequency Ordered    07/16/20 1833  haloperidol lactate injection 2 mg      -- IV Every 6 hours PRN 07/16/20 1834 07/16/20 1833  OLANZapine injection 10 mg      -- IM Every 8 hours PRN 07/16/20 1834 07/11/20 2245  mirtazapine tablet 15 mg      -- Oral Nightly 07/11/20 2132           Review of Systems   Unable to perform ROS: Psychiatric disorder     Objective:     Vital Signs (Most Recent):  Temp: 98.7 °F (37.1 °C) (07/20/20 0503)  Pulse: 80 (07/20/20 0503)  Resp: 18 (07/20/20 0503)  BP: 107/60 (07/20/20 0503)  SpO2: 95 % (07/20/20 0503) Vital Signs (24h Range):  Temp:  [98.1 °F (36.7 °C)-99.7 °F (37.6 °C)] 98.7 °F (37.1 °C)  Pulse:  [72-87] 80  Resp:  [14-18] 18  SpO2:  [95 %-100 %] 95 %  BP: (100-116)/(49-60) 107/60     Height: 5' 7" (170.2 cm)  Weight: 68 kg (150 lb)  Body mass index is 23.49 kg/m².      Intake/Output Summary (Last 24 hours) at 7/20/2020 0731  Last data filed at 7/20/2020 0200  Gross per 24 hour   Intake 432.5 ml   Output --   Net 432.5 ml       Physical Exam  Vitals signs reviewed.   Constitutional:       Appearance: Normal appearance.   HENT:      Head: Normocephalic and atraumatic.   Neurological:      Mental Status: She is alert and oriented to person, place, and time.       NEUROLOGICAL EXAMINATION:     MENTAL STATUS   Oriented to person, place, and time.   Attention: decreased. Concentration: " decreased.   Level of consciousness: alert       Patient appears dishevled. Sitting at edge of bed with minimal clothing on. Patient speech is loud, pressured. She is yelling profane words. Insight/judgement is poor.      Significant Labs:   Last 24 Hours:   Recent Lab Results       07/20/20  0353   07/19/20 2059 07/19/20  1625        Albumin 1.9         Alkaline Phosphatase 129         ALT 14         Anion Gap 7         Aniso Slight         AST 14         Baso # 0.05         Basophil% 0.3         BILIRUBIN TOTAL 0.5  Comment:  For infants and newborns, interpretation of results should be based  on gestational age, weight and in agreement with clinical  observations.  Premature Infant recommended reference ranges:  Up to 24 hours.............<8.0 mg/dL  Up to 48 hours............<12.0 mg/dL  3-5 days..................<15.0 mg/dL  6-29 days.................<15.0 mg/dL           BUN, Bld 7         Calcium 8.0         Chloride 102         CO2 25         Creatinine 0.6         Differential Method Automated         eGFR if  >60.0         eGFR if non  >60.0  Comment:  Calculation used to obtain the estimated glomerular filtration  rate (eGFR) is the CKD-EPI equation.            Eos # 0.1         Eosinophil% 0.6         Glucose 228         Gran # (ANC) 11.5         Gran% 64.7         Hematocrit 24.4         Hemoglobin 7.3         Hypo Occasional         Immature Grans (Abs) 0.28  Comment:  Mild elevation in immature granulocytes is non specific and   can be seen in a variety of conditions including stress response,   acute inflammation, trauma and pregnancy. Correlation with other   laboratory and clinical findings is essential.           Immature Granulocytes 1.6         Lymph # 3.6         Lymph% 20.5         Magnesium 1.7         MCH 28.9         MCHC 29.9         MCV 96         Mono # 2.2         Mono% 12.3         MPV 10.2         nRBC 1         Ovalocytes Occasional          Phosphorus 3.0         Platelet Estimate Increased         Platelets 407         POCT Glucose   239 329     Poik Slight         Poly Occasional         Potassium 3.9         PROTEIN TOTAL 5.6         RBC 2.53         RDW 16.6         Sodium 134         Target Cells Occasional         WBC 17.67               Significant Imaging: None

## 2020-07-20 NOTE — ASSESSMENT & PLAN NOTE
ASSESSMENT     Violeta Boudreaux is a 54 y.o. female with a past psychiatric history of BPAD and delusional disorder, who presented to the [facility] due to OPC from estranged  for threatening to kill him. Admitted to [facility] for symptomatic anemia. Per FACT team, patient non-compliant with medications, did not receive most recent scheduled Aristada BAUM.    IMPRESSION  Bipolar affective disorder, current episode manic  Psychological factors affecting medical care  Cluster B personality d/o  Delusional disorder  Medication non-adherence    RECOMMENDATION(S)      1. Scheduled Medication(s):  - Continue Abilify 30 mg daily  - Continue home Remeron 15 mg nightly  - Initiate Aristada 441 mg IM injection once    2. PRN Medication(s):  - First try: Haldol 5 mg PO, Benadryl 50 mg PO, Ativan 2 mg PO PRN for non-redirectable agitation  - If refuses: Haldol 2 mg IV, Benadryl 50 mg IV, Ativan 2 mg IV PRN for non-redirectable agitation    3.  Monitor:  - Please obtain daily EKG to monitor QTc    4. Legal Status/Precaution(s):  - Continue CEC (expires 7/26 @ 8:32 pm) as patient is in imminent danger of hurting self or others and is gravely disabled due to a psychiatric illness.  - Maintain 1:1 sitter  - Contact next of kin to help facilitate patient care and decision making, especially with regards to disposition

## 2020-07-20 NOTE — PROGRESS NOTES
[facility] Hospital Medicine  Progress Note    Patient Name: Violeta Boudreaux  MRN: [id number]  Patient Class: IP- Inpatient   Admission Date: 7/11/2020  Length of Stay: 3 days  Attending Physician: Kahlil Banegas MD  Primary Care Provider: Otf Brownlee MD    [facility] Medicine Team: [facility] MED B Kahlil Banegas MD    HPI:    Ms. Violeta Boudreaux is a 54 y.o. female with Bipolar Disorder, delusional disorder, GIST on Sunitinib, and recent LLE DVT s/p IVC filter (June 2020) who presents to the ER by [facility] for delusions.  Her estranged  reports that she showed up at his house, claiming that she was  to Geovany Ring from [employer] and he was being hid inside the estranged 's house. He reports that she is homeless, and hasn't been taking her psych medications.  She denies any complaints at this time.  Labs on arrival showed a Hemoglobin 6.2 down from 9.7 in June 2020.  She endorses midepigastric abdominal pain.  She denies any blood in her stools, dark stools, or vaginal bleeding.  She mentions that a few weeks ago, she was having bad headaches and took a lot of Advil, then causing her to have hematemesis.  She denies further bleeding or use of Aspirin or NSAIDS.  Of note, at the end of May, she was hospitalized at [facility] for delusional disorder.  At that time, she was found to have a Hemoglobin 6.4.  She was given blood and her hemolgobin improved to 9.7.  It was thought that her bleeding with 2/2 her GIST tumor. She was not evaluated by GI.  Also during that admission, she was found to have a LLE DVT.  HemeOnc suggested IVC filter, given her lack of consistency with medications.  She was [facility] to the [facility].  She was discharged on Abilify 10mg and Mirtazapine 15mg qHS.       In the ER, she was PECed for grave disability.  SUMIT was positive.  She was transfused 1 unit PRBCs.  She mentions that [employer] will pay for every drop of blood we take from her, and that she wants to  return to her house in [place] when discharged.  She was admitted to Hospital Medicine for GI and Psych evaluations.    Subjective:     Principal Problem:Psychological factors affecting medical condition    Interval History: Patient lying in bed, no acute distress. Reports that she is unwilling to speak until she has her phone. No other complaints.     Review of Systems   Constitutional: Negative for chills and fever.   Eyes: Negative for visual disturbance.   Respiratory: Negative for cough and shortness of breath.    Gastrointestinal: Negative for abdominal distention, abdominal pain, nausea and vomiting.   Genitourinary: Negative for dysuria.   Neurological: Negative for weakness.   Psychiatric/Behavioral: Negative for suicidal ideas.        Objective:     Vital Signs (Most Recent):  Temp: 98.7 °F (37.1 °C) (07/20/20 0503)  Pulse: 80 (07/20/20 0503)  Resp: 18 (07/20/20 0503)  BP: 107/60 (07/20/20 0503)  SpO2: 95 % (07/20/20 0503) Vital Signs (24h Range):  Temp:  [98.1 °F (36.7 °C)-99.7 °F (37.6 °C)] 98.7 °F (37.1 °C)  Pulse:  [72-87] 80  Resp:  [14-18] 18  SpO2:  [95 %-100 %] 95 %  BP: (100-116)/(49-60) 107/60     Weight: 68 kg (150 lb)  Body mass index is 23.49 kg/m².    Intake/Output Summary (Last 24 hours) at 7/20/2020 0942  Last data filed at 7/20/2020 0200  Gross per 24 hour   Intake 192.5 ml   Output --   Net 192.5 ml        Physical Exam  HENT:      Head: Normocephalic.   Eyes:      Pupils: Pupils are equal, round, and reactive to light.   Neck:      Musculoskeletal: Normal range of motion.   Cardiovascular:      Rate and Rhythm: Normal rate and regular rhythm.      Pulses: Normal pulses.      Heart sounds: Normal heart sounds.   Pulmonary:      Effort: Pulmonary effort is normal.      Breath sounds: Normal breath sounds.   Abdominal:      General: Bowel sounds are normal. There is no distension.      Palpations: Abdomen is soft.      Tenderness: There is no abdominal tenderness.   Musculoskeletal:  Normal range of motion.   Skin:     General: Skin is warm.   Neurological:      General: No focal deficit present.      Mental Status: She is alert.   Psychiatric:         Mood and Affect: Affect is labile.         Thought Content: Thought content is delusional.         Judgment: Judgment is inappropriate.           Overview/Hospital Course:     7/12   presenting to the ED via [facility] with [facility] stating patient was at her estranged 's house  threatening she would kill the estranged .  PEC placed for delusional behavior. Work-up significant for H/H 6.2/20 (baseline 9/30 through care everywhere). Rectal exam performed without evidence of blood or melena. FOBT positive.   alert, delusional. Refusing to give  personal belongings, and agitation with staff.  4 point restraints were applied. repeat CBC at 6.6 . transfusion with 1 unit of PRBC. GI recommends EGD and colonoscopy for further evaluation of iron deficiency anemia patient adamantly refuses exam and EGD/ colonoscopy  7/13 agitated overnight requesting cell phone.  Patient was placed 4 point  restraints hemoglobin at 8 3 status post 2 units of pack RBC transfusion . agrees for EGD/ colonoscopy.Patient off restraints.Continue home Abilify 10 mg and Remeron 15 mg nightly. Increased Zyprexa from 5 mg to 10 mg q8h IM PRN for agitation.daily EKG to monitor QTc- 447ms   7/14 Hb 8.1 --> 7.6. Requesting  her purse and  belongings she needs to get to her (Geovany Ring).  Clear liquids today and NPO from midnight EGD/colonoscopy in a.m. complains of abdominal, takes oxycodone 30 mg Q 6 hourly as per chart review (reviewed  last filled on 06/23).  Norco discontinued and started oxycodone 20 mg Q 6 hourly p.r.n.  7/15 refused to drink GoLYTELY overnight.  Hemoglobin stable at 7.8 leukocytosis of 15 but afebrile.  Transaminitis AST//112 with normal bilirubin and mildly elevated alk-phos at 337.  Significant left upper quadrant abdominal pain prior to  endoscopy today.  Endoscopy canceled.  CT abdomen with IV and oral contrast ordered.  General surgery consulted.  Started on Zosyn and vancomycin empirically.  Blood cultures x2 pending    Significant Labs:   A1C:   Recent Labs   Lab 07/11/20 1932   HGBA1C 6.3*     CBC:   Recent Labs   Lab 07/19/20  0553 07/20/20  0353   WBC 14.53* 17.67*   HGB 6.7* 7.3*   HCT 21.8* 24.4*   * 407*     CMP:   Recent Labs   Lab 07/19/20  0553 07/20/20  0353   * 134*   K 3.8 3.9    102   CO2 27 25   * 228*   BUN 10 7   CREATININE 0.6 0.6   CALCIUM 8.3* 8.0*   PROT 5.6* 5.6*   ALBUMIN 1.8* 1.9*   BILITOT 0.4 0.5   ALKPHOS 163* 129   AST 16 14   ALT 18 14   ANIONGAP 5* 7*   EGFRNONAA >60.0 >60.0     Magnesium:   Recent Labs   Lab 07/19/20  0553 07/20/20  0353   MG 1.7 1.7     POCT Glucose:   Recent Labs   Lab 07/19/20  1625 07/19/20  2059 07/20/20  0734   POCTGLUCOSE 329* 239* 279*     TSH:   Recent Labs   Lab 07/11/20 1932   TSH 3.160       Significant Imaging: I have reviewed and interpreted all pertinent imaging results/findings within the past 24 hours.    Assessment/Plan:      Active Diagnoses:    Diagnosis Date Noted POA    PRINCIPAL PROBLEM:  Psychological factors affecting medical condition [F54]  Yes    Noncompliance with treatment [Z91.19] 07/20/2020 Not Applicable    Anemia [D64.9] 07/11/2020 Yes    Hypokalemia [E87.6] 07/11/2020 Yes    Acute deep vein thrombosis (DVT) of popliteal vein of left lower extremity [I82.432] 05/29/2020 Yes    Delusional disorder [F22] 05/28/2020 Yes    Bipolar affective disorder, current episode hypomanic [F31.0] 09/09/2019 Yes    Malignant gastrointestinal stromal tumor (GIST) of stomach [C49.A2] 09/09/2019 Yes    Type 2 diabetes mellitus without complication, without long-term current use of insulin [E11.9] 09/09/2019 Yes      Problems Resolved During this Admission:    Diagnosis Date Noted Date Resolved POA    Bipolar 1 disorder, mixed, moderate [F31.62]   07/16/2020 Yes     Scheduled Meds:   ARIPiprazole  30 mg Oral Daily    ciprofloxacin HCl  500 mg Oral Q12H    cyanocobalamin  1,000 mcg Subcutaneous Q7 Days    Followed by    [START ON 8/16/2020] cyanocobalamin  1,000 mcg Subcutaneous Q30 Days    HYDROmorphone  0.5 mg Intravenous Once    lidocaine  1 patch Transdermal Q24H    metroNIDAZOLE  500 mg Oral Q8H    mirtazapine  15 mg Oral QHS    NON FORMULARY MEDICATION 441 mg  441 mg Intramuscular Q30 Days    pantoprazole  40 mg Oral BID    potassium chloride  30 mEq Oral Once    potassium chloride  40 mEq Oral Once    potassium chloride  40 mEq Oral Once     Continuous Infusions:  PRN Meds:.sodium chloride, sodium chloride, acetaminophen, dextrose 50%, dextrose 50%, haloperidol lactate **AND** diphenhydrAMINE **AND** lorazepam, glucagon (human recombinant), glucose, glucose, HYDROmorphone, HYDROmorphone, insulin aspart U-100, iohexol, melatonin, naloxone, OLANZapine, ondansetron, promethazine, senna-docusate 8.6-50 mg, sodium chloride 0.9%, sodium chloride 0.9%    PLAN:    Delusional disorder   Bipolar disorder  - presenting to the ED via [facility] with [facility] stating patient was at her estranged 's house  threatening she would kill the estranged .  PEC placed for delusional behavior. Work-up significant for H/H 6.2/20 (baseline 9/30 through care everywhere). Rectal exam performed without evidence of blood or melena. FOBT positive.   alert, delusional.  - restraints in place due to severe agitation   - psychiatry consulted. apprec recs  -- Plan to up titrate Abilify   -- Saturday Abilify PO 15mg po qam   -- Sunday Abilify PO 30 mg po qam   -- Monday plan to give Aristada Long acting ; Aristada(R) Initial dose, 441 mg IM (deltoid or gluteal)  -- Continue home Remeron 15 mg nightly  -- First try: Haldol 5 mg PO, Benadryl 50 mg PO, Ativan 2 mg PO PRN for non-redirectable agitation  -- If refuses: Haldol 2 mg IV, Benadryl 50 mg IV, Ativan 2 mg IV PRN for  non-redirectable agitation  -- Please obtain daily EKG to monitor QTc  -- Continue CEC (expires 7/26 @ 8:32 pm) as patient is in imminent danger of hurting self or others and is gravely disabled due to a psychiatric illness.    Symptomatic anemia   GIB   - GIB Pathway initiated  Likely bleeding from GIST  Hgb 6.2 on admit, down from 9.7 beginning of June  - H/H stable on 7/16  Check iron studies and hemolysis labs  GI consulted. apprec recs  -- Discussed with primary team that EGD in this patient would be high risk given worsening/progression of her cancer and that given the likely lower GI pathology that is improving the risks outweigh the benefits  -- continue PO PPI  -- trend Hgb, transfuse Hgb <7, Plt<50  -- would recommend heme/onc consult for evaluation/treatment of progressing tumor  -- if it is determined that tissue is needed, AES consult would be appropriate at that time.  - s/p 1 unit pRBC on 7/17  - Hb 6.7. ordered one unit pRBC.   - 7/12: haptoglobin normal and reticulocyte count elevated. Gastroenterology consulted  repeat CBC at 6.6 . transfusion with 1 unit of PRBC. GI recommends EGD and colonoscopy for further evaluation of iron deficiency anemia patient adamantly refuses exam and EGD/ colonoscopy  7/13  hemoglobin at 8 3 status post 2 units of pack RBC transfusion.agrees for EGD/ colonoscopy.  7/14 Hb 8.1 --> 7.6.Clear liquids today and NPO from midnight EGD/colonoscopy in a.m.  7/15 refused to drink GoLYTELY overnight.     Malignant gastrointestinal stromal tumor (GIST) of stomach   - [facility]s [facility] Hem[facility]c at [facility]  - On Sunitinib outpatient  - CT A/P showed increased size of mass  - Heme/onc consulted. apprec recs   -- poor prognosis d/t mental health issue  -- will need to f/u with primary oncologist at discharge to obtain sunitinib   -- /social work consults for possible placement  -- hospice reasonable if patient has to be d/c to previous living situation    Leukocytosis  - WBC: 11  --> 15 --> 18 --> 17  - afebrile since 7/15   - denies cough or SOB  - Initially on empiric IV abx but switched to po cipro/flagyl due to patient refusing IV access   - plan to discontinue abx is remains afebrile for 24 hours  - bcx no growth to date  - U/A negative for UTI  - CBC daily     Transaminitis   - 7/15:  AST//112 with normal bilirubin and mildly elevated alk-phos at 337. Consider right upper quadrant sonogram if not trending down        Acute deep vein thrombosis (DVT) of popliteal vein of left lower extremity   - S/p IVC filter on 6/2020    Hypokalemia- resolved   - K 2.9-->3  - replete prn     B12 deficiency  - levels of 192 started on vitamin B12 subcutaneous dissection     Type 2 diabetes mellitus without complication, without long-term current use of insulin  - Patient's FSGs are controlled on current hypoglycemics.  - Home DM regimen:  Metformin  - SSI with POCT accuchecks and Diabetic diet      VTE Risk Mitigation (From admission, onward)         Ordered     IP VTE HIGH RISK PATIENT  Once      07/11/20 2130     Place sequential compression device  Until discontinued      07/11/20 2130              Time spent in care of patient: > 35 minutes     Kahlil Banegas MD  Department of Hospital Medicine   [facility]
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
## 2020-07-21 NOTE — ASSESSMENT & PLAN NOTE
ASSESSMENT     Violeta Boudreaux is a 54 y.o. female with a past psychiatric history of BPAD and delusional disorder, who presented to the [facility] due to OPC from estranged  for threatening to kill him. Admitted to [facility] for symptomatic anemia. Per FACT team, patient non-compliant with medications, did not receive most recent scheduled Aristada BAUM.    IMPRESSION  Bipolar affective disorder, current episode manic  Psychological factors affecting medical care  Cluster B personality d/o  Delusional disorder  Medication non-adherence    RECOMMENDATION(S)      1. Scheduled Medication(s):  - Continue Abilify 30 mg daily   - Forced med protocol with Haldol, Ativan and Benadryl as described in PRN below)  - Continue home Remeron 15 mg nightly  - Initiate Aristada 441 mg IM injection once    2. PRN Medication(s):  - First try: Haldol 5 mg PO, Benadryl 50 mg PO, Ativan 2 mg PO PRN for non-redirectable agitation  - If refuses: Haldol 2 mg IV, Benadryl 50 mg IV, Ativan 2 mg IV PRN for non-redirectable agitation    3.  Monitor:  - Please obtain daily EKG to monitor QTc    4. Legal Status/Precaution(s):  - Continue CEC (expires 7/26 @ 8:32 pm) as patient is in imminent danger of hurting self or others and is gravely disabled due to a psychiatric illness.  - Maintain 1:1 sitter  - Contact next of kin to help facilitate patient care and decision making, especially with regards to disposition

## 2020-07-21 NOTE — SUBJECTIVE & OBJECTIVE
"Interval History:   As above    Family History     None        Tobacco Use    Smoking status: Current Every Day Smoker     Packs/day: 10.00     Types: Cigarettes    Smokeless tobacco: Never Used   Substance and Sexual Activity    Alcohol use: No    Drug use: No    Sexual activity: Not on file     Psychotherapeutics (From admission, onward)    Start     Stop Route Frequency Ordered    07/20/20 1500  ARIPiprazole lauroxil 441 mg/1.6 mL injection 441 mg     Question Answer Comment   Brand Name: ARISTADA (R)    Generic name: none    Form: Injectable    Length of Therapy: Indefinite    Reason for Non-Formulary: No equivalent formulary medication available    How soon needed? (if approved, may take up to 5 days to procure): 48-72 hrs    Requestor's Contact Information: Shirley Lopez or ON CALL psychiatry        -- IM Every 30 days 07/17/20 1723    07/20/20 1036  lorazepam injection 2 mg      -- IM Every 6 hours PRN 07/20/20 0946    07/20/20 1034  haloperidol lactate injection 5 mg      -- IM Every 6 hours PRN 07/20/20 0946    07/20/20 1031  lorazepam injection 2 mg      -- IV Every 6 hours PRN 07/20/20 0933    07/20/20 1030  haloperidol lactate injection 5 mg      -- IV Every 6 hours PRN 07/20/20 0933    07/20/20 1030  ARIPiprazole tablet 30 mg      -- Oral Daily 07/20/20 0927    07/16/20 1833  OLANZapine injection 10 mg      -- IM Every 8 hours PRN 07/16/20 1834    07/11/20 2245  mirtazapine tablet 15 mg      -- Oral Nightly 07/11/20 2132           Review of Systems   Unable to perform ROS: Psychiatric disorder     Objective:     Vital Signs (Most Recent):  Temp: 99 °F (37.2 °C) (07/21/20 0800)  Pulse: 80 (07/21/20 0800)  Resp: 16 (07/21/20 0800)  BP: (!) 113/55 (07/21/20 0800)  SpO2: 98 % (07/21/20 0800) Vital Signs (24h Range):  Temp:  [97.8 °F (36.6 °C)-99.1 °F (37.3 °C)] 99 °F (37.2 °C)  Pulse:  [78-91] 80  Resp:  [15-18] 16  SpO2:  [94 %-98 %] 98 %  BP: (103-113)/(55-57) 113/55     Height: 5' 7" (170.2 " cm)  Weight: 68 kg (150 lb)  Body mass index is 23.49 kg/m².    No intake or output data in the 24 hours ending 07/21/20 1102    Physical Exam:  General appearance: NAD  Head: NCAT  Lungs: CTAB, no increased work of breath  Heart: RRR  Abdomen: soft, somewhat distended  Extremities: extremities normal, atraumatic  Skin: warm, dry, pallor     Mental Status Exam:  Appearance: older than stated age, disheveled  Behavior/Cooperation: eye contact normal, uncooperative  Speech: profane  Mood: irritable  Affect: mood congruent  Thought Process: logical  Thought Content: delusions: yes, erotomanic, persecutory  Orientation: grossly intact  Memory: Grossly intact  Attention Span/Concentration: Normal  Insight: poor  Judgment: poor    Significant Labs:   Last 24 Hours:   Recent Lab Results       07/21/20  0829   07/20/20  2051   07/20/20  1610        POCT Glucose 287 253 232         Significant Imaging: None    Physical Exam

## 2020-07-21 NOTE — PLAN OF CARE
LEXY spoke with Carito -  with the FACT Team. Per Carito, patient's brother is not supportive, however her ex- Gregorio is a good support system for the patient. Per Carito, she will try to obtain Gregorio's number to provide to LEXY. LEXY updated patient's CM. LEXY will continue to follow.     11:10 AM  Carito contacted LEXY and provided Mar's number: [phone]. LEXY updated patient's Care Team.     Sharee Rivas LMSW   - [facility]  Ext. [phone]

## 2020-07-22 NOTE — SUBJECTIVE & OBJECTIVE
"Interval History:   As above    Family History     None        Tobacco Use    Smoking status: Current Every Day Smoker     Packs/day: 10.00     Types: Cigarettes    Smokeless tobacco: Never Used   Substance and Sexual Activity    Alcohol use: No    Drug use: No    Sexual activity: Not on file     Psychotherapeutics (From admission, onward)    Start     Stop Route Frequency Ordered    07/21/20 1300  ARIPiprazole lauroxil 441 mg/1.6 mL injection 441 mg     Question Answer Comment   Brand Name: ARISTADA (R)    Generic name: none    Form: Injectable    Length of Therapy: Indefinite    Reason for Non-Formulary: No equivalent formulary medication available    How soon needed? (if approved, may take up to 5 days to procure): 48-72 hrs    Requestor's Contact Information: Shirley Lopez or ON CALL psychiatry        -- IM Every 30 days 07/21/20 1219    07/20/20 1036  lorazepam injection 2 mg      -- IM Every 6 hours PRN 07/20/20 0946    07/20/20 1034  haloperidol lactate injection 5 mg      -- IM Every 6 hours PRN 07/20/20 0946    07/20/20 1031  lorazepam injection 2 mg      -- IV Every 6 hours PRN 07/20/20 0933    07/20/20 1030  haloperidol lactate injection 5 mg      -- IV Every 6 hours PRN 07/20/20 0933    07/20/20 1030  ARIPiprazole tablet 30 mg      -- Oral Daily 07/20/20 0927    07/16/20 1833  OLANZapine injection 10 mg      -- IM Every 8 hours PRN 07/16/20 1834    07/11/20 2245  mirtazapine tablet 15 mg      -- Oral Nightly 07/11/20 2132           Review of Systems   Unable to perform ROS: Psychiatric disorder     Objective:     Vital Signs (Most Recent):  Temp: 97.5 °F (36.4 °C) (07/22/20 0811)  Pulse: 90 (07/22/20 0811)  Resp: 16 (07/22/20 0817)  BP: (!) 109/52 (07/22/20 0811)  SpO2: 100 % (07/22/20 0811) Vital Signs (24h Range):  Temp:  [97 °F (36.1 °C)-98.7 °F (37.1 °C)] 97.5 °F (36.4 °C)  Pulse:  [70-90] 90  Resp:  [14-19] 16  SpO2:  [95 %-100 %] 100 %  BP: ()/(49-57) 109/52     Height: 5' 7" (170.2 " cm)  Weight: 68 kg (150 lb)  Body mass index is 23.49 kg/m².      Intake/Output Summary (Last 24 hours) at 7/22/2020 1109  Last data filed at 7/21/2020 1800  Gross per 24 hour   Intake 550 ml   Output --   Net 550 ml       Physical Exam:  General appearance: NAD  Head: NCAT  Lungs: no increased work of breath  Heart: no displacement of PMI  Abdomen: soft, distended  Extremities: extremities normal, atraumatic  Skin: warm, dry, pallor     Mental Status Exam:  Appearance: older than stated age, disheveled  Behavior/Cooperation: eye contact normal, uncooperative  Speech: profane, loud  Mood: angry  Affect: mood congruent  Thought Process: logical  Thought Content: delusions: yes, erotomanic, persecutory  Orientation: grossly intact  Memory: Grossly intact  Attention Span/Concentration: Normal  Insight: fair-poor, improving  Judgment: poor    Significant Labs:   Last 24 Hours:   Recent Lab Results       07/22/20  0825   07/21/20  2100   07/21/20  1630   07/21/20  1135        POCT Glucose [phone] 271         Significant Imaging: None    Physical Exam

## 2020-07-22 NOTE — PROGRESS NOTES
[facility] Hospital Medicine  Progress Note    Patient Name: Violeta Boudreaux  MRN: [id number]  Patient Class: IP- Inpatient   Admission Date: 7/11/2020  Length of Stay: 5 days  Attending Physician: Kahlil Banegas MD  Primary Care Provider: Otf Brownlee MD    [facility]: [facility] B Kahlil Banegas MD    HPI:    Ms. Violeta Boudreaux is a 54 y.o. female with Bipolar Disorder, delusional disorder, GIST on Sunitinib, and recent LLE DVT s/p IVC filter (June 2020) who presents to the ER by [facility] for delusions.  Her estranged  reports that she showed up at his house, claiming that she was  to Geovany Ring from [employer] and he was being hid inside the estranged 's house. He reports that she is homeless, and hasn't been taking her psych medications.  She denies any complaints at this time.  Labs on arrival showed a Hemoglobin 6.2 down from 9.7 in June 2020.  She endorses midepigastric abdominal pain.  She denies any blood in her stools, dark stools, or vaginal bleeding.  She mentions that a few weeks ago, she was having bad headaches and took a lot of Advil, then causing her to have hematemesis.  She denies further bleeding or use of Aspirin or NSAIDS.  Of note, at the end of May, she was hospitalized at [facility] for delusional disorder.  At that time, she was found to have a Hemoglobin 6.4.  She was given blood and her hemolgobin improved to 9.7.  It was thought that her bleeding with 2/2 her GIST tumor. She was not evaluated by GI.  Also during that admission, she was found to have a LLE DVT.  HemeOnc suggested IVC filter, given her lack of consistency with medications.  She was [facility] to the [facility].  She was discharged on Abilify 10mg and Mirtazapine 15mg qHS.       In the ER, she was PECed for grave disability.  SUMIT was positive.  She was transfused 1 unit PRBCs.  She mentions that Bert Gorman will pay for every drop of blood we take from her, and that she wants to  return to her house in [place] when discharged.  She was admitted to Hospital Medicine for GI and Psych evaluations.    Subjective:     Principal Problem:Psychological factors affecting medical condition    Interval History: Patient lying in bed, no acute distress. Continues to request her phone and her belongings. Reports increased size of abdominal mass (tumor) but denies pain around the mass. Continues have normal bowel movements.     Review of Systems   Constitutional: Negative for chills and fever.   Eyes: Negative for visual disturbance.   Respiratory: Negative for cough and shortness of breath.    Gastrointestinal: Negative for abdominal distention, abdominal pain, nausea and vomiting.   Genitourinary: Negative for dysuria.   Neurological: Negative for weakness.   Psychiatric/Behavioral: Negative for suicidal ideas.        Objective:     Vital Signs (Most Recent):  Temp: 98.7 °F (37.1 °C) (07/21/20 1935)  Pulse: 87 (07/21/20 2203)  Resp: 18 (07/21/20 2257)  BP: (!) 98/50 (07/21/20 1935)  SpO2: 95 % (07/21/20 2203) Vital Signs (24h Range):  Temp:  [97 °F (36.1 °C)-99 °F (37.2 °C)] 98.7 °F (37.1 °C)  Pulse:  [70-90] 87  Resp:  [14-19] 18  SpO2:  [95 %-100 %] 95 %  BP: ()/(49-57) 98/50     Weight: 68 kg (150 lb)  Body mass index is 23.49 kg/m².    Intake/Output Summary (Last 24 hours) at 7/22/2020 0637  Last data filed at 7/21/2020 1800  Gross per 24 hour   Intake 950 ml   Output --   Net 950 ml        Physical Exam  HENT:      Head: Normocephalic.   Eyes:      Pupils: Pupils are equal, round, and reactive to light.   Neck:      Musculoskeletal: Normal range of motion.   Cardiovascular:      Rate and Rhythm: Normal rate and regular rhythm.      Pulses: Normal pulses.      Heart sounds: Normal heart sounds.   Pulmonary:      Effort: Pulmonary effort is normal.      Breath sounds: Normal breath sounds.   Abdominal:      General: Bowel sounds are normal. There is no distension.      Palpations: Abdomen  is soft.      Tenderness: There is no abdominal tenderness.   Musculoskeletal: Normal range of motion.   Skin:     General: Skin is warm.   Neurological:      General: No focal deficit present.      Mental Status: She is alert.   Psychiatric:         Mood and Affect: Affect is labile.         Thought Content: Thought content is delusional.         Judgment: Judgment is inappropriate.           Overview/Hospital Course:     7/12   presenting to the ED via [facility] with OPC stating patient was at her estranged 's house  threatening she would kill the estranged .  PEC placed for delusional behavior. Work-up significant for H/H 6.2/20 (baseline 9/30 through care everywhere). Rectal exam performed without evidence of blood or melena. FOBT positive.   alert, delusional. Refusing to give  personal belongings, and agitation with staff.  4 point restraints were applied. repeat CBC at 6.6 . transfusion with 1 unit of PRBC. GI recommends EGD and colonoscopy for further evaluation of iron deficiency anemia patient adamantly refuses exam and EGD/ colonoscopy  7/13 agitated overnight requesting cell phone.  Patient was placed 4 point  restraints hemoglobin at 8 3 status post 2 units of pack RBC transfusion . agrees for EGD/ colonoscopy.Patient off restraints.Continue home Abilify 10 mg and Remeron 15 mg nightly. Increased Zyprexa from 5 mg to 10 mg q8h IM PRN for agitation.daily EKG to monitor QTc- 447ms   7/14 Hb 8.1 --> 7.6. Requesting  her purse and  belongings she needs to get to her (Geovany Ring).  Clear liquids today and NPO from midnight EGD/colonoscopy in a.m. complains of abdominal, takes oxycodone 30 mg Q 6 hourly as per chart review (reviewed  last filled on 06/23).  Norco discontinued and started oxycodone 20 mg Q 6 hourly p.r.n.  7/15 refused to drink GoLYTELY overnight.  Hemoglobin stable at 7.8 leukocytosis of 15 but afebrile.  Transaminitis AST//112 with normal bilirubin and mildly  elevated alk-phos at 337.  Significant left upper quadrant abdominal pain prior to endoscopy today.  Endoscopy canceled.  CT abdomen with IV and oral contrast ordered.  General surgery consulted.  Started on Zosyn and vancomycin empirically.  Blood cultures x2 pending    Significant Labs:   A1C:   Recent Labs   Lab 07/11/20 1932   HGBA1C 6.3*     CBC:   No results for input(s): WBC, HGB, HCT, PLT in the last 48 hours.  CMP:   No results for input(s): NA, K, CL, CO2, GLU, BUN, CREATININE, CALCIUM, PROT, ALBUMIN, BILITOT, ALKPHOS, AST, ALT, ANIONGAP, EGFRNONAA in the last 48 hours.    Invalid input(s): ESTGFAFRICA  Magnesium:   No results for input(s): MG in the last 48 hours.  POCT Glucose:   Recent Labs   Lab 07/21/20  1135 07/21/20  1630 07/21/20  2100   POCTGLUCOSE 271* 291* 394*     TSH:   Recent Labs   Lab 07/11/20 1932   TSH 3.160       Significant Imaging: I have reviewed and interpreted all pertinent imaging results/findings within the past 24 hours.    Assessment/Plan:      Active Diagnoses:    Diagnosis Date Noted POA    PRINCIPAL PROBLEM:  Psychological factors affecting medical condition [F54]  Yes    Noncompliance with treatment [Z91.19] 07/20/2020 Not Applicable    Bipolar 1 disorder, mixed, moderate [F31.62]  Yes    Anemia [D64.9] 07/11/2020 Yes    Hypokalemia [E87.6] 07/11/2020 Yes    Acute deep vein thrombosis (DVT) of popliteal vein of left lower extremity [I82.432] 05/29/2020 Yes    Delusional disorder [F22] 05/28/2020 Yes    Bipolar affective disorder, current episode manic [F31.10] 09/09/2019 Yes    Malignant gastrointestinal stromal tumor (GIST) of stomach [C49.A2] 09/09/2019 Yes    Type 2 diabetes mellitus without complication, without long-term current use of insulin [E11.9] 09/09/2019 Yes      Problems Resolved During this Admission:     Scheduled Meds:   ARIPiprazole lauroxil  441 mg Intramuscular Q30 Days    ARIPiprazole  30 mg Oral Daily    ciprofloxacin HCl  500 mg Oral  Q12H    cyanocobalamin  1,000 mcg Subcutaneous Q7 Days    Followed by    [START ON 8/16/2020] cyanocobalamin  1,000 mcg Subcutaneous Q30 Days    lidocaine  1 patch Transdermal Q24H    metroNIDAZOLE  500 mg Oral Q8H    mirtazapine  15 mg Oral QHS    pantoprazole  40 mg Oral BID    potassium chloride  30 mEq Oral Once    potassium chloride  40 mEq Oral Once    potassium chloride  40 mEq Oral Once     Continuous Infusions:  PRN Meds:.sodium chloride, sodium chloride, acetaminophen, dextrose 50%, dextrose 50%, haloperidol lactate **AND** diphenhydrAMINE **AND** lorazepam, haloperidol lactate **AND** diphenhydrAMINE **AND** lorazepam, glucagon (human recombinant), glucose, glucose, HYDROmorphone, HYDROmorphone, insulin aspart U-100, iohexol, melatonin, metoclopramide HCl, naloxone, OLANZapine, ondansetron, promethazine, senna-docusate 8.6-50 mg, sodium chloride 0.9%, sodium chloride 0.9%    PLAN:    Delusional disorder   Bipolar disorder  - presenting to the ED via [facility] with OPC stating patient was at her estranged 's house  threatening she would kill the estranged .  PEC placed for delusional behavior. Work-up significant for H/H 6.2/20 (baseline 9/30 through care everywhere). Rectal exam performed without evidence of blood or melena. FOBT positive.   alert, delusional.  - restraints in place due to severe agitation   - psychiatry consulted. apprec recs  --  Continue Abilify 30 mg daily  -- Forced med protocol with Haldol, Ativan and Benadryl as described in PRN below)  -- Continue home Remeron 15 mg nightly  - Initiate Aristada 441 mg IM injection on 7/21  -- First try: Haldol 5 mg PO, Benadryl 50 mg PO, Ativan 2 mg PO PRN for non-redirectable agitation  -- If refuses: Haldol 2 mg IV, Benadryl 50 mg IV, Ativan 2 mg IV PRN for non-redirectable agitation  -- Please obtain daily EKG to monitor QTc  -- Continue CEC (expires 7/26 @ 8:32 pm) as patient is in imminent danger of hurting self or others and  is gravely disabled due to a psychiatric illness.    Symptomatic anemia   GIB   - GIB Pathway initiated  Likely bleeding from GIST  Hgb 6.2 on admit, down from 9.7 beginning of June  - H/H stable on 7/16  Check iron studies and hemolysis labs  GI consulted. apprec recs  -- Discussed with primary team that EGD in this patient would be high risk given worsening/progression of her cancer and that given the likely lower GI pathology that is improving the risks outweigh the benefits  -- continue PO PPI  -- trend Hgb, transfuse Hgb <7, Plt<50  -- would recommend heme/onc consult for evaluation/treatment of progressing tumor  -- if it is determined that tissue is needed, AES consult would be appropriate at that time.  - s/p 1 unit pRBC on 7/17  - Hb 6.7. ordered one unit pRBC.   - 7/12: haptoglobin normal and reticulocyte count elevated. Gastroenterology consulted  repeat CBC at 6.6 . transfusion with 1 unit of PRBC. GI recommends EGD and colonoscopy for further evaluation of iron deficiency anemia patient adamantly refuses exam and EGD/ colonoscopy  7/13  hemoglobin at 8 3 status post 2 units of pack RBC transfusion.agrees for EGD/ colonoscopy.  7/14 Hb 8.1 --> 7.6.Clear liquids today and NPO from midnight EGD/colonoscopy in a.m.  7/15 refused to drink GoLYTELY overnight.     Malignant gastrointestinal stromal tumor (GIST) of stomach   - Follows with Hem[facility]c at [facility]  - On Sunitinib outpatient  - CT A/P showed increased size of mass  - Heme/onc consulted. apprec recs   -- poor prognosis d/t mental health issue  -- will need to f/u with primary oncologist at discharge to obtain sunitinib   -- /social work consults for possible placement  -- hospice reasonable if patient has to be d/c to previous living situation    Leukocytosis  - WBC: 11 --> 15 --> 18 --> 17  - afebrile since 7/15   - denies cough or SOB  - Initially on empiric IV abx but switched to po cipro/flagyl due to patient refusing IV access   - plan to  discontinue abx is remains afebrile for 24 hours  - bcx no growth to date  - U/A negative for UTI  - CBC daily     Transaminitis   - 7/15:  AST//112 with normal bilirubin and mildly elevated alk-phos at 337. Consider right upper quadrant sonogram if not trending down        Acute deep vein thrombosis (DVT) of popliteal vein of left lower extremity   - S/p IVC filter on 6/2020    Hypokalemia- resolved   - K 2.9-->3  - replete prn     B12 deficiency  - levels of 192 started on vitamin B12 subcutaneous dissection     Type 2 diabetes mellitus without complication, without long-term current use of insulin  - Patient's FSGs are controlled on current hypoglycemics.  - Home DM regimen:  Metformin  - SSI with POCT accuchecks and Diabetic diet      VTE Risk Mitigation (From admission, onward)         Ordered     IP VTE HIGH RISK PATIENT  Once      07/11/20 2130     Place sequential compression device  Until discontinued      07/11/20 2130              Time spent in care of patient: > 35 minutes     Kahlil Banegas MD  Department of Hospital Medicine   [facility]

## 2020-07-22 NOTE — ASSESSMENT & PLAN NOTE
ASSESSMENT     Violeta Boudreaux is a 54 y.o. female with a past psychiatric history of BPAD and delusional disorder, who presented to the [facility] due to OPC from estranged  for threatening to kill him. Admitted to [facility] for symptomatic anemia. Per FACT team, patient non-compliant with medications, did not receive most recent scheduled Aristada BAUM. Received Aristada 441 mg BAUM on 7/22.    IMPRESSION  Bipolar affective disorder, current episode manic  Psychological factors affecting medical care  Cluster B personality d/o  Delusional disorder  Medication non-adherence    RECOMMENDATION(S)      1. Scheduled Medication(s):  - Continue Abilify from 30 mg daily   - Forced med protocol with Haldol, Ativan and Benadryl as described in PRN below)  - Continue home Remeron 15 mg nightly  - Aristada 441 mg IM injection once given 7/22, to be given [id number]  - Start Atarax 50 mg nightly for insomnia    2. PRN Medication(s):  - First try: Haldol 5 mg PO, Benadryl 50 mg PO, Ativan 2 mg PO PRN for non-redirectable agitation  - If refuses: Haldol 2 mg IV, Benadryl 50 mg IV, Ativan 2 mg IV PRN for non-redirectable agitation    3.  Monitor:  - Please obtain daily EKG to monitor QTc    4. Legal Status/Precaution(s):  - Continue CEC (expires 7/26 @ 8:32 pm) as patient is in imminent danger of hurting self or others and is gravely disabled due to a psychiatric illness.  - Maintain 1:1 sitter  - Contact next of kin to help facilitate patient care and decision making, especially with regards to disposition  - Consult palliative care to discuss options, code status, and disposition

## 2020-07-22 NOTE — PLAN OF CARE
Problem: Adult Inpatient Plan of Care  Goal: Plan of Care Review  Outcome: Ongoing, Progressing     Problem: Diabetes Comorbidity  Goal: Blood Glucose Level Within Desired Range  Outcome: Ongoing, Progressing     Pt in bed sitter at the bedside pt noncompliant with care refuses most plan of care c/o NV prn medication given. Safety maintained during shift.

## 2020-07-22 NOTE — PROGRESS NOTES
[facility] Hospital Medicine  Progress Note    Patient Name: Violeta Boudreaux  MRN: [id number]  Patient Class: IP- Inpatient   Admission Date: 7/11/2020  Length of Stay: 5 days  Attending Physician: Kahlil Banegas MD  Primary Care Provider: Otf Brownlee MD    [facility] Medicine Team: [facility] [place] Kahlil Banegas MD    HPI:    Ms. Violeta Boudreaux is a 54 y.o. female with Bipolar Disorder, delusional disorder, GIST on Sunitinib, and recent LLE DVT s/p IVC filter (June 2020) who presents to the ER by [facility] for delusions.  Her estranged  reports that she showed up at his house, claiming that she was  to Geovany Ring from [employer] and he was being hid inside the estranged 's house. He reports that she is homeless, and hasn't been taking her psych medications.  She denies any complaints at this time.  Labs on arrival showed a Hemoglobin 6.2 down from 9.7 in June 2020.  She endorses midepigastric abdominal pain.  She denies any blood in her stools, dark stools, or vaginal bleeding.  She mentions that a few weeks ago, she was having bad headaches and took a lot of Advil, then causing her to have hematemesis.  She denies further bleeding or use of Aspirin or NSAIDS.  Of note, at the end of May, she was hospitalized at [facility] for delusional disorder.  At that time, she was found to have a Hemoglobin 6.4.  She was given blood and her hemolgobin improved to 9.7.  It was thought that her bleeding with 2/2 her GIST tumor. She was not evaluated by GI.  Also during that admission, she was found to have a LLE DVT.  HemeOnc suggested IVC filter, given her lack of consistency with medications.  She was [facility] to the [facility].  She was discharged on Abilify 10mg and Mirtazapine 15mg qHS.       In the ER, she was PECed for grave disability.  SUMIT was positive.  She was transfused 1 unit PRBCs.  She mentions that Bert Gorman will pay for every drop of blood we take from her, and that she wants to  return to her house in [place] when discharged.  She was admitted to Hospital Medicine for GI and Psych evaluations.    Subjective:     Principal Problem:Psychological factors affecting medical condition    Interval History: Patient lying in bed, no acute distress. Continues to note abdominal mass due to her GIST tumor. Requesting her belongings and requesting to leave the hospital. Continues to refuse blood draws.     Review of Systems   Constitutional: Negative for chills and fever.   Eyes: Negative for visual disturbance.   Respiratory: Negative for cough and shortness of breath.    Gastrointestinal: Negative for abdominal distention, abdominal pain, nausea and vomiting.   Genitourinary: Negative for dysuria.   Neurological: Negative for weakness.   Psychiatric/Behavioral: Negative for suicidal ideas.        Objective:     Vital Signs (Most Recent):  Temp: 97.5 °F (36.4 °C) (07/22/20 0811)  Pulse: 90 (07/22/20 0811)  Resp: 16 (07/22/20 0817)  BP: (!) 109/52 (07/22/20 0811)  SpO2: 100 % (07/22/20 0811) Vital Signs (24h Range):  Temp:  [97 °F (36.1 °C)-98.7 °F (37.1 °C)] 97.5 °F (36.4 °C)  Pulse:  [70-90] 90  Resp:  [14-19] 16  SpO2:  [95 %-100 %] 100 %  BP: ()/(49-57) 109/52     Weight: 68 kg (150 lb)  Body mass index is 23.49 kg/m².    Intake/Output Summary (Last 24 hours) at 7/22/2020 1028  Last data filed at 7/21/2020 1800  Gross per 24 hour   Intake 550 ml   Output --   Net 550 ml        Physical Exam  HENT:      Head: Normocephalic.   Eyes:      Pupils: Pupils are equal, round, and reactive to light.   Neck:      Musculoskeletal: Normal range of motion.   Cardiovascular:      Rate and Rhythm: Normal rate and regular rhythm.      Pulses: Normal pulses.      Heart sounds: Normal heart sounds.   Pulmonary:      Effort: Pulmonary effort is normal.      Breath sounds: Normal breath sounds.   Abdominal:      General: Bowel sounds are normal. There is no distension.      Palpations: Abdomen is soft.       Tenderness: There is no abdominal tenderness.   Musculoskeletal: Normal range of motion.   Skin:     General: Skin is warm.   Neurological:      General: No focal deficit present.      Mental Status: She is alert.   Psychiatric:         Mood and Affect: Affect is labile.         Thought Content: Thought content is delusional.         Judgment: Judgment is inappropriate.           Overview/Hospital Course:     7/12   presenting to the ED via [facility] with OPC stating patient was at her estranged 's house  threatening she would kill the estranged .  PEC placed for delusional behavior. Work-up significant for H/H 6.2/20 (baseline 9/30 through care everywhere). Rectal exam performed without evidence of blood or melena. FOBT positive.   alert, delusional. Refusing to give  personal belongings, and agitation with staff.  4 point restraints were applied. repeat CBC at 6.6 . transfusion with 1 unit of PRBC. GI recommends EGD and colonoscopy for further evaluation of iron deficiency anemia patient adamantly refuses exam and EGD/ colonoscopy  7/13 agitated overnight requesting cell phone.  Patient was placed 4 point  restraints hemoglobin at 8 3 status post 2 units of pack RBC transfusion . agrees for EGD/ colonoscopy.Patient off restraints.Continue home Abilify 10 mg and Remeron 15 mg nightly. Increased Zyprexa from 5 mg to 10 mg q8h IM PRN for agitation.daily EKG to monitor QTc- 447ms   7/14 Hb 8.1 --> 7.6. Requesting  her purse and  belongings she needs to get to her (Geovany Ring).  Clear liquids today and NPO from midnight EGD/colonoscopy in a.m. complains of abdominal, takes oxycodone 30 mg Q 6 hourly as per chart review (reviewed  last filled on 06/23).  Norco discontinued and started oxycodone 20 mg Q 6 hourly p.r.n.  7/15 refused to drink GoLYTELY overnight.  Hemoglobin stable at 7.8 leukocytosis of 15 but afebrile.  Transaminitis AST//112 with normal bilirubin and mildly elevated alk-phos at  337.  Significant left upper quadrant abdominal pain prior to endoscopy today.  Endoscopy canceled.  CT abdomen with IV and oral contrast ordered.  General surgery consulted.  Started on Zosyn and vancomycin empirically.  Blood cultures x2 pending    Significant Labs:   A1C:   Recent Labs   Lab 07/11/20 1932   HGBA1C 6.3*     CBC:   No results for input(s): WBC, HGB, HCT, PLT in the last 48 hours.  CMP:   No results for input(s): NA, K, CL, CO2, GLU, BUN, CREATININE, CALCIUM, PROT, ALBUMIN, BILITOT, ALKPHOS, AST, ALT, ANIONGAP, EGFRNONAA in the last 48 hours.    Invalid input(s): ESTGFAFRICA  Magnesium:   No results for input(s): MG in the last 48 hours.  POCT Glucose:   Recent Labs   Lab 07/21/20  1630 07/21/20  2100 07/22/20  0825   POCTGLUCOSE 291* 394* 328*     TSH:   Recent Labs   Lab 07/11/20 1932   TSH 3.160       Significant Imaging: I have reviewed and interpreted all pertinent imaging results/findings within the past 24 hours.    Assessment/Plan:      Active Diagnoses:    Diagnosis Date Noted POA    PRINCIPAL PROBLEM:  Psychological factors affecting medical condition [F54]  Yes    Noncompliance with treatment [Z91.19] 07/20/2020 Not Applicable    Bipolar 1 disorder, mixed, moderate [F31.62]  Yes    Anemia [D64.9] 07/11/2020 Yes    Hypokalemia [E87.6] 07/11/2020 Yes    Acute deep vein thrombosis (DVT) of popliteal vein of left lower extremity [I82.432] 05/29/2020 Yes    Delusional disorder [F22] 05/28/2020 Yes    Bipolar affective disorder, current episode manic [F31.10] 09/09/2019 Yes    Malignant gastrointestinal stromal tumor (GIST) of stomach [C49.A2] 09/09/2019 Yes    Type 2 diabetes mellitus without complication, without long-term current use of insulin [E11.9] 09/09/2019 Yes      Problems Resolved During this Admission:     Scheduled Meds:   ARIPiprazole lauroxil  441 mg Intramuscular Q30 Days    ARIPiprazole  30 mg Oral Daily    ciprofloxacin HCl  500 mg Oral Q12H    cyanocobalamin   1,000 mcg Subcutaneous Q7 Days    Followed by    [START ON 8/16/2020] cyanocobalamin  1,000 mcg Subcutaneous Q30 Days    lidocaine  1 patch Transdermal Q24H    metroNIDAZOLE  500 mg Oral Q8H    mirtazapine  15 mg Oral QHS    pantoprazole  40 mg Oral BID    potassium chloride  30 mEq Oral Once    potassium chloride  40 mEq Oral Once    potassium chloride  40 mEq Oral Once     Continuous Infusions:  PRN Meds:.sodium chloride, sodium chloride, acetaminophen, dextrose 50%, dextrose 50%, haloperidol lactate **AND** diphenhydrAMINE **AND** lorazepam, haloperidol lactate **AND** diphenhydrAMINE **AND** lorazepam, glucagon (human recombinant), glucose, glucose, HYDROmorphone, HYDROmorphone, insulin aspart U-100, iohexol, melatonin, metoclopramide HCl, naloxone, OLANZapine, ondansetron, promethazine, senna-docusate 8.6-50 mg, sodium chloride 0.9%, sodium chloride 0.9%    PLAN:    Delusional disorder   Bipolar disorder  - presenting to the ED via [facility] with OPC stating patient was at her estranged 's house  threatening she would kill the estranged .  PEC placed for delusional behavior. Work-up significant for H/H 6.2/20 (baseline 9/30 through care everywhere). Rectal exam performed without evidence of blood or melena. FOBT positive.   alert, delusional.  - restraints in place due to severe agitation   - psychiatry consulted. apprec recs  --  Continue Abilify 30 mg daily  -- Forced med protocol with Haldol, Ativan and Benadryl as described in PRN below)  -- Continue home Remeron 15 mg nightly  -- Aristada 441 mg IM injection once given 7/22, to be given [id number]  -- Start Atarax 50 mg nightly for insomnia  -- First try: Haldol 5 mg PO, Benadryl 50 mg PO, Ativan 2 mg PO PRN for non-redirectable agitation  -- If refuses: Haldol 2 mg IV, Benadryl 50 mg IV, Ativan 2 mg IV PRN for non-redirectable agitation  -- Please obtain daily EKG to monitor QTc  - patient has very limited insight into her situation and  remains gravely disabled as evidenced by her refusal of taking medications and vitals, ekg done.    -- Pt is however more calm and cooperative less irritable.   -- In lieu of pts CEC expiring will pursue DELORES filing as pt remains delusional and uncooperative with recommended treatment plans.     Symptomatic anemia   GIB- resolved    - GIB Pathway initiated  Likely bleeding from GIST  Hgb 6.2 on admit, down from 9.7 beginning of June  - H/H stable on 7/16  Check iron studies and hemolysis labs  GI consulted. apprec recs  -- Discussed with primary team that EGD in this patient would be high risk given worsening/progression of her cancer and that given the likely lower GI pathology that is improving the risks outweigh the benefits  -- continue PO PPI  -- trend Hgb, transfuse Hgb <7, Plt<50  -- would recommend heme/onc consult for evaluation/treatment of progressing tumor  -- if it is determined that tissue is needed, AES consult would be appropriate at that time.  - s/p 1 unit pRBC on 7/17. Patient denies blood in stool or melena   - 7/12: haptoglobin normal and reticulocyte count elevated. Gastroenterology consulted  repeat CBC at 6.6 . transfusion with 1 unit of PRBC. GI recommends EGD and colonoscopy for further evaluation of iron deficiency anemia patient adamantly refuses exam and EGD/ colonoscopy  7/13  hemoglobin at 8 3 status post 2 units of pack RBC transfusion.agrees for EGD/ colonoscopy.  7/14 Hb 8.1 --> 7.6.Clear liquids today and NPO from midnight EGD/colonoscopy in a.m.  7/15 refused to drink GoLYTELY overnight.     Malignant gastrointestinal stromal tumor (GIST) of stomach   - Follows with HemeOnc at [facility]  - On Sunitinib outpatient  - CT A/P showed increased size of mass  - Heme/onc consulted. apprec recs   -- poor prognosis d/t mental health issue  -- will need to f/u with primary oncologist at discharge to obtain sunitinib   -- /social work consults for possible placement  -- hospice  reasonable if patient has to be d/c to previous living situation    Leukocytosis  - WBC: 11 --> 15 --> 18 --> 17  - afebrile since 7/15   - denies cough or SOB  - Initially on empiric IV abx but switched to po cipro/flagyl due to patient refusing IV access   - plan to discontinue abx is remains afebrile for 24 hours  - bcx no growth to date  - U/A negative for UTI  - CBC daily     Transaminitis   - 7/15:  AST//112 with normal bilirubin and mildly elevated alk-phos at 337. Consider right upper quadrant sonogram if not trending down        Acute deep vein thrombosis (DVT) of popliteal vein of left lower extremity   - S/p IVC filter on 6/2020    Hypokalemia- resolved   - K 2.9-->3  - replete prn     B12 deficiency  - levels of 192 started on vitamin B12 subcutaneous dissection     Type 2 diabetes mellitus without complication, without long-term current use of insulin  - Patient's FSGs are controlled on current hypoglycemics.  - Home DM regimen:  Metformin  - SSI with POCT accuchecks and Diabetic diet    Disposition   Palliative care [place]  - Pallative care consulted.   - Discussing with palliative care and psychiatry to best discharge location for the patient   - psychiatry pursuing DELORES filing       VTE Risk Mitigation (From admission, onward)         Ordered     IP VTE HIGH RISK PATIENT  Once      07/11/20 2130     Place sequential compression device  Until discontinued      07/11/20 2130              Time spent in care of patient: > 35 minutes     Kahlil Banegas MD  Department of Hospital Medicine   [facility]

## 2020-07-23 PROBLEM — Z51.5 PALLIATIVE CARE ENCOUNTER: Status: ACTIVE | Noted: 2020-01-01

## 2020-07-23 NOTE — PLAN OF CARE
"0910  CM was informed by Dr. Chi that the patient needs judicial commitment. Per Dr. Banegas's note 7/22/2020, "In lieu of pts CEC expiring will pursue DELORES filing as pt remains delusional and uncooperative with recommended treatment plans". Per Dr. Lopez (psych) legal has been consulted & will file for DELORES before the CEC expires. Paperwork was completed by Dr. Lopez.     1020  CM informed the patient's estranged spouse, Gregorio Boudreaux ([phone]), of above. Gregorio stated, "I don't want to have anything to do with her & have a restraining order against her". CM informed Dr. Lopez of above.     CM informed the patient's brother, Ridge Cheney ([phone]) that the patient's MDs are trying to contact family/friends regarding pt. Ridge stated that he is agreeable & can be reached by phone. CM informed Dr. Lopez of above.     1521  CM received a call from the patient's estranged spouse, Gregorio Boudreaux ([phone]), stating that he regrets what he said earlier & does want to be involved in the patient's care. Gregorio stated that he is her  & is the only person to make decisions for the patient.  CM informed Dr. Chi & Dr. Lopez of above.     Will continue to follow.     "

## 2020-07-23 NOTE — ASSESSMENT & PLAN NOTE
ASSESSMENT     Violeta Boudreaux is a 54 y.o. female with a past psychiatric history of BPAD and delusional disorder, who [place] [facility] [place] OPC from estranged  for threatening to kill him. Ad[place] [facility] [place] symptomatic anemia. Per FACT team, patient non-compliant with medications, did not receive most recent scheduled Aristada BAUM. Received Aristada 441 mg BAUM on 7/22.    IMPRESSION  Bipolar affective disorder, current episode manic  Psychological factors affecting medical care  Cluster B personality d/o  Delusional disorder  Medication non-adherence    RECOMMENDATION(S)      1. Scheduled Medication(s):  - Continue Abilify from 30 mg daily   - Forced med protocol with Haldol, Ativan and Benadryl as described in PRN below  - Continue home Remeron 15 mg nightly  - Aristada 441 mg IM injection [id number], last given 7/22  - Continue Atarax 50 mg nightly for insomnia  - Optimize pain management regimen to ensure adequate pain control and encourage medication compliance    2. PRN Medication(s):  - First try: Haldol 5 mg PO, Benadryl 50 mg PO, Ativan 2 mg PO PRN for non-redirectable agitation  - If refuses: Haldol 2 mg IV, Benadryl 50 mg IV, Ativan 2 mg IV PRN for non-redirectable agitation    3.  Monitor:  - Please obtain daily EKG to monitor QTc    4. Legal Status/Precaution(s):  - Continue CEC (expires 7/26 @ 8:32 pm) as patient is in imminent danger of hurting self or others and is gravely disabled due to a psychiatric illness.  - Maintain 1:1 sitter  - Contact next of kin to help facilitate patient care and decision making, especially with regards to disposition  - Consult palliative care to discuss options, code status, and disposition
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
## 2020-07-23 NOTE — SUBJECTIVE & OBJECTIVE
Interval History: Pt on CEC    History reviewed. No pertinent past medical history.    Past Surgical History:   Procedure Laterality Date    CHOLECYSTECTOMY         Review of patient's allergies indicates:   Allergen Reactions    Toradol [ketorolac] Hives       Medications:  Continuous Infusions:  Scheduled Meds:   ARIPiprazole lauroxil  441 mg Intramuscular Q30 Days    ARIPiprazole  30 mg Oral Daily    ciprofloxacin HCl  500 mg Oral Q12H    cyanocobalamin  1,000 mcg Subcutaneous Q7 Days    Followed by    [START ON 8/16/2020] cyanocobalamin  1,000 mcg Subcutaneous Q30 Days    hydrOXYzine HCL  50 mg Oral QHS    lidocaine  1 patch Transdermal Q24H    metroNIDAZOLE  500 mg Oral Q8H    mirtazapine  15 mg Oral QHS    pantoprazole  40 mg Oral BID    potassium chloride  30 mEq Oral Once    potassium chloride  40 mEq Oral Once    potassium chloride  40 mEq Oral Once     PRN Meds:sodium chloride, sodium chloride, acetaminophen, dextrose 50%, dextrose 50%, haloperidol lactate **AND** diphenhydrAMINE **AND** lorazepam, haloperidol lactate **AND** diphenhydrAMINE **AND** lorazepam, glucagon (human recombinant), glucose, glucose, HYDROmorphone, HYDROmorphone, insulin aspart U-100, iohexol, melatonin, metoclopramide HCl, naloxone, OLANZapine, ondansetron, promethazine, senna-docusate 8.6-50 mg, sodium chloride 0.9%, sodium chloride 0.9%    Family History     None        Tobacco Use    Smoking status: Current Every Day Smoker     Packs/day: 10.00     Types: Cigarettes    Smokeless tobacco: Never Used   Substance and Sexual Activity    Alcohol use: No    Drug use: No    Sexual activity: Not on file       Review of Systems   Constitutional: Negative.    HENT: Negative.    Eyes: Negative.    Respiratory: Negative.    Cardiovascular: Negative.    Gastrointestinal: Positive for abdominal distention, abdominal pain and nausea.   Genitourinary: Negative.    Musculoskeletal: Negative.    Skin: Negative.     Neurological: Negative.    Psychiatric/Behavioral: Negative for agitation. The patient is not nervous/anxious and is not hyperactive.      Objective:     Vital Signs (Most Recent):  Temp: 98.6 °F (37 °C) (07/23/20 0808)  Pulse: 81 (07/23/20 0808)  Resp: 14 (07/23/20 0808)  BP: (!) 101/52 (07/23/20 0808)  SpO2: 99 % (07/23/20 0808) Vital Signs (24h Range):  Temp:  [97.6 °F (36.4 °C)-98.6 °F (37 °C)] 98.6 °F (37 °C)  Pulse:  [81-93] 81  Resp:  [14-18] 14  SpO2:  [97 %-100 %] 99 %  BP: (101-112)/(51-60) 101/52     Weight: 70.1 kg (154 lb 8.7 oz)  Body mass index is 24.2 kg/m².    Physical Exam  HENT:      Head: Normocephalic and atraumatic.      Nose: Nose normal.   Eyes:      General: Lids are normal.   Neck:      Musculoskeletal: Full passive range of motion without pain and normal range of motion.   Cardiovascular:      Rate and Rhythm: Normal rate and regular rhythm.   Pulmonary:      Effort: Pulmonary effort is normal.   Abdominal:      Palpations: Abdomen is soft.      Tenderness: There is abdominal tenderness.      Comments: Scar midline    Musculoskeletal: Normal range of motion.   Skin:     General: Skin is warm and dry.   Neurological:      Mental Status: She is alert.      Comments: Oriented to person, place, and situation somewhat   Psychiatric:         Speech: Speech normal.         Behavior: Behavior is not agitated.         Thought Content: Thought content is delusional.      Comments: Pt on CEC.  Believes she is  to NAMAN Armenta and lives with body guard in her apartment.          Advance Care Planning   Review of Symptoms    Symptom Assessment (ESAS 0-10 Scale)  Pain:  3  Dyspnea:  0  Agitation:  0         Comments:  Pt reports ABD pain that is is described as throbbing. Relieved with pain meds.           ECOG Performance Status Grade:  3 - Confined to bed or chair 50% of waking hours    Advanced Directives:  Living Will: No    LaPOST: No    Do Not Resuscitate Status: No    Medical Power  of : No      Decision Making:  Patient answered questions and Family answered questions         Significant Labs: All pertinent labs within the past 24 hours have been reviewed.  CBC:   Recent Labs   Lab 07/23/20  0950   WBC 12.90*   HGB 6.0*   HCT 19.4*   MCV 98   *     BMP:  Recent Labs   Lab 07/23/20  0950   *   *   K 4.2      CO2 29   BUN 8   CREATININE 0.7   CALCIUM 8.1*     LFT:  Lab Results   Component Value Date    AST 12 07/23/2020    ALKPHOS 105 07/23/2020    BILITOT 0.4 07/23/2020     Albumin:   Albumin   Date Value Ref Range Status   07/23/2020 2.0 (L) 3.5 - 5.2 g/dL Final     Protein:   Total Protein   Date Value Ref Range Status   07/23/2020 5.5 (L) 6.0 - 8.4 g/dL Final     Lactic acid:   No results found for: LACTATE    Significant Imaging: I have reviewed all pertinent imaging results/findings within the past 24 hours.
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
## 2020-07-23 NOTE — PROGRESS NOTES
Progress Note  Hospital Medicine    Admit Date: 7/11/2020  Length of Stay:  LOS: 6 days     SUBJECTIVE:         Follow-up For:  Psychological factors affecting medical condition    HPI/Interval history (See H&P for complete P,F,SHx) :     Ms. Violeta Boudreaux is a 54 y.o. female with Bipolar Disorder, delusional disorder, GIST on Sunitinib, and recent LLE DVT s/p IVC filter (June 2020) who presents to the ER by [facility] for delusions.  Her estranged  reports that she showed up at his house, claiming that she was  to Geovany Ring from [employer] and he was being hid inside the estranged 's house. He reports that she is homeless, and hasn't been taking her psych medications.  She denies any complaints at this time.  Labs on arrival showed a Hemoglobin 6.2 down from 9.7 in June 2020.  She endorses midepigastric abdominal abdomina.  She denies any blood in her stools, dark stools, or vaginal bleeding.  She mentions that a few weeks ago, she was having bad headaches and took a lot of Advil, then causing her to have hematemesis.  She denies further bleeding or use of Aspirin or NSAIDS.  Of note, at the end of May, she was hospitalized at [facility] for delusional disorder.  At that time, she was found to have a Hemoglobin 6.4.  She was given blood and her hemolgobin improved to 9.7.  It was thought that her bleeding with 2/2 her GIST tumor.  She was not evaluated by GI.  Also during that admission, she was found to have a LLE DVT.  HemeOnc suggested IVC filter, given her lack of consistency with medications.  She was [facility] to the [facility].  She was discharged on Abilify 10mg and Mirtazapine 15mg qHS.       In the ER, she was PECed for grave disability.  SUMIT was positive.  She was transfused 1 unit PRBCs.  She mentions that Bert Gorman will pay for every drop of blood we take from her, and that she wants to return to her house in [place] when discharged.  She was admitted to Hospital Medicine for GI and Psych  evaluations.        Interval history  7/12   presenting to the ED via [facility] with [facility] stating patient was at her estranged 's house  threatening she would kill the estranged .  PEC placed for delusional behavior. Work-up significant for H/H 6.2/20 (baseline 9/30 through care everywhere). Rectal exam performed without evidence of blood or melena. FOBT positive.   alert, delusional. Refusing to give  personal belongings, and agitation with staff.  4 point restraints were applied. repeat CBC at 6.6 . transfusion with 1 unit of PRBC. GI recommends EGD and colonoscopy for further evaluation of iron deficiency anemia patient adamantly refuses exam and EGD/ colonoscopy  7/13 agitated overnight requesting cell phone.  Patient was placed 4 point  restraints hemoglobin at 8 3 status post 2 units of pack RBC transfusion . agrees for EGD/ colonoscopy.Patient off restraints.Continue home Abilify 10 mg and Remeron 15 mg nightly. Increased Zyprexa from 5 mg to 10 mg q8h IM PRN for agitation.daily EKG to monitor QTc- 447ms   7/14 Hb 8.1 --> 7.6. Requesting  her purse and  belongings she needs to get to her (Geovany Ring).  Clear liquids today and NPO from midnight EGD/colonoscopy in a.m. complains of abdominal, takes oxycodone 30 mg Q 6 hourly as per chart review (reviewed  last filled on 06/23).  Norco discontinued and started oxycodone 20 mg Q 6 hourly p.r.n.  7/15 refused to drink GoLYTELY overnight.  Hemoglobin stable at 7.8 leukocytosis of 15 but afebrile.  Transaminitis AST//112 with normal bilirubin and mildly elevated alk-phos at 337.  Significant left upper quadrant abdominal pain prior to endoscopy today.  Endoscopy canceled.  CT abdomen with IV and oral contrast ordered.  General surgery consulted.  Started on Zosyn and vancomycin empirically.  Blood cultures x2 with no growth  7/22  Continued on ciprofloxacin and metronidazole since 07/16. refusal of taking medications and vitals, .As CEC  expiring 7/26 , plan to pursue DELORES filing as pt remains delusional and uncooperative and needs psychiatric hospital placement. Forced med protocol with Haldol, Ativan and Benadry. Aristada 441 mg IM injection once given 7/22, to be given [id number]. Started Atarax 50 mg nightly for insomnia  7/23 agreed for blood draw today hemoglobin repeated at 6 transfusion with 1 unit of pack RBC    Review of Systems:   Pain scale:  abdominal pain 10/10   Constitutional: neg for fever or chills     Respiratory: neg for cough or shortness of breath  Cardiovascular: neg for chest pain or palpitations  Gastrointestinal: neg for nausea or vomiting, positive  for abdominal pain or change in bowel habits  Genitourinary: neg for hematuria or dysuria  Integument/Breast: neg for rash or pruritis  Hematologic/Lymphatic: neg for easy bruising or lymphadenopathy  Musculoskeletal: neg for arthralgias or myalgias  Neurological: neg for seizures or tremors  Behavioral/Psych: neg for depression or anxiety+ delusions    OBJECTIVE:     Vital Signs Range (Last 24H):  Temp:  [97.5 °F (36.4 °C)-98.5 °F (36.9 °C)]   Pulse:  [81-93]   Resp:  [16-18]   BP: (108-112)/(51-60)   SpO2:  [97 %-100 %]     Physical Exam:  Constitutional: Appears well-developed and well-nourished.   Head: Normocephalic and atraumatic. sitter at the bedside  Neck: Normal range of motion. Neck supple.   Cardiovascular: Normal heart rate.  Regular heart rhythm.  Pulmonary/Chest: Effort normal.   Abdominal: No distension.  tender epigastrium and left quadrant  Musculoskeletal: Normal range of motion. No edema.   Neurological: Alert and oriented to person, place, and time.   Skin: Skin is warm and dry.   Psychiatric: Normal mood and affect. delusional       Medications:  Medication list was reviewed and changes noted under Assessment/Plan.      Current Facility-Administered Medications:     0.9%  NaCl infusion (for blood administration), , Intravenous, Q24H PRN, Ghassan Roca,  PA-C    0.9%  NaCl infusion (for blood administration), , Intravenous, Q24H PRN, Mukund Chi MD    acetaminophen tablet 650 mg, 650 mg, Oral, Q4H PRN, Tracy Ospina MD, 650 mg at 07/15/20 2004    ARIPiprazole lauroxil 441 mg/1.6 mL injection 441 mg, 441 mg, Intramuscular, Q30 Days, Kahlil Banegas MD, 441 mg at 20 1341    ARIPiprazole tablet 30 mg, 30 mg, Oral, Daily, Shirley Lopez MD, 30 mg at 20 0810    ciprofloxacin HCl tablet 500 mg, 500 mg, Oral, Q12H, Kahlil Banegas MD, 500 mg at 20    [] cyanocobalamin injection 1,000 mcg, 1,000 mcg, Subcutaneous, Daily, 1,000 mcg at 20 0915 **FOLLOWED BY** cyanocobalamin injection 1,000 mcg, 1,000 mcg, Subcutaneous, Q7 Days **FOLLOWED BY** [START ON 2020] cyanocobalamin injection 1,000 mcg, 1,000 mcg, Subcutaneous, Q30 Days, Mukund Chi MD    dextrose 50% injection 12.5 g, 12.5 g, Intravenous, PRN, Tracy Ospina MD    dextrose 50% injection 25 g, 25 g, Intravenous, PRN, Tracy Ospina MD    haloperidol lactate injection 5 mg, 5 mg, Intravenous, Q6H PRN, 5 mg at 20 **AND** diphenhydrAMINE injection 50 mg, 50 mg, Intravenous, Q6H PRN, 50 mg at 20 **AND** lorazepam injection 2 mg, 2 mg, Intravenous, Q6H PRN, Shirley Lopez MD, 2 mg at 20    haloperidol lactate injection 5 mg, 5 mg, Intramuscular, Q6H PRN **AND** diphenhydrAMINE injection 50 mg, 50 mg, Intramuscular, Q6H PRN **AND** lorazepam injection 2 mg, 2 mg, Intramuscular, Q6H PRN, Shirley Lopez MD    glucagon (human recombinant) injection 1 mg, 1 mg, Intramuscular, PRN, Tracy Ospina MD    glucose chewable tablet 16 g, 16 g, Oral, PRN, Tracy Ospina MD    glucose chewable tablet 24 g, 24 g, Oral, PRN, Tracy Ospina MD    HYDROmorphone injection 0.2 mg, 0.2 mg, Intravenous, Q4H PRN, Kahlil Banegas MD    HYDROmorphone injection 0.2 mg, 0.2 mg, Intramuscular, Once, MD Arvin Martinrpfrederickne  injection 1 mg, 1 mg, Intravenous, Q4H PRN, Kahlil Banegas MD, 1 mg at 07/23/20 0039    hydrOXYzine tablet 50 mg, 50 mg, Oral, QHS, Kahlil Banegas MD, 50 mg at 07/22/20 2050    insulin aspart U-100 pen 1-10 Units, 1-10 Units, Subcutaneous, QID (AC + HS) PRN, Tracy Ospina MD, 8 Units at 07/22/20 1604    iohexol (OMNIPAQUE) oral solution 15 mL, 15 mL, Oral, PRN, Eusebia Almonte MD, 15 mL at 07/15/20 1715    lidocaine 5 % patch 1 patch, 1 patch, Transdermal, Q24H, Vick Monge PA-C, 1 patch at 07/15/20 2356    melatonin tablet 6 mg, 6 mg, Oral, Nightly PRN, Tracy Ospina MD, 6 mg at 07/14/20 2132    metoclopramide HCl injection 10 mg, 10 mg, Intravenous, Q6H PRN, Malcolm Smith MD    metroNIDAZOLE tablet 500 mg, 500 mg, Oral, Q8H, Kahlil Banegas MD, 500 mg at 07/22/20 2050    mirtazapine tablet 15 mg, 15 mg, Oral, QHS, Tracy Ospina MD, 15 mg at 07/22/20 2049    naloxone 0.4 mg/mL injection 0.4 mg, 0.4 mg, Intravenous, PRN, Mukund Chi MD    OLANZapine injection 10 mg, 10 mg, Intramuscular, Q8H PRN, Kahlil Banegas MD    ondansetron disintegrating tablet 4 mg, 4 mg, Oral, Q8H PRN, Yin Soto PA-C, 4 mg at 07/22/20 0156    pantoprazole EC tablet 40 mg, 40 mg, Oral, BID, Kahlil Banegas MD, 40 mg at 07/22/20 2050    potassium chloride CR capsule 30 mEq, 30 mEq, Oral, Once, Mukund Chi MD    potassium chloride SA CR tablet 40 mEq, 40 mEq, Oral, Once, Mukund Chi MD    potassium chloride SA CR tablet 40 mEq, 40 mEq, Oral, Once, Mukund Chi MD    promethazine tablet 12.5 mg, 12.5 mg, Oral, Q6H PRN, Yin Soto PA-C, 12.5 mg at 07/21/20 2056    senna-docusate 8.6-50 mg per tablet 1 tablet, 1 tablet, Oral, BID PRN, Tracy Ospina MD, 1 tablet at 07/14/20 0135    sodium chloride 0.9% flush 10 mL, 10 mL, Intravenous, PRN, Rosy Chavez MD    sodium chloride 0.9% flush 5 mL, 5 mL, Intravenous, PRN, Tracy Ospina MD    sodium  "chloride, sodium chloride, acetaminophen, dextrose 50%, dextrose 50%, haloperidol lactate **AND** diphenhydrAMINE **AND** lorazepam, haloperidol lactate **AND** diphenhydrAMINE **AND** lorazepam, glucagon (human recombinant), glucose, glucose, HYDROmorphone, HYDROmorphone, insulin aspart U-100, iohexol, melatonin, metoclopramide HCl, naloxone, OLANZapine, ondansetron, promethazine, senna-docusate 8.6-50 mg, sodium chloride 0.9%, sodium chloride 0.9%    Laboratory/Diagnostic Data:  Reviewed and noted in plan where applicable- Please see chart for full lab data.    Recent Labs   Lab 07/18/20 0402 07/19/20  0553 07/20/20  0353   WBC 17.64* 14.53* 17.67*   HGB 7.4* 6.7* 7.3*   HCT 24.2* 21.8* 24.4*   * 396* 407*       Recent Labs   Lab 07/18/20 0402 07/19/20  0553 07/20/20  0353    132* 134*   K 3.8 3.8 3.9    100 102   CO2 26 27 25   BUN 10 10 7   CREATININE 0.7 0.6 0.6   * 201* 228*   CALCIUM 8.4* 8.3* 8.0*   MG 1.9 1.7 1.7   PHOS 2.8 3.6 3.0       Recent Labs   Lab 07/18/20 0402 07/19/20  0553 07/20/20  0353   ALKPHOS 185* 163* 129   ALT 28 18 14   AST 16 16 14   ALBUMIN 2.0* 1.8* 1.9*   PROT 6.2 5.6* 5.6*   BILITOT 0.6 0.4 0.5        Microbiology labs for the last week  Microbiology Results (last 7 days)     Procedure Component Value Units Date/Time    Blood culture [[id number]] Collected: 07/15/20 1711    Order Status: Completed Specimen: Blood from Antecubital, Right Arm Updated: 07/20/20 2012     Blood Culture, Routine No growth after 5 days.    Blood culture [[id number]] Collected: 07/15/20 1711    Order Status: Completed Specimen: Blood from Antecubital, Left Arm Updated: 07/20/20 2012     Blood Culture, Routine No growth after 5 days.           Imaging Results    None         Estimated body mass index is 24.2 kg/m² as calculated from the following:    Height as of this encounter: 5' 7" (1.702 m).    Weight as of this encounter: 70.1 kg (154 lb 8.7 oz).    I & O (Last " 24H):    Intake/Output Summary (Last 24 hours) at 7/23/2020 0515  Last data filed at 7/22/2020 1800  Gross per 24 hour   Intake 890 ml   Output no documentation   Net 890 ml       Body mass index is 24.2 kg/m².    Estimated Creatinine Clearance: 105.4 mL/min (based on SCr of 0.6 mg/dL).      Cardiac:  ECG Results          EKG 12-lead (Final result)  Result time 07/12/20 10:59:23    Final result by Interface, Lab In [facility] (07/12/20 10:59:23)             Narrative:    Test Reason : D64.9,    Vent. Rate : 080 BPM     Atrial Rate : 080 BPM     P-R Int : 136 ms          QRS Dur : 086 ms      QT Int : 402 ms       P-R-T Axes : 076 063 012 degrees     QTc Int : 463 ms    Sinus rhythm with occasional Premature ventricular complexes  Low voltage QRS  Low septal forces  Abnormal ECG  No previous ECGs available  Confirmed by Dylan OCHOA MD ([id number]) on 7/12/2020 10:59:17 AM    Referred By: [email or web address]   SELF           Confirmed By:Dylan OCHOA MD                            ASSESSMENT/PLAN:     Active Problems:      Active Hospital Problems    Diagnosis  POA    *Symptomatic anemia [D64.9]GIB Pathway initiated  Likely bleeding from GISt  Hgb 6.2 on admit, down from 9.7 beginning of June  Check iron studies and hemolysis labs  Protonix 40mg IV BID  GI consulted, appreciate assistance  Continue diet as no procedures on Sunday  Type and screen, blood consent obtained  CBCq 8h.  Transfuse for Hemoglobin <7.  Transfuse 1 unit PRBCs  7/12 haptoglobin normal and reticulocyte count elevated.  Gastroenterology consulted  repeat CBC at 6.6 . transfusion with 1 unit of PRBC. GI recommends EGD and colonoscopy for further evaluation of iron deficiency anemia patient adamantly refuses exam and EGD/ colonoscopy  7/13  hemoglobin at 8 3 status post 2 units of pack RBC transfusion.agrees for EGD/ colonoscopy.  7/14 Hb 8.1 --> 7.6.Clear liquids today and NPO from midnight EGD/colonoscopy in a.m.  7/15 refused to drink GoLYTELY overnight.   7/23 agreed  for blood draw today hemoglobin repeated at 6 transfusion with 1 unit of pack RBC      Transaminitis 7/15-  AST//112 with normal bilirubin and mildly elevated alk-phos at 337. Consider right upper quadrant sonogram if not trending down        Yes    Hypokalemia [E87.6]K 2.9-->3  Check Mag  Resolved      B12 deficiency- levels of 192 started on vitamin B12 subcutaneous dissection  Yes    Acute deep vein thrombosis (DVT) of popliteal vein of left lower extremity [I82.432]June 2020  S/p IVC filter     Yes     S/p IVC filter June 2020      Delusional disorder [F22]/12   presenting to the ED via [facility] with [facility] stating patient was at her estranged 's house  threatening she would kill the estranged .  PEC placed for delusional behavior. Work-up significant for H/H 6.2/20 (baseline 9/30 through care everywhere). Rectal exam performed without evidence of blood or melena. FOBT positive.   alert, delusional. Refusing to give  personal belongings, and agitation with staff.  4 point restraints  7/13 agitated overnight requesting cell phone.  Patient off restraints.Continue home Abilify 10 mg and Remeron 15 mg nightly. Increased Zyprexa from 5 mg to 10 mg q8h IM PRN for agitation  7/14 Hb 8.1 --> 7.6. Requesting  her purse and  belongings she needs to get to her (Geovany Ring)  7/15. Hemoglobin stable at 7.8 leukocytosis of 15 but afebrile.    7/22   refusal of taking medications and vitals, .As CEC expiring 7/26 , plan to pursue DELORES filing as pt remains delusional and uncooperative and needs psychiatric hospital placement. Forced med protocol with Haldol, Ativan and Benadry. Aristada 441 mg IM injection once given 7/22, to be given [id number]. Started Atarax 50 mg nightly for insomnia  7/23 EKG with QTC at 448milliseconds.Haldol 5 mg PO, Benadryl 50 mg PO, Ativan 2 mg PO PRN for non-redirectable agitation        Yes    Bipolar disorder in partial remission [F31.70]PECed in the ER  Recent admission to [facility]  from [facility] beginning of June  Continue Abilify 10mg  Continue Remeron 15mg PO qHS  on Abilify Aristada BAUM, last received this month according to patient.  needs to be checked with primary psychiatrist   7/13  Increased Zyprexa from 5 mg to 10 mg q8h IM PRN for agitation.daily EKG to monitor QTc. 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  Norco discontinued and started oxycodone 20 mg Q 6 hourly p.r.n.     Significant left upper quadrant abdominal pain prior to endoscopy today.  Endoscopy canceled.  CT abdomen with IV and oral contrast ordered.  General surgery consulted.  Started on Zosyn and vancomycin empirically.  Blood cultures x2 pending  7/22  Continued on ciprofloxacin and metronidazole since 07/16    Yes    Type 2 diabetes mellitus without complication, without long-term current use of insulin [E11.9]   Patient's FSGs are controlled on current hypoglycemics.   Last A1c reviewed-   Lab Results   Component Value Date    HGBA1C 6.3 (H) 07/11/2020     Home DM regimen:  Metformin  SSI with POCT accuchecks and Diabetic diet  Yes      Resolved Hospital Problems   No resolved problems to display.         Disposition- CECd, likely psychiatric hospital.  Working on judicial commitment    DVT prophylaxis addressed with: SCDs        Subsequent Hospital Care     Level 3 10807 Total visit time was 35 minutes or greater with greater than 50% of time spent in counseling and coordination of care.     We discussed in detail the plan of care, the patient's response to treatment, the discharge plan,.  Total time includes time spent reviewing the medical record, examining the patient, writing notes and communicating with other professionals.    Mukund Chi MD  Attending Staff Physician  [facility]  Medicine  pager- [phone]  [facility] [id number]               I

## 2020-07-23 NOTE — NURSING
Patient is becoming increasingly agitated at situation and hospital stay. Requests another pain shot for mid-epigastric pain.  Gave PRN IV benadryl, haldol, and ativan for agitation. Sitter at bedside. Will monitor.

## 2020-07-23 NOTE — PLAN OF CARE
Problem: Fall Injury Risk  Goal: Absence of Fall and Fall-Related Injury  Outcome: Ongoing, Progressing     Problem: Adjustment to Illness (Gastrointestinal Bleeding)  Goal: Optimal Coping with Acute Illness  Outcome: Ongoing, Progressing     Problem: Bleeding (Gastrointestinal Bleeding)  Goal: Hemostasis  Outcome: Ongoing, Progressing     Problem: Adult Inpatient Plan of Care  Goal: Plan of Care Review  Outcome: Ongoing, Progressing  Goal: Patient-Specific Goal (Individualization)  Outcome: Ongoing, Progressing  Goal: Absence of Hospital-Acquired Illness or Injury  Outcome: Ongoing, Progressing  Goal: Optimal Comfort and Wellbeing  Outcome: Ongoing, Progressing  Goal: Readiness for Transition of Care  Outcome: Ongoing, Progressing  Goal: Rounds/Family Conference  Outcome: Ongoing, Progressing     Problem: Diabetes Comorbidity  Goal: Blood Glucose Level Within Desired Range  Outcome: Ongoing, Progressing     Problem: Restraint, Nonbehavioral (Nonviolent)  Goal: Discontinuation Criteria Achieved  Outcome: Ongoing, Progressing  Goal: Personal Dignity and Safety Maintained  Outcome: Ongoing, Progressing     Problem: Coping Ineffective  Goal: Effective Coping  Outcome: Ongoing, Progressing   Patient up all night long. Round the clock IV pain medicine given. Last IV medicine leaked out of IV site, IV  removed. Physician notified with one time IM dose.  Attempted to start new IV x 2 attempts without success. Charge nurse made aware. Sitter remains at bedside. CB near.

## 2020-07-23 NOTE — SUBJECTIVE & OBJECTIVE
"Interval History:   As above    Family History     None        Tobacco Use    Smoking status: Current Every Day Smoker     Packs/day: 10.00     Types: Cigarettes    Smokeless tobacco: Never Used   Substance and Sexual Activity    Alcohol use: No    Drug use: No    Sexual activity: Not on file     Psychotherapeutics (From admission, onward)    Start     Stop Route Frequency Ordered    07/21/20 1300  ARIPiprazole lauroxil 441 mg/1.6 mL injection 441 mg     Question Answer Comment   Brand Name: ARISTADA (R)    Generic name: none    Form: Injectable    Length of Therapy: Indefinite    Reason for Non-Formulary: No equivalent formulary medication available    How soon needed? (if approved, may take up to 5 days to procure): 48-72 hrs    Requestor's Contact Information: Shirley Lopez or ON CALL psychiatry        -- IM Every 30 days 07/21/20 1219    07/20/20 1036  lorazepam injection 2 mg      -- IM Every 6 hours PRN 07/20/20 0946    07/20/20 1034  haloperidol lactate injection 5 mg      -- IM Every 6 hours PRN 07/20/20 0946    07/20/20 1031  lorazepam injection 2 mg      -- IV Every 6 hours PRN 07/20/20 0933    07/20/20 1030  haloperidol lactate injection 5 mg      -- IV Every 6 hours PRN 07/20/20 0933    07/20/20 1030  ARIPiprazole tablet 30 mg      -- Oral Daily 07/20/20 0927    07/16/20 1833  OLANZapine injection 10 mg      -- IM Every 8 hours PRN 07/16/20 1834    07/11/20 2245  mirtazapine tablet 15 mg      -- Oral Nightly 07/11/20 2132           Review of Systems   Unable to perform ROS: Psychiatric disorder     Objective:     Vital Signs (Most Recent):  Temp: 98.6 °F (37 °C) (07/23/20 0808)  Pulse: 81 (07/23/20 0808)  Resp: 14 (07/23/20 0808)  BP: (!) 101/52 (07/23/20 0808)  SpO2: 99 % (07/23/20 0808) Vital Signs (24h Range):  Temp:  [97.6 °F (36.4 °C)-98.6 °F (37 °C)] 98.6 °F (37 °C)  Pulse:  [81-93] 81  Resp:  [14-18] 14  SpO2:  [97 %-100 %] 99 %  BP: (101-112)/(51-60) 101/52     Height: 5' 7" (170.2 " cm)  Weight: 70.1 kg (154 lb 8.7 oz)  Body mass index is 24.2 kg/m².      Intake/Output Summary (Last 24 hours) at 7/23/2020 0939  Last data filed at 7/22/2020 1800  Gross per 24 hour   Intake 650 ml   Output --   Net 650 ml       Physical Exam:  General appearance: NAD  Head: NCAT  Lungs: no increased work of breath  Heart: no displacement of PMI  Abdomen: soft, distended  Extremities: extremities normal, atraumatic  Skin: warm, dry, pallor     Mental Status Exam:  Appearance: older than stated age, disheveled  Behavior/Cooperation: eye contact normal  Speech: profane, loud  Mood: angry  Affect: mood congruent  Thought Process: logical  Thought Content: delusions: yes, erotomanic, persecutory  Orientation: grossly intact  Memory: Grossly intact  Attention Span/Concentration: Normal  Insight: fair-poor, improving  Judgment: poor    Significant Labs:   Last 24 Hours:   Recent Lab Results       07/23/20  0743   07/22/20  1602   07/22/20  1138        POCT Glucose 250 303 302         Significant Imaging: None    Physical Exam

## 2020-07-23 NOTE — ASSESSMENT & PLAN NOTE
"Impression: Pt is a 55 y/o with Bipolar Disorder, delusional disorder, GIST on Sunitinib, and recent LLE DVT s/p IVC filter (June 2020) who presented to the ER by ROBIN for delusions.  Her estranged  reported that she showed up at his house, claiming that she was  to Geovany Ring from [employer] and he was being hid inside the estranged 's house. He reports that she is homeless, and hasn't been taking her psych medications.   Pt is alert, oriented, to person, place, and situation somewhat. Pt is a full code. Pt is on CEC.     Reason for consult: Placement goals of care    Goals of care/advance care planning:  Spoke to pt's ex-. Pt is estranged from . He has a restraining order against pt. Pt's  does not want to be medical decision-maker.  Per pt's , she is capable of very violent behavior and he can no longer care for her due to her mental illness. Per pt's , pt is non-complaint with meds and would only take them intermittently. 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 Per pt's , she has had delusions since 1990 about Geovany Ring from [employer]. Per pt's , she has been arrested for multiple felonies. Per pt's  pt is homeless.   Per pt's , he does "not believe she will ever come out of this."    Per pt's , she had a daughter out of "wedlock". He does not have contact information. Per pt's , she has not had contact with daughter in many years.       Pt has a brother, Ridge Cheney ([phone]).    Process for DELORES filing started per Psychiatry.     Plan:   Pt is on CEC.   DELORES filing in process per Psychiatry.    does not want to be decision-maker.   Will follow-up.   "

## 2020-07-23 NOTE — CONSULTS
"[facility]  Palliative Medicine  Consult Note    Patient Name: Violeta Boudreaux  MRN: [id number]  Admission Date: 7/11/2020  Hospital Length of Stay: 6 days  Code Status: Full Code   Attending Provider: Mukund Chi MD  Consulting Provider: DIDIER Rogers  Primary Care Physician: Otf Brownlee MD  Principal Problem:Psychological factors affecting medical condition    Patient information was obtained from patient, spouse/SO and ER records.      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  Her estranged  reported that she showed up at his house, claiming that she was  to Geovany Ring from [employer] and he was being hid inside the estranged 's house. He reports that she is homeless, and hasn't been taking her psych medications.   Pt is alert, oriented, to person, place, and situation somewhat. Pt is a full code. Pt is on CEC.     Reason for consult: Placement goals of care    Goals of care/advance care planning:  Spoke to pt's ex-. Pt is estranged from . He has a restraining order against pt. Pt's  does not want to be medical decision-maker.  Per pt's , she is capable of very violent behavior and he can no longer care for her due to her mental illness. Per pt's , pt is non-complaint with meds and would only take them intermittently. 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 Per pt's , she has had delusions since 1990 about Geovany Ring from [employer]. Per pt's , she has been arrested for multiple felonies. Per pt's  pt is homeless.   Per pt's , he does "not believe she " "will ever come out of this."    Per pt's , she had a daughter out of "wedlock". He does not have contact information. Per pt's , she has not had contact with daughter in many years.       Pt has a brother, Ridge Cheney ([phone]).    Process for DELORES filing started per Psychiatry.     Plan:   Pt is on CEC.   DELORES filing in process per Psychiatry.    does not want to be decision-maker.   Will follow-up.         Thank you for your consult. I will follow-up with patient. Please contact us if you have any additional questions.    Subjective:     HPI:   Pt is a 54 y.o. 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  Per chart review, her estranged  reports that she showed up at his house, claiming that she was  to Geovany Ring from [employer] and he was being hid inside the estranged 's house. He reported that she is homeless, and hasn't been taking her psych medications.  She denied any complaints at this time.  Labs on arrival showed a Hemoglobin 6.2 down from 9.7 in June 2020.  She endorsed midepigastric abdominal pain.  She denied any blood in her stools, dark stools, or vaginal bleeding.  She mentioned that a few weeks ago, she was having bad headaches and took a lot of Advil, then causing her to have hematemesis.  She denied further bleeding or use of Aspirin or NSAIDS.  Of note, at the end of May, she was hospitalized at [facility] for delusional disorder.  At that time, she was found to have a Hemoglobin 6.4.  She was given blood and her hemolgobin improved to 9.7.  It was thought that her bleeding with 2/2 her GIST tumor.  She was not evaluated by GI.  Also during that admission, she was found to have a LLE DVT.  [facility] suggested IVC filter, given her lack of consistency with medications.  She was [facility] to the [facility].  She was discharged on Abilify 10mg and " Mirtazapine 15mg qHS.       In the ER, she was PECed for grave disability.  SUMIT was positive.  She was transfused 1 unit PRBCs.  She mentioned that Bert Gorman will pay for every drop of blood we take from her, and that she wants to return to her house in [place] when discharged.  She was admitted to Hospital Medicine for GI and Psych evaluations.    Hospital Course:  No notes on file    Interval History: Pt on CEC    History reviewed. No pertinent past medical history.    Past Surgical History:   Procedure Laterality Date    CHOLECYSTECTOMY         Review of patient's allergies indicates:   Allergen Reactions    Toradol [ketorolac] Hives       Medications:  Continuous Infusions:  Scheduled Meds:   ARIPiprazole lauroxil  441 mg Intramuscular Q30 Days    ARIPiprazole  30 mg Oral Daily    ciprofloxacin HCl  500 mg Oral Q12H    cyanocobalamin  1,000 mcg Subcutaneous Q7 Days    Followed by    [START ON 8/16/2020] cyanocobalamin  1,000 mcg Subcutaneous Q30 Days    hydrOXYzine HCL  50 mg Oral QHS    lidocaine  1 patch Transdermal Q24H    metroNIDAZOLE  500 mg Oral Q8H    mirtazapine  15 mg Oral QHS    pantoprazole  40 mg Oral BID    potassium chloride  30 mEq Oral Once    potassium chloride  40 mEq Oral Once    potassium chloride  40 mEq Oral Once     PRN Meds:sodium chloride, sodium chloride, acetaminophen, dextrose 50%, dextrose 50%, haloperidol lactate **AND** diphenhydrAMINE **AND** lorazepam, haloperidol lactate **AND** diphenhydrAMINE **AND** lorazepam, glucagon (human recombinant), glucose, glucose, HYDROmorphone, HYDROmorphone, insulin aspart U-100, iohexol, melatonin, metoclopramide HCl, naloxone, OLANZapine, ondansetron, promethazine, senna-docusate 8.6-50 mg, sodium chloride 0.9%, sodium chloride 0.9%    Family History     None        Tobacco Use    Smoking status: Current Every Day Smoker     Packs/day: 10.00     Types: Cigarettes    Smokeless tobacco: Never Used   Substance and Sexual  Activity    Alcohol use: No    Drug use: No    Sexual activity: Not on file       Review of Systems   Constitutional: Negative.    HENT: Negative.    Eyes: Negative.    Respiratory: Negative.    Cardiovascular: Negative.    Gastrointestinal: Positive for abdominal distention, abdominal pain and nausea.   Genitourinary: Negative.    Musculoskeletal: Negative.    Skin: Negative.    Neurological: Negative.    Psychiatric/Behavioral: Negative for agitation. The patient is not nervous/anxious and is not hyperactive.      Objective:     Vital Signs (Most Recent):  Temp: 98.6 °F (37 °C) (07/23/20 0808)  Pulse: 81 (07/23/20 0808)  Resp: 14 (07/23/20 0808)  BP: (!) 101/52 (07/23/20 0808)  SpO2: 99 % (07/23/20 0808) Vital Signs (24h Range):  Temp:  [97.6 °F (36.4 °C)-98.6 °F (37 °C)] 98.6 °F (37 °C)  Pulse:  [81-93] 81  Resp:  [14-18] 14  SpO2:  [97 %-100 %] 99 %  BP: (101-112)/(51-60) 101/52     Weight: 70.1 kg (154 lb 8.7 oz)  Body mass index is 24.2 kg/m².    Physical Exam  HENT:      Head: Normocephalic and atraumatic.      Nose: Nose normal.   Eyes:      General: Lids are normal.   Neck:      Musculoskeletal: Full passive range of motion without pain and normal range of motion.   Cardiovascular:      Rate and Rhythm: Normal rate and regular rhythm.   Pulmonary:      Effort: Pulmonary effort is normal.   Abdominal:      Palpations: Abdomen is soft.      Tenderness: There is abdominal tenderness.      Comments: Scar midline    Musculoskeletal: Normal range of motion.   Skin:     General: Skin is warm and dry.   Neurological:      Mental Status: She is alert.      Comments: Oriented to person, place, and situation somewhat   Psychiatric:         Speech: Speech normal.         Behavior: Behavior is not agitated.         Thought Content: Thought content is delusional.      Comments: Pt on CEC.  Believes she is  to [employer] a[employer] Geovany and lives with body guard in her apartment.          Advance Care Planning   Review of  Symptoms    Symptom Assessment (ESAS 0-10 Scale)  Pain:  3  Dyspnea:  0  Agitation:  0         Comments:  Pt reports ABD pain that is is described as throbbing. Relieved with pain meds.           ECOG Performance Status Grade:  3 - Confined to bed or chair 50% of waking hours    Advanced Directives:  Living Will: No    LaPOST: No    Do Not Resuscitate Status: No    Medical Power of : No      Decision Making:  Patient answered questions and Family answered questions         Significant Labs: All pertinent labs within the past 24 hours have been reviewed.  CBC:   Recent Labs   Lab 07/23/20  0950   WBC 12.90*   HGB 6.0*   HCT 19.4*   MCV 98   *     BMP:  Recent Labs   Lab 07/23/20  0950   *   *   K 4.2      CO2 29   BUN 8   CREATININE 0.7   CALCIUM 8.1*     LFT:  Lab Results   Component Value Date    AST 12 07/23/2020    ALKPHOS 105 07/23/2020    BILITOT 0.4 07/23/2020     Albumin:   Albumin   Date Value Ref Range Status   07/23/2020 2.0 (L) 3.5 - 5.2 g/dL Final     Protein:   Total Protein   Date Value Ref Range Status   07/23/2020 5.5 (L) 6.0 - 8.4 g/dL Final     Lactic acid:   No results found for: LACTATE    Significant Imaging: I have reviewed all pertinent imaging results/findings within the past 24 hours.      > 50% of 70 min visit spent in chart review, face to face discussion of goals of care,  symptom assessment, coordination of care and emotional support.    Mary Piper, CNS  Palliative Medicine  [facility]

## 2020-07-23 NOTE — HPI
Pt is a 54 y.o. 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  Per chart review, her estranged  reports that she showed up at his house, claiming that she was  to Geovany Ring from [employer] and he was being hid inside the estranged 's house. He reported that she is homeless, and hasn't been taking her psych medications.  She denied any complaints at this time.  Labs on arrival showed a Hemoglobin 6.2 down from 9.7 in June 2020.  She endorsed midepigastric abdominal pain.  She denied any blood in her stools, dark stools, or vaginal bleeding.  She mentioned that a few weeks ago, she was having bad headaches and took a lot of Advil, then causing her to have hematemesis.  She denied further bleeding or use of Aspirin or NSAIDS.  Of note, at the end of May, she was hospitalized at [facility] for delusional disorder.  At that time, she was found to have a Hemoglobin 6.4.  She was given blood and her hemolgobin improved to 9.7.  It was thought that her bleeding with 2/2 her GIST tumor.  She was not evaluated by GI.  Also during that admission, she was found to have a LLE DVT.  [facility] suggested IVC filter, given her lack of consistency with medications.  She was [facility] to the [facility].  She was discharged on Abilify 10mg and Mirtazapine 15mg qHS.       In the ER, she was PECed for grave disability.  SUMIT was positive.  She was transfused 1 unit PRBCs.  She mentioned that Bert Gorman will pay for every drop of blood we take from her, and that she wants to return to her house in [place] when discharged.  She was admitted to Hospital Medicine for GI and Psych evaluations.

## 2020-07-24 NOTE — ASSESSMENT & PLAN NOTE
ASSESSMENT     Violeta Boudreaux is a 54 y.o. female with a past psychiatric history of BPAD and delusional disorder, who presented to the [facility] due to OPC from estranged  for threatening to kill him. Admitted to [facility] for symptomatic anemia. Per FACT team, patient non-compliant with medications, did not receive most recent scheduled Aristada BAUM. Received Aristada 441 mg BAUM on 7/22.    IMPRESSION  Bipolar affective disorder, current episode manic  Psychological factors affecting medical care  Cluster B personality d/o  Delusional disorder  Medication non-adherence    RECOMMENDATION(S)      1. Scheduled Medication(s):  - Continue Abilify from 30 mg daily   - Forced med protocol with Haldol, Ativan and Benadryl as described in PRN below  - Continue home Remeron 15 mg nightly  - Aristada 441 mg IM injection [id number], last given 7/22  - Continue Atarax 50 mg nightly for insomnia  - Optimize pain management regimen to ensure adequate pain control and encourage medication compliance    2. PRN Medication(s):  - First try: Haldol 5 mg PO, Benadryl 50 mg PO, Ativan 2 mg PO PRN for non-redirectable agitation  - If refuses: Haldol 2 mg IV, Benadryl 50 mg IV, Ativan 2 mg IV PRN for non-redirectable agitation    3.  Monitor:  - Please obtain daily EKG to monitor QTc    4. Legal Status/Precaution(s):  - Continue CEC (expires 7/26 @ 8:32 pm) as patient is in imminent danger of hurting self or others and is gravely disabled due to a psychiatric illness-- DELORES filed 7/23  - Maintain 1:1 sitter  - Continue working with next of kin (estranged  and brother) to help facilitate patient care and decision making, especially with regards to disposition -- would be best to come to a consensus with regards to her disposition  - Continue working with palliative care to discuss options, code status, and disposition
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
## 2020-07-24 NOTE — SUBJECTIVE & OBJECTIVE
Interval History: Pt on CEC    History reviewed. No pertinent past medical history.    Past Surgical History:   Procedure Laterality Date    CHOLECYSTECTOMY         Review of patient's allergies indicates:   Allergen Reactions    Toradol [ketorolac] Hives       Medications:  Continuous Infusions:  Scheduled Meds:   ARIPiprazole lauroxil  441 mg Intramuscular Q30 Days    ARIPiprazole  30 mg Oral Daily    ciprofloxacin HCl  500 mg Oral Q12H    cyanocobalamin  1,000 mcg Subcutaneous Q7 Days    Followed by    [START ON 8/16/2020] cyanocobalamin  1,000 mcg Subcutaneous Q30 Days    hydrOXYzine HCL  50 mg Oral QHS    lidocaine  1 patch Transdermal Q24H    metroNIDAZOLE  500 mg Oral Q8H    mirtazapine  15 mg Oral QHS    pantoprazole  40 mg Oral BID    potassium chloride  30 mEq Oral Once    potassium chloride  40 mEq Oral Once    potassium chloride  40 mEq Oral Once     PRN Meds:sodium chloride, sodium chloride, sodium chloride, acetaminophen, dextrose 50%, dextrose 50%, haloperidol lactate **AND** diphenhydrAMINE **AND** lorazepam, haloperidol lactate **AND** diphenhydrAMINE **AND** lorazepam, glucagon (human recombinant), glucose, glucose, HYDROmorphone, HYDROmorphone, insulin aspart U-100, iohexol, melatonin, metoclopramide HCl, naloxone, OLANZapine, ondansetron, promethazine, senna-docusate 8.6-50 mg, sodium chloride 0.9%, sodium chloride 0.9%    Family History     None        Tobacco Use    Smoking status: Current Every Day Smoker     Packs/day: 10.00     Types: Cigarettes    Smokeless tobacco: Never Used   Substance and Sexual Activity    Alcohol use: No    Drug use: No    Sexual activity: Not on file       Review of Systems   Constitutional: Negative.    HENT: Negative.    Eyes: Negative.    Respiratory: Negative.    Cardiovascular: Negative.    Gastrointestinal: Positive for abdominal distention, abdominal pain and nausea.   Genitourinary: Negative.    Musculoskeletal: Negative.    Skin:  Negative.    Neurological: Negative.    Psychiatric/Behavioral: Negative for agitation. The patient is not nervous/anxious and is not hyperactive.      Objective:     Vital Signs (Most Recent):  Temp: 98.1 °F (36.7 °C) (07/24/20 1130)  Pulse: 89 (07/24/20 1130)  Resp: 16 (07/24/20 1145)  BP: (!) 125/58 (07/24/20 1130)  SpO2: 100 % (07/24/20 1130) Vital Signs (24h Range):  Temp:  [97.4 °F (36.3 °C)-98.7 °F (37.1 °C)] 98.1 °F (36.7 °C)  Pulse:  [77-99] 89  Resp:  [10-19] 16  SpO2:  [94 %-100 %] 100 %  BP: (101-125)/(48-79) 125/58     Weight: 70.1 kg (154 lb 8.7 oz)  Body mass index is 24.2 kg/m².    Physical Exam  HENT:      Head: Normocephalic and atraumatic.      Nose: Nose normal.   Eyes:      General: Lids are normal.   Neck:      Musculoskeletal: Full passive range of motion without pain and normal range of motion.   Cardiovascular:      Rate and Rhythm: Normal rate and regular rhythm.   Pulmonary:      Effort: Pulmonary effort is normal.   Abdominal:      Palpations: Abdomen is soft.      Tenderness: There is abdominal tenderness.      Comments: Scar midline    Musculoskeletal: Normal range of motion.   Skin:     General: Skin is warm and dry.   Neurological:      Mental Status: She is alert.      Comments: Oriented to person, place, and situation somewhat   Psychiatric:         Speech: Speech normal.         Behavior: Behavior is not agitated.         Thought Content: Thought content is delusional.      Comments: Pt on CEC.  Believes she is  to [employer] singer Armenta and lives with body guard in her apartment.          Advance Care Planning   Review of Symptoms    Symptom Assessment (ESAS 0-10 Scale)  Pain:  3  Dyspnea:  0  Agitation:  0         Comments:  Pt reports ABD pain that is is described as throbbing. Relieved with pain meds.           ECOG Performance Status Grade:  3 - Confined to bed or chair 50% of waking hours    Advanced Directives:  Living Will: No    LaPOST: No    Do Not Resuscitate Status: No     Medical Power of : No      Decision Making:  Patient answered questions and Family answered questions         Significant Labs: All pertinent labs within the past 24 hours have been reviewed.  CBC:   Recent Labs   Lab 07/24/20  0543   WBC 14.99*  14.99*   HGB 7.3*  7.3*   HCT 24.0*  24.0*   *  102*   *  457*     BMP:  Recent Labs   Lab 07/24/20  0543   *  282*   *  131*   K 4.1  4.1     100   CO2 25  25   BUN 10  10   CREATININE 0.7  0.7   CALCIUM 8.3*  8.3*   MG 1.7     LFT:  Lab Results   Component Value Date    AST 16 07/24/2020    AST 16 07/24/2020    ALKPHOS 99 07/24/2020    ALKPHOS 99 07/24/2020    BILITOT 0.6 07/24/2020    BILITOT 0.6 07/24/2020     Albumin:   Albumin   Date Value Ref Range Status   07/24/2020 2.1 (L) 3.5 - 5.2 g/dL Final   07/24/2020 2.1 (L) 3.5 - 5.2 g/dL Final     Protein:   Total Protein   Date Value Ref Range Status   07/24/2020 5.7 (L) 6.0 - 8.4 g/dL Final   07/24/2020 5.7 (L) 6.0 - 8.4 g/dL Final     Lactic acid:   No results found for: LACTATE    Significant Imaging: I have reviewed all pertinent imaging results/findings within the past 24 hours.

## 2020-07-24 NOTE — PLAN OF CARE
Problem: Adult Inpatient Plan of Care  Goal: Plan of Care Review  Outcome: Ongoing, Progressing     Problem: Fall Injury Risk  Goal: Absence of Fall and Fall-Related Injury  Outcome: Ongoing, Progressing     Problem: Diabetes Comorbidity  Goal: Blood Glucose Level Within Desired Range  Outcome: Ongoing, Progressing     Problem: Pain Chronic (Persistent)  Goal: Acceptable Pain Control and Functional Ability  Outcome: Ongoing, Progressing       Pt free of fall this shift. Pain assessed and pt c/o epigastric pain with noticeable cyst; MD notified and aware. Prn IV dilaudid administered and pt verbalized moderate relief of pain.  Cbg monitored to manage pt DM2 and insulin administered per MAR order. Pt aaox4. Afebrile. Vss. Will continue to monitor pt.

## 2020-07-24 NOTE — PROGRESS NOTES
Pt started to have purulent drainage from upper abdomen at tumor site. [facility] notified, culture obtained and hand delivered to lab. Will continue to monitor.

## 2020-07-24 NOTE — PROGRESS NOTES
[facility]  Palliative Medicine  Progress Note    Patient Name: Violeta Boudreaux  MRN: [id number]  Admission Date: 7/11/2020  Hospital Length of Stay: 7 days  Code Status: Full Code   Attending Provider: Mukund Chi MD  Consulting Provider: DIDIER Rogers  Primary Care Physician: Otf Brownlee MD  Principal Problem:Psychological factors affecting medical condition    Patient information was obtained from spouse/SO and ER records.      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  Her estranged  reported that she showed up at his house, claiming that she was  to [employer]any Ring from [employer] and he was being hid inside the estranged 's house. He reports that she is homeless, and hasn't been taking her psych medications.   Pt is alert, oriented, to person, place, and situation somewhat. Pt is a full code. Pt is on CEC.     Reason for consult: Placement goals of care    Goals of care/advance care planning:    Called and spoke to pt's  over-the-phone along with Rand Hicks LCSW. Pt's , Gregorio Boudreaux stated that he thought it over and wants to be pt's medical decision-maker due to he next of kin and has her best interest.  states he has been  to pt from [id number] and remarried 2015 - present. Per , he does not want pt's brother to be medical decision-maker due to brother is on methadone and is only worried about getting any money he can from pt.     Per , his goal is to have pt comitted to an institution so she can receive her psych meds and also her oral chemo. Per , if pt is once again not complaint with meds, he is open to hospice care in a nursing facility. He is unable to care for her.     7/24/2020  Spoke to pt's ex-. Pt is  "estranged from . He has a restraining order against pt. Pt's  does not want to be medical decision-maker.  Per pt's , she is capable of very violent behavior and he can no longer care for her due to her mental illness. Per pt's , pt is non-complaint with meds and would only take them intermittently. 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 Per pt's , she has had delusions since 1990 about Geovany Ring from [employer]. Per pt's , she has been arrested for multiple felonies. Per pt's  pt is homeless.   Per pt's , he does "not believe she will ever come out of this."    Per pt's , she had a daughter out of "wedlock". He does not have contact information. Per pt's , she has not had contact with daughter in many years.       Pt has a brother, Ridge Cheney ([phone]).    Process for DELORES filing started per Psychiatry.     Plan:   Pt is on CEC.   DELORES filing in process per Psychiatry.    does not want to be decision-maker.   Will follow-up.         I will follow-up with patient. Please contact us if you have any additional questions.    Subjective:     Chief Complaint:   Chief Complaint   Patient presents with    Mental Health Problem     OPC, brought in with [facility], pt apparantly homeless and hx of bipolar and schizophrenia, denies SI/HI       HPI:   Pt is a 54 y.o. 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  Per chart review, her estranged  reports that she showed up at his house, claiming that she was  to Geovany Ring from [employer] and he was being hid inside the estranged 's house. He reported that she is homeless, and hasn't been taking her psych medications.  She denied any complaints at this time.  Labs on arrival showed a Hemoglobin 6.2 down from 9.7 in June 2020.  She endorsed " midepigastric abdominal pain.  She denied any blood in her stools, dark stools, or vaginal bleeding.  She mentioned that a few weeks ago, she was having bad headaches and took a lot of Advil, then causing her to have hematemesis.  She denied further bleeding or use of Aspirin or NSAIDS.  Of note, at the end of May, she was hospitalized at [facility] for delusional disorder.  At that time, she was found to have a Hemoglobin 6.4.  She was given blood and her hemolgobin improved to 9.7.  It was thought that her bleeding with 2/2 her GIST tumor.  She was not evaluated by GI.  Also during that admission, she was found to have a LLE DVT.  [facility] suggested IVC filter, given her lack of consistency with medications.  She was [facility] to the [facility].  She was discharged on Abilify 10mg and Mirtazapine 15mg qHS.       In the ER, she was PECed for grave disability.  SUMIT was positive.  She was transfused 1 unit PRBCs.  She mentioned that Bert Gorman will pay for every drop of blood we take from her, and that she wants to return to her house in [place] when discharged.  She was admitted to Hospital Medicine for GI and Psych evaluations.    Hospital Course:  No notes on file    Interval History: Pt on CEC    History reviewed. No pertinent past medical history.    Past Surgical History:   Procedure Laterality Date    CHOLECYSTECTOMY         Review of patient's allergies indicates:   Allergen Reactions    Toradol [ketorolac] Hives       Medications:  Continuous Infusions:  Scheduled Meds:   ARIPiprazole lauroxil  441 mg Intramuscular Q30 Days    ARIPiprazole  30 mg Oral Daily    ciprofloxacin HCl  500 mg Oral Q12H    cyanocobalamin  1,000 mcg Subcutaneous Q7 Days    Followed by    [START ON 8/16/2020] cyanocobalamin  1,000 mcg Subcutaneous Q30 Days    hydrOXYzine HCL  50 mg Oral QHS    lidocaine  1 patch Transdermal Q24H    metroNIDAZOLE  500 mg Oral Q8H    mirtazapine  15 mg Oral QHS    pantoprazole  40 mg Oral BID     potassium chloride  30 mEq Oral Once    potassium chloride  40 mEq Oral Once    potassium chloride  40 mEq Oral Once     PRN Meds:sodium chloride, sodium chloride, sodium chloride, acetaminophen, dextrose 50%, dextrose 50%, haloperidol lactate **AND** diphenhydrAMINE **AND** lorazepam, haloperidol lactate **AND** diphenhydrAMINE **AND** lorazepam, glucagon (human recombinant), glucose, glucose, HYDROmorphone, HYDROmorphone, insulin aspart U-100, iohexol, melatonin, metoclopramide HCl, naloxone, OLANZapine, ondansetron, promethazine, senna-docusate 8.6-50 mg, sodium chloride 0.9%, sodium chloride 0.9%    Family History     None        Tobacco Use    Smoking status: Current Every Day Smoker     Packs/day: 10.00     Types: Cigarettes    Smokeless tobacco: Never Used   Substance and Sexual Activity    Alcohol use: No    Drug use: No    Sexual activity: Not on file       Review of Systems   Constitutional: Negative.    HENT: Negative.    Eyes: Negative.    Respiratory: Negative.    Cardiovascular: Negative.    Gastrointestinal: Positive for abdominal distention, abdominal pain and nausea.   Genitourinary: Negative.    Musculoskeletal: Negative.    Skin: Negative.    Neurological: Negative.    Psychiatric/Behavioral: Negative for agitation. The patient is not nervous/anxious and is not hyperactive.      Objective:     Vital Signs (Most Recent):  Temp: 98.1 °F (36.7 °C) (07/24/20 1130)  Pulse: 89 (07/24/20 1130)  Resp: 16 (07/24/20 1145)  BP: (!) 125/58 (07/24/20 1130)  SpO2: 100 % (07/24/20 1130) Vital Signs (24h Range):  Temp:  [97.4 °F (36.3 °C)-98.7 °F (37.1 °C)] 98.1 °F (36.7 °C)  Pulse:  [77-99] 89  Resp:  [10-19] 16  SpO2:  [94 %-100 %] 100 %  BP: (101-125)/(48-79) 125/58     Weight: 70.1 kg (154 lb 8.7 oz)  Body mass index is 24.2 kg/m².    Physical Exam  HENT:      Head: Normocephalic and atraumatic.      Nose: Nose normal.   Eyes:      General: Lids are normal.   Neck:      Musculoskeletal: Full passive  range of motion without pain and normal range of motion.   Cardiovascular:      Rate and Rhythm: Normal rate and regular rhythm.   Pulmonary:      Effort: Pulmonary effort is normal.   Abdominal:      Palpations: Abdomen is soft.      Tenderness: There is abdominal tenderness.      Comments: Scar midline    Musculoskeletal: Normal range of motion.   Skin:     General: Skin is warm and dry.   Neurological:      Mental Status: She is alert.      Comments: Oriented to person, place, and situation somewhat   Psychiatric:         Speech: Speech normal.         Behavior: Behavior is not agitated.         Thought Content: Thought content is delusional.      Comments: Pt on CEC.  Believes she is  to NAMAN Armenta and lives with body guard in her apartment.          Advance Care Planning   Review of Symptoms    Symptom Assessment (ESAS 0-10 Scale)  Pain:  3  Dyspnea:  0  Agitation:  0         Comments:  Pt reports ABD pain that is is described as throbbing. Relieved with pain meds.           ECOG Performance Status Grade:  3 - Confined to bed or chair 50% of waking hours    Advanced Directives:  Living Will: No    LaPOST: No    Do Not Resuscitate Status: No    Medical Power of : No      Decision Making:  Patient answered questions and Family answered questions         Significant Labs: All pertinent labs within the past 24 hours have been reviewed.  CBC:   Recent Labs   Lab 07/24/20  0543   WBC 14.99*  14.99*   HGB 7.3*  7.3*   HCT 24.0*  24.0*   *  102*   *  457*     BMP:  Recent Labs   Lab 07/24/20  0543   *  282*   *  131*   K 4.1  4.1     100   CO2 25  25   BUN 10  10   CREATININE 0.7  0.7   CALCIUM 8.3*  8.3*   MG 1.7     LFT:  Lab Results   Component Value Date    AST 16 07/24/2020    AST 16 07/24/2020    ALKPHOS 99 07/24/2020    ALKPHOS 99 07/24/2020    BILITOT 0.6 07/24/2020    BILITOT 0.6 07/24/2020     Albumin:   Albumin   Date Value Ref Range Status    07/24/2020 2.1 (L) 3.5 - 5.2 g/dL Final   07/24/2020 2.1 (L) 3.5 - 5.2 g/dL Final     Protein:   Total Protein   Date Value Ref Range Status   07/24/2020 5.7 (L) 6.0 - 8.4 g/dL Final   07/24/2020 5.7 (L) 6.0 - 8.4 g/dL Final     Lactic acid:   No results found for: LACTATE    Significant Imaging: I have reviewed all pertinent imaging results/findings within the past 24 hours.      20 minutes done with advance care planning    Mary Piper, CNS  Palliative Medicine  [facility]

## 2020-07-24 NOTE — PROGRESS NOTES
Progress Note  Hospital Medicine    Admit Date: 7/11/2020  Length of Stay:  LOS: 7 days     SUBJECTIVE:         Follow-up For:  Psychological factors affecting medical condition    HPI/Interval history (See H&P for complete P,F,SHx) :     Ms. Violeta Boudreaux is a 54 y.o. 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  Her estranged  reports that she showed up at his house, claiming that she was  to Geovany Ring from [employer] and he was being hid inside the estranged 's house. He reports that she is homeless, and hasn't been taking her psych medications.  She denies any complaints at this time.  Labs on arrival showed a Hemoglobin 6.2 down from 9.7 in June 2020.  She endorses midepigastric abdominal abdomina.  She denies any blood in her stools, dark stools, or vaginal bleeding.  She mentions that a few weeks ago, she was having bad headaches and took a lot of Advil, then causing her to have hematemesis.  She denies further bleeding or use of Aspirin or NSAIDS.  Of note, at the end of May, she was hospitalized at [facility] for delusional disorder.  At that time, she was found to have a Hemoglobin 6.4.  She was given blood and her hemolgobin improved to 9.7.  It was thought that her bleeding with 2/2 her GIST tumor.  She was not evaluated by GI.  Also during that admission, she was found to have a LLE DVT.  HemeOnc suggested IVC filter, given her lack of consistency with medications.  [facility] was [facility] to the [facility].  She was discharged on Abilify 10mg and Mirtazapine 15mg qHS.       In the ER, she was PECed for grave disability.  SUMIT was positive.  She was transfused 1 unit PRBCs.  She mentions that [employer] will pay for every drop of blood we take from her, and that she wants to return to her house in [place] when discharged.  She was admitted to Hospital Medicine for GI and Psych  evaluations.        Interval history  7/12   presenting to the ED via [facility] with [facility] stating patient was at her estranged 's house  threatening she would kill the estranged .  PEC placed for delusional behavior. Work-up significant for H/H 6.2/20 (baseline 9/30 through care everywhere). Rectal exam performed without evidence of blood or melena. FOBT positive.   alert, delusional. Refusing to give  personal belongings, and agitation with staff.  4 point restraints were applied. repeat CBC at 6.6 . transfusion with 1 unit of PRBC. GI recommends EGD and colonoscopy for further evaluation of iron deficiency anemia patient adamantly refuses exam and EGD/ colonoscopy  7/13 agitated overnight requesting cell phone.  Patient was placed 4 point  restraints hemoglobin at 8 3 status post 2 units of pack RBC transfusion . agrees for EGD/ colonoscopy.Patient off restraints.Continue home Abilify 10 mg and Remeron 15 mg nightly. Increased Zyprexa from 5 mg to 10 mg q8h IM PRN for agitation.daily EKG to monitor QTc- 447ms   7/14 Hb 8.1 --> 7.6. Requesting  her purse and  belongings she needs to get to her (Geovany Ring).  Clear liquids today and NPO from midnight EGD/colonoscopy in a.m. complains of abdominal, takes oxycodone 30 mg Q 6 hourly as per chart review (reviewed  last filled on 06/23).  Norco discontinued and started oxycodone 20 mg Q 6 hourly p.r.n.  7/15 refused to drink GoLYTELY overnight.  Hemoglobin stable at 7.8 leukocytosis of 15 but afebrile.  Transaminitis AST//112 with normal bilirubin and mildly elevated alk-phos at 337.  Significant left upper quadrant abdominal pain prior to endoscopy today.  Endoscopy canceled.  CT abdomen with IV and oral contrast ordered.  General surgery consulted.  Started on Zosyn and vancomycin empirically.  Blood cultures x2 with no growth  7/22  Continued on ciprofloxacin and metronidazole since 07/16. refusal of taking medications and vitals, .As [facility]  expiring 7/26 , plan to pursue DELORES filing as pt remains delusional and uncooperative and needs psychiatric hospital placement. Forced med protocol with Haldol, Ativan and Benadry. Aristada 441 mg IM injection once given 7/22, to be given [id number]. Started Atarax 50 mg nightly for insomnia  7/23 agreed for blood draw today hemoglobin repeated at 6 transfusion with 1 unit of pack RBC  7/24 purulent drainage from upper abdomen at tumor site. culture obtained .Hb 7.2 .  judicial commitment in process.  Patient's  okay with nursing home with hospice as the patient is noncompliant with medications.  does not want to be decision-maker.    Review of Systems:   Pain scale:  abdominal pain 10/10   Constitutional: neg for fever or chills     Respiratory: neg for cough or shortness of breath  Cardiovascular: neg for chest pain or palpitations  Gastrointestinal: neg for nausea or vomiting, positive  for abdominal pain or change in bowel habits  Genitourinary: neg for hematuria or dysuria  Integument/Breast: neg for rash or pruritis  Hematologic/Lymphatic: neg for easy bruising or lymphadenopathy  Musculoskeletal: neg for arthralgias or myalgias  Neurological: neg for seizures or tremors  Behavioral/Psych: neg for depression or anxiety+ delusions    OBJECTIVE:     Vital Signs Range (Last 24H):  Temp:  [97.4 °F (36.3 °C)-98.7 °F (37.1 °C)]   Pulse:  [77-99]   Resp:  [10-19]   BP: (101-125)/(48-79)   SpO2:  [94 %-100 %]     Physical Exam:  Constitutional: Appears well-developed and well-nourished.   Head: Normocephalic and atraumatic. sitter at the bedside  Neck: Normal range of motion. Neck supple.   Cardiovascular: Normal heart rate.  Regular heart rhythm.  Pulmonary/Chest: Effort normal.   Abdominal: No distension.  tender epigastrium and left quadrant  Musculoskeletal: Normal range of motion. No edema.   Neurological: Alert and oriented to person, place, and time.   Skin: Skin is warm and dry.   Psychiatric: Normal  mood and affect. delusional       Medications:  Medication list was reviewed and changes noted under Assessment/Plan.      Current Facility-Administered Medications:     0.9%  NaCl infusion (for blood administration), , Intravenous, Q24H PRN, Ghassan Roca PA-C    0.9%  NaCl infusion (for blood administration), , Intravenous, Q24H PRN, Mukund Chi MD    0.9%  NaCl infusion (for blood administration), , Intravenous, Q24H PRN, Mukund Chi MD    acetaminophen tablet 650 mg, 650 mg, Oral, Q4H PRN, Tracy Ospina MD, 650 mg at 07/15/20 2004    ARIPiprazole lauroxil 441 mg/1.6 mL injection 441 mg, 441 mg, Intramuscular, Q30 Days, Kahlil Banegas MD, 441 mg at 20 1341    ARIPiprazole tablet 30 mg, 30 mg, Oral, Daily, Shirley Lopez MD, 30 mg at 20 0917    ciprofloxacin HCl tablet 500 mg, 500 mg, Oral, Q12H, Kahlil Banegas MD, 500 mg at 20 0917    [] cyanocobalamin injection 1,000 mcg, 1,000 mcg, Subcutaneous, Daily, 1,000 mcg at 20 0915 **FOLLOWED BY** cyanocobalamin injection 1,000 mcg, 1,000 mcg, Subcutaneous, Q7 Days **FOLLOWED BY** [START ON 2020] cyanocobalamin injection 1,000 mcg, 1,000 mcg, Subcutaneous, Q30 Days, Mukund Chi MD    dextrose 50% injection 12.5 g, 12.5 g, Intravenous, PRN, Tracy Ospina MD    dextrose 50% injection 25 g, 25 g, Intravenous, PRN, Tracy Ospina MD    haloperidol lactate injection 5 mg, 5 mg, Intravenous, Q6H PRN, 5 mg at 20 **AND** diphenhydrAMINE injection 50 mg, 50 mg, Intravenous, Q6H PRN, 50 mg at 20 **AND** lorazepam injection 2 mg, 2 mg, Intravenous, Q6H PRN, Shirley Lopez MD, 2 mg at 20 2242    haloperidol lactate injection 5 mg, 5 mg, Intramuscular, Q6H PRN **AND** diphenhydrAMINE injection 50 mg, 50 mg, Intramuscular, Q6H PRN **AND** lorazepam injection 2 mg, 2 mg, Intramuscular, Q6H PRN, Shirley Lopez MD    glucagon (human recombinant) injection 1 mg, 1 mg,  Intramuscular, PRN, Tracy Ospina MD    glucose chewable tablet 16 g, 16 g, Oral, PRN, Tracy Ospina MD    glucose chewable tablet 24 g, 24 g, Oral, PRN, Tracy Ospina MD    HYDROmorphone injection 0.2 mg, 0.2 mg, Intravenous, Q4H PRN, Kahlil Banegas MD, 0.2 mg at 07/24/20 0945    HYDROmorphone injection 1 mg, 1 mg, Intravenous, Q4H PRN, Kahlil Banegas MD, 1 mg at 07/24/20 0634    hydrOXYzine tablet 50 mg, 50 mg, Oral, QHS, Kahlil Banegas MD, 50 mg at 07/23/20 2112    insulin aspart U-100 pen 1-10 Units, 1-10 Units, Subcutaneous, QID (AC + HS) PRN, Tracy Ospina MD, 8 Units at 07/23/20 1721    iohexol (OMNIPAQUE) oral solution 15 mL, 15 mL, Oral, PRN, Eusebia Almonte MD, 15 mL at 07/15/20 1715    lidocaine 5 % patch 1 patch, 1 patch, Transdermal, Q24H, Vick Monge PA-C, 1 patch at 07/15/20 2356    melatonin tablet 6 mg, 6 mg, Oral, Nightly PRN, Tracy Ospina MD, 6 mg at 07/14/20 2132    metoclopramide HCl injection 10 mg, 10 mg, Intravenous, Q6H PRN, Malcolm Smith MD    metroNIDAZOLE tablet 500 mg, 500 mg, Oral, Q8H, Kahlil Banegas MD, 500 mg at 07/24/20 0608    mirtazapine tablet 15 mg, 15 mg, Oral, QHS, Tracy Ospina MD, 15 mg at 07/23/20 2112    naloxone 0.4 mg/mL injection 0.4 mg, 0.4 mg, Intravenous, PRN, Mukund Chi MD    OLANZapine injection 10 mg, 10 mg, Intramuscular, Q8H PRN, Kahlil Banegas MD    ondansetron disintegrating tablet 4 mg, 4 mg, Oral, Q8H PRN, Yin Soto PA-C, 4 mg at 07/23/20 2120    pantoprazole EC tablet 40 mg, 40 mg, Oral, BID, Kahlil Banegas MD, 40 mg at 07/24/20 0917    potassium chloride CR capsule 30 mEq, 30 mEq, Oral, Once, Mukund Chi MD    potassium chloride SA CR tablet 40 mEq, 40 mEq, Oral, Once, Mukund Chi MD    potassium chloride SA CR tablet 40 mEq, 40 mEq, Oral, Once, Mukund Chi MD    promethazine tablet 12.5 mg, 12.5 mg, Oral, Q6H PRN, Yin Soto PA-C, 12.5 mg at  07/24/20 0108    senna-docusate 8.6-50 mg per tablet 1 tablet, 1 tablet, Oral, BID PRN, Tracy Ospina MD, 1 tablet at 07/14/20 0135    sodium chloride 0.9% flush 10 mL, 10 mL, Intravenous, PRN, Rsoy Chavez MD    sodium chloride 0.9% flush 5 mL, 5 mL, Intravenous, PRN, Tracy Ospina MD    sodium chloride, sodium chloride, sodium chloride, acetaminophen, dextrose 50%, dextrose 50%, haloperidol lactate **AND** diphenhydrAMINE **AND** lorazepam, haloperidol lactate **AND** diphenhydrAMINE **AND** lorazepam, glucagon (human recombinant), glucose, glucose, HYDROmorphone, HYDROmorphone, insulin aspart U-100, iohexol, melatonin, metoclopramide HCl, naloxone, OLANZapine, ondansetron, promethazine, senna-docusate 8.6-50 mg, sodium chloride 0.9%, sodium chloride 0.9%    Laboratory/Diagnostic Data:  Reviewed and noted in plan where applicable- Please see chart for full lab data.    Recent Labs   Lab 07/23/20  0950 07/23/20  1843 07/24/20  0543   WBC 12.90* 13.00* 14.99*  14.99*   HGB 6.0* 8.0* 7.3*  7.3*   HCT 19.4* 25.9* 24.0*  24.0*   * 451* 457*  457*       Recent Labs   Lab 07/19/20  0553 07/20/20  0353 07/23/20  0950 07/24/20  0543   * 134* 134* 131*  131*   K 3.8 3.9 4.2 4.1  4.1    102 101 100  100   CO2 27 25 29 25  25   BUN 10 7 8 10  10   CREATININE 0.6 0.6 0.7 0.7  0.7   * 228* 314* 282*  282*   CALCIUM 8.3* 8.0* 8.1* 8.3*  8.3*   MG 1.7 1.7  --  1.7   PHOS 3.6 3.0  --  3.2       Recent Labs   Lab 07/20/20  0353 07/23/20  0950 07/24/20  0543   ALKPHOS 129 105 99  99   ALT 14 8* 9*  9*   AST 14 12 16  16   ALBUMIN 1.9* 2.0* 2.1*  2.1*   PROT 5.6* 5.5* 5.7*  5.7*   BILITOT 0.5 0.4 0.6  0.6        Microbiology labs for the last week  Microbiology Results (last 7 days)     Procedure Component Value Units Date/Time    Culture, Body Fluid - Bactec [[id number]] Collected: 07/23/20 2100    Order Status: Completed Specimen: Body Fluid from Ear, Left Updated:  "07/24/20 0715     Body Fluid Culture, Sterile No Growth to date    Narrative:      Purulent drainage from abdomen    Blood culture [[id number]] Collected: 07/15/20 1711    Order Status: Completed Specimen: Blood from Antecubital, Right Arm Updated: 07/20/20 2012     Blood Culture, Routine No growth after 5 days.    Blood culture [[id number]] Collected: 07/15/20 1711    Order Status: Completed Specimen: Blood from Antecubital, Left Arm Updated: 07/20/20 2012     Blood Culture, Routine No growth after 5 days.           Imaging Results    None         Estimated body mass index is 24.2 kg/m² as calculated from the following:    Height as of this encounter: 5' 7" (1.702 m).    Weight as of this encounter: 70.1 kg (154 lb 8.7 oz).    I & O (Last 24H):    Intake/Output Summary (Last 24 hours) at 7/24/2020 1146  Last data filed at 7/24/2020 0800  Gross per 24 hour   Intake 850 ml   Output no documentation   Net 850 ml       Body mass index is 24.2 kg/m².    Estimated Creatinine Clearance: 90.4 mL/min (based on SCr of 0.7 mg/dL).      Cardiac:  ECG Results          EKG 12-lead (Final result)  Result time 07/12/20 10:59:23    Final result by Interface, Lab In [facility] (07/12/20 10:59:23)             Narrative:    Test Reason : D64.9,    Vent. Rate : 080 BPM     Atrial Rate : 080 BPM     P-R Int : 136 ms          QRS Dur : 086 ms      QT Int : 402 ms       P-R-T Axes : 076 063 012 degrees     QTc Int : 463 ms    Sinus rhythm with occasional Premature ventricular complexes  Low voltage QRS  Low septal forces  Abnormal ECG  No previous ECGs available  Confirmed by Dylan OCHOA MD ([id number]) on 7/12/2020 10:59:17 AM    Referred By: [email or web address]   SELF           Confirmed By:Dylan OCHOA MD                            ASSESSMENT/PLAN:     Active Problems:      Active Hospital Problems    Diagnosis  POA    *Symptomatic anemia [D64.9]GIB Pathway initiated  Likely bleeding from GISt  Hgb 6.2 on admit, down from 9.7 beginning of June  Check iron " studies and hemolysis labs  Protonix 40mg IV BID  GI consulted, appreciate assistance  Continue diet as no procedures on Sunday  Type and screen, blood consent obtained  CBCq 8h.  Transfuse for Hemoglobin <7.  Transfuse 1 unit PRBCs  7/12 haptoglobin normal and reticulocyte count elevated.  Gastroenterology consulted  repeat CBC at 6.6 . transfusion with 1 unit of PRBC. GI recommends EGD and colonoscopy for further evaluation of iron deficiency anemia patient adamantly refuses exam and EGD/ colonoscopy  7/13  hemoglobin at 8 3 status post 2 units of pack RBC transfusion.agrees for EGD/ colonoscopy.  7/14 Hb 8.1 --> 7.6.Clear liquids today and NPO from midnight EGD/colonoscopy in a.m.  7/15 refused to drink GoLYTELY overnight.   7/23 agreed for blood draw today hemoglobin repeated at 6 transfusion with 1 unit of pack RBC      Transaminitis 7/15-  AST//112 with normal bilirubin and mildly elevated alk-phos at 337. Consider right upper quadrant sonogram if not trending down        Yes    Hypokalemia [E87.6]K 2.9-->3  Check Mag  Resolved      B12 deficiency- levels of 192 started on vitamin B12 subcutaneous dissection  Yes    Acute deep vein thrombosis (DVT) of popliteal vein of left lower extremity [I82.432]June 2020  S/p IVC filter     Yes     S/p IVC filter June 2020      Delusional disorder [F22]/12   presenting to the ED via [facility] with [facility] stating patient was at her estranged 's house  threatening she would kill the estranged .  PEC placed for delusional behavior. Work-up significant for H/H 6.2/20 (baseline 9/30 through care everywhere). Rectal exam performed without evidence of blood or melena. FOBT positive.   alert, delusional. Refusing to give  personal belongings, and agitation with staff.  4 point restraints  7/13 agitated overnight requesting cell phone.  Patient off restraints.Continue home Abilify 10 mg and Remeron 15 mg nightly. Increased Zyprexa from 5 mg to 10 mg q8h IM PRN for  agitation  7/14 Hb 8.1 --> 7.6. Requesting  her purse and  belongings she needs to get to her (Geovany Ring)  7/15. Hemoglobin stable at 7.8 leukocytosis of 15 but afebrile.    7/22   refusal of taking medications and vitals, .As [facility] expiring 7/26 , plan to pursue DELORES filing as pt remains delusional and uncooperative and needs psychiatric hospital placement. Forced med protocol with Haldol, Ativan and Benadry. Aristada 441 mg IM injection once given 7/22, to be given [id number]. Started Atarax 50 mg nightly for insomnia  7/23 EKG with QTC at 448milliseconds.Haldol 5 mg PO, Benadryl 50 mg PO, Ativan 2 mg PO PRN for non-redirectable agitation  7/24.  judicial commitment in process.  Patient's  okay with nursing home with hospice as the patient is noncompliant with medications.  does not want to be decision-maker.        Yes    Bipolar disorder in partial remission [F31.70]PECed in the ER  Recent admission to [facility] from [facility] beginning of June  Continue Abilify 10mg  Continue Remeron 15mg PO qHS  on Abilify Aristada BAUM, last received this month according to patient.  needs to be checked with primary psychiatrist   7/13  Increased Zyprexa from 5 mg to 10 mg q8h IM PRN for agitation.daily EKG to monitor QTc. 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  Norco discontinued and started oxycodone 20 mg Q 6 hourly p.r.n.     Significant left upper quadrant abdominal pain prior to endoscopy today.  Endoscopy canceled.  CT abdomen with IV and oral contrast ordered.  General surgery consulted.  Started on Zosyn and vancomycin empirically.  Blood cultures x2 pending  7/22  Continued on ciprofloxacin and metronidazole since 07/16 7/24 purulent drainage from upper abdomen at tumor site.  culture obtained      Yes    Type 2 diabetes mellitus without complication, without long-term current use of insulin [E11.9]   Patient's FSGs are controlled on current hypoglycemics.   Last A1c reviewed-   Lab Results   Component Value Date    HGBA1C 6.3 (H) 07/11/2020     Home DM regimen:  Metformin  SSI with POCT accuchecks and Diabetic diet  Yes      Resolved Hospital Problems   No resolved problems to display.         Disposition- [facility], likely psychiatric hospital.  Working on judicial commitment    DVT prophylaxis addressed with: SCDs            Subsequent Hospital Care     Level 2 [id number] Total visit time was 25 minutes or greater with greater than 50% of time spent in counseling and coordination of care.       We discussed in detail the plan of care, the patient's response to treatment, the discharge plan,.  Total time includes time spent reviewing the medical record, examining the patient, writing notes and communicating with other professionals.    Mukund Chi MD  Attending Staff Physician  [facility]  pager- [phone]  [facility] - [phone]                 I

## 2020-07-24 NOTE — SUBJECTIVE & OBJECTIVE
"Interval History:   As above    Family History     None        Tobacco Use    Smoking status: Current Every Day Smoker     Packs/day: 10.00     Types: Cigarettes    Smokeless tobacco: Never Used   Substance and Sexual Activity    Alcohol use: No    Drug use: No    Sexual activity: Not on file     Psychotherapeutics (From admission, onward)    Start     Stop Route Frequency Ordered    07/21/20 1300  ARIPiprazole lauroxil 441 mg/1.6 mL injection 441 mg     Question Answer Comment   Brand Name: ARISTADA (R)    Generic name: none    Form: Injectable    Length of Therapy: Indefinite    Reason for Non-Formulary: No equivalent formulary medication available    How soon needed? (if approved, may take up to 5 days to procure): 48-72 hrs    Requestor's Contact Information: Shirley Lopez or ON CALL psychiatry        -- IM Every 30 days 07/21/20 1219    07/20/20 1036  lorazepam injection 2 mg      -- IM Every 6 hours PRN 07/20/20 0946    07/20/20 1034  haloperidol lactate injection 5 mg      -- IM Every 6 hours PRN 07/20/20 0946    07/20/20 1031  lorazepam injection 2 mg      -- IV Every 6 hours PRN 07/20/20 0933    07/20/20 1030  haloperidol lactate injection 5 mg      -- IV Every 6 hours PRN 07/20/20 0933    07/20/20 1030  ARIPiprazole tablet 30 mg      -- Oral Daily 07/20/20 0927    07/16/20 1833  OLANZapine injection 10 mg      -- IM Every 8 hours PRN 07/16/20 1834    07/11/20 2245  mirtazapine tablet 15 mg      -- Oral Nightly 07/11/20 2132           Review of Systems   Unable to perform ROS: Psychiatric disorder     Objective:     Vital Signs (Most Recent):  Temp: 98.2 °F (36.8 °C) (07/24/20 0338)  Pulse: 80 (07/24/20 0917)  Resp: 16 (07/24/20 0945)  BP: 119/63 (07/24/20 0917)  SpO2: 99 % (07/24/20 0917) Vital Signs (24h Range):  Temp:  [97.4 °F (36.3 °C)-98.7 °F (37.1 °C)] 98.2 °F (36.8 °C)  Pulse:  [77-99] 80  Resp:  [10-19] 16  SpO2:  [94 %-99 %] 99 %  BP: (101-119)/(48-79) 119/63     Height: 5' 7" (170.2 " cm)  Weight: 70.1 kg (154 lb 8.7 oz)  Body mass index is 24.2 kg/m².      Intake/Output Summary (Last 24 hours) at 7/24/2020 1053  Last data filed at 7/24/2020 0800  Gross per 24 hour   Intake 850 ml   Output --   Net 850 ml       Physical Exam:  General appearance: NAD  Head: NCAT  Lungs: no increased work of breath  Heart: no displacement of PMI  Abdomen: soft, distended  Extremities: extremities normal, atraumatic  Skin: warm, dry, pallor     Mental Status Exam:  Appearance: older than stated age, disheveled  Behavior/Cooperation: eye contact normal, pleasant today  Speech: normal rate, tone, volume  Mood: good  Affect: mood congruent  Thought Process: logical  Thought Content: delusions: yes, erotomanic, persecutory  Orientation: grossly intact  Memory: Grossly intact  Attention Span/Concentration: Normal  Insight: fair-poor, improving  Judgment: poor    Significant Labs:   Last 24 Hours:   Recent Lab Results       07/24/20  0738   07/24/20  0543   07/23/20  2100   07/23/20  1843   07/23/20  1720        Unit Blood Type Code               Unit Expiration               Unit Blood Type               Albumin   2.1              2.1           Alkaline Phosphatase   99              99           ALT   9              9           Anion Gap   6              6           AST   16              16           Baso #   0.05   0.07          0.05           Basophil%   0.3   0.5          0.3           BILIRUBIN TOTAL   0.6  Comment:  For infants and newborns, interpretation of results should be based  on gestational age, weight and in agreement with clinical  observations.  Premature Infant recommended reference ranges:  Up to 24 hours.............<8.0 mg/dL  Up to 48 hours............<12.0 mg/dL  3-5 days..................<15.0 mg/dL  6-29 days.................<15.0 mg/dL                0.6  Comment:  For infants and newborns, interpretation of results should be based  on gestational age, weight and in agreement with  clinical  observations.  Premature Infant recommended reference ranges:  Up to 24 hours.............<8.0 mg/dL  Up to 48 hours............<12.0 mg/dL  3-5 days..................<15.0 mg/dL  6-29 days.................<15.0 mg/dL             Body Fluid Culture, Sterile     No Growth to date[P]         BUN, Bld   10              10           Calcium   8.3              8.3           Chloride   100              100           CO2   25              25           CODING SYSTEM               Creatinine   0.7              0.7           Differential Method   Automated   Automated          Automated           DISPENSE STATUS               eGFR if    >60.0              >60.0           eGFR if non    >60.0  Comment:  Calculation used to obtain the estimated glomerular filtration  rate (eGFR) is the CKD-EPI equation.                 >60.0  Comment:  Calculation used to obtain the estimated glomerular filtration  rate (eGFR) is the CKD-EPI equation.              Eos #   0.1   0.1          0.1           Eosinophil%   0.4   1.0          0.4           Glucose   282              282           Gran # (ANC)   9.3   7.4          9.3           Gran%   62.3   56.5          62.3           Group & Rh               Hematocrit   24.0   25.9          24.0           Hemoglobin   7.3   8.0          7.3           Immature Grans (Abs)   0.52  Comment:  Mild elevation in immature granulocytes is non specific and   can be seen in a variety of conditions including stress response,   acute inflammation, trauma and pregnancy. Correlation with other   laboratory and clinical findings is essential.     0.41  Comment:  Mild elevation in immature granulocytes is non specific and   can be seen in a variety of conditions including stress response,   acute inflammation, trauma and pregnancy. Correlation with other   laboratory and clinical findings is essential.            0.52  Comment:  Mild elevation in immature granulocytes is  non specific and   can be seen in a variety of conditions including stress response,   acute inflammation, trauma and pregnancy. Correlation with other   laboratory and clinical findings is essential.             Immature Granulocytes   3.5   3.2          3.5           INDIRECT THOMAS               Lymph #   3.4   3.3          3.4           Lymph%   22.5   25.1          22.5           Magnesium   1.7           MCH   30.9   31.5          30.9           MCHC   30.4   30.9          30.4           MCV   102   102          102           Mono #   1.7   1.8          1.7           Mono%   11.0   13.7          11.0           MPV   9.8   10.5          9.8           nRBC   1   1          1           Phosphorus   3.2           Platelets   457   451          457           POCT Glucose 347       310     Potassium   4.1              4.1           Product Code               PROTEIN TOTAL   5.7              5.7           RBC   2.36   2.54          2.36           RDW   18.5   18.6          18.5           Sodium   131              131           UNIT NUMBER               WBC   14.99   13.00          14.99                            07/23/20  1205   07/23/20  1141        Unit Blood Type Code   5100     Unit Expiration   [id number]     Unit Blood Type   O POS     Albumin         Alkaline Phosphatase         ALT         Anion Gap         AST         Baso #         Basophil%         BILIRUBIN TOTAL         Body Fluid Culture, Sterile         BUN, Bld         Calcium         Chloride         CO2         CODING SYSTEM   [id number]     Creatinine         Differential Method         DISPENSE STATUS   TRANSFUSED     eGFR if          eGFR if non          Eos #         Eosinophil%         Glucose         Gran # (ANC)         Gran%         Group & Rh   O POS     Hematocrit         Hemoglobin         Immature Grans (Abs)         Immature Granulocytes         INDIRECT THOMAS   NEG     Lymph #         Lymph%          Magnesium         MCH         MCHC         MCV         Mono #         Mono%         MPV         nRBC         Phosphorus         Platelets         POCT Glucose 304       Potassium         Product Code   [id number]     PROTEIN TOTAL         RBC         RDW         Sodium         UNIT NUMBER   [id number]     WBC             Significant Imaging: None    Physical Exam

## 2020-07-24 NOTE — PLAN OF CARE
"Advance Care Planning   [facility]  Palliative Care   Follow Up Note:    Patient Name: Violeta Boudreaux  MRN: [id number]  Admission Date: 7/11/2020  Hospital Length of Stay: 7 days  Code Status: Full Code   Attending Provider: Mukund Chi MD  Palliative Care Provider: VONDA Agosto, APRN, AGCNS  Primary Care Physician: Otf Brownlee MD  Principal Problem:Psychological factors affecting medical condition      Phone Conference held today with pt's  Gregorio Boudreaux ([phone]) with hospitals care [facility].     stated that he thought it over and DOES want to be pt's decision-maker.  stated that he feels that he has her best interest.  stated that he has been  to pt from [phone] and re- her in 2015.  stated that he has concerns about her brother being her decision-maker as he is on methadone and is only worried about getting any money he can from the pt.       able to state that his goal is to have pt committed to an institution so she can receive her psych meds and also her oral chemo.  understands that the pt's chemo keeps her cancer "from spreading".   stated that if pt once again is not compliant with her meds, he is open to hospice care in a nursing facility.   unable to care for pt.    Will continue to follow and provide support to pt's .      Rand Hicks, KRISTINE, [facility]                                     "

## 2020-07-24 NOTE — ASSESSMENT & PLAN NOTE
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  Her estranged  reported that she showed up at his house, claiming that she was  to Geovany Ring from [employer] and he was being hid inside the estranged 's house. He reports that she is homeless, and hasn't been taking her psych medications.   Pt is alert, oriented, to person, place, and situation somewhat. Pt is a full code. Pt is on CEC.     Reason for consult: Placement goals of care    Goals of care/advance care planning:    Called and spoke to pt's  over-the-phone along with Rand Hicks LCSW. Pt's , Gregorio Boudreaux stated that he thought it over and wants to be pt's medical decision-maker due to he next of kin and has her best interest.  states he has been  to pt from [phone] and remarried 2015 - present. Per , he does not want pt's brother to be medical decision-maker due to brother is on methadone and is only worried about getting any money he can from pt.     Per , his goal is to have pt comitted to an institution so she can receive her psych meds and also her oral chemo. Per , if pt is once again not complaint with meds, he is open to hospice care in a nursing facility. He is unable to care for her.     7/24/2020  Spoke to pt's ex-. Pt is estranged from . He has a restraining order against pt. Pt's  does not want to be medical decision-maker.  Per pt's , she is capable of very violent behavior and he can no longer care for her due to her mental illness. Per pt's , pt is non-complaint with meds and would only take them intermittently. 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 Per pt's , she has had delusions since 1990 about Geovany Ring from [employer]. Per pt's , she has been arrested for  "multiple felonies. Per pt's  pt is homeless.   Per pt's , he does "not believe she will ever come out of this."    Per pt's , she had a daughter out of "wedlock". He does not have contact information. Per pt's , she has not had contact with daughter in many years.       Pt has a brother, Ridge Cheney ([phone]).    Process for DELORES filing started per Psychiatry.     Plan:   Pt is on CEC.   DELORES filing in process per Psychiatry.    does not want to be decision-maker.   Will follow-up.   "

## 2020-07-24 NOTE — PLAN OF CARE
Problem: Fall Injury Risk  Goal: Absence of Fall and Fall-Related Injury  Outcome: Ongoing, Progressing     Problem: Adjustment to Illness (Gastrointestinal Bleeding)  Goal: Optimal Coping with Acute Illness  Outcome: Ongoing, Progressing     Problem: Bleeding (Gastrointestinal Bleeding)  Goal: Hemostasis  Outcome: Ongoing, Progressing     Problem: Adult Inpatient Plan of Care  Goal: Plan of Care Review  Outcome: Ongoing, Progressing  Goal: Patient-Specific Goal (Individualization)  Outcome: Ongoing, Progressing  Goal: Absence of Hospital-Acquired Illness or Injury  Outcome: Ongoing, Progressing  Goal: Optimal Comfort and Wellbeing  Outcome: Ongoing, Progressing  Goal: Readiness for Transition of Care  Outcome: Ongoing, Progressing  Goal: Rounds/Family Conference  Outcome: Ongoing, Progressing     Problem: Diabetes Comorbidity  Goal: Blood Glucose Level Within Desired Range  Outcome: Ongoing, Progressing     Problem: Restraint, Nonbehavioral (Nonviolent)  Goal: Discontinuation Criteria Achieved  Outcome: Ongoing, Progressing  Goal: Personal Dignity and Safety Maintained  Outcome: Ongoing, Progressing     Problem: Coping Ineffective  Goal: Effective Coping  Outcome: Ongoing, Progressing     Problem: Pain Chronic (Persistent)  Goal: Acceptable Pain Control and Functional Ability  Outcome: Ongoing, Progressing   Patient awake most of the night. Drsg to upper epigastric area intact. Drainage to area has slowed down. Patient still getting IV pain med every 3 hours. Sitter remains at bedside. Will monitor.

## 2020-07-25 NOTE — PROGRESS NOTES
Progress Note  Hospital Medicine    Admit Date: 7/11/2020  Length of Stay:  LOS: 8 days     SUBJECTIVE:         Follow-up For:  Psychological factors affecting medical condition    HPI/Interval history (See H&P for complete P,F,SHx) :     Ms. Violeta Boudreaux is a 54 y.o. female with Bipolar Disorder, delusional disorder, GIST on Sunitinib, and recent LLE DVT s/p IVC filter (June 2020) who presents to the ER by [facility] for delusions.  Her estranged  reports that she showed up at his house, claiming that she was  to Geovany Ring from [employer] and he was being hid inside the estranged 's house. He reports that she is homeless, and hasn't been taking her psych medications.  She denies any complaints at this time.  Labs on arrival showed a Hemoglobin 6.2 down from 9.7 in June 2020.  She endorses midepigastric abdominal abdomina.  She denies any blood in her stools, dark stools, or vaginal bleeding.  She mentions that a few weeks ago, she was having bad headaches and took a lot of Advil, then causing her to have hematemesis.  She denies further bleeding or use of Aspirin or NSAIDS.  Of note, at the end of May, she was hospitalized at [facility] for delusional disorder.  At that time, she was found to have a Hemoglobin 6.4.  She was given blood and her hemolgobin improved to 9.7.  It was thought that her bleeding with 2/2 her GIST tumor.  She was not evaluated by GI.  Also during that admission, she was found to have a LLE DVT.  HemeOnc suggested IVC filter, given her lack of consistency with medications.  She was [facility] to the [facility].  She was discharged on Abilify 10mg and Mirtazapine 15mg qHS.       In the ER, she was PECed for grave disability.  SUMIT was positive.  She was transfused 1 unit PRBCs.  She mentions that Bert Gorman will pay for every drop of blood we take from her, and that she wants to return to her house in [place] when discharged.  She was admitted to Hospital Medicine for GI and Psych  evaluations.        Interval history  7/12   presenting to the ED via [facility] with [facility] stating patient was at her estranged 's house  threatening she would kill the estranged .  PEC placed for delusional behavior. Work-up significant for H/H 6.2/20 (baseline 9/30 through care everywhere). Rectal exam performed without evidence of blood or melena. FOBT positive.   alert, delusional. Refusing to give  personal belongings, and agitation with staff.  4 point restraints were applied. repeat CBC at 6.6 . transfusion with 1 unit of PRBC. GI recommends EGD and colonoscopy for further evaluation of iron deficiency anemia patient adamantly refuses exam and EGD/ colonoscopy  7/13 agitated overnight requesting cell phone.  Patient was placed 4 point  restraints hemoglobin at 8 3 status post 2 units of pack RBC transfusion . agrees for EGD/ colonoscopy.Patient off restraints.Continue home Abilify 10 mg and Remeron 15 mg nightly. Increased Zyprexa from 5 mg to 10 mg q8h IM PRN for agitation.daily EKG to monitor QTc- 447ms   7/14 Hb 8.1 --> 7.6. Requesting  her purse and  belongings she needs to get to her (Geovany Ring).  Clear liquids today and NPO from midnight EGD/colonoscopy in a.m. complains of abdominal, takes oxycodone 30 mg Q 6 hourly as per chart review (reviewed  last filled on 06/23).  Norco discontinued and started oxycodone 20 mg Q 6 hourly p.r.n.  7/15 refused to drink GoLYTELY overnight.  Hemoglobin stable at 7.8 leukocytosis of 15 but afebrile.  Transaminitis AST//112 with normal bilirubin and mildly elevated alk-phos at 337.  Significant left upper quadrant abdominal pain prior to endoscopy today.  Endoscopy canceled.  CT abdomen with IV and oral contrast ordered.  General surgery consulted.  Started on Zosyn and vancomycin empirically.  Blood cultures x2 with no growth  7/22  Continued on ciprofloxacin and metronidazole since 07/16. refusal of taking medications and vitals, .As [facility]  expiring 7/26 , plan to pursue DELORES filing as pt remains delusional and uncooperative and needs psychiatric hospital placement. Forced med protocol with Haldol, Ativan and Benadry. Aristada 441 mg IM injection once given 7/22, to be given [id number]. Started Atarax 50 mg nightly for insomnia  7/23 agreed for blood draw today hemoglobin repeated at 6 transfusion with 1 unit of pack RBC  7/24 purulent drainage from upper abdomen at tumor site. culture obtained .Hb 7.2 .  judicial commitment in process.  Patient's  okay with nursing home with hospice as the patient is noncompliant with medications.  does not want to be decision-maker.  7/25  Abdominal x-ray-  No convincing evidence of pneumoperitoneum on this limited single portable view up. abdominal fluid culture from 07/23 with no growth.  Wound Care consulted.  Hemoglobin at 6.7 transfusion with 1 unit of pack RBC today.  Discussed with .   mentions the patient's brother is a bad influence and only involved with the patient to obtain her money. he is okay with patient being DNR/ hospice placement.       Review of Systems:   Pain scale:  abdominal pain 10/10   Constitutional: neg for fever or chills     Respiratory: neg for cough or shortness of breath  Cardiovascular: neg for chest pain or palpitations  Gastrointestinal: neg for nausea or vomiting, positive  for abdominal pain or change in bowel habits  Genitourinary: neg for hematuria or dysuria  Integument/Breast: neg for rash or pruritis  Hematologic/Lymphatic: neg for easy bruising or lymphadenopathy  Musculoskeletal: neg for arthralgias or myalgias  Neurological: neg for seizures or tremors  Behavioral/Psych: neg for depression or anxiety+ delusions    OBJECTIVE:     Vital Signs Range (Last 24H):  Temp:  [98.1 °F (36.7 °C)-98.7 °F (37.1 °C)]   Pulse:  [80-96]   Resp:  [16-18]   BP: (110-125)/(53-69)   SpO2:  [96 %-100 %]     Physical Exam:  Constitutional: Appears well-developed and  well-nourished.   Head: Normocephalic and atraumatic. sitter at the bedside  Neck: Normal range of motion. Neck supple.   Cardiovascular: Normal heart rate.  Regular heart rhythm.  Pulmonary/Chest: Effort normal.   Abdominal: No distension.  tender epigastrium and left quadrant  Musculoskeletal: Normal range of motion. No edema.   Neurological: Alert and oriented to person, place, and time.   Skin: Skin is warm and dry.   Psychiatric: Normal mood and affect. delusional       Medications:  Medication list was reviewed and changes noted under Assessment/Plan.      Current Facility-Administered Medications:     0.9%  NaCl infusion (for blood administration), , Intravenous, Q24H PRN, Ghassan Roca PA-C    0.9%  NaCl infusion (for blood administration), , Intravenous, Q24H PRN, Mukund Chi MD    0.9%  NaCl infusion (for blood administration), , Intravenous, Q24H PRN, Mukund Chi MD    acetaminophen tablet 650 mg, 650 mg, Oral, Q4H PRN, Tracy Ospina MD, 650 mg at 07/15/20 2004    ARIPiprazole lauroxil 441 mg/1.6 mL injection 441 mg, 441 mg, Intramuscular, Q30 Days, Kahlil Banegas MD, 441 mg at 20 1341    ARIPiprazole tablet 30 mg, 30 mg, Oral, Daily, Shirley Lopez MD, 30 mg at 20 0917    ciprofloxacin HCl tablet 500 mg, 500 mg, Oral, Q12H, Kahlil Banegas MD, 500 mg at 20    [] cyanocobalamin injection 1,000 mcg, 1,000 mcg, Subcutaneous, Daily, 1,000 mcg at 20 0915 **FOLLOWED BY** cyanocobalamin injection 1,000 mcg, 1,000 mcg, Subcutaneous, Q7 Days **FOLLOWED BY** [START ON 2020] cyanocobalamin injection 1,000 mcg, 1,000 mcg, Subcutaneous, Q30 Days, Mukund Chi MD    dextrose 50% injection 12.5 g, 12.5 g, Intravenous, PRN, Tracy Ospina MD    dextrose 50% injection 25 g, 25 g, Intravenous, PRN, Tracy Ospina MD    haloperidol lactate injection 5 mg, 5 mg, Intravenous, Q6H PRN, 5 mg at 20 0058 **AND** diphenhydrAMINE injection  50 mg, 50 mg, Intravenous, Q6H PRN, 50 mg at 07/22/20 2242 **AND** lorazepam injection 2 mg, 2 mg, Intravenous, Q6H PRN, Sihrley Lopez MD, 2 mg at 07/22/20 2242    haloperidol lactate injection 5 mg, 5 mg, Intramuscular, Q6H PRN **AND** diphenhydrAMINE injection 50 mg, 50 mg, Intramuscular, Q6H PRN **AND** lorazepam injection 2 mg, 2 mg, Intramuscular, Q6H PRN, Shirley Lopez MD    glucagon (human recombinant) injection 1 mg, 1 mg, Intramuscular, PRN, Tracy Ospina MD    glucose chewable tablet 16 g, 16 g, Oral, PRN, Tracy Ospina MD    glucose chewable tablet 24 g, 24 g, Oral, PRN, Tracy Ospina MD    HYDROmorphone injection 0.2 mg, 0.2 mg, Intravenous, Q4H PRN, Kahlil Banegas MD, 0.2 mg at 07/25/20 0201    HYDROmorphone injection 1 mg, 1 mg, Intravenous, Q4H PRN, Kahlil Banegas MD, 1 mg at 07/25/20 0526    hydrOXYzine tablet 50 mg, 50 mg, Oral, QHS, Kahlil Banegas MD, 50 mg at 07/24/20 2005    insulin aspart U-100 pen 1-10 Units, 1-10 Units, Subcutaneous, QID (AC + HS) PRN, Tracy Ospina MD, 3 Units at 07/24/20 2003    iohexol (OMNIPAQUE) oral solution 15 mL, 15 mL, Oral, PRN, Eusebia Almonte MD, 15 mL at 07/15/20 1715    lidocaine 5 % patch 1 patch, 1 patch, Transdermal, Q24H, Vick Monge PA-C, 1 patch at 07/15/20 2195    melatonin tablet 6 mg, 6 mg, Oral, Nightly PRN, Tracy Ospina MD, 6 mg at 07/24/20 2007    metoclopramide HCl injection 10 mg, 10 mg, Intravenous, Q6H PRN, Malcolm Smith MD    metroNIDAZOLE tablet 500 mg, 500 mg, Oral, Q8H, Kahlil Banegas MD, 500 mg at 07/25/20 0525    mirtazapine tablet 15 mg, 15 mg, Oral, QHS, Tracy Opsina MD, 15 mg at 07/24/20 2005    naloxone 0.4 mg/mL injection 0.4 mg, 0.4 mg, Intravenous, PRN, Mukund Chi MD    OLANZapine injection 10 mg, 10 mg, Intramuscular, Q8H PRN, Kahlil Banegas MD    ondansetron disintegrating tablet 4 mg, 4 mg, Oral, Q8H PRN, Yin Soto PA-C, 4 mg at 07/24/20 1952    pantoprazole  EC tablet 40 mg, 40 mg, Oral, BID, Kahill Banegas MD, 40 mg at 07/24/20 2005    potassium chloride CR capsule 30 mEq, 30 mEq, Oral, Once, Mukund Chi MD    potassium chloride SA CR tablet 40 mEq, 40 mEq, Oral, Once, Mukund Chi MD    potassium chloride SA CR tablet 40 mEq, 40 mEq, Oral, Once, Mukund Chi MD    promethazine tablet 12.5 mg, 12.5 mg, Oral, Q6H PRN, Yin Soto PA-C, 12.5 mg at 07/24/20 1844    senna-docusate 8.6-50 mg per tablet 1 tablet, 1 tablet, Oral, BID PRN, Tracy Ospina MD, 1 tablet at 07/14/20 0135    sodium chloride 0.9% flush 10 mL, 10 mL, Intravenous, PRN, Rosy Chavez MD    sodium chloride 0.9% flush 5 mL, 5 mL, Intravenous, PRN, Tracy Ospina MD    sodium chloride, sodium chloride, sodium chloride, acetaminophen, dextrose 50%, dextrose 50%, haloperidol lactate **AND** diphenhydrAMINE **AND** lorazepam, haloperidol lactate **AND** diphenhydrAMINE **AND** lorazepam, glucagon (human recombinant), glucose, glucose, HYDROmorphone, HYDROmorphone, insulin aspart U-100, iohexol, melatonin, metoclopramide HCl, naloxone, OLANZapine, ondansetron, promethazine, senna-docusate 8.6-50 mg, sodium chloride 0.9%, sodium chloride 0.9%    Laboratory/Diagnostic Data:  Reviewed and noted in plan where applicable- Please see chart for full lab data.    Recent Labs   Lab 07/23/20  0950 07/23/20  1843 07/24/20  0543   WBC 12.90* 13.00* 14.99*  14.99*   HGB 6.0* 8.0* 7.3*  7.3*   HCT 19.4* 25.9* 24.0*  24.0*   * 451* 457*  457*       Recent Labs   Lab 07/19/20  0553 07/20/20  0353 07/23/20  0950 07/24/20  0543   * 134* 134* 131*  131*   K 3.8 3.9 4.2 4.1  4.1    102 101 100  100   CO2 27 25 29 25  25   BUN 10 7 8 10  10   CREATININE 0.6 0.6 0.7 0.7  0.7   * 228* 314* 282*  282*   CALCIUM 8.3* 8.0* 8.1* 8.3*  8.3*   MG 1.7 1.7  --  1.7   PHOS 3.6 3.0  --  3.2       Recent Labs   Lab 07/20/20  0353 07/23/20  0950  "07/24/20  0543   ALKPHOS 129 105 99  99   ALT 14 8* 9*  9*   AST 14 12 16  16   ALBUMIN 1.9* 2.0* 2.1*  2.1*   PROT 5.6* 5.5* 5.7*  5.7*   BILITOT 0.5 0.4 0.6  0.6        Microbiology labs for the last week  Microbiology Results (last 7 days)     Procedure Component Value Units Date/Time    Culture, Body Fluid - Bactec [[id number]] Collected: 07/23/20 2100    Order Status: Completed Specimen: Body Fluid from Ear, Left Updated: 07/25/20 0613     Body Fluid Culture, Sterile No Growth to date      No Growth to date    Narrative:      Purulent drainage from abdomen    Blood culture [[id number]] Collected: 07/15/20 1711    Order Status: Completed Specimen: Blood from Antecubital, Right Arm Updated: 07/20/20 2012     Blood Culture, Routine No growth after 5 days.    Blood culture [[id number]] Collected: 07/15/20 1711    Order Status: Completed Specimen: Blood from Antecubital, Left Arm Updated: 07/20/20 2012     Blood Culture, Routine No growth after 5 days.           Imaging Results    None         Estimated body mass index is 24.2 kg/m² as calculated from the following:    Height as of this encounter: 5' 7" (1.702 m).    Weight as of this encounter: 70.1 kg (154 lb 8.7 oz).    I & O (Last 24H):    Intake/Output Summary (Last 24 hours) at 7/25/2020 0717  Last data filed at 7/25/2020 0600  Gross per 24 hour   Intake 480 ml   Output no documentation   Net 480 ml       Body mass index is 24.2 kg/m².    Estimated Creatinine Clearance: 90.4 mL/min (based on SCr of 0.7 mg/dL).      Cardiac:  ECG Results          EKG 12-lead (Final result)  Result time 07/12/20 10:59:23    Final result by Interface, Lab In [facility] (07/12/20 10:59:23)             Narrative:    Test Reason : D64.9,    Vent. Rate : 080 BPM     Atrial Rate : 080 BPM     P-R Int : 136 ms          QRS Dur : 086 ms      QT Int : 402 ms       P-R-T Axes : 076 063 012 degrees     QTc Int : 463 ms    Sinus rhythm with occasional Premature ventricular complexes  Low " voltage QRS  Low septal forces  Abnormal ECG  No previous ECGs available  Confirmed by Dylan OCHOA MD ([id number]) on 7/12/2020 10:59:17 AM    Referred By: [email or web address]   SELF           Confirmed By:Dylan OCHOA MD                            ASSESSMENT/PLAN:     Active Problems:      Active Hospital Problems    Diagnosis  POA    *Symptomatic anemia [D64.9]GIB Pathway initiated  Likely bleeding from GISt  Hgb 6.2 on admit, down from 9.7 beginning of June  Check iron studies and hemolysis labs  Protonix 40mg IV BID  GI consulted, appreciate assistance  Continue diet as no procedures on Sunday  Type and screen, blood consent obtained  CBCq 8h.  Transfuse for Hemoglobin <7.  Transfuse 1 unit PRBCs  7/12 haptoglobin normal and reticulocyte count elevated.  Gastroenterology consulted  repeat CBC at 6.6 . transfusion with 1 unit of PRBC. GI recommends EGD and colonoscopy for further evaluation of iron deficiency anemia patient adamantly refuses exam and EGD/ colonoscopy  7/13  hemoglobin at 8 3 status post 2 units of pack RBC transfusion.agrees for EGD/ colonoscopy.  7/14 Hb 8.1 --> 7.6.Clear liquids today and NPO from midnight EGD/colonoscopy in a.m.  7/15 refused to drink GoLYTELY overnight.   7/23 agreed for blood draw today hemoglobin repeated at 6 transfusion with 1 unit of pack RBC  7/25  Hemoglobin at 6.7 transfusion with 1 unit of pack RBC today.  Discussed with .   mentions the patient's brother is a bad influence and only involved with the patient to obtain her money. he is okay with patient being DNR/ hospice placement.       Transaminitis 7/15-  AST//112 with normal bilirubin and mildly elevated alk-phos at 337. Consider right upper quadrant sonogram if not trending down        Yes    Hypokalemia [E87.6]K 2.9-->3  Check Mag  Resolved      B12 deficiency- levels of 192 started on vitamin B12 subcutaneous dissection  Yes    Acute deep vein thrombosis (DVT) of popliteal vein of left lower extremity  [I82.432]June 2020  S/p IVC filter     Yes     S/p IVC filter June 2020      Delusional disorder [F22]/12   presenting to the ED via [facility] with [facility] stating patient was at her estranged 's house  threatening she would kill the estranged .  PEC placed for delusional behavior. Work-up significant for H/H 6.2/20 (baseline 9/30 through care everywhere). Rectal exam performed without evidence of blood or melena. FOBT positive.   alert, delusional. Refusing to give  personal belongings, and agitation with staff.  4 point restraints  7/13 agitated overnight requesting cell phone.  Patient off restraints.Continue home Abilify 10 mg and Remeron 15 mg nightly. Increased Zyprexa from 5 mg to 10 mg q8h IM PRN for agitation  7/14 Hb 8.1 --> 7.6. Requesting  her purse and  belongings she needs to get to her (Geovany Ring)  7/15. Hemoglobin stable at 7.8 leukocytosis of 15 but afebrile.    7/22   refusal of taking medications and vitals, .As [facility] expiring 7/26 , plan to pursue DELORES filing as pt remains delusional and uncooperative and needs psychiatric hospital placement. Forced med protocol with Haldol, Ativan and Benadry. Aristada 441 mg IM injection once given 7/22, to be given [id number]. Started Atarax 50 mg nightly for insomnia  7/23 EKG with QTC at 448milliseconds.Haldol 5 mg PO, Benadryl 50 mg PO, Ativan 2 mg PO PRN for non-redirectable agitation  7/24.  judicial commitment in process.  Patient's  okay with nursing home with hospice as the patient is noncompliant with medications.  does not want to be decision-maker.  7/25    Discussed with .   mentions the patient's brother is a bad influence and only involved with the patient to obtain her money. he is okay with patient being DNR/ hospice placement        Yes    Bipolar disorder in partial remission [F31.70]PECed in the ER  Recent admission to [facility] from [facility] beginning of June  Continue Abilify 10mg  Continue Remeron 15mg PO qHS  on  Evelina BAUM, last received this month according to patient.  needs to be checked with primary psychiatrist   7/13  Increased Zyprexa from 5 mg to 10 mg q8h IM PRN for agitation.daily EKG to monitor QTc. 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  Norco discontinued and started oxycodone 20 mg Q 6 hourly p.r.n. which did not relieve her pain patient currently on Dilaudid IV 0.2/1 mg for pain control     Significant left upper quadrant abdominal pain prior to endoscopy today.  Endoscopy canceled.  CT abdomen with IV and oral contrast ordered.  General surgery consulted.  Started on Zosyn and vancomycin empirically.  Blood cultures x2 pending  7/22  Continued on ciprofloxacin and metronidazole since 07/16 7/24 purulent drainage from upper abdomen at tumor site. culture obtained  7/25  Abdominal x-ray-  No convincing evidence of pneumoperitoneum on this limited single portable view up. abdominal fluid culture from 07/23 with no growth.  Wound Care consulted      Yes    Type 2 diabetes mellitus without complication, without long-term current use of insulin [E11.9]   Patient's FSGs are controlled on current hypoglycemics.   Last A1c reviewed-   Lab Results   Component Value Date    HGBA1C 6.3 (H) 07/11/2020     Home DM regimen:  Metformin  SSI with POCT accuchecks and Diabetic diet  Yes      Resolved Hospital Problems   No resolved problems to display.         Disposition- [facility], likely psychiatric hospital/ nursing home with hospice.  Working on judicial commitment    DVT prophylaxis addressed with: SCDs            Subsequent Hospital Care     Level 2 [id number] Total visit time was 25 minutes or greater with greater than 50% of time spent in counseling and coordination of care.       We  discussed in detail the plan of care, the patient's response to treatment, the discharge plan,.  Total time includes time spent reviewing the medical record, examining the patient, writing notes and communicating with other professionals.    Mukund Chi MD  Attending Staff Physician  [facility]  pager- [phone]  [employer] - [phone]                 I
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
## 2020-07-25 NOTE — SUBJECTIVE & OBJECTIVE
Interval History:   As above    Family History     None        Tobacco Use    Smoking status: Current Every Day Smoker     Packs/day: 10.00     Types: Cigarettes    Smokeless tobacco: Never Used   Substance and Sexual Activity    Alcohol use: No    Drug use: No    Sexual activity: Not on file     Psychotherapeutics (From admission, onward)    Start     Stop Route Frequency Ordered    07/21/20 1300  ARIPiprazole lauroxil 441 mg/1.6 mL injection 441 mg     Question Answer Comment   Brand Name: ARISTADA (R)    Generic name: none    Form: Injectable    Length of Therapy: Indefinite    Reason for Non-Formulary: No equivalent formulary medication available    How soon needed? (if approved, may take up to 5 days to procure): 48-72 hrs    Requestor's Contact Information: Shirley Lopez or ON CALL psychiatry        -- IM Every 30 days 07/21/20 1219    07/20/20 1036  lorazepam injection 2 mg      -- IM Every 6 hours PRN 07/20/20 0946    07/20/20 1034  haloperidol lactate injection 5 mg      -- IM Every 6 hours PRN 07/20/20 0946    07/20/20 1031  lorazepam injection 2 mg      -- IV Every 6 hours PRN 07/20/20 0933    07/20/20 1030  haloperidol lactate injection 5 mg      -- IV Every 6 hours PRN 07/20/20 0933    07/20/20 1030  ARIPiprazole tablet 30 mg      -- Oral Daily 07/20/20 0927    07/16/20 1833  OLANZapine injection 10 mg      -- IM Every 8 hours PRN 07/16/20 1834    07/11/20 2245  mirtazapine tablet 15 mg      -- Oral Nightly 07/11/20 2132           Review of Systems   Psychiatric/Behavioral: Negative for agitation, behavioral problems, confusion, dysphoric mood, hallucinations, sleep disturbance and suicidal ideas. The patient is not nervous/anxious.      Objective:     Vital Signs (Most Recent):  Temp: 98.7 °F (37.1 °C) (07/25/20 0526)  Pulse: 88 (07/25/20 0526)  Resp: 18 (07/25/20 0526)  BP: (!) 110/53 (07/25/20 0526)  SpO2: 96 % (07/25/20 0526) Vital Signs (24h Range):  Temp:  [98.1 °F (36.7 °C)-98.7 °F (37.1 °C)]  "98.7 °F (37.1 °C)  Pulse:  [80-96] 88  Resp:  [16-18] 18  SpO2:  [96 %-100 %] 96 %  BP: (110-125)/(53-69) 110/53     Height: 5' 7" (170.2 cm)  Weight: 70.1 kg (154 lb 8.7 oz)  Body mass index is 24.2 kg/m².      Intake/Output Summary (Last 24 hours) at 7/25/2020 0902  Last data filed at 7/25/2020 0800  Gross per 24 hour   Intake 600 ml   Output --   Net 600 ml       Physical Exam    CONSTITUTIONAL  General Appearance: unremarkable, age appropriate     PSYCHIATRIC   Level of Consciousness: alert, wake  Orientation: person, place, month of year  Grooming: fair, casually dressed  Behavior/Cooperation: normal, friendly and cooperative  Psychomotor: within normal limits  Speech: normal tone, normal rate, normal pitch, normal volume  Language: Fluent, answers questions appropriately, follows commands.  Mood: "great"  Affect: euphoric  Thought Process: normal and logical  Associations: normal and logical  Thought Content: denies SI/HI/AVH, endorses delusions of receiving gifts from Geovany  Memory: Grossly intact  Attention: intact  Fund of Knowledge: Intact  Insight: poor  Judgment: fair     Significant Labs: All pertinent labs within the past 24 hours have been reviewed.    Significant Imaging: I have reviewed all pertinent imaging results/findings within the past 24 hours.  "
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
## 2020-07-26 NOTE — PROGRESS NOTES
Progress Note  Hospital Medicine    Admit Date: 7/11/2020  Length of Stay:  LOS: 9 days     SUBJECTIVE:         Follow-up For:  Psychological factors affecting medical condition    HPI/Interval history (See H&P for complete P,F,SHx) :     Ms. Violeta Boudreaux is a 54 y.o. 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  Her estranged  reports that she showed up at his house, claiming that she was  to Geovany Ring from [employer] and he was being hid inside the estranged 's house. He reports that she is homeless, and hasn't been taking her psych medications.  She denies any complaints at this time.  Labs on arrival showed a Hemoglobin 6.2 down from 9.7 in June 2020.  She endorses midepigastric abdominal abdomina.  She denies any blood in her stools, dark stools, or vaginal bleeding.  She mentions that a few weeks ago, she was having bad headaches and took a lot of Advil, then causing her to have hematemesis.  She denies further bleeding or use of Aspirin or NSAIDS.  Of note, at the end of May, she was hospitalized at [facility] for delusional disorder.  At that time, she was found to have a Hemoglobin 6.4.  She was given blood and her hemolgobin improved to 9.7.  It was thought that her bleeding with 2/2 her GIST tumor.  She was not evaluated by GI.  Also during that admission, she was found to have a LLE DVT.  HemeOnc suggested IVC filter, given her lack of consistency with medications.  She was [facility] to the [facility].  She was discharged on Abilify 10mg and Mirtazapine 15mg qHS.       In the ER, she was PECed for grave disability.  SUMIT was positive.  She was transfused 1 unit PRBCs.  She mentions that Bert Gorman will pay for every drop of blood we take from her, and that she wants to return to her house in [place] when discharged.  She was admitted to Hospital Medicine for GI and Psych  evaluations.        Interval history  7/12   presenting to the ED via [facility] with [facility] stating patient was at her estranged 's house  threatening she would kill the estranged .  PEC placed for delusional behavior. Work-up significant for H/H 6.2/20 (baseline 9/30 through care everywhere). Rectal exam performed without evidence of blood or melena. FOBT positive.   alert, delusional. Refusing to give  personal belongings, and agitation with staff.  4 point restraints were applied. repeat CBC at 6.6 . transfusion with 1 unit of PRBC. GI recommends EGD and colonoscopy for further evaluation of iron deficiency anemia patient adamantly refuses exam and EGD/ colonoscopy  7/13 agitated overnight requesting cell phone.  Patient was placed 4 point  restraints hemoglobin at 8 3 status post 2 units of pack RBC transfusion . agrees for EGD/ colonoscopy.Patient off restraints.Continue home Abilify 10 mg and Remeron 15 mg nightly. Increased Zyprexa from 5 mg to 10 mg q8h IM PRN for agitation.daily EKG to monitor QTc- 447ms   7/14 Hb 8.1 --> 7.6. Requesting  her purse and  belongings she needs to get to her (Geovany Ring).  Clear liquids today and NPO from midnight EGD/colonoscopy in a.m. complains of abdominal, takes oxycodone 30 mg Q 6 hourly as per chart review (reviewed  last filled on 06/23).  Norco discontinued and started oxycodone 20 mg Q 6 hourly p.r.n.  7/15 refused to drink GoLYTELY overnight.  Hemoglobin stable at 7.8 leukocytosis of 15 but afebrile.  Transaminitis AST//112 with normal bilirubin and mildly elevated alk-phos at 337.  Significant left upper quadrant abdominal pain prior to endoscopy today.  Endoscopy canceled.  CT abdomen with IV and oral contrast ordered.  General surgery consulted.  Started on Zosyn and vancomycin empirically.  Blood cultures x2 with no growth  7/22  Continued on ciprofloxacin and metronidazole since 07/16. refusal of taking medications and vitals, .As [facility]  expiring 7/26 , plan to pursue DELORES filing as pt remains delusional and uncooperative and needs psychiatric hospital placement. Forced med protocol with Haldol, Ativan and Ciarra. Aristada 441 mg IM injection once given 7/22, to be given [id number]. Started Atarax 50 mg nightly for insomnia  7/23 agreed for blood draw today hemoglobin repeated at 6 transfusion with 1 unit of pack RBC  7/24 purulent drainage from upper abdomen at tumor site. culture obtained .Hb 7.2 .  judicial commitment in process.  Patient's  okay with nursing home with hospice as the patient is noncompliant with medications.  does not want to be decision-maker.  7/25  Abdominal x-ray-  No convincing evidence of pneumoperitoneum on this limited single portable view up. abdominal fluid culture from 07/23 with no growth.  Wound Care consulted.  Hemoglobin at 6.7 transfusion with 1 unit of pack RBC today.  Discussed with .   mentions the patient's brother is a bad influence and only involved with the patient to obtain her money. he is okay with patient being DNR/ hospice placement.   7/26hemoglobin at 6.4--> 6.9 .  Transfusion with 1 unit of pack RBC today.  Wound cultures from 07/23 of the abdomen with no growth.  Discussed with psychiatry regarding 's wish for DNR/hospice with judicial commitment process.  discussed with Infectious Disease CT abdomen findings 7/15 .  Continue ciprofloxacin and metronidazole indefinitely. refuses rectal exam today and EGD. mentions she has brown bowel movement today. direct antiglobulin positive. haptoglobin <10  and LDH ordered. Anemia likely from hemolysis. Discussed with  in detail and he agrees for DNR / hospice placement. he wants to visit her today      Review of Systems:   Pain scale:  abdominal pain 10/10   Constitutional: neg for fever or chills     Respiratory: neg for cough or shortness of breath  Cardiovascular: neg for chest pain or palpitations  Gastrointestinal:  neg for nausea or vomiting, positive  for abdominal pain or change in bowel habits  Genitourinary: neg for hematuria or dysuria  Integument/Breast: neg for rash or pruritis  Hematologic/Lymphatic: neg for easy bruising or lymphadenopathy  Musculoskeletal: neg for arthralgias or myalgias  Neurological: neg for seizures or tremors  Behavioral/Psych: neg for depression or anxiety+ delusions    OBJECTIVE:     Vital Signs Range (Last 24H):  Temp:  [98 °F (36.7 °C)-98.7 °F (37.1 °C)]   Pulse:  [84-96]   Resp:  [16-20]   BP: ()/(44-74)   SpO2:  [94 %-97 %]     Physical Exam:  Constitutional: Appears well-developed and well-nourished.   Head: Normocephalic and atraumatic. sitter at the bedside  Neck: Normal range of motion. Neck supple.   Cardiovascular: Normal heart rate.  Regular heart rhythm.  Pulmonary/Chest: Effort normal.   Abdominal: No distension.  tender epigastrium and left quadrant  Musculoskeletal: Normal range of motion. No edema.   Neurological: Alert and oriented to person, place, and time.   Skin: Skin is warm and dry.   Psychiatric: Normal mood and affect. delusional       Medications:  Medication list was reviewed and changes noted under Assessment/Plan.      Current Facility-Administered Medications:     0.9%  NaCl infusion (for blood administration), , Intravenous, Q24H PRN, Ghassan Roca PA-C    0.9%  NaCl infusion (for blood administration), , Intravenous, Q24H PRN, Mukund Chi MD    0.9%  NaCl infusion (for blood administration), , Intravenous, Q24H PRN, Mukund Chi MD    0.9%  NaCl infusion (for blood administration), , Intravenous, Q24H PRN, Mukund Chi MD    0.9%  NaCl infusion (for blood administration), , Intravenous, Q24H PRN, Mukund Chi MD    acetaminophen tablet 650 mg, 650 mg, Oral, Q4H PRN, Tracy Ospina MD, 650 mg at 07/15/20 2004    ARIPiprazole lauroxil 441 mg/1.6 mL injection 441 mg, 441 mg, Intramuscular, Q30 Days, Kahlil Banegas MD, 441  mg at 20 1341    ARIPiprazole tablet 30 mg, 30 mg, Oral, Daily, Shirley Lopez MD, 30 mg at 20 0859    ciprofloxacin HCl tablet 500 mg, 500 mg, Oral, Q12H, Kahlil Banegas MD, 500 mg at 20 2209    [] cyanocobalamin injection 1,000 mcg, 1,000 mcg, Subcutaneous, Daily, 1,000 mcg at 20 0915 **FOLLOWED BY** cyanocobalamin injection 1,000 mcg, 1,000 mcg, Subcutaneous, Q7 Days **FOLLOWED BY** [START ON 2020] cyanocobalamin injection 1,000 mcg, 1,000 mcg, Subcutaneous, Q30 Days, Mukund Chi MD    dextrose 50% injection 12.5 g, 12.5 g, Intravenous, PRN, Tracy Ospina MD    dextrose 50% injection 25 g, 25 g, Intravenous, PRN, Tracy Ospina MD    haloperidol lactate injection 5 mg, 5 mg, Intravenous, Q6H PRN, 5 mg at 20 1111 **AND** diphenhydrAMINE injection 50 mg, 50 mg, Intravenous, Q6H PRN, 50 mg at 20 1120 **AND** lorazepam injection 2 mg, 2 mg, Intravenous, Q6H PRN, Shirley Lopez MD, 2 mg at 20 1120    haloperidol lactate injection 5 mg, 5 mg, Intramuscular, Q6H PRN **AND** diphenhydrAMINE injection 50 mg, 50 mg, Intramuscular, Q6H PRN **AND** lorazepam injection 2 mg, 2 mg, Intramuscular, Q6H PRN, Shirley Lopez MD    glucagon (human recombinant) injection 1 mg, 1 mg, Intramuscular, PRN, Tracy Ospina MD    glucose chewable tablet 16 g, 16 g, Oral, PRN, Tracy Ospina MD    glucose chewable tablet 24 g, 24 g, Oral, PRN, Tracy Ospina MD    HYDROmorphone injection 0.2 mg, 0.2 mg, Intravenous, Q4H PRN, Kahlil Banegas MD, 0.2 mg at 20 0201    HYDROmorphone injection 1 mg, 1 mg, Intravenous, Q4H PRN, Kahlil Banegas MD, 1 mg at 20 0531    hydrOXYzine tablet 50 mg, 50 mg, Oral, QHS, Kahlil Banegas MD, 50 mg at 20 2044    insulin aspart U-100 pen 1-10 Units, 1-10 Units, Subcutaneous, QID (AC + HS) PRN, Tracy Ospina MD, 4 Units at 20 2213    iohexol (OMNIPAQUE) oral solution 15 mL, 15 mL, Oral, PRN,  Eusebia Almonte MD, 15 mL at 07/15/20 1715    lidocaine 5 % patch 1 patch, 1 patch, Transdermal, Q24H, Vick Monge PA-C, 1 patch at 07/15/20 2356    melatonin tablet 6 mg, 6 mg, Oral, Nightly PRN, Tracy Ospina MD, 6 mg at 07/24/20 2007    metoclopramide HCl injection 10 mg, 10 mg, Intravenous, Q6H PRN, Malcolm Smith MD    metroNIDAZOLE tablet 500 mg, 500 mg, Oral, Q8H, Kahlil Banegas MD, 500 mg at 07/25/20 2050    mirtazapine tablet 15 mg, 15 mg, Oral, QHS, Tracy Ospina MD, 15 mg at 07/25/20 2043    naloxone 0.4 mg/mL injection 0.4 mg, 0.4 mg, Intravenous, PRN, Mukund Chi MD    OLANZapine injection 10 mg, 10 mg, Intramuscular, Q8H PRN, Kahlil Banegas MD    ondansetron disintegrating tablet 4 mg, 4 mg, Oral, Q8H PRN, Yin Soto PA-C, 4 mg at 07/24/20 1952    pantoprazole EC tablet 40 mg, 40 mg, Oral, BID, Kahlil Banegas MD, 40 mg at 07/25/20 2043    potassium chloride CR capsule 30 mEq, 30 mEq, Oral, Once, Mukund Chi MD    potassium chloride SA CR tablet 40 mEq, 40 mEq, Oral, Once, Mukund Chi MD    potassium chloride SA CR tablet 40 mEq, 40 mEq, Oral, Once, Mukund Chi MD    promethazine tablet 12.5 mg, 12.5 mg, Oral, Q6H PRN, Yin Soto PA-C, 12.5 mg at 07/24/20 1844    senna-docusate 8.6-50 mg per tablet 1 tablet, 1 tablet, Oral, BID PRN, Tracy Ospina MD, 1 tablet at 07/14/20 0135    sodium chloride 0.9% flush 10 mL, 10 mL, Intravenous, PRN, Rosy Chavez MD    sodium chloride 0.9% flush 5 mL, 5 mL, Intravenous, PRN, Tracy Ospina MD    sodium chloride, sodium chloride, sodium chloride, sodium chloride, sodium chloride, acetaminophen, dextrose 50%, dextrose 50%, haloperidol lactate **AND** diphenhydrAMINE **AND** lorazepam, haloperidol lactate **AND** diphenhydrAMINE **AND** lorazepam, glucagon (human recombinant), glucose, glucose, HYDROmorphone, HYDROmorphone, insulin aspart U-100, iohexol, melatonin,  metoclopramide HCl, naloxone, OLANZapine, ondansetron, promethazine, senna-docusate 8.6-50 mg, sodium chloride 0.9%, sodium chloride 0.9%    Laboratory/Diagnostic Data:  Reviewed and noted in plan where applicable- Please see chart for full lab data.    Recent Labs   Lab 07/24/20  0543 07/25/20  0604 07/26/20  0042   WBC 14.99*  14.99* 14.73*  14.73* 13.87*   HGB 7.3*  7.3* 6.7*  6.7* 6.4*   HCT 24.0*  24.0* 22.7*  22.7* 20.7*   *  457* 488*  488* 425*       Recent Labs   Lab 07/24/20  0543 07/25/20  0604 07/26/20  0526   *  131* 129*  129* 134*  134*   K 4.1  4.1 4.3  4.3 3.8  3.8     100 97  97 103  103   CO2 25  25 25  25 25  25   BUN 10  10 8  8 7  7   CREATININE 0.7  0.7 0.7  0.7 0.6  0.6   *  282* 315*  315* 314*  314*   CALCIUM 8.3*  8.3* 8.2*  8.2* 7.6*  7.6*   MG 1.7 1.6 1.7   PHOS 3.2 3.5 2.5*       Recent Labs   Lab 07/24/20  0543 07/25/20  0604 07/26/20  0526   ALKPHOS 99  99 102  102 90  90   ALT 9*  9* 9*  9* 8*  8*   AST 16  16 16  16 20  20   ALBUMIN 2.1*  2.1* 2.2*  2.2* 1.9*  1.9*   PROT 5.7*  5.7* 5.9*  5.9* 5.2*  5.2*   BILITOT 0.6  0.6 0.4  0.4 0.4  0.4        Microbiology labs for the last week  Microbiology Results (last 7 days)     Procedure Component Value Units Date/Time    Culture, Body Fluid - Bactec [[id number]] Collected: 07/23/20 2100    Order Status: Completed Specimen: Body Fluid from Ear, Left Updated: 07/26/20 0613     Body Fluid Culture, Sterile No Growth to date      No Growth to date      No Growth to date    Narrative:      Purulent drainage from abdomen    Blood culture [[id number]] Collected: 07/15/20 1711    Order Status: Completed Specimen: Blood from Antecubital, Right Arm Updated: 07/20/20 2012     Blood Culture, Routine No growth after 5 days.    Blood culture [[id number]] Collected: 07/15/20 1711    Order Status: Completed Specimen: Blood from Antecubital, Left Arm Updated: 07/20/20 2012     Blood  "Culture, Routine No growth after 5 days.           Imaging Results    None         Estimated body mass index is 24.2 kg/m² as calculated from the following:    Height as of this encounter: 5' 7" (1.702 m).    Weight as of this encounter: 70.1 kg (154 lb 8.7 oz).    I & O (Last 24H):    Intake/Output Summary (Last 24 hours) at 7/26/2020 0900  Last data filed at 7/25/2020 2300  Gross per 24 hour   Intake 1055.42 ml   Output no documentation   Net 1055.42 ml       Body mass index is 24.2 kg/m².    Estimated Creatinine Clearance: 105.4 mL/min (based on SCr of 0.6 mg/dL).      Cardiac:  ECG Results          EKG 12-lead (Final result)  Result time 07/12/20 10:59:23    Final result by Interface, Lab In [facility] (07/12/20 10:59:23)             Narrative:    Test Reason : D64.9,    Vent. Rate : 080 BPM     Atrial Rate : 080 BPM     P-R Int : 136 ms          QRS Dur : 086 ms      QT Int : 402 ms       P-R-T Axes : 076 063 012 degrees     QTc Int : 463 ms    Sinus rhythm with occasional Premature ventricular complexes  Low voltage QRS  Low septal forces  Abnormal ECG  No previous ECGs available  Confirmed by Dylan OCHOA MD ([id number]) on 7/12/2020 10:59:17 AM    Referred By: HALLE   SELF           Confirmed By:Dylan OCHOA MD                            ASSESSMENT/PLAN:     Active Problems:      Active Hospital Problems    Diagnosis  POA    *Symptomatic anemia [D64.9]GIB Pathway initiated  Likely bleeding from GISt  Hgb 6.2 on admit, down from 9.7 beginning of June  Check iron studies and hemolysis labs  Protonix 40mg IV BID  GI consulted, appreciate assistance  Continue diet as no procedures on Sunday  Type and screen, blood consent obtained  CBCq 8h.  Transfuse for Hemoglobin <7.  Transfuse 1 unit PRBCs  7/12 haptoglobin normal and reticulocyte count elevated.  Gastroenterology consulted  repeat CBC at 6.6 . transfusion with 1 unit of PRBC. GI recommends EGD and colonoscopy for further evaluation of iron deficiency anemia patient " adamantly refuses exam and EGD/ colonoscopy  7/13  hemoglobin at 8 3 status post 2 units of pack RBC transfusion.agrees for EGD/ colonoscopy.  7/14 Hb 8.1 --> 7.6.Clear liquids today and NPO from midnight EGD/colonoscopy in a.m.  7/15 refused to drink GoLYTELY overnight.   7/23 agreed for blood draw today hemoglobin repeated at 6 transfusion with 1 unit of pack RBC  7/25  Hemoglobin at 6.7 transfusion with 1 unit of pack RBC today.  Discussed with .   mentions the patient's brother is a bad influence and only involved with the patient to obtain her money. he is okay with patient being DNR/ hospice placement.     7/26hemoglobin at 6.4--> 6.9 .  Transfusion with 1 unit of pack RBC today.  Wound cultures from 07/23 of the abdomen with no growth.  Discussed with psychiatry regarding 's wish for DNR/hospice with judicial commitment process.  discussed with Infectious Disease CT abdomen findings 7/15 .  Continue ciprofloxacin and metronidazole indefinitely. refuses rectal exam today and EGD. mentions she has brown bowel movement today. direct antiglobulin positive. haptoglobin <10  and LDH ordered. Anemia likely from hemolysis. Discussed with  in detail and he agrees for DNR / hospice placement. he wants to visit her today. discussed with hematology      Transaminitis 7/15-  AST//112 with normal bilirubin and mildly elevated alk-phos at 337.   7/26 AST/ALT normal         Yes    Hypokalemia [E87.6]   Resolved      B12 deficiency- levels of 192 started on vitamin B12 subcutaneous dissection  Yes    Acute deep vein thrombosis (DVT) of popliteal vein of left lower extremity [I82.432]June 2020  S/p IVC filter     Yes     S/p IVC filter June 2020      Delusional disorder [F22]/12   presenting to the ED via [facility] with [facility] stating patient was at her estranged 's house  threatening she would kill the estranged .  PEC placed for delusional behavior. Work-up significant for H/H 6.2/20  (baseline 9/30 through care everywhere). Rectal exam performed without evidence of blood or melena. FOBT positive.   alert, delusional. Refusing to give  personal belongings, and agitation with staff.  4 point restraints  7/13 agitated overnight requesting cell phone.  Patient off restraints.Continue home Abilify 10 mg and Remeron 15 mg nightly. Increased Zyprexa from 5 mg to 10 mg q8h IM PRN for agitation  7/14 Hb 8.1 --> 7.6. Requesting  her purse and  belongings she needs to get to her (Geovany Ring)  7/15. Hemoglobin stable at 7.8 leukocytosis of 15 but afebrile.    7/22   refusal of taking medications and vitals, .As [facility] expiring 7/26 , plan to pursue DELORES filing as pt remains delusional and uncooperative and needs psychiatric hospital placement. Forced med protocol with Haldol, Ativan and Benadry. Aristada 441 mg IM injection once given 7/22, to be given [id number]. Started Atarax 50 mg nightly for insomnia  7/23 EKG with QTC at 448milliseconds.Haldol 5 mg PO, Benadryl 50 mg PO, Ativan 2 mg PO PRN for non-redirectable agitation  7/24.  judicial commitment in process.  Patient's  okay with nursing home with hospice as the patient is noncompliant with medications.  does not want to be decision-maker.  7/25    Discussed with .   mentions the patient's brother is a bad influence and only involved with the patient to obtain her money. he is okay with patient being DNR/ hospice placement        Yes    Bipolar disorder in partial remission [F31.70]PECed in the ER  Recent admission to [facility] from [facility] beginning of June  Continue Abilify 10mg  Continue Remeron 15mg PO qHS  on Abilify Aristada BAUM, last received this month according to patient.  needs to be checked with primary psychiatrist   7/13  Increased Zyprexa from 5 mg to 10 mg q8h IM PRN for agitation.daily EKG to monitor QTc. 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  Norco discontinued and started oxycodone 20 mg Q 6 hourly p.r.n. which did not relieve her pain patient currently on Dilaudid IV 0.2/1 mg for pain control     Significant left upper quadrant abdominal pain prior to endoscopy today.  Endoscopy canceled.  CT abdomen with IV and oral contrast ordered.  General surgery consulted.  Started on Zosyn and vancomycin empirically.  Blood cultures x2 pending  7/22  Continued on ciprofloxacin and metronidazole since 07/16 7/24 purulent drainage from upper abdomen at tumor site. culture obtained  7/25  Abdominal x-ray-  No convincing evidence of pneumoperitoneum on this limited single portable view up. abdominal fluid culture from 07/23 with no growth.  Wound Care consulted  7/26 continue ciprofloxacin and metronidazole indefinitely per ID      Yes    Type 2 diabetes mellitus without complication, without long-term current use of insulin [E11.9]   Patient's FSGs are controlled on current hypoglycemics.   Last A1c reviewed-   Lab Results   Component Value Date    HGBA1C 6.3 (H) 07/11/2020     Home DM regimen:  Metformin  SSI with POCT accuchecks and Diabetic diet  Yes      Resolved Hospital Problems   No resolved problems to display.         Disposition- [facility], likely psychiatric hospital/ nursing home with hospice.  Working on judicial commitment    DVT prophylaxis addressed with: SCDs          Subsequent Hospital Care .   Level 3 [id number] Total visit time was 35 minutes or greater with greater than 50% of time spent in counseling and coordination of care.     We discussed in detail the plan of care, the patient's response to treatment, the discharge plan,.  Total time includes time spent reviewing the medical record, examining the patient, writing notes and communicating with other professionals.    Mukund Chi  MD  Attending Staff Physician  [facility]  pager- [phone]  [facility] - [phone]                 I

## 2020-07-26 NOTE — NURSING
Bruising noted to patient's LUE where PIV was. Pt complaining of swelling to hand. No swelling noted. PIV flushes well. Pt requesting PIV to be removed. PIV removed. Pt refusing further peripheral IVs at this time. Med team B notified. Will attempt to place PIV.

## 2020-07-27 NOTE — PROGRESS NOTES
"[facility]  Adult Nutrition  Progress Note    SUMMARY     Recommendations    1. Continue Diabetic diet, encourage PO intake    Goals: Consume 75% or more of all meals  Nutrition Goal Status: new  Communication of RD Recs: (plan of care)    Reason for Assessment    Reason For Assessment: length of stay(10 days)  Diagnosis: (Psychological factors affecting medical condition)  Relevant Medical History: GI Hemorrhage, T2DM, Hypokalemia, GIST, DVT, Biplar Disorder  Interdisciplinary Rounds: did not attend    General Information Comments: 54 y.o. female who presents to the ER for delusions. Pt on a diabetic diet. Pt awake at visit sitting up in bed. Pt states that she has been experiencing nausea and diarrhea due to medicaitons and stress from being in the hospital. Pt reports she told nurse about nausea and vomiting this morning and was given medications for it but medications has not fully resolved the issue however, it has not affected appetite. Per nurse assistant, pt has been eating 80% of meals. NFPE not completed due to nurse changing IV and not warranted at this time. Pt appears well nourished with no recent wt loss hx in chart. LBM per pt 7/27.   Nutrition Discharge Planning: Adequate intake on Diabetic Diet    Nutrition Risk Screen    Nutrition Risk Screen: no indicators present    Nutrition/Diet History    Spiritual, Cultural Beliefs, Judaism Practices, Values that Affect Care: no  Factors Affecting Nutritional Intake: nausea/vomiting    Anthropometrics    Temp: 98.7 °F (37.1 °C)  Height Method: Stated  Height: 5' 7" (170.2 cm)  Height (inches): 67 in  Weight Method: Bed Scale  Weight: 70.1 kg (154 lb 8.7 oz)  Weight (lb): 154.54 lb  Ideal Body Weight (IBW), Female: 135 lb  % Ideal Body Weight, Female (lb): 114.47 %  BMI (Calculated): 24.2     Lab/Procedures/Meds    Pertinent Labs Reviewed: reviewed  Pertinent Labs Comments: Na 135, Glu 206, Ca 7.8, Alb 1.9, ALT 7, Protein total " 5.6  Pertinent Medications Reviewed: reviewed  Pertinent Medications Comments: ciprofloxacin, pantoprazole     Estimated/Assessed Needs    Weight Used For Calorie Calculations: 70.1 kg (154 lb 8.7 oz)  Energy Calorie Requirements (kcal): 1752 kcal (25 kcal/kg)  Energy Need Method: Kcal/kg  Protein Requirements: 70 g (1.0 g /kg)  Weight Used For Protein Calculations: 70.1 kg (154 lb 8.7 oz)  Fluid Requirements (mL): 1 mL per kcal or per MD     RDA Method (mL): 1752  CHO Requirement: 219 gm daily    Nutrition Prescription Ordered    Current Diet Order: Diabetic Diet    Evaluation of Received Nutrient/Fluid Intake    Comments: LBM 7/23  % Intake of Estimated Energy Needs: 75 - 100 %  % Meal Intake: 75 - 100 %    Nutrition Risk    Level of Risk/Frequency of Follow-up: low(1x/week)     Assessment and Plan    Nutrition Problem  Diabetes    Related to (etiology):   Undesirable food choices    Signs and Symptoms (as evidenced by):   Blood Glucose 206 mg/dL    Interventions/Recommendations (treatment strategy):  1. Carbohydrate Modified diet  2. Collaboration with other providers    Nutrition Diagnosis Status:   New    Monitor and Evaluation    Food and Nutrient Intake: energy intake, food and beverage intake  Food and Nutrient Adminstration: diet order  Knowledge/Beliefs/Attitudes: food and nutrition knowledge/skill  Biochemical Data, Medical Tests and Procedures: electrolyte and renal panel, glucose/endocrine profile  Nutrition-Focused Physical Findings: overall appearance     Nutrition Follow-Up    RD Follow-up?: Yes

## 2020-07-27 NOTE — CONSULTS
Wound care consulted for midline abdomen with drainage  PMH:   Bipolar disorder, delusional disorder, GIST, LLE DVT s/p IVC filter, low hemoglobin anemia  Assessment:  The abdomen is distended with a healed midline abdominal incisional scar.  There is an opening on the incision line with a fluid pocket beneath the skin draining a large amount of clear yellow fluid. The wound was cleansed with sterile normal saline, barrier film, gauze placed over the wound and covered with a border gauze dressing.    Nursing was given pouches to place over the area if drainage continues.    The patient said to let Geovany Tinoco know about this issue.    Wound care will follow-up jorge Monroy RN, CWCN  [id number]     Upper midline abdomen incisional scar  0.2 in diameter.

## 2020-07-27 NOTE — SUBJECTIVE & OBJECTIVE
"Interval History: As above    Family History     None        Tobacco Use    Smoking status: Current Every Day Smoker     Packs/day: 10.00     Types: Cigarettes    Smokeless tobacco: Never Used   Substance and Sexual Activity    Alcohol use: No    Drug use: No    Sexual activity: Not on file     Psychotherapeutics (From admission, onward)    Start     Stop Route Frequency Ordered    07/21/20 1300  ARIPiprazole lauroxil 441 mg/1.6 mL injection 441 mg     Question Answer Comment   Brand Name: ARISTADA (R)    Generic name: none    Form: Injectable    Length of Therapy: Indefinite    Reason for Non-Formulary: No equivalent formulary medication available    How soon needed? (if approved, may take up to 5 days to procure): 48-72 hrs    Requestor's Contact Information: Shirley Lopez or ON CALL psychiatry        -- IM Every 30 days 07/21/20 1219    07/20/20 1036  lorazepam injection 2 mg      -- IM Every 6 hours PRN 07/20/20 0946    07/20/20 1034  haloperidol lactate injection 5 mg      -- IM Every 6 hours PRN 07/20/20 0946    07/20/20 1031  lorazepam injection 2 mg      -- IV Every 6 hours PRN 07/20/20 0933    07/20/20 1030  haloperidol lactate injection 5 mg      -- IV Every 6 hours PRN 07/20/20 0933    07/20/20 1030  ARIPiprazole tablet 30 mg      -- Oral Daily 07/20/20 0927    07/16/20 1833  OLANZapine injection 10 mg      -- IM Every 8 hours PRN 07/16/20 1834    07/11/20 2245  mirtazapine tablet 15 mg      -- Oral Nightly 07/11/20 2132           Review of Systems     Pertinent items noted in HPI.    Objective:     Vital Signs (Most Recent):  Temp: 98.5 °F (36.9 °C) (07/27/20 1145)  Pulse: 78 (07/27/20 1145)  Resp: 17 (07/27/20 1437)  BP: (!) 113/56 (07/27/20 1145)  SpO2: 97 % (07/27/20 1145) Vital Signs (24h Range):  Temp:  [97.8 °F (36.6 °C)-98.5 °F (36.9 °C)] 98.5 °F (36.9 °C)  Pulse:  [78-94] 78  Resp:  [17-18] 17  SpO2:  [95 %-97 %] 97 %  BP: (103-121)/(53-64) 113/56     Height: 5' 7" (170.2 cm)  Weight: 70.1 kg " (154 lb 8.7 oz)  Body mass index is 24.2 kg/m².    No intake or output data in the 24 hours ending 07/27/20 1533    Physical Exam       Physical Exam:  General appearance: NAD  Head: NCAT  Lungs: CTAB, no increased work of breath  Heart: RRR  Abdomen: soft, distended  Extremities: extremities normal, atraumatic  Skin: warm, dry    Mental Status Exam:  Appearance: older than stated age, disheveled  Behavior/Cooperation: eye contact normal, pleasant today  Speech: normal rate, tone, volume  Mood: good  Affect: mood congruent  Thought Process: logical  Thought Content: delusions: yes, erotomanic, persecutory  Orientation: grossly intact  Memory: Grossly intact  Attention Span/Concentration: Normal  Insight: fair-poor, improving  Judgment: poor       Significant Labs: All pertinent labs within the past 24 hours have been reviewed.    Significant Imaging: I have reviewed all pertinent imaging results/findings within the past 24 hours.
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
## 2020-07-27 NOTE — NURSING
Called to patient room with questions about patency of IV. IV opsite changed and flushed without difficulty, No swelling noted. Patient claimed that IV medication she had gotten wasn't going into the vein. She wanted to call physician to get another dose. I explained that I  would not be calling them , she had done this two days ago and they probably wouldn't give additional dosing. Patient appears to be drug -seeking and may need to be titrated to PO pain meds as she has a history of addiction and only wants to take IV pain medicine.  Charge nurse aware of situation.

## 2020-07-27 NOTE — PLAN OF CARE
Recommendations    1. Continue Diabetic diet, encourage PO intake    Goals: Consume 75% or more of all meals  Nutrition Goal Status: new  Communication of RD Recs: (plan of care)

## 2020-07-27 NOTE — ASSESSMENT & PLAN NOTE
ASSESSMENT     Violeta Boudreaux is a 54 y.o. female with a past psychiatric history of BPAD and delusional disorder, who presented to the [facility] due to OPC from estranged  for threatening to kill him. Admitted to [facility] for symptomatic anemia. Per FACT team, patient non-compliant with medications, did not receive most recent scheduled Aristada BAUM. Received Aristada 441 mg BAUM on 7/22.    IMPRESSION  Bipolar affective disorder, current episode manic  Psychological factors affecting medical care  Cluster B personality d/o  Delusional disorder  Medication non-adherence    RECOMMENDATION(S)      1. Scheduled Medication(s):  - Continue Abilify from 30 mg daily   - Forced med protocol with Haldol, Ativan and Benadryl as described in PRN below  - Continue home Remeron 15 mg nightly  - Aristada 441 mg IM injection [id number], last given 7/22  - Continue Atarax 50 mg nightly for insomnia  - Optimize pain management regimen to ensure adequate pain control and encourage medication compliance    2. PRN Medication(s):  - First try: Haldol 5 mg PO, Benadryl 50 mg PO, Ativan 2 mg PO PRN for non-redirectable agitation  - If refuses: Haldol 2 mg IV, Benadryl 50 mg IV, Ativan 2 mg IV PRN for non-redirectable agitation    3.  Monitor:  - Please obtain daily EKG to monitor QTc    4. Legal Status/Precaution(s):  - Continue CEC (expires 7/26 @ 8:32 pm) as patient is in imminent danger of hurting self or others and is gravely disabled due to a psychiatric illness-- [facility]OR[facility] filed 7/23  - Maintain 1:1 sitter  - Continue working with next of kin (estranged  and brother) to help facilitate patient care and decision making, especially with regards to disposition -- would be best to come to a consensus with regards to her disposition  - Continue working with palliative care to discuss options, code status, and disposition    5. Capacity and Medical Decision Making  - Primary team worried about melena and potential need for EGD.  The patient is  "refusing procedures on the basis of it is "impossible for a blood count to drop in 24 hours."  She cannot explain the consequences or benefits of the procedure and continues to demonstrate delusional thought content.    - Recommend to contact the  for medical decision making and decisions regarding DNR, hospice care, procedures.    - Would carefully weight the risk vs. benefit of various procedure and hold off unless emergent given that the patient is adamantly against having any done forcing the patient to undergo a procedure will likely be difficult and traumatic for the patient  - Disposition: [facility] placement in nursing home with hospice vs. Inpatient hospice, NOT inpatient psychiatric hospitalization  "

## 2020-07-27 NOTE — CLINICAL REVIEW
Hematology Note     Patient previously seen by oncology for hx of GIST, non compliant with multiple psychosocial problems who was found to be profoundly anemic on admission.     She also has notable progression of disease.     Appears she has refused GI procedures.     Hem/Onc discussion this morning with primary MD regarding low haptoglobin and + ZAKIYA.     Recs:  Likely 2/2 to infused blood products and delayed hemolysis   Will obtain smear, direct bili, repeat hapto and retic  Stable Hgb, continue to trend daily  If it drops, will recommend steroids  Will continue to follow  Poor prognosis overall    Robert Cristobal MD  Hematology/Medical Oncology Fellow  [phone] (pager)

## 2020-07-27 NOTE — PROGRESS NOTES
Progress Note  Hospital Medicine    Admit Date: 7/11/2020  Length of Stay:  LOS: 10 days     SUBJECTIVE:         Follow-up For:  Psychological factors affecting medical condition    HPI/Interval history (See H&P for complete P,F,SHx) :     Ms. Violeta Boudreaux is a 54 y.o. 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  Her estranged  reports that she showed up at his house, claiming that she was  to Geovany Ring from [employer] and he was being hid inside the estranged 's house. He reports that she is homeless, and hasn't been taking her psych medications.  She denies any complaints at this time.  Labs on arrival showed a Hemoglobin 6.2 down from 9.7 in June 2020.  She endorses midepigastric abdominal abdomina.  She denies any blood in her stools, dark stools, or vaginal bleeding.  She mentions that a few weeks ago, she was having bad headaches and took a lot of Advil, then causing her to have hematemesis.  She denies further bleeding or use of Aspirin or NSAIDS.  Of note, at the end of May, she was hospitalized at [facility] for delusional disorder.  At that time, she was found to have a Hemoglobin 6.4.  She was given blood and her hemolgobin improved to 9.7.  It was thought that her bleeding with 2/2 her GIST tumor.  She was not evaluated by GI.  Also during that admission, she was found to have a LLE DVT.  HemeOnc suggested IVC filter, given her lack of consistency with medications.  She was [facility] to the [facility].  She was discharged on Abilify 10mg and Mirtazapine 15mg qHS.       In the ER, she was PECed for grave disability.  SUMIT was positive.  She was transfused 1 unit PRBCs.  She mentions that Bert Gorman will pay for every drop of blood we take from her, and that she wants to return to her house in [place] when discharged.  She was admitted to Hospital Medicine for GI and Psych  evaluations.        Interval history  7/12   presenting to the ED via [facility] with [facility] stating patient was at her estranged 's house  threatening she would kill the estranged .  PEC placed for delusional behavior. Work-up significant for H/H 6.2/20 (baseline 9/30 through care everywhere). Rectal exam performed without evidence of blood or melena. FOBT positive.   alert, delusional. Refusing to give  personal belongings, and agitation with staff.  4 point restraints were applied. repeat CBC at 6.6 . transfusion with 1 unit of PRBC. GI recommends EGD and colonoscopy for further evaluation of iron deficiency anemia patient adamantly refuses exam and EGD/ colonoscopy  7/13 agitated overnight requesting cell phone.  Patient was placed 4 point  restraints hemoglobin at 8 3 status post 2 units of pack RBC transfusion . agrees for EGD/ colonoscopy.Patient off restraints.Continue home Abilify 10 mg and Remeron 15 mg nightly. Increased Zyprexa from 5 mg to 10 mg q8h IM PRN for agitation.daily EKG to monitor QTc- 447ms   7/14 Hb 8.1 --> 7.6. Requesting  her purse and  belongings she needs to get to her (Geovany Ring).  Clear liquids today and NPO from midnight EGD/colonoscopy in a.m. complains of abdominal, takes oxycodone 30 mg Q 6 hourly as per chart review (reviewed  last filled on 06/23).  Norco discontinued and started oxycodone 20 mg Q 6 hourly p.r.n.  7/15 refused to drink GoLYTELY overnight.  Hemoglobin stable at 7.8 leukocytosis of 15 but afebrile.  Transaminitis AST//112 with normal bilirubin and mildly elevated alk-phos at 337.  Significant left upper quadrant abdominal pain prior to endoscopy today.  Endoscopy canceled.  CT abdomen with IV and oral contrast ordered.  General surgery consulted.  Started on Zosyn and vancomycin empirically.  Blood cultures x2 with no growth  7/22  Continued on ciprofloxacin and metronidazole since 07/16. refusal of taking medications and vitals, .As [facility]  expiring 7/26 , plan to pursue DELORES filing as pt remains delusional and uncooperative and needs psychiatric hospital placement. Forced med protocol with Haldol, Ativan and Ciarra. Aristada 441 mg IM injection once given 7/22, to be given [id number]. Started Atarax 50 mg nightly for insomnia  7/23 agreed for blood draw today hemoglobin repeated at 6 transfusion with 1 unit of pack RBC  7/24 purulent drainage from upper abdomen at tumor site. culture obtained .Hb 7.2 .  judicial commitment in process.  Patient's  okay with nursing home with hospice as the patient is noncompliant with medications.  does not want to be decision-maker.  7/25  Abdominal x-ray-  No convincing evidence of pneumoperitoneum on this limited single portable view up. abdominal fluid culture from 07/23 with no growth.  Wound Care consulted.  Hemoglobin at 6.7 transfusion with 1 unit of pack RBC today.  Discussed with .   mentions the patient's brother is a bad influence and only involved with the patient to obtain her money. he is okay with patient being DNR/ hospice placement.   7/26hemoglobin at 6.4--> 6.9 .  Transfusion with 1 unit of pack RBC today.  Wound cultures from 07/23 of the abdomen with no growth.  Discussed with psychiatry regarding 's wish for DNR/hospice with judicial commitment process.  discussed with Infectious Disease CT abdomen findings 7/15 .  Continue ciprofloxacin and metronidazole indefinitely. refuses rectal exam today and EGD. mentions she has brown bowel movement today. direct antiglobulin positive. haptoglobin <10  and LDH ordered. Anemia likely from hemolysis. Discussed with  in detail and he agrees for DNR / hospice placement. he wants to visit her today  7/27 Hb at 8.1 . hematology considering steroids. s/p wound care evaluation      Review of Systems:   Pain scale:  abdominal pain 10/10   Constitutional: neg for fever or chills     Respiratory: neg for cough or shortness of  breath  Cardiovascular: neg for chest pain or palpitations  Gastrointestinal: neg for nausea or vomiting, positive  for abdominal pain or change in bowel habits  Genitourinary: neg for hematuria or dysuria  Integument/Breast: neg for rash or pruritis  Hematologic/Lymphatic: neg for easy bruising or lymphadenopathy  Musculoskeletal: neg for arthralgias or myalgias  Neurological: neg for seizures or tremors  Behavioral/Psych: neg for depression or anxiety+ delusions    OBJECTIVE:     Vital Signs Range (Last 24H):  Temp:  [97.8 °F (36.6 °C)-98.3 °F (36.8 °C)]   Pulse:  [83-94]   Resp:  [16-18]   BP: (103-117)/(53-63)   SpO2:  [95 %-99 %]     Physical Exam:  Constitutional: Appears well-developed and well-nourished.   Head: Normocephalic and atraumatic. sitter at the bedside  Neck: Normal range of motion. Neck supple.   Cardiovascular: Normal heart rate.  Regular heart rhythm.  Pulmonary/Chest: Effort normal.   Abdominal: No distension.  tender epigastrium and left quadrant  Musculoskeletal: Normal range of motion. No edema.   Neurological: Alert and oriented to person, place, and time.   Skin: Skin is warm and dry.   Psychiatric: Normal mood and affect. delusional       Medications:  Medication list was reviewed and changes noted under Assessment/Plan.      Current Facility-Administered Medications:     0.9%  NaCl infusion (for blood administration), , Intravenous, Q24H PRN, Ghassan Roca PA-C    0.9%  NaCl infusion (for blood administration), , Intravenous, Q24H PRN, Mukund Chi MD    0.9%  NaCl infusion (for blood administration), , Intravenous, Q24H PRN, Mukund Chi MD    0.9%  NaCl infusion (for blood administration), , Intravenous, Q24H PRN, Mukund Chi MD    0.9%  NaCl infusion (for blood administration), , Intravenous, Q24H PRN, Mukund Chi MD    acetaminophen tablet 650 mg, 650 mg, Oral, Q4H PRN, Tracy Ospina MD, 650 mg at 07/15/20 2004    ARIPiprazole lauroxil 441  mg/1.6 mL injection 441 mg, 441 mg, Intramuscular, Q30 Days, Kahlil Banegas MD, 441 mg at 20 1341    ARIPiprazole tablet 30 mg, 30 mg, Oral, Daily, Shirley Lopez MD, 30 mg at 20 1033    ciprofloxacin HCl tablet 500 mg, 500 mg, Oral, Q12H, Kahlil Banegas MD, 500 mg at 20 2211    [] cyanocobalamin injection 1,000 mcg, 1,000 mcg, Subcutaneous, Daily, 1,000 mcg at 20 0915 **FOLLOWED BY** cyanocobalamin injection 1,000 mcg, 1,000 mcg, Subcutaneous, Q7 Days, 1,000 mcg at 20 1033 **FOLLOWED BY** [START ON 2020] cyanocobalamin injection 1,000 mcg, 1,000 mcg, Subcutaneous, Q30 Days, Mukund Chi MD    dextrose 50% injection 12.5 g, 12.5 g, Intravenous, PRN, Tracy Ospina MD    dextrose 50% injection 25 g, 25 g, Intravenous, PRN, Tracy Ospina MD    haloperidol lactate injection 5 mg, 5 mg, Intravenous, Q6H PRN, 5 mg at 20 1111 **AND** diphenhydrAMINE injection 50 mg, 50 mg, Intravenous, Q6H PRN, 50 mg at 20 1120 **AND** lorazepam injection 2 mg, 2 mg, Intravenous, Q6H PRN, Shirley Lopez MD, 2 mg at 20 1120    haloperidol lactate injection 5 mg, 5 mg, Intramuscular, Q6H PRN **AND** diphenhydrAMINE injection 50 mg, 50 mg, Intramuscular, Q6H PRN **AND** lorazepam injection 2 mg, 2 mg, Intramuscular, Q6H PRN, Shirley Lopez MD    glucagon (human recombinant) injection 1 mg, 1 mg, Intramuscular, PRN, Tracy Ospina MD    glucose chewable tablet 16 g, 16 g, Oral, PRN, Tracy Ospina MD    glucose chewable tablet 24 g, 24 g, Oral, PRN, Tracy Ospina MD    HYDROmorphone injection 0.2 mg, 0.2 mg, Intravenous, Q4H PRN, Kahlil Banegas MD, 0.2 mg at 20 0201    HYDROmorphone injection 1 mg, 1 mg, Intravenous, Q4H PRN, Kahlil Banegas MD, 1 mg at 20 0533    hydrOXYzine tablet 50 mg, 50 mg, Oral, QHS, Kahlil Banegas MD, 50 mg at 20 2137    insulin aspart U-100 pen 1-10 Units, 1-10 Units, Subcutaneous, QID (AC + HS)  PRN, Tracy Ospina MD, 8 Units at 07/26/20 1724    iohexol (OMNIPAQUE) oral solution 15 mL, 15 mL, Oral, PRN, Eusebia Almonte MD, 15 mL at 07/15/20 1715    lidocaine 5 % patch 1 patch, 1 patch, Transdermal, Q24H, Vick Monge PA-C, 1 patch at 07/15/20 2356    melatonin tablet 6 mg, 6 mg, Oral, Nightly PRN, Tracy Ospina MD, 6 mg at 07/24/20 2007    metoclopramide HCl injection 10 mg, 10 mg, Intravenous, Q6H PRN, Malcolm Smith MD    metroNIDAZOLE tablet 500 mg, 500 mg, Oral, Q8H, Kahlil Banegas MD, 500 mg at 07/27/20 0533    mirtazapine tablet 15 mg, 15 mg, Oral, QHS, Tracy Ospina MD, 15 mg at 07/26/20 2137    naloxone 0.4 mg/mL injection 0.4 mg, 0.4 mg, Intravenous, PRN, Mukund Chi MD    OLANZapine injection 10 mg, 10 mg, Intramuscular, Q8H PRN, Kahlil Banegas MD    ondansetron disintegrating tablet 4 mg, 4 mg, Oral, Q8H PRN, Yin Soto PA-C, 4 mg at 07/27/20 0530    pantoprazole EC tablet 40 mg, 40 mg, Oral, BID, Kahlil Banegas MD, 40 mg at 07/26/20 2137    potassium chloride CR capsule 30 mEq, 30 mEq, Oral, Once, Mukund Chi MD    potassium chloride SA CR tablet 40 mEq, 40 mEq, Oral, Once, Mukund Chi MD    potassium chloride SA CR tablet 40 mEq, 40 mEq, Oral, Once, Mukund Chi MD    promethazine tablet 12.5 mg, 12.5 mg, Oral, Q6H PRN, Yin Soto PA-C, 12.5 mg at 07/24/20 1844    senna-docusate 8.6-50 mg per tablet 1 tablet, 1 tablet, Oral, BID PRN, Tracy Ospina MD, 1 tablet at 07/14/20 0135    sodium chloride 0.9% flush 10 mL, 10 mL, Intravenous, PRN, Rosy Chavez MD    sodium chloride 0.9% flush 5 mL, 5 mL, Intravenous, PRN, Tracy Ospina MD    sodium chloride, sodium chloride, sodium chloride, sodium chloride, sodium chloride, acetaminophen, dextrose 50%, dextrose 50%, haloperidol lactate **AND** diphenhydrAMINE **AND** lorazepam, haloperidol lactate **AND** diphenhydrAMINE **AND** lorazepam, glucagon (human  recombinant), glucose, glucose, HYDROmorphone, HYDROmorphone, insulin aspart U-100, iohexol, melatonin, metoclopramide HCl, naloxone, OLANZapine, ondansetron, promethazine, senna-docusate 8.6-50 mg, sodium chloride 0.9%, sodium chloride 0.9%    Laboratory/Diagnostic Data:  Reviewed and noted in plan where applicable- Please see chart for full lab data.    Recent Labs   Lab 07/26/20  0042 07/26/20  1157 07/26/20 2017   WBC 13.87* 13.54* 13.32*   HGB 6.4* 6.9* 8.0*   HCT 20.7* 22.5* 26.5*   * 449* 419*       Recent Labs   Lab 07/24/20  0543 07/25/20  0604 07/26/20  0526   *  131* 129*  129* 134*  134*   K 4.1  4.1 4.3  4.3 3.8  3.8     100 97  97 103  103   CO2 25  25 25  25 25  25   BUN 10  10 8  8 7  7   CREATININE 0.7  0.7 0.7  0.7 0.6  0.6   *  282* 315*  315* 314*  314*   CALCIUM 8.3*  8.3* 8.2*  8.2* 7.6*  7.6*   MG 1.7 1.6 1.7   PHOS 3.2 3.5 2.5*       Recent Labs   Lab 07/24/20  0543 07/25/20  0604 07/26/20  0526   ALKPHOS 99  99 102  102 90  90   ALT 9*  9* 9*  9* 8*  8*   AST 16  16 16  16 20  20   ALBUMIN 2.1*  2.1* 2.2*  2.2* 1.9*  1.9*   PROT 5.7*  5.7* 5.9*  5.9* 5.2*  5.2*   BILITOT 0.6  0.6 0.4  0.4 0.4  0.4        Microbiology labs for the last week  Microbiology Results (last 7 days)     Procedure Component Value Units Date/Time    Culture, Body Fluid - Bactec [[id number]] Collected: 07/23/20 2100    Order Status: Completed Specimen: Body Fluid from Ear, Left Updated: 07/27/20 0613     Body Fluid Culture, Sterile No Growth to date      No Growth to date      No Growth to date      No Growth to date    Narrative:      Purulent drainage from abdomen    Blood culture [[id number]] Collected: 07/15/20 1711    Order Status: Completed Specimen: Blood from Antecubital, Right Arm Updated: 07/20/20 2012     Blood Culture, Routine No growth after 5 days.    Blood culture [[id number]] Collected: 07/15/20 1711    Order Status: Completed Specimen:  "Blood from Antecubital, Left Arm Updated: 07/20/20 2012     Blood Culture, Routine No growth after 5 days.           Imaging Results    None         Estimated body mass index is 24.2 kg/m² as calculated from the following:    Height as of this encounter: 5' 7" (1.702 m).    Weight as of this encounter: 70.1 kg (154 lb 8.7 oz).    I & O (Last 24H):  No intake or output data in the 24 hours ending 07/27/20 0742    Body mass index is 24.2 kg/m².    Estimated Creatinine Clearance: 105.4 mL/min (based on SCr of 0.6 mg/dL).      Cardiac:  ECG Results          EKG 12-lead (Final result)  Result time 07/12/20 10:59:23    Final result by Interface, Lab In [facility] (07/12/20 10:59:23)             Narrative:    Test Reason : D64.9,    Vent. Rate : 080 BPM     Atrial Rate : 080 BPM     P-R Int : 136 ms          QRS Dur : 086 ms      QT Int : 402 ms       P-R-T Axes : 076 063 012 degrees     QTc Int : 463 ms    Sinus rhythm with occasional Premature ventricular complexes  Low voltage QRS  Low septal forces  Abnormal ECG  No previous ECGs available  Confirmed by Dylan OCHOA MD ([id number]) on 7/12/2020 10:59:17 AM    Referred By: [email or web address]   SELF           Confirmed By:Dylan OCHOA MD                            ASSESSMENT/PLAN:     Active Problems:      Active Hospital Problems    Diagnosis  POA    *Symptomatic anemia [D64.9]GIB Pathway initiated  Likely bleeding from GISt  Hgb 6.2 on admit, down from 9.7 beginning of June  Check iron studies and hemolysis labs  Protonix 40mg IV BID  GI consulted, appreciate assistance  Continue diet as no procedures on Sunday  Type and screen, blood consent obtained  CBCq 8h.  Transfuse for Hemoglobin <7.  Transfuse 1 unit PRBCs  7/12 haptoglobin normal and reticulocyte count elevated.  Gastroenterology consulted  repeat CBC at 6.6 . transfusion with 1 unit of PRBC. GI recommends EGD and colonoscopy for further evaluation of iron deficiency anemia patient adamantly refuses exam and EGD/ colonoscopy  7/13 "  hemoglobin at 8 3 status post 2 units of pack RBC transfusion.agrees for EGD/ colonoscopy.  7/14 Hb 8.1 --> 7.6.Clear liquids today and NPO from midnight EGD/colonoscopy in a.m.  7/15 refused to drink GoLYTELY overnight.   7/23 agreed for blood draw today hemoglobin repeated at 6 transfusion with 1 unit of pack RBC  7/25  Hemoglobin at 6.7 transfusion with 1 unit of pack RBC today.  Discussed with .   mentions the patient's brother is a bad influence and only involved with the patient to obtain her money. he is okay with patient being DNR/ hospice placement.     7/26hemoglobin at 6.4--> 6.9 .  Transfusion with 1 unit of pack RBC today.  Wound cultures from 07/23 of the abdomen with no growth.  Discussed with psychiatry regarding 's wish for DNR/hospice with judicial commitment process.  discussed with Infectious Disease CT abdomen findings 7/15 .  Continue ciprofloxacin and metronidazole indefinitely. refuses rectal exam today and EGD. mentions she has brown bowel movement today. direct antiglobulin positive. haptoglobin <10  and LDH ordered. Anemia likely from hemolysis. Discussed with  in detail and he agrees for DNR / hospice placement. he wants to visit her today. discussed with hematology  7/27 Hb at 8.1 . hematology considering steroids. s/p wound care evaluation      Transaminitis 7/15-  AST//112 with normal bilirubin and mildly elevated alk-phos at 337.   7/26 AST/ALT normal         Yes    Hypokalemia [E87.6]   Resolved      B12 deficiency- levels of 192 started on vitamin B12 subcutaneous dissection  Yes    Acute deep vein thrombosis (DVT) of popliteal vein of left lower extremity [I82.432]June 2020  S/p IVC filter     Yes     S/p IVC filter June 2020      Delusional disorder [F22]/12   presenting to the ED via [facility] with [facility] stating patient was at her estranged 's house  threatening she would kill the estranged .  PEC placed for delusional behavior. Work-up  significant for H/H 6.2/20 (baseline 9/30 through care everywhere). Rectal exam performed without evidence of blood or melena. FOBT positive.   alert, delusional. Refusing to give  personal belongings, and agitation with staff.  4 point restraints  7/13 agitated overnight requesting cell phone.  Patient off restraints.Continue home Abilify 10 mg and Remeron 15 mg nightly. Increased Zyprexa from 5 mg to 10 mg q8h IM PRN for agitation  7/14 Hb 8.1 --> 7.6. Requesting  her purse and  belongings she needs to get to her (Geovany Ring)  7/15. Hemoglobin stable at 7.8 leukocytosis of 15 but afebrile.    7/22   refusal of taking medications and vitals, .As [facility] expiring 7/26 , plan to pursue DELORES filing as pt remains delusional and uncooperative and needs psychiatric hospital placement. Forced med protocol with Haldol, Ativan and Benadry. Aristada 441 mg IM injection once given 7/22, to be given q2mknmf. Started Atarax 50 mg nightly for insomnia  7/23 EKG with QTC at 448milliseconds.Haldol 5 mg PO, Benadryl 50 mg PO, Ativan 2 mg PO PRN for non-redirectable agitation  7/24.  judicial commitment in process.  Patient's  okay with nursing home with hospice as the patient is noncompliant with medications.  does not want to be decision-maker.  7/25    Discussed with .   mentions the patient's brother is a bad influence and only involved with the patient to obtain her money. he is okay with patient being DNR/ hospice placement        Yes    Bipolar disorder in partial remission [F31.70]PECed in the ER  Recent admission to [facility] from [facility] beginning of June  Continue Abilify 10mg  Continue Remeron 15mg PO qHS  on Abilify Aristada BAUM, last received this month according to patient.  needs to be checked with primary psychiatrist   7/13  Increased Zyprexa from 5 mg to 10 mg q8h IM PRN for agitation.daily EKG to monitor QTc. 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  Norco discontinued and started oxycodone 20 mg Q 6 hourly p.r.n. which did not relieve her pain patient currently on Dilaudid IV 0.2/1 mg for pain control     Significant left upper quadrant abdominal pain prior to endoscopy today.  Endoscopy canceled.  CT abdomen with IV and oral contrast ordered.  General surgery consulted.  Started on Zosyn and vancomycin empirically.  Blood cultures x2 pending  7/22  Continued on ciprofloxacin and metronidazole since 07/16 7/24 purulent drainage from upper abdomen at tumor site. culture obtained  7/25  Abdominal x-ray-  No convincing evidence of pneumoperitoneum on this limited single portable view up. abdominal fluid culture from 07/23 with no growth.  Wound Care consulted  7/26 continue ciprofloxacin and metronidazole indefinitely per ID      Yes    Type 2 diabetes mellitus without complication, without long-term current use of insulin [E11.9]   Patient's FSGs are controlled on current hypoglycemics.   Last A1c reviewed-   Lab Results   Component Value Date    HGBA1C 6.3 (H) 07/11/2020     Home DM regimen:  Metformin  SSI with POCT accuchecks and Diabetic diet  Yes      Resolved Hospital Problems   No resolved problems to display.       Code status-DNR  Disposition- [facility]d, likely psychiatric hospital/ nursing home with hospice.  Working on judicial commitment    DVT prophylaxis addressed with: SCDs            Subsequent Hospital Care     Level 2 [id number] Total visit time was 25 minutes or greater with greater than 50% of time spent in counseling and coordination of care.       We discussed in detail the plan of care, the patient's response to treatment, the discharge plan,.  Total time includes time spent reviewing the medical record, examining the patient, writing notes and communicating with  other professionals.    Mukund Chi MD  Attending Staff Physician  [facility]  pager- [phone]  [facility] - [phone]                   I

## 2020-07-27 NOTE — PLAN OF CARE
Problem: Fall Injury Risk  Goal: Absence of Fall and Fall-Related Injury  Outcome: Ongoing, Progressing     Problem: Adjustment to Illness (Gastrointestinal Bleeding)  Goal: Optimal Coping with Acute Illness  Outcome: Ongoing, Progressing     Problem: Bleeding (Gastrointestinal Bleeding)  Goal: Hemostasis  Outcome: Ongoing, Progressing     Problem: Adult Inpatient Plan of Care  Goal: Plan of Care Review  Outcome: Ongoing, Progressing  Goal: Patient-Specific Goal (Individualization)  Outcome: Ongoing, Progressing  Goal: Absence of Hospital-Acquired Illness or Injury  Outcome: Ongoing, Progressing  Goal: Optimal Comfort and Wellbeing  Outcome: Ongoing, Progressing  Goal: Readiness for Transition of Care  Outcome: Ongoing, Progressing  Goal: Rounds/Family Conference  Outcome: Ongoing, Progressing     Problem: Diabetes Comorbidity  Goal: Blood Glucose Level Within Desired Range  Outcome: Ongoing, Progressing     Problem: Restraint, Nonbehavioral (Nonviolent)  Goal: Discontinuation Criteria Achieved  Outcome: Ongoing, Progressing  Goal: Personal Dignity and Safety Maintained  Outcome: Ongoing, Progressing     Problem: Coping Ineffective  Goal: Effective Coping  Outcome: Ongoing, Progressing     Problem: Pain Chronic (Persistent)  Goal: Acceptable Pain Control and Functional Ability  Outcome: Ongoing, Progressing   Patient rested very little this shift. Continues to ask for pain meds every 4 hours. Sitter remains at bedside. Remains calm and free from falls. Will continue to monitor. CB near.
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
## 2020-07-28 NOTE — SUBJECTIVE & OBJECTIVE
"Interval History: As above    Family History     None        Tobacco Use    Smoking status: Current Every Day Smoker     Packs/day: 10.00     Types: Cigarettes    Smokeless tobacco: Never Used   Substance and Sexual Activity    Alcohol use: No    Drug use: No    Sexual activity: Not on file     Psychotherapeutics (From admission, onward)    Start     Stop Route Frequency Ordered    07/21/20 1300  ARIPiprazole lauroxil 441 mg/1.6 mL injection 441 mg     Question Answer Comment   Brand Name: ARISTADA (R)    Generic name: none    Form: Injectable    Length of Therapy: Indefinite    Reason for Non-Formulary: No equivalent formulary medication available    How soon needed? (if approved, may take up to 5 days to procure): 48-72 hrs    Requestor's Contact Information: Shirley Lopez or ON CALL psychiatry        -- IM Every 30 days 07/21/20 1219    07/20/20 1036  lorazepam injection 2 mg      -- IM Every 6 hours PRN 07/20/20 0946    07/20/20 1034  haloperidol lactate injection 5 mg      -- IM Every 6 hours PRN 07/20/20 0946    07/20/20 1031  lorazepam injection 2 mg      -- IV Every 6 hours PRN 07/20/20 0933    07/20/20 1030  haloperidol lactate injection 5 mg      -- IV Every 6 hours PRN 07/20/20 0933    07/20/20 1030  ARIPiprazole tablet 30 mg      -- Oral Daily 07/20/20 0927    07/16/20 1833  OLANZapine injection 10 mg      -- IM Every 8 hours PRN 07/16/20 1834    07/11/20 2245  mirtazapine tablet 15 mg      -- Oral Nightly 07/11/20 2132           Review of Systems       Pertinent items noted in HPI.    Objective:     Vital Signs (Most Recent):  Temp: 96.3 °F (35.7 °C) (07/28/20 0813)  Pulse: 74 (07/28/20 0813)  Resp: 18 (07/28/20 0813)  BP: (!) 118/59 (07/28/20 0813)  SpO2: 99 % (07/28/20 0813) Vital Signs (24h Range):  Temp:  [96.3 °F (35.7 °C)-98.7 °F (37.1 °C)] 96.3 °F (35.7 °C)  Pulse:  [74-93] 74  Resp:  [17-18] 18  SpO2:  [97 %-99 %] 99 %  BP: (107-118)/(55-59) 118/59     Height: 5' 7" (170.2 cm)  Weight: 70.1 " kg (154 lb 8.7 oz)  Body mass index is 24.2 kg/m².      Intake/Output Summary (Last 24 hours) at 7/28/2020 0838  Last data filed at 7/27/2020 2027  Gross per 24 hour   Intake 200 ml   Output --   Net 200 ml       Physical Exam         Physical Exam:  General appearance: NAD  Head: NCAT  Lungs: CTAB, no increased work of breath  Heart: RRR  Abdomen: soft, distended  Extremities: extremities normal, atraumatic  Skin: warm, dry    Mental Status Exam:  Appearance: older than stated age, disheveled  Behavior/Cooperation: eye contact normal, pleasant today  Speech: normal rate, tone, volume, sarcastic  Mood: good  Affect: mood congruent  Thought Process: logical  Thought Content: delusions: yes, erotomanic, persecutory  Orientation: grossly intact  Memory: Grossly intact  Attention Span/Concentration: Normal  Insight: fair-poor, improving  Judgment: poor         Significant Labs: All pertinent labs within the past 24 hours have been reviewed.    Significant Imaging: I have reviewed all pertinent imaging results/findings within the past 24 hours.

## 2020-07-28 NOTE — PLAN OF CARE
Patient remained in stable condition through shift. Remained free of falls and other injuries,. Able to reposition self independently. PRN dilaudid given for pain. Patient frequently asking about Geovany Webb and if he called or came to hospital looking for her. Bed in locked and lowest position, call light in reach, all questions answered, declines any further needs at this time. Sitter remains at bedside. Will continue to monitor.

## 2020-07-28 NOTE — ASSESSMENT & PLAN NOTE
ASSESSMENT     Violeta Boudreaux is a 54 y.o. female with a past psychiatric history of BPAD and delusional disorder, who presented to the [facility] due to OPC from estranged  for threatening to kill him. Admitted to [facility] for symptomatic anemia. Per FACT team, patient non-compliant with medications, did not receive most recent scheduled Aristada BAUM. Received Aristada 441 mg BAUM on 7/22.    IMPRESSION  Bipolar affective disorder, current episode manic  Psychological factors affecting medical care  Cluster B personality d/o  Delusional disorder  Medication non-adherence    RECOMMENDATION(S)      1. Scheduled Medication(s):  - Continue Abilify from 30 mg daily   - Forced med protocol with Haldol, Ativan and Benadryl as described in PRN below  - Continue home Remeron 15 mg nightly  - Aristada 441 mg IM injection [id number], last given 7/22  - Continue Atarax 50 mg nightly for insomnia  - Optimize pain management regimen to ensure adequate pain control and encourage medication compliance    2. PRN Medication(s):  - First try: Haldol 5 mg PO, Benadryl 50 mg PO, Ativan 2 mg PO PRN for non-redirectable agitation  - If refuses: Haldol 2 mg IV, Benadryl 50 mg IV, Ativan 2 mg IV PRN for non-redirectable agitation    3.  Monitor:  - Please obtain daily EKG to monitor QTc    4. Legal Status/Precaution(s):  - Continue CEC (expires 7/26 @ 8:32 pm) as patient is in imminent danger of hurting self or others and is gravely disabled due to a psychiatric illness-- DELORES filed 7/23  - Maintain 1:1 sitter  - Continue working with next of kin (estranged  and brother) to help facilitate patient care and decision making, especially with regards to disposition -- would be best to come to a consensus with regards to her disposition  - Continue working with palliative care to discuss options, code status, and disposition    5. Capacity and Medical Decision Making  - Primary team worried about melena and potential need for EGD.  The patient is  "refusing procedures on the basis of it is "impossible for a blood count to drop in 24 hours."  She cannot explain the consequences or benefits of the procedure and continues to demonstrate delusional thought content.    - Recommend to contact the  for medical decision making and decisions regarding DNR, hospice care, procedures.    - Would carefully weight the risk vs. benefit of various procedure and hold off unless emergent given that the patient is adamantly against having any done forcing the patient to undergo a procedure will likely be difficult and traumatic for the patient  - Disposition: [facility] placement in nursing home with hospice vs. Inpatient hospice, NOT inpatient psychiatric hospitalization  "

## 2020-07-28 NOTE — PLAN OF CARE
"   07/28/20 1140   Discharge Reassessment   Assessment Type Discharge Planning Reassessment   Do you have any problems affording any of your prescribed medications? No   Discharge Plan A Psychiatric hospital   Discharge Plan B Psychiatric hospital   DME Needed Upon Discharge  none   Anticipated Discharge Disposition Psych   Can the patient/caregiver answer the patient profile reliably? No, cognitively impaired   Describe the patient's ability to walk at the present time. No restrictions   Post-Acute Status   Post-Acute Authorization Placement   Post-Acute Placement Status Awaiting Internal Medical Clearance     Patient not medically stable to discharge. Petition for Judicial Commitment has been filed. Awaiting response.     CM received calls from the patient's spouse, Gregorio Boudreaux ([phone]), approximately twice a day checking on patient status & requesting to visit patient. CM questioned charge nurse Ruthy regarding visitation for PEC/CEC patients. Due to the patient's delusional status & agitation it has been determined that the patient should not receive visitors at this time. CM informed Gregorio of above. Gregorio questioned if the patient's friend, Kami Guadarrama ([phone]), could visit. CM mentioned Kami to the patient. Patient stated that Kami "was a friend. She sided with my bodyguard & got me kicked out of my apartment". CM informed Gregorio that a visit from Kami would not be beneficial.     Will continue to follow.  "

## 2020-07-28 NOTE — PROGRESS NOTES
Notified of patient refusing EKG and labs, [facility] paged. No new orders placed. Will continue to monitor.

## 2020-07-28 NOTE — PROGRESS NOTES
Progress Note  Hospital Medicine    Admit Date: 7/11/2020  Length of Stay:  LOS: 11 days     SUBJECTIVE:         Follow-up For:  Psychological factors affecting medical condition    HPI/Interval history (See H&P for complete P,F,SHx) :     Ms. Violeta Boudreaux is a 54 y.o. female with Bipolar Disorder, delusional disorder, GIST on Sunitinib, and recent LLE DVT s/p IVC filter (June 2020) who presents to the ER by [facility] for delusions.  Her estranged  reports that she showed up at his house, claiming that she was  to Geovany Ring from [employer] and he was being hid inside the estranged 's house. He reports that she is homeless, and hasn't been taking her psych medications.  She denies any complaints at this time.  Labs on arrival showed a Hemoglobin 6.2 down from 9.7 in June 2020.  She endorses midepigastric abdominal abdomina.  She denies any blood in her stools, dark stools, or vaginal bleeding.  She mentions that a few weeks ago, she was having bad headaches and took a lot of Advil, then causing her to have hematemesis.  She denies further bleeding or use of Aspirin or NSAIDS.  Of note, at the end of May, she was hospitalized at [facility] for delusional disorder.  At that time, she was found to have a Hemoglobin 6.4.  She was given blood and her hemolgobin improved to 9.7.  It was thought that her bleeding with 2/2 her GIST tumor.  She was not evaluated by GI.  Also during that admission, she was found to have a LLE DVT.  HemeOnc suggested IVC filter, given her lack of consistency with medications.  She was discharged to the [facility].  She was discharged on Abilify 10mg and Mirtazapine 15mg qHS.       In the ER, she was PECed for grave disability.  SUMIT was positive.  She was transfused 1 unit PRBCs.  She mentions that Bert show guarded with the she [employer] will pay for every drop of blood we take from her, and that she wants to return to her house in [place] when discharged.  She was admitted to Hospital  Medicine for GI and Psych evaluations.        Interval history  7/12   presenting to the ED via [facility] with OPC stating patient was at her estranged 's house  threatening she would kill the estranged .  PEC placed for delusional behavior. Work-up significant for H/H 6.2/20 (baseline 9/30 through care everywhere). Rectal exam performed without evidence of blood or melena. FOBT positive.   alert, delusional. Refusing to give  personal belongings, and agitation with staff.  4 point restraints were applied. repeat CBC at 6.6 . transfusion with 1 unit of PRBC. GI recommends EGD and colonoscopy for further evaluation of iron deficiency anemia patient adamantly refuses exam and EGD/ colonoscopy  7/13 agitated overnight requesting cell phone.  Patient was placed 4 point  restraints hemoglobin at 8 3 status post 2 units of pack RBC transfusion . agrees for EGD/ colonoscopy.Patient off restraints.Continue home Abilify 10 mg and Remeron 15 mg nightly. Increased Zyprexa from 5 mg to 10 mg q8h IM PRN for agitation.daily EKG to monitor QTc- 447ms   7/14 Hb 8.1 --> 7.6. Requesting  her purse and  belongings she needs to get to her (Geovany Ring).  Clear liquids today and NPO from midnight EGD/colonoscopy in a.m. complains of abdominal, takes oxycodone 30 mg Q 6 hourly as per chart review (reviewed  last filled on 06/23).  Norco discontinued and started oxycodone 20 mg Q 6 hourly p.r.n.  7/15 refused to drink GoLYTELY overnight.  Hemoglobin stable at 7.8 leukocytosis of 15 but afebrile.  Transaminitis AST//112 with normal bilirubin and mildly elevated alk-phos at 337.  Significant left upper quadrant abdominal pain prior to endoscopy today.  Endoscopy canceled.  CT abdomen with IV and oral contrast ordered.  General surgery consulted.  Started on Zosyn and vancomycin empirically.  Blood cultures x2 with no growth  7/22  Continued on ciprofloxacin and metronidazole since 07/16. refusal of taking  medications and vitals, .As CEC expiring 7/26 , plan to pursue DELORES filing as pt remains delusional and uncooperative and needs psychiatric hospital placement. Forced med protocol with Haldol, Ativan and Ciarra. Aristada 441 mg IM injection once given 7/22, to be given [id number]. Started Atarax 50 mg nightly for insomnia  7/23 agreed for blood draw today hemoglobin repeated at 6 transfusion with 1 unit of pack RBC  7/24 purulent drainage from upper abdomen at tumor site. culture obtained .Hb 7.2 .  judicial commitment in process.  Patient's  okay with nursing home with hospice as the patient is noncompliant with medications.  does not want to be decision-maker.  7/25  Abdominal x-ray-  No convincing evidence of pneumoperitoneum on this limited single portable view up. abdominal fluid culture from 07/23 with no growth.  Wound Care consulted.  Hemoglobin at 6.7 transfusion with 1 unit of pack RBC today.  Discussed with .   mentions the patient's brother is a bad influence and only involved with the patient to obtain her money. he is okay with patient being DNR/ hospice placement.   7/26hemoglobin at 6.4--> 6.9 .  Transfusion with 1 unit of pack RBC today.  Wound cultures from 07/23 of the abdomen with no growth.  Discussed with psychiatry regarding 's wish for DNR/hospice with judicial commitment process.  discussed with Infectious Disease CT abdomen findings 7/15 .  Continue ciprofloxacin and metronidazole indefinitely. refuses rectal exam today and EGD. mentions she has brown bowel movement today. direct antiglobulin positive. haptoglobin <10  and LDH ordered. Anemia likely from hemolysis. Discussed with  in detail and he agrees for DNR / hospice placement. he wants to visit her   7/27 Hb at 8.1 . hematology considering steroids. s/p wound care evaluation  7/28 refused EKG . Hb stable at 8.3    Review of Systems:   Pain scale:  abdominal pain 10/10   Constitutional: neg for  fever or chills     Respiratory: neg for cough or shortness of breath  Cardiovascular: neg for chest pain or palpitations  Gastrointestinal: neg for nausea or vomiting, positive  for abdominal pain or change in bowel habits  Genitourinary: neg for hematuria or dysuria  Integument/Breast: neg for rash or pruritis  Hematologic/Lymphatic: neg for easy bruising or lymphadenopathy  Musculoskeletal: neg for arthralgias or myalgias  Neurological: neg for seizures or tremors  Behavioral/Psych: neg for depression or anxiety+ delusions    OBJECTIVE:     Vital Signs Range (Last 24H):  Temp:  [96.3 °F (35.7 °C)-98.7 °F (37.1 °C)]   Pulse:  [74-93]   Resp:  [16-18]   BP: (107-118)/(55-59)   SpO2:  [97 %-99 %]     Physical Exam:  Constitutional: Appears well-developed and well-nourished.   Head: Normocephalic and atraumatic. sitter at the bedside  Neck: Normal range of motion. Neck supple.   Cardiovascular: Normal heart rate.  Regular heart rhythm.  Pulmonary/Chest: Effort normal.   Abdominal: No distension.  tender epigastrium and left quadrant  Musculoskeletal: Normal range of motion. No edema.   Neurological: Alert and oriented to person, place, and time.   Skin: Skin is warm and dry.   Psychiatric: Normal mood and affect. delusional       Medications:  Medication list was reviewed and changes noted under Assessment/Plan.      Current Facility-Administered Medications:     0.9%  NaCl infusion (for blood administration), , Intravenous, Q24H PRN, Ghassan Roca PA-C    0.9%  NaCl infusion (for blood administration), , Intravenous, Q24H PRN, Mukund Chi MD    0.9%  NaCl infusion (for blood administration), , Intravenous, Q24H PRN, Mukund Chi MD    0.9%  NaCl infusion (for blood administration), , Intravenous, Q24H PRN, Mukund Chi MD    0.9%  NaCl infusion (for blood administration), , Intravenous, Q24H PRN, Mukund Chi MD    acetaminophen tablet 650 mg, 650 mg, Oral, Q4H PRN, Trcay Ospina,  MD, 650 mg at 07/15/20 2004    ARIPiprazole lauroxil 441 mg/1.6 mL injection 441 mg, 441 mg, Intramuscular, Q30 Days, Kahlil Banegas MD, 441 mg at 20 1341    ARIPiprazole tablet 30 mg, 30 mg, Oral, Daily, Shirley Lopez MD, 30 mg at 20 0854    ciprofloxacin HCl tablet 500 mg, 500 mg, Oral, Q12H, Kahlil Banegas MD, 500 mg at 20 2150    [] cyanocobalamin injection 1,000 mcg, 1,000 mcg, Subcutaneous, Daily, 1,000 mcg at 20 0915 **FOLLOWED BY** cyanocobalamin injection 1,000 mcg, 1,000 mcg, Subcutaneous, Q7 Days, 1,000 mcg at 20 1033 **FOLLOWED BY** [START ON 2020] cyanocobalamin injection 1,000 mcg, 1,000 mcg, Subcutaneous, Q30 Days, Mukund Chi MD    dextrose 50% injection 12.5 g, 12.5 g, Intravenous, PRN, Tracy Ospina MD    dextrose 50% injection 25 g, 25 g, Intravenous, PRN, Tracy Ospina MD    haloperidol lactate injection 5 mg, 5 mg, Intravenous, Q6H PRN, 5 mg at 20 1111 **AND** diphenhydrAMINE injection 50 mg, 50 mg, Intravenous, Q6H PRN, 50 mg at 20 1120 **AND** lorazepam injection 2 mg, 2 mg, Intravenous, Q6H PRN, Shirley Lopez MD, 2 mg at 20 1120    haloperidol lactate injection 5 mg, 5 mg, Intramuscular, Q6H PRN **AND** diphenhydrAMINE injection 50 mg, 50 mg, Intramuscular, Q6H PRN **AND** lorazepam injection 2 mg, 2 mg, Intramuscular, Q6H PRN, Shirley Lopez MD    glucagon (human recombinant) injection 1 mg, 1 mg, Intramuscular, PRN, Tracy Ospina MD    glucose chewable tablet 16 g, 16 g, Oral, PRN, Tracy Ospina MD    glucose chewable tablet 24 g, 24 g, Oral, PRN, Tracy Ospina MD    HYDROmorphone injection 0.2 mg, 0.2 mg, Intravenous, Q4H PRN, Kahlil Banegas MD, 0.2 mg at 20 0201    HYDROmorphone injection 1 mg, 1 mg, Intravenous, Q4H PRN, Kahlil Banegas MD, 1 mg at 20 0851    hydrOXYzine tablet 50 mg, 50 mg, Oral, QHS, Kahlil Banegas MD, 50 mg at 20    insulin aspart U-100  pen 1-10 Units, 1-10 Units, Subcutaneous, QID (AC + HS) PRN, Tracy Ospian MD, 6 Units at 07/27/20 1747    iohexol (OMNIPAQUE) oral solution 15 mL, 15 mL, Oral, PRN, Eusebia Almonte MD, 15 mL at 07/15/20 1715    lidocaine 5 % patch 1 patch, 1 patch, Transdermal, Q24H, Vick Monge PA-C, 1 patch at 07/15/20 2356    melatonin tablet 6 mg, 6 mg, Oral, Nightly PRN, Tracy Ospina MD, 6 mg at 07/24/20 2007    metoclopramide HCl injection 10 mg, 10 mg, Intravenous, Q6H PRN, Malcolm Smith MD    metroNIDAZOLE tablet 500 mg, 500 mg, Oral, Q8H, Kahlil Banegas MD, 500 mg at 07/28/20 0501    mirtazapine tablet 15 mg, 15 mg, Oral, QHS, Tracy Ospina MD, 15 mg at 07/27/20 2027    naloxone 0.4 mg/mL injection 0.4 mg, 0.4 mg, Intravenous, PRN, Mukund Chi MD    OLANZapine injection 10 mg, 10 mg, Intramuscular, Q8H PRN, Kahlil Banegas MD    ondansetron disintegrating tablet 4 mg, 4 mg, Oral, Q8H PRN, Yin Soto PA-C, 4 mg at 07/27/20 2251    pantoprazole EC tablet 40 mg, 40 mg, Oral, BID, Kahlil Banegas MD, 40 mg at 07/28/20 0854    potassium chloride CR capsule 30 mEq, 30 mEq, Oral, Once, Mukund Chi MD    potassium chloride SA CR tablet 40 mEq, 40 mEq, Oral, Once, Mukund Chi MD    potassium chloride SA CR tablet 40 mEq, 40 mEq, Oral, Once, Mukund Chi MD    promethazine tablet 12.5 mg, 12.5 mg, Oral, Q6H PRN, Yin Soto PA-C, 12.5 mg at 07/27/20 1139    senna-docusate 8.6-50 mg per tablet 1 tablet, 1 tablet, Oral, BID PRN, Tracy Ospina MD, 1 tablet at 07/14/20 0135    sodium chloride 0.9% flush 10 mL, 10 mL, Intravenous, PRN, Rosy Chavez MD    sodium chloride 0.9% flush 5 mL, 5 mL, Intravenous, PRN, Tracy Ospina MD    sodium chloride, sodium chloride, sodium chloride, sodium chloride, sodium chloride, acetaminophen, dextrose 50%, dextrose 50%, haloperidol lactate **AND** diphenhydrAMINE **AND** lorazepam, haloperidol lactate  "**AND** diphenhydrAMINE **AND** lorazepam, glucagon (human recombinant), glucose, glucose, HYDROmorphone, HYDROmorphone, insulin aspart U-100, iohexol, melatonin, metoclopramide HCl, naloxone, OLANZapine, ondansetron, promethazine, senna-docusate 8.6-50 mg, sodium chloride 0.9%, sodium chloride 0.9%    Laboratory/Diagnostic Data:  Reviewed and noted in plan where applicable- Please see chart for full lab data.    Recent Labs   Lab 07/26/20 2017 07/27/20  1034 07/27/20  1528   WBC 13.32* 12.84* 13.11*   HGB 8.0* 8.1* 8.2*   HCT 26.5* 26.9* 26.0*   * 390* 414*       Recent Labs   Lab 07/24/20  0543 07/25/20  0604 07/26/20 0526 07/27/20  1034   *  131* 129*  129* 134*  134* 135*   K 4.1  4.1 4.3  4.3 3.8  3.8 3.9     100 97  97 103  103 104   CO2 25  25 25  25 25  25 27   BUN 10  10 8  8 7  7 6   CREATININE 0.7  0.7 0.7  0.7 0.6  0.6 0.6   *  282* 315*  315* 314*  314* 206*   CALCIUM 8.3*  8.3* 8.2*  8.2* 7.6*  7.6* 7.8*   MG 1.7 1.6 1.7  --    PHOS 3.2 3.5 2.5*  --        Recent Labs   Lab 07/25/20 0604 07/26/20 0526 07/27/20  1034   ALKPHOS 102  102 90  90 90   ALT 9*  9* 8*  8* 7*   AST 16  16 20  20 12   ALBUMIN 2.2*  2.2* 1.9*  1.9* 1.9*   PROT 5.9*  5.9* 5.2*  5.2* 5.6*   BILITOT 0.4  0.4 0.4  0.4 0.4        Microbiology labs for the last week  Microbiology Results (last 7 days)     Procedure Component Value Units Date/Time    Culture, Body Fluid - Bactec [[id number]] Collected: 07/23/20 2100    Order Status: Completed Specimen: Body Fluid from Ear, Left Updated: 07/28/20 0613     Body Fluid Culture, Sterile No Growth to date      No Growth to date      No Growth to date      No Growth to date      No Growth to date    Narrative:      Purulent drainage from abdomen           Imaging Results    None         Estimated body mass index is 24.2 kg/m² as calculated from the following:    Height as of this encounter: 5' 7" (1.702 m).    Weight as of this " encounter: 70.1 kg (154 lb 8.7 oz).    I & O (Last 24H):    Intake/Output Summary (Last 24 hours) at 7/28/2020 1000  Last data filed at 7/27/2020 2027  Gross per 24 hour   Intake 200 ml   Output no documentation   Net 200 ml       Body mass index is 24.2 kg/m².    Estimated Creatinine Clearance: 105.4 mL/min (based on SCr of 0.6 mg/dL).      Cardiac:  ECG Results          EKG 12-lead (Final result)  Result time 07/12/20 10:59:23    Final result by Interface, Lab In [facility] (07/12/20 10:59:23)             Narrative:    Test Reason : D64.9,    Vent. Rate : 080 BPM     Atrial Rate : 080 BPM     P-R Int : 136 ms          QRS Dur : 086 ms      QT Int : 402 ms       P-R-T Axes : 076 063 012 degrees     QTc Int : 463 ms    Sinus rhythm with occasional Premature ventricular complexes  Low voltage QRS  Low septal forces  Abnormal ECG  No previous ECGs available  Confirmed by Dylan OCHOA MD ([id number]) on 7/12/2020 10:59:17 AM    Referred By: AAAREFCHRISTINE   SELF           Confirmed By:Dylan OCHOA MD                            ASSESSMENT/PLAN:     Active Problems:      Active Hospital Problems    Diagnosis  POA    *Symptomatic anemia [D64.9]GIB Pathway initiated  Likely bleeding from GISt  Hgb 6.2 on admit, down from 9.7 beginning of June  Check iron studies and hemolysis labs  Protonix 40mg IV BID  GI consulted, appreciate assistance  Continue diet as no procedures on Sunday  Type and screen, blood consent obtained  CBCq 8h.  Transfuse for Hemoglobin <7.  Transfuse 1 unit PRBCs  7/12 haptoglobin normal and reticulocyte count elevated.  Gastroenterology consulted  repeat CBC at 6.6 . transfusion with 1 unit of PRBC. GI recommends EGD and colonoscopy for further evaluation of iron deficiency anemia patient adamantly refuses exam and EGD/ colonoscopy  7/13  hemoglobin at 8 3 status post 2 units of pack RBC transfusion.agrees for EGD/ colonoscopy.  7/14 Hb 8.1 --> 7.6.Clear liquids today and NPO from midnight EGD/colonoscopy in a.m.  7/15  refused to drink GoLYTELY overnight.   7/23 agreed for blood draw today hemoglobin repeated at 6 transfusion with 1 unit of pack RBC  7/25  Hemoglobin at 6.7 transfusion with 1 unit of pack RBC today.  Discussed with .   mentions the patient's brother is a bad influence and only involved with the patient to obtain her money. he is okay with patient being DNR/ hospice placement.     7/26hemoglobin at 6.4--> 6.9 .  Transfusion with 1 unit of pack RBC today.  Wound cultures from 07/23 of the abdomen with no growth.  Discussed with psychiatry regarding 's wish for DNR/hospice with judicial commitment process.  discussed with Infectious Disease CT abdomen findings 7/15 .  Continue ciprofloxacin and metronidazole indefinitely. refuses rectal exam today and EGD. mentions she has brown bowel movement today. direct antiglobulin positive. haptoglobin <10  and LDH ordered. Anemia likely from hemolysis. Discussed with  in detail and he agrees for DNR / hospice placement. he wants to visit her today. discussed with hematology  7/27 Hb at 8.1 . hematology considering steroids. s/p wound care evaluation      Transaminitis 7/15-  AST//112 with normal bilirubin and mildly elevated alk-phos at 337.   7/26 AST/ALT normal         Yes    Hypokalemia [E87.6]   Resolved      B12 deficiency- levels of 192 started on vitamin B12 subcutaneous dissection  Yes    Acute deep vein thrombosis (DVT) of popliteal vein of left lower extremity [I82.432]June 2020  S/p IVC filter     Yes     S/p IVC filter June 2020      Delusional disorder [F22]/12   presenting to the ED via [facility] with OPC stating patient was at her estranged 's house  threatening she would kill the estranged .  PEC placed for delusional behavior. Work-up significant for H/H 6.2/20 (baseline 9/30 through care everywhere). Rectal exam performed without evidence of blood or melena. FOBT positive.   alert, delusional. Refusing to give   personal belongings, and agitation with staff.  4 point restraints  7/13 agitated overnight requesting cell phone.  Patient off restraints.Continue home Abilify 10 mg and Remeron 15 mg nightly. Increased Zyprexa from 5 mg to 10 mg q8h IM PRN for agitation  7/14 Hb 8.1 --> 7.6. Requesting  her purse and  belongings she needs to get to her (Geovany Ring)  7/15. Hemoglobin stable at 7.8 leukocytosis of 15 but afebrile.    7/22   refusal of taking medications and vitals, .As CEC expiring 7/26 , plan to pursue DELORES filing as pt remains delusional and uncooperative and needs psychiatric hospital placement. Forced med protocol with Haldol, Ativan and Benadry. Aristada 441 mg IM injection once given 7/22, to be given [id number]. Started Atarax 50 mg nightly for insomnia  7/23 EKG with QTC at 448milliseconds.Haldol 5 mg PO, Benadryl 50 mg PO, Ativan 2 mg PO PRN for non-redirectable agitation  7/24.  judicial commitment in process.  Patient's  okay with nursing home with hospice as the patient is noncompliant with medications.  does not want to be decision-maker.  7/25    Discussed with .   mentions the patient's brother is a bad influence and only involved with the patient to obtain her money. he is okay with patient being DNR/ hospice placement        Yes    Bipolar disorder in partial remission [F31.70]PECed in the ER  Recent admission to [facility] from [facility] beginning of June  Continue Abilify 10mg  Continue Remeron 15mg PO qHS  on Abilify Aristada BAUM, last received this month according to patient.  needs to be checked with primary psychiatrist   7/13  Increased Zyprexa from 5 mg to 10 mg q8h IM PRN for agitation.daily EKG to monitor QTc. 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  Norco discontinued and started oxycodone 20 mg Q 6 hourly p.r.n. which did not relieve her pain patient currently on Dilaudid IV 0.2/1 mg for pain control     Significant left upper quadrant abdominal pain prior to endoscopy today.  Endoscopy canceled.  CT abdomen with IV and oral contrast ordered.  General surgery consulted.  Started on Zosyn and vancomycin empirically.  Blood cultures x2 pending    Continued on ciprofloxacin and metronidazole since  purulent drainage from upper abdomen at tumor site. culture obtained    Abdominal x-ray-  No convincing evidence of pneumoperitoneum on this limited single portable view up. abdominal fluid culture from  with no growth.  Wound Care consulted   continue ciprofloxacin and metronidazole indefinitely per ID      Yes    Type 2 diabetes mellitus without complication, without long-term current use of insulin [E11.9]   Patient's FSGs are controlled on current hypoglycemics.   Last A1c reviewed-   Lab Results   Component Value Date    HGBA1C 6.3 (H) 2020     Home DM regimen:  Metformin  SSI with POCT accuchecks and Diabetic diet  Yes      Resolved Hospital Problems   No resolved problems to display.       Code status-DNR  Disposition- CEC  , likely nursing home with hospice.  Working on judicial commitment    DVT prophylaxis addressed with: SCDs            Subsequent Hospital Care     Level 2 15774 Total visit time was 25 minutes or greater with greater than 50% of time spent in counseling and coordination of care.       We discussed in detail the plan of care, the patient's response to treatment, the discharge plan,.  Total time includes time spent reviewing the medical record, examining the patient, writing notes and communicating with other professionals.    Mukund Chi MD  Attending Staff Physician  [facility]  pager- [phone]  [facility] - [phone]                   I

## 2020-07-29 NOTE — ASSESSMENT & PLAN NOTE
ASSESSMENT     Violeta Boudreaux is a 54 y.o. female with a past psychiatric history of BPAD and delusional disorder, who presented to the [facility] due to OPC from estranged  for threatening to kill him. Admitted to [facility] for symptomatic anemia. Per FACT team, patient non-compliant with medications, did not receive most recent scheduled Aristada BAUM. Received Aristada 441 mg BAUM on 7/22.    IMPRESSION  Bipolar affective disorder, current episode manic  Psychological factors affecting medical care  Cluster B personality d/o  Delusional disorder  Medication non-adherence    RECOMMENDATION(S)      1. Scheduled Medication(s):  - Continue Abilify from 30 mg daily   - Forced med protocol with Haldol, Ativan and Benadryl as described in PRN below  - Continue home Remeron 15 mg nightly  - Aristada 441 mg IM injection [id number], last given 7/22  - Continue Atarax 50 mg nightly for insomnia  - Optimize pain management regimen to ensure adequate pain control and encourage medication compliance    2. PRN Medication(s):  - First try: Haldol 5 mg PO, Benadryl 50 mg PO, Ativan 2 mg PO PRN for non-redirectable agitation  - If refuses: Haldol 2 mg IV, Benadryl 50 mg IV, Ativan 2 mg IV PRN for non-redirectable agitation    3.  Monitor:  - Please obtain daily EKG to monitor QTc    4. Legal Status/Precaution(s):  - Continue CEC (expires 7/26 @ 8:32 pm) as patient is in imminent danger of hurting self or others and is gravely disabled due to a psychiatric illness-- DELORES filed 7/23  - Maintain 1:1 sitter  - Continue working with next of kin (estranged  and brother) to help facilitate patient care and decision making, especially with regards to disposition -- would be best to come to a consensus with regards to her disposition  - Continue working with palliative care to discuss options, code status, and disposition    5. Capacity and Medical Decision Making  - Primary team worried about melena and potential need for EGD.  The patient is  "refusing procedures on the basis of it is "impossible for a blood count to drop in 24 hours."  She cannot explain the consequences or benefits of the procedure and continues to demonstrate delusional thought content.    - Recommend to contact the  for medical decision making and decisions regarding DNR, hospice care, procedures.    - Would carefully weight the risk vs. benefit of various procedure and hold off unless emergent given that the patient is adamantly against having any done forcing the patient to undergo a procedure will likely be difficult and traumatic for the patient  - Disposition: DELORES placement in nursing home with hospice vs. Inpatient hospice, NOT inpatient psychiatric hospitalization; patient does not have capacity to refuse placement  " No

## 2020-07-29 NOTE — PLAN OF CARE
Patient remained free of falls and injuries during shift.  Patient refused all medications except for pain medications.  Patient refused 0400am vitals.  Patient refused Morning Lab draw.  No other concerns noted.

## 2020-07-29 NOTE — PLAN OF CARE
Problem: Adult Inpatient Plan of Care  Goal: Plan of Care Review  7/29/2020 1858 by Julita So RN  Outcome: Ongoing, Progressing  7/29/2020 1858 by Julita So RN  Outcome: Ongoing, Not Progressing     Problem: Adult Inpatient Plan of Care  Goal: Optimal Comfort and Wellbeing  7/29/2020 1858 by Julita So RN  Outcome: Ongoing, Progressing  7/29/2020 1858 by Julita So RN  Outcome: Ongoing, Not Progressing     Problem: Pain Chronic (Persistent)  Goal: Acceptable Pain Control and Functional Ability  7/29/2020 1858 by Julita So RN  Outcome: Ongoing, Progressing  7/29/2020 1858 by Julita So RN  Outcome: Ongoing, Not Progressing     Problem: Wound  Goal: Optimal Wound Healing  Outcome: Ongoing, Progressing     AAOx4. Continues PEC'd status. Sitter at bedside. C/o frequent abd pain; epigastric region. Wound care  performed during shift; drsg cdi. Continues with prn pain management; refuses alternative comfort measures.

## 2020-07-29 NOTE — PROGRESS NOTES
Progress Note  Hospital Medicine    Admit Date: 7/11/2020  Length of Stay:  LOS: 12 days     SUBJECTIVE:         Follow-up For:  Psychological factors affecting medical condition    HPI/Interval history (See H&P for complete P,F,SHx) :     Ms. Violeta Boudreaux is a 54 y.o. female with Bipolar Disorder, delusional disorder, GIST on Sunitinib, and recent LLE DVT s/p IVC filter (June 2020) who [place] [facility] [place].  Her estranged  reports that she showed up at his house, claiming that she was  to Geovany Ring from [employer] and he was being hid inside the estranged 's house. He reports that she is homeless, and hasn't been taking her psych medications.  She denies any complaints at this time.  Labs on arrival showed a Hemoglobin 6.2 down from 9.7 in June 2020.  She endorses midepigastric abdominal abdomina.  She denies any blood in her stools, dark stools, or vaginal bleeding.  She mentions that a few weeks ago, she was having bad headaches and took a lot of Advil, then causing her to have hematemesis.  She denies further bleeding or use of Aspirin or NSAIDS.  Of note, at the end of May, she was hospitalized at [facility] for delusional disorder.  At that time, she was found to have a Hemoglobin 6.4.  She was given blood and her hemolgobin improved to 9.7.  It was thought that her bleeding with 2/2 her GIST tumor.  She was not evaluated by GI.  Also during that admission, she was found to have a LLE DVT.  HemeOnc suggested IVC filter, given her lack of consistency with medications.  She was [facility] to the [facility].  She was discharged on Abilify 10mg and Mirtazapine 15mg qHS.       In the ER, she was PECed for grave disability.  SUMIT was positive.  She was transfused 1 unit PRBCs.  She mentions that Bert show guarded with the she [employer] will pay for every drop of blood we take from her, and that she wants to return to her house in [place] when discharged.  She was admitted to Hospital  Medicine for GI and Psych evaluations.        Interval history  7/12   presenting to the ED via [facility] with [facility] stating patient was at her estranged 's house  threatening she would kill the estranged .  PEC placed for delusional behavior. Work-up significant for H/H 6.2/20 (baseline 9/30 through care everywhere). Rectal exam performed without evidence of blood or melena. FOBT positive.   alert, delusional. Refusing to give  personal belongings, and agitation with staff.  4 point restraints were applied. repeat CBC at 6.6 . transfusion with 1 unit of PRBC. GI recommends EGD and colonoscopy for further evaluation of iron deficiency anemia patient adamantly refuses exam and EGD/ colonoscopy  7/13 agitated overnight requesting cell phone.  Patient was placed 4 point  restraints hemoglobin at 8 3 status post 2 units of pack RBC transfusion . agrees for EGD/ colonoscopy.Patient off restraints.Continue home Abilify 10 mg and Remeron 15 mg nightly. Increased Zyprexa from 5 mg to 10 mg q8h IM PRN for agitation.daily EKG to monitor QTc- 447ms   7/14 Hb 8.1 --> 7.6. Requesting  her purse and  belongings she needs to get to her (Geovany Ring).  Clear liquids today and NPO from midnight EGD/colonoscopy in a.m. complains of abdominal, takes oxycodone 30 mg Q 6 hourly as per chart review (reviewed  last filled on 06/23).  Norco discontinued and started oxycodone 20 mg Q 6 hourly p.r.n.  7/15 refused to drink GoLYTELY overnight.  Hemoglobin stable at 7.8 leukocytosis of 15 but afebrile.  Transaminitis AST//112 with normal bilirubin and mildly elevated alk-phos at 337.  Significant left upper quadrant abdominal pain prior to endoscopy today.  Endoscopy canceled.  CT abdomen with IV and oral contrast ordered.  General surgery consulted.  Started on Zosyn and vancomycin empirically.  Blood cultures x2 with no growth  7/22  Continued on ciprofloxacin and metronidazole since 07/16. refusal of taking  medications and vitals, .As CEC expiring 7/26 , plan to pursue DELORES filing as pt remains delusional and uncooperative and needs psychiatric hospital placement. Forced med protocol with Haldol, Ativan and Ciarra. Aristada 441 mg IM injection once given 7/22, to be given [id number]. Started Atarax 50 mg nightly for insomnia  7/23 agreed for blood draw today hemoglobin repeated at 6 transfusion with 1 unit of pack RBC  7/24 purulent drainage from upper abdomen at tumor site. culture obtained .Hb 7.2 .  judicial commitment in process.  Patient's  okay with nursing home with hospice as the patient is noncompliant with medications.  does not want to be decision-maker.  7/25  Abdominal x-ray-  No convincing evidence of pneumoperitoneum on this limited single portable view up. abdominal fluid culture from 07/23 with no growth.  Wound Care consulted.  Hemoglobin at 6.7 transfusion with 1 unit of pack RBC today.  Discussed with .   mentions the patient's brother is a bad influence and only involved with the patient to obtain her money. he is okay with patient being DNR/ hospice placement.   7/26hemoglobin at 6.4--> 6.9 .  Transfusion with 1 unit of pack RBC today.  Wound cultures from 07/23 of the abdomen with no growth.  Discussed with psychiatry regarding 's wish for DNR/hospice with judicial commitment process.  discussed with Infectious Disease CT abdomen findings 7/15 .  Continue ciprofloxacin and metronidazole indefinitely. refuses rectal exam today and EGD. mentions she has brown bowel movement today. direct antiglobulin positive. haptoglobin <10  and LDH ordered. Anemia likely from hemolysis. Discussed with  in detail and he agrees for DNR / hospice placement. he wants to visit her   7/27 Hb at 8.1 . hematology considering steroids. s/p wound care evaluation  7/28 refused EKG . Hb stable at 8.3  7/29  refusing labs and EKG . forced med protocol as ordered if pt refuses scheduled  Abilify.  delusional today    Review of Systems:   Pain scale:  abdominal pain 10/10   Constitutional: neg for fever or chills     Respiratory: neg for cough or shortness of breath  Cardiovascular: neg for chest pain or palpitations  Gastrointestinal: neg for nausea or vomiting, positive  for abdominal pain or change in bowel habits  Genitourinary: neg for hematuria or dysuria  Integument/Breast: neg for rash or pruritis  Hematologic/Lymphatic: neg for easy bruising or lymphadenopathy  Musculoskeletal: neg for arthralgias or myalgias  Neurological: neg for seizures or tremors  Behavioral/Psych: neg for depression or anxiety+ delusions    OBJECTIVE:     Vital Signs Range (Last 24H):  Temp:  [96.2 °F (35.7 °C)-98.5 °F (36.9 °C)]   Pulse:  [76-99]   Resp:  [16-20]   BP: (100-116)/(52-67)   SpO2:  [94 %-99 %]     Physical Exam:  Constitutional: Appears well-developed and well-nourished.   Head: Normocephalic and atraumatic. sitter at the bedside  Neck: Normal range of motion. Neck supple.   Cardiovascular: Normal heart rate.  Regular heart rhythm.  Pulmonary/Chest: Effort normal.   Abdominal: No distension.  tender epigastrium and left quadrant  Musculoskeletal: Normal range of motion. No edema.   Neurological: Alert and oriented to person, place, and time.   Skin: Skin is warm and dry.   Psychiatric: Normal mood and affect. delusional       Medications:  Medication list was reviewed and changes noted under Assessment/Plan.      Current Facility-Administered Medications:     0.9%  NaCl infusion (for blood administration), , Intravenous, Q24H PRN, Ghassan Roca PA-C    0.9%  NaCl infusion (for blood administration), , Intravenous, Q24H PRN, Mukund Chi MD    0.9%  NaCl infusion (for blood administration), , Intravenous, Q24H PRN, Mukund Chi MD    0.9%  NaCl infusion (for blood administration), , Intravenous, Q24H PRN, Mukund Chi MD    0.9%  NaCl infusion (for blood administration), ,  Intravenous, Q24H PRN, Mukund Chi MD    acetaminophen tablet 650 mg, 650 mg, Oral, Q4H PRN, Tracy Ospina MD, 650 mg at 07/15/20 2004    ARIPiprazole lauroxil 441 mg/1.6 mL injection 441 mg, 441 mg, Intramuscular, Q30 Days, Kahlil Banegas MD, 441 mg at 20 1341    ARIPiprazole tablet 30 mg, 30 mg, Oral, Daily, Shirley Lopez MD, 30 mg at 20 0854    ciprofloxacin HCl tablet 500 mg, 500 mg, Oral, Q12H, Kahlil Banegas MD, 500 mg at 20 2150    [] cyanocobalamin injection 1,000 mcg, 1,000 mcg, Subcutaneous, Daily, 1,000 mcg at 20 0915 **FOLLOWED BY** cyanocobalamin injection 1,000 mcg, 1,000 mcg, Subcutaneous, Q7 Days, 1,000 mcg at 20 1033 **FOLLOWED BY** [START ON 2020] cyanocobalamin injection 1,000 mcg, 1,000 mcg, Subcutaneous, Q30 Days, Mukund Chi MD    dextrose 50% injection 12.5 g, 12.5 g, Intravenous, PRN, Tracy Ospina MD    dextrose 50% injection 25 g, 25 g, Intravenous, PRN, Tracy Ospina MD    haloperidol lactate injection 5 mg, 5 mg, Intravenous, Q6H PRN, 5 mg at 20 1111 **AND** diphenhydrAMINE injection 50 mg, 50 mg, Intravenous, Q6H PRN, 50 mg at 20 1120 **AND** lorazepam injection 2 mg, 2 mg, Intravenous, Q6H PRN, Shirley Lopez MD, 2 mg at 20 1120    haloperidol lactate injection 5 mg, 5 mg, Intramuscular, Q6H PRN **AND** diphenhydrAMINE injection 50 mg, 50 mg, Intramuscular, Q6H PRN **AND** lorazepam injection 2 mg, 2 mg, Intramuscular, Q6H PRN, Shirley Lopez MD    glucagon (human recombinant) injection 1 mg, 1 mg, Intramuscular, PRN, Tracy Ospina MD    glucose chewable tablet 16 g, 16 g, Oral, PRN, Tracy Ospina MD    glucose chewable tablet 24 g, 24 g, Oral, PRN, Tracy Ospina MD    HYDROmorphone injection 0.2 mg, 0.2 mg, Intravenous, Q4H PRN, Kahlil Banegas MD, 0.2 mg at 20 0201    HYDROmorphone injection 1 mg, 1 mg, Intravenous, Q4H PRN, Kahlil Banegas MD, 1 mg at 20  0855    hydrOXYzine tablet 50 mg, 50 mg, Oral, QHS, Kahlil Banegas MD, 50 mg at 07/27/20 2028    insulin aspart U-100 pen 1-10 Units, 1-10 Units, Subcutaneous, QID (AC + HS) PRN, Tracy Ospina MD, 6 Units at 07/29/20 0843    iohexol (OMNIPAQUE) oral solution 15 mL, 15 mL, Oral, PRN, Eusebia Almonte MD, 15 mL at 07/15/20 1715    lidocaine 5 % patch 1 patch, 1 patch, Transdermal, Q24H, Vick Monge PA-C, 1 patch at 07/15/20 2356    melatonin tablet 6 mg, 6 mg, Oral, Nightly PRN, Tracy Ospina MD, 6 mg at 07/24/20 2007    metoclopramide HCl injection 10 mg, 10 mg, Intravenous, Q6H PRN, Malcolm Smith MD    metroNIDAZOLE tablet 500 mg, 500 mg, Oral, Q8H, Kahlil Banegas MD, 500 mg at 07/28/20 1549    mirtazapine tablet 15 mg, 15 mg, Oral, QHS, Tracy Ospina MD, 15 mg at 07/27/20 2027    naloxone 0.4 mg/mL injection 0.4 mg, 0.4 mg, Intravenous, PRN, Mukund Chi MD    OLANZapine injection 10 mg, 10 mg, Intramuscular, Q8H PRN, Kahlil Banegas MD    ondansetron disintegrating tablet 4 mg, 4 mg, Oral, Q8H PRN, Yin Soto PA-C, 4 mg at 07/27/20 2251    pantoprazole EC tablet 40 mg, 40 mg, Oral, BID, Kahlil Banegas MD, 40 mg at 07/28/20 2059    potassium chloride CR capsule 30 mEq, 30 mEq, Oral, Once, Mukund Chi MD    potassium chloride SA CR tablet 40 mEq, 40 mEq, Oral, Once, Mukund Chi MD    potassium chloride SA CR tablet 40 mEq, 40 mEq, Oral, Once, Mukund Chi MD    promethazine tablet 12.5 mg, 12.5 mg, Oral, Q6H PRN, Yin Soto PA-C, 12.5 mg at 07/27/20 1139    senna-docusate 8.6-50 mg per tablet 1 tablet, 1 tablet, Oral, BID PRN, Tracy Ospina MD, 1 tablet at 07/14/20 0135    sodium chloride 0.9% flush 10 mL, 10 mL, Intravenous, PRN, Rosy Chavez MD    sodium chloride 0.9% flush 5 mL, 5 mL, Intravenous, PRN, Tracy Ospina MD    sodium chloride, sodium chloride, sodium chloride, sodium chloride, sodium chloride,  "acetaminophen, dextrose 50%, dextrose 50%, haloperidol lactate **AND** diphenhydrAMINE **AND** lorazepam, haloperidol lactate **AND** diphenhydrAMINE **AND** lorazepam, glucagon (human recombinant), glucose, glucose, HYDROmorphone, HYDROmorphone, insulin aspart U-100, iohexol, melatonin, metoclopramide HCl, naloxone, OLANZapine, ondansetron, promethazine, senna-docusate 8.6-50 mg, sodium chloride 0.9%, sodium chloride 0.9%    Laboratory/Diagnostic Data:  Reviewed and noted in plan where applicable- Please see chart for full lab data.    Recent Labs   Lab 07/27/20  1034 07/27/20  1528 07/28/20  1035   WBC 12.84* 13.11* 11.44   HGB 8.1* 8.2* 8.3*   HCT 26.9* 26.0* 27.2*   * 414* 459*       Recent Labs   Lab 07/25/20  0604 07/26/20  0526 07/27/20  1034 07/28/20  1035   *  129* 134*  134* 135* 135*   K 4.3  4.3 3.8  3.8 3.9 4.4   CL 97  97 103  103 104 102   CO2 25  25 25  25 27 29   BUN 8  8 7  7 6 5*   CREATININE 0.7  0.7 0.6  0.6 0.6 0.7   *  315* 314*  314* 206* 363*   CALCIUM 8.2*  8.2* 7.6*  7.6* 7.8* 7.9*   MG 1.6 1.7  --  1.8   PHOS 3.5 2.5*  --  3.6       Recent Labs   Lab 07/26/20  0526 07/27/20  1034 07/28/20  1035   ALKPHOS 90  90 90 95   ALT 8*  8* 7* 7*   AST 20 20 12 13   ALBUMIN 1.9*  1.9* 1.9* 2.0*   PROT 5.2*  5.2* 5.6* 5.8*   BILITOT 0.4  0.4 0.4 0.4        Microbiology labs for the last week  Microbiology Results (last 7 days)     Procedure Component Value Units Date/Time    Culture, Body Fluid - Bactec [[id number]] Collected: 07/23/20 2100    Order Status: Completed Specimen: Body Fluid from Ear, Left Updated: 07/29/20 0612     Body Fluid Culture, Sterile No growth after 5 days.    Narrative:      Purulent drainage from abdomen           Imaging Results    None         Estimated body mass index is 24.2 kg/m² as calculated from the following:    Height as of this encounter: 5' 7" (1.702 m).    Weight as of this encounter: 70.1 kg (154 lb 8.7 oz).    I & O " (Last 24H):    Intake/Output Summary (Last 24 hours) at 7/29/2020 1141  Last data filed at 7/29/2020 0900  Gross per 24 hour   Intake 240 ml   Output no documentation   Net 240 ml       Body mass index is 24.2 kg/m².    Estimated Creatinine Clearance: 90.4 mL/min (based on SCr of 0.7 mg/dL).      Cardiac:  ECG Results          EKG 12-lead (Final result)  Result time 07/12/20 10:59:23    Final result by Interface, Lab In [facility] (07/12/20 10:59:23)             Narrative:    Test Reason : D64.9,    Vent. Rate : 080 BPM     Atrial Rate : 080 BPM     P-R Int : 136 ms          QRS Dur : 086 ms      QT Int : 402 ms       P-R-T Axes : 076 063 012 degrees     QTc Int : 463 ms    Sinus rhythm with occasional Premature ventricular complexes  Low voltage QRS  Low septal forces  Abnormal ECG  No previous ECGs available  Confirmed by Dylan OCHOA MD ([id number]) on 7/12/2020 10:59:17 AM    Referred By: [email or web address]   SELF           Confirmed By:Dylan OCHOA MD                            ASSESSMENT/PLAN:     Active Problems:      Active Hospital Problems    Diagnosis  POA    *Symptomatic anemia [D64.9]GIB Pathway initiated  Likely bleeding from GISt  Hgb 6.2 on admit, down from 9.7 beginning of June  Check iron studies and hemolysis labs  Protonix 40mg IV BID  GI consulted, appreciate assistance  Continue diet as no procedures on Sunday  Type and screen, blood consent obtained  CBCq 8h.  Transfuse for Hemoglobin <7.  Transfuse 1 unit PRBCs  7/12 haptoglobin normal and reticulocyte count elevated.  Gastroenterology consulted  repeat CBC at 6.6 . transfusion with 1 unit of PRBC. GI recommends EGD and colonoscopy for further evaluation of iron deficiency anemia patient adamantly refuses exam and EGD/ colonoscopy  7/13  hemoglobin at 8 3 status post 2 units of pack RBC transfusion.agrees for EGD/ colonoscopy.  7/14 Hb 8.1 --> 7.6.Clear liquids today and NPO from midnight EGD/colonoscopy in a.m.  7/15 refused to drink GoLYTELY overnight.   7/23  agreed for blood draw today hemoglobin repeated at 6 transfusion with 1 unit of pack RBC  7/25  Hemoglobin at 6.7 transfusion with 1 unit of pack RBC today.  Discussed with .   mentions the patient's brother is a bad influence and only involved with the patient to obtain her money. he is okay with patient being DNR/ hospice placement.     7/26hemoglobin at 6.4--> 6.9 .  Transfusion with 1 unit of pack RBC today.  Wound cultures from 07/23 of the abdomen with no growth.  Discussed with psychiatry regarding 's wish for DNR/hospice with judicial commitment process.  discussed with Infectious Disease CT abdomen findings 7/15 .  Continue ciprofloxacin and metronidazole indefinitely. refuses rectal exam today and EGD. mentions she has brown bowel movement today. direct antiglobulin positive. haptoglobin <10  and LDH ordered. Anemia likely from hemolysis. Discussed with  in detail and he agrees for DNR / hospice placement. he wants to visit her today. discussed with hematology  7/27 Hb at 8.1 . hematology considering steroids. s/p wound care evaluation      Transaminitis 7/15-  AST//112 with normal bilirubin and mildly elevated alk-phos at 337.   7/26 AST/ALT normal         Yes    Hypokalemia [E87.6]   Resolved      B12 deficiency- levels of 192 started on vitamin B12 subcutaneous dissection  Yes    Acute deep vein thrombosis (DVT) of popliteal vein of left lower extremity [I82.432]June 2020  S/p IVC filter     Yes     S/p IVC filter June 2020      Delusional disorder [F22]/12   presenting to the ED via [facility] with [facility] stating patient was at her estranged 's house  threatening she would kill the estranged .  PEC placed for delusional behavior. Work-up significant for H/H 6.2/20 (baseline 9/30 through care everywhere). Rectal exam performed without evidence of blood or melena. FOBT positive.   alert, delusional. Refusing to give  personal belongings, and agitation with staff.   4 point restraints  7/13 agitated overnight requesting cell phone.  Patient off restraints.Continue home Abilify 10 mg and Remeron 15 mg nightly. Increased Zyprexa from 5 mg to 10 mg q8h IM PRN for agitation  7/14 Hb 8.1 --> 7.6. Requesting  her purse and  belongings she needs to get to her (Geovany Ring)  7/15. Hemoglobin stable at 7.8 leukocytosis of 15 but afebrile.    7/22   refusal of taking medications and vitals, .As CEC expiring 7/26 , plan to pursue DELORES filing as pt remains delusional and uncooperative and needs psychiatric hospital placement. Forced med protocol with Haldol, Ativan and Benadry. Aristada 441 mg IM injection once given 7/22, to be given [id number]. Started Atarax 50 mg nightly for insomnia  7/23 EKG with QTC at 448milliseconds.Haldol 5 mg PO, Benadryl 50 mg PO, Ativan 2 mg PO PRN for non-redirectable agitation  7/24.  judicial commitment in process.  Patient's  okay with nursing home with hospice as the patient is noncompliant with medications.  does not want to be decision-maker.  7/25    Discussed with .   mentions the patient's brother is a bad influence and only involved with the patient to obtain her money. he is okay with patient being DNR/ hospice placement        Yes    Bipolar disorder in partial remission [F31.70]PECed in the ER  Recent admission to [facility] from [facility] beginning of June  Continue Abilify 10mg  Continue Remeron 15mg PO qHS  on Abilify Aristada BAUM, last received this month according to patient.  needs to be checked with primary psychiatrist   7/13  Increased Zyprexa from 5 mg to 10 mg q8h IM PRN for agitation.daily EKG to monitor QTc. 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  Norco discontinued and started oxycodone  20 mg Q 6 hourly p.r.n. which did not relieve her pain patient currently on Dilaudid IV 0.2/1 mg for pain control     Significant left upper quadrant abdominal pain prior to endoscopy today.  Endoscopy canceled.  CT abdomen with IV and oral contrast ordered.  General surgery consulted.  Started on Zosyn and vancomycin empirically.  Blood cultures x2 pending    Continued on ciprofloxacin and metronidazole since  purulent drainage from upper abdomen at tumor site. culture obtained    Abdominal x-ray-  No convincing evidence of pneumoperitoneum on this limited single portable view up. abdominal fluid culture from  with no growth.  Wound Care consulted   continue ciprofloxacin and metronidazole indefinitely per ID      Yes    Type 2 diabetes mellitus without complication, without long-term current use of insulin [E11.9]   Patient's FSGs are controlled on current hypoglycemics.   Last A1c reviewed-   Lab Results   Component Value Date    HGBA1C 6.3 (H) 2020     Home DM regimen:  Metformin  SSI with POCT accuchecks and Diabetic diet  Yes      Resolved Hospital Problems   No resolved problems to display.       Code status-DNR  Disposition- [facility]  , likely nursing home with hospice.  Working on judicial commitment    DVT prophylaxis addressed with: SCDs            Subsequent Hospital Care     Level 2 90926 Total visit time was 25 minutes or greater with greater than 50% of time spent in counseling and coordination of care.       We discussed in detail the plan of care, the patient's response to treatment, the discharge plan,.  Total time includes time spent reviewing the medical record, examining the patient, writing notes and communicating with other professionals.    Mukund Chi MD  Attending Staff Physician  [facility] Medicine  pager- [phone]  [facility] - [phone]                   I
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
## 2020-07-29 NOTE — SUBJECTIVE & OBJECTIVE
"Interval History: As above    Family History     None        Tobacco Use    Smoking status: Current Every Day Smoker     Packs/day: 10.00     Types: Cigarettes    Smokeless tobacco: Never Used   Substance and Sexual Activity    Alcohol use: No    Drug use: No    Sexual activity: Not on file     Psychotherapeutics (From admission, onward)    Start     Stop Route Frequency Ordered    07/21/20 1300  ARIPiprazole lauroxil 441 mg/1.6 mL injection 441 mg     Question Answer Comment   Brand Name: ARISTADA (R)    Generic name: none    Form: Injectable    Length of Therapy: Indefinite    Reason for Non-Formulary: No equivalent formulary medication available    How soon needed? (if approved, may take up to 5 days to procure): 48-72 hrs    Requestor's Contact Information: Shirley Lopez or ON CALL psychiatry        -- IM Every 30 days 07/21/20 1219    07/20/20 1036  lorazepam injection 2 mg      -- IM Every 6 hours PRN 07/20/20 0946    07/20/20 1034  haloperidol lactate injection 5 mg      -- IM Every 6 hours PRN 07/20/20 0946    07/20/20 1031  lorazepam injection 2 mg      -- IV Every 6 hours PRN 07/20/20 0933    07/20/20 1030  haloperidol lactate injection 5 mg      -- IV Every 6 hours PRN 07/20/20 0933    07/20/20 1030  ARIPiprazole tablet 30 mg      -- Oral Daily 07/20/20 0927    07/16/20 1833  OLANZapine injection 10 mg      -- IM Every 8 hours PRN 07/16/20 1834    07/11/20 2245  mirtazapine tablet 15 mg      -- Oral Nightly 07/11/20 2132           Review of Systems       Pertinent items noted in HPI.    Objective:     Vital Signs (Most Recent):  Temp: 98.5 °F (36.9 °C) (07/29/20 0759)  Pulse: 88 (07/29/20 0759)  Resp: 16 (07/29/20 0855)  BP: (!) 107/53 (07/29/20 0759)  SpO2: 97 % (07/29/20 0759) Vital Signs (24h Range):  Temp:  [96.2 °F (35.7 °C)-98.5 °F (36.9 °C)] 98.5 °F (36.9 °C)  Pulse:  [85-99] 88  Resp:  [16-18] 16  SpO2:  [94 %-99 %] 97 %  BP: (100-116)/(53-67) 107/53     Height: 5' 7" (170.2 cm)  Weight: 70.1 " kg (154 lb 8.7 oz)  Body mass index is 24.2 kg/m².      Intake/Output Summary (Last 24 hours) at 7/29/2020 1053  Last data filed at 7/29/2020 0900  Gross per 24 hour   Intake 240 ml   Output --   Net 240 ml       Physical Exam         Physical Exam:  General appearance: NAD  Head: NCAT  Lungs: CTAB, no increased work of breath  Heart: RRR  Abdomen: soft, distended  Extremities: extremities normal, atraumatic  Skin: warm, dry    Mental Status Exam:  Appearance: older than stated age, disheveled, covers over face  Behavior/Cooperation: no eye contact today  Speech: normal rate, tone, volume prior to entering room, sarcastic, was not speaking today  Mood: unable to assess  Affect: unable to assess  Thought Process: unable to assess  Thought Content: delusions: yes, erotomanic, persecutory  Orientation: unable to assess  Memory: unable to assess  Attention Span/Concentration: unable to assess  Insight: poor  Judgment: poor         Significant Labs: All pertinent labs within the past 24 hours have been reviewed.    Significant Imaging: I have reviewed all pertinent imaging results/findings within the past 24 hours.

## 2020-07-30 NOTE — SUBJECTIVE & OBJECTIVE
"Interval History: As above    Family History     None        Tobacco Use    Smoking status: Current Every Day Smoker     Packs/day: 10.00     Types: Cigarettes    Smokeless tobacco: Never Used   Substance and Sexual Activity    Alcohol use: No    Drug use: No    Sexual activity: Not on file     Psychotherapeutics (From admission, onward)    Start     Stop Route Frequency Ordered    07/21/20 1300  ARIPiprazole lauroxil 441 mg/1.6 mL injection 441 mg     Question Answer Comment   Brand Name: ARISTADA (R)    Generic name: none    Form: Injectable    Length of Therapy: Indefinite    Reason for Non-Formulary: No equivalent formulary medication available    How soon needed? (if approved, may take up to 5 days to procure): 48-72 hrs    Requestor's Contact Information: Shirley Lopez or ON CALL psychiatry        -- IM Every 30 days 07/21/20 1219    07/20/20 1036  lorazepam injection 2 mg      -- IM Every 6 hours PRN 07/20/20 0946    07/20/20 1034  haloperidol lactate injection 5 mg      -- IM Every 6 hours PRN 07/20/20 0946    07/20/20 1031  lorazepam injection 2 mg      -- IV Every 6 hours PRN 07/20/20 0933    07/20/20 1030  haloperidol lactate injection 5 mg      -- IV Every 6 hours PRN 07/20/20 0933    07/20/20 1030  ARIPiprazole tablet 30 mg      -- Oral Daily 07/20/20 0927    07/16/20 1833  OLANZapine injection 10 mg      -- IM Every 8 hours PRN 07/16/20 1834    07/11/20 2245  mirtazapine tablet 15 mg      -- Oral Nightly 07/11/20 2132           Review of Systems       Pertinent items noted in HPI.    Objective:     Vital Signs (Most Recent):  Temp: 97.4 °F (36.3 °C) (07/30/20 0728)  Pulse: 84 (07/30/20 0728)  Resp: 14 (07/30/20 0851)  BP: 118/64 (07/30/20 0728)  SpO2: 98 % (07/30/20 0728) Vital Signs (24h Range):  Temp:  [97.4 °F (36.3 °C)-98.5 °F (36.9 °C)] 97.4 °F (36.3 °C)  Pulse:  [] 84  Resp:  [14-20] 14  SpO2:  [96 %-99 %] 98 %  BP: (100-118)/(52-64) 118/64     Height: 5' 7" (170.2 cm)  Weight: 70.1 kg " (154 lb 8.7 oz)  Body mass index is 24.2 kg/m².      Intake/Output Summary (Last 24 hours) at 7/30/2020 0903  Last data filed at 7/29/2020 1302  Gross per 24 hour   Intake 240 ml   Output --   Net 240 ml       Physical Exam         Physical Exam:  General appearance: NAD  Head: NCAT  Lungs: CTAB, no increased work of breath  Heart: RRR  Abdomen: soft, distended  Extremities: extremities normal, atraumatic  Skin: warm, dry    Mental Status Exam:  Appearance: older than stated age, disheveled  Behavior/Cooperation: intense eye contact, cooperative  Speech: normal rate, tone, volume, sarcastic  Mood: irritable, also tearful  Affect: mood congruent  Thought Process: linear  Thought Content: delusions: yes, erotomanic, persecutory  Orientation: oriented to person, place, time  Memory: intact to conversation  Attention Span/Concentration: intact to conversation, WORLD forwards and backwards  Insight: poor but improving  Judgment: poor         Significant Labs: All pertinent labs within the past 24 hours have been reviewed.    Significant Imaging: I have reviewed all pertinent imaging results/findings within the past 24 hours.

## 2020-07-30 NOTE — PLAN OF CARE
Fall precaution maintained this shift call bell in reach bed in low position. Instructed pt to call for assistance. Vs stable. Prn pain given as needed. Pt taken all meds as directed
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
## 2020-07-30 NOTE — PLAN OF CARE
SW attempted to call in LOCET. SW was informed that the [facility] has 2 operating systems down and someone will call SW back when they are back up and running. SW provide call back contact information. SW will continue to follow.     12:26 PM  SW completed LOCET and faxed PASRR to [facility] the [facility], waiting on 142. SW is in communication with patient's CM, and patient's Care Team. SW will continue to follow.     2:33 PM  SW received the patient's Letter of Consideration, which was uploaded to the patient's chart.     Sharee Rivas LMSW   - [facility]  Ext. [phone]

## 2020-07-30 NOTE — PLAN OF CARE
Plan of care reviewed with patient.  Patient verbalized understanding and had no further questions.  Patient complained of abdominal pain throughout most of the shift which was relieved with IV dilaudid.  Also complained of mild nausea that was relieved with oral antiemetics.  Patient now resting comfortably in bed locked in lowest position, side rails up x3, and call bell in reach.  Sitter at bedside, will continue to monitor.

## 2020-07-30 NOTE — PROGRESS NOTES
"Wound care follow up for midline abdominal wound.    Received pt lying in bed on left side awake and alert with sitter at bedside. Dressing clean, dry and intact to abdomen.     Pt declined wound care assessment at this time and reports that her wound care was just done and that she would prefer that dressing be left intact at this time.    Per pt, border gauze dressings are "fine" and that she does not want a applied to her abdomen to catch the wound drainage. Agreed upon with pt to continue current plan of care.    Chart reflects, per Dr. Chi's progress note form 7/29/20, pt has been made a DNR with discharge likely to nursing home with hospice. Working on judicial commitment.     Recommendations:  -Continue current plan of care with nursing to cleanse wound area with sterile normal saline with gauze to wound bed with a border gauze to secure. Dressing to be changed daily and PRN if soiled or dislodged.     Nursing to continue plan of care. Wound care to follow pt PRN. [id number]        "

## 2020-07-30 NOTE — CLINICAL REVIEW
Hematology Note     Patient previously seen by oncology for hx of GIST, non compliant with multiple psychosocial problems who was found to be profoundly anemic on admission.     She also has notable progression of disease.     Appears she has refused GI procedures.     Ordered repeat hemolysis labs showing potential hemolysis though of unknown origin. ZAKIYA + previously. Refused collection for smear.     Appears she is being placed in hospice.     Recs:  Consider 1 mg/kg PO prednisone (60 mg daily would be okay)  for 4 days being mindful of possible psychiatric side effects  No taper needed  Transfuse <7g Hgb  Please page/chat with questions     Robert Cristobal MD  Hematology/Medical Oncology Fellow  [phone] (pager)

## 2020-07-30 NOTE — ASSESSMENT & PLAN NOTE
ASSESSMENT     Violeta Boudreaux is a 54 y.o. female with a past psychiatric history of BPAD and delusional disorder, who presented to the [facility] due to OPC from estranged  for threatening to kill him. Admitted to [facility] for symptomatic anemia. Per FACT team, patient non-compliant with medications, did not receive most recent scheduled Aristada BAUM. Received Aristada 441 mg BAUM on 7/22.    IMPRESSION  Bipolar affective disorder, current episode manic  Psychological factors affecting medical care  Cluster B personality d/o  Delusional disorder  Medication non-adherence    RECOMMENDATION(S)      1. Scheduled Medication(s):  - Continue Abilify from 30 mg daily   - Forced med protocol with Haldol, Ativan and Benadryl as described in PRN below  - Continue home Remeron 15 mg nightly  - Aristada 441 mg IM injection [id number], last given 7/22  - Continue Atarax 50 mg nightly for insomnia  - Optimize pain management regimen to ensure adequate pain control and encourage medication compliance    2. PRN Medication(s):  - First try: Haldol 5 mg PO, Benadryl 50 mg PO, Ativan 2 mg PO PRN for non-redirectable agitation  - If refuses: Haldol 2 mg IV, Benadryl 50 mg IV, Ativan 2 mg IV PRN for non-redirectable agitation    3.  Monitor:  - Please obtain daily EKG to monitor QTc    4. Legal Status/Precaution(s):  - Continue CEC (expires 7/26 @ 8:32 pm) as patient is in imminent danger of hurting self or others and is gravely disabled due to a psychiatric illness-- DELORES filed 7/23  - Maintain 1:1 sitter  - Continue working with next of kin (estranged  and brother) to help facilitate patient care and decision making, especially with regards to disposition -- would be best to come to a consensus with regards to her disposition  - Continue working with palliative care to discuss options, code status, and disposition    5. Capacity and Medical Decision Making  - Primary team worried about melena and potential need for EGD.  The patient is  "refusing procedures on the basis of it is "impossible for a blood count to drop in 24 hours."  She cannot explain the consequences or benefits of the procedure and continues to demonstrate delusional thought content.    - Recommend to contact the  for medical decision making and decisions regarding DNR, hospice care, procedures.    - Would carefully weight the risk vs. benefit of various procedure and hold off unless emergent given that the patient is adamantly against having any done forcing the patient to undergo a procedure will likely be difficult and traumatic for the patient  - Disposition: pending DELORES placement in nursing home with hospice vs. Inpatient hospice, NOT inpatient psychiatric hospitalization; patient does not have capacity to refuse placement  "

## 2020-07-30 NOTE — PLAN OF CARE
LEXY spoke with Gregorio [phone] to obtain the patient's correct SS# and Medicaid Number. Per Gregorio, he had the patient's SS#, but not the Medicaid number. Per Gregorio, the patient's correct SS# is . LEXY contacted the FACT Team and was able to obtain the patient's correct Medicaid number: [id number]. LEXY updated the admitting department to provide the patient's corrected information. LEXY will continue to follow.     Sharee Rivas LMSW   - [facility]  Ext. [phone]

## 2020-07-30 NOTE — PROGRESS NOTES
[facility] Hospital Medicine  Progress Note    Patient Name: Violeta Boudreaux  MRN: [id number]  Patient Class: IP- Inpatient   Admission Date: 7/11/2020  Length of Stay: 13 days  Attending Physician: Kahlil Banegas MD  Primary Care Provider: Otf Brownlee MD    [facility] Medicine Team: [facility] HOSP MED B Kahlil Banegas MD    HPI:    Ms. Violeta Boudreaux is a 54 y.o. female with Bipolar Disorder, delusional disorder, GIST on Sunitinib, and recent LLE DVT s/p IVC filter (June 2020) who presents to the ER by [facility] for delusions.  Her estranged  reports that she showed up at his house, claiming that she was  to Geovany Ring from [employer] and he was being hid inside the estranged 's house. He reports that she is homeless, and hasn't been taking her psych medications.  She denies any complaints at this time.  Labs on arrival showed a Hemoglobin 6.2 down from 9.7 in June 2020.  She endorses midepigastric abdominal pain.  She denies any blood in her stools, dark stools, or vaginal bleeding.  She mentions that a few weeks ago, she was having bad headaches and took a lot of Advil, then causing her to have hematemesis.  She denies further bleeding or use of Aspirin or NSAIDS.  Of note, at the end of May, she was hospitalized at [facility] for delusional disorder.  At that time, she was found to have a Hemoglobin 6.4.  She was given blood and her hemolgobin improved to 9.7.  It was thought that her bleeding with 2/2 her GIST tumor. She was not evaluated by GI.  Also during that admission, she was found to have a LLE DVT.  HemeOnc suggested IVC filter, given her lack of consistency with medications.  She was discharged to the [facility].  She was discharged on Abilify 10mg and Mirtazapine 15mg qHS.       In the ER, she was PECed for grave disability.  SUMIT was positive.  She was transfused 1 unit PRBCs.  She mentions that Bert Gorman will pay for every drop of blood we take from her, and that she wants  to return to her house in [place] when discharged.  She was admitted to Hospital Medicine for GI and Psych evaluations.    Subjective:     Principal Problem:Psychological factors affecting medical condition    Interval History: Patient lying in bed, no acute distress. Answered a few questions but appeared annoyed and asked me to leave. Reports abdominal pain continues to be inadequately controlled. Also feels ready to leave the hospital.     Review of Systems   Constitutional: Negative for chills and fever.   Eyes: Negative for visual disturbance.   Respiratory: Negative for cough and shortness of breath.    Gastrointestinal: Negative for abdominal distention, abdominal pain, nausea and vomiting.   Genitourinary: Negative for dysuria.   Neurological: Negative for weakness.   Psychiatric/Behavioral: Negative for suicidal ideas.        Objective:     Vital Signs (Most Recent):  Temp: 97.4 °F (36.3 °C) (07/30/20 0728)  Pulse: 84 (07/30/20 0728)  Resp: 14 (07/30/20 0851)  BP: 118/64 (07/30/20 0728)  SpO2: 98 % (07/30/20 0728) Vital Signs (24h Range):  Temp:  [97.4 °F (36.3 °C)-98.5 °F (36.9 °C)] 97.4 °F (36.3 °C)  Pulse:  [] 84  Resp:  [14-20] 14  SpO2:  [96 %-99 %] 98 %  BP: (100-118)/(52-64) 118/64     Weight: 70.1 kg (154 lb 8.7 oz)  Body mass index is 24.2 kg/m².    Intake/Output Summary (Last 24 hours) at 7/30/2020 1011  Last data filed at 7/29/2020 1302  Gross per 24 hour   Intake 240 ml   Output --   Net 240 ml        Physical Exam  HENT:      Head: Normocephalic.   Eyes:      Pupils: Pupils are equal, round, and reactive to light.   Neck:      Musculoskeletal: Normal range of motion.   Cardiovascular:      Rate and Rhythm: Normal rate and regular rhythm.      Pulses: Normal pulses.      Heart sounds: Normal heart sounds.   Pulmonary:      Effort: Pulmonary effort is normal.      Breath sounds: Normal breath sounds.   Abdominal:      General: Bowel sounds are normal. There is no distension.       Palpations: Abdomen is soft.      Tenderness: There is no abdominal tenderness.   Musculoskeletal: Normal range of motion.   Skin:     General: Skin is warm.   Neurological:      General: No focal deficit present.      Mental Status: She is alert.   Psychiatric:         Mood and Affect: Affect is labile.         Thought Content: Thought content is delusional.         Judgment: Judgment is inappropriate.           Overview/Hospital Course:     7/12   presenting to the ED via [facility] with OPC stating patient was at her estranged 's house  threatening she would kill the estranged .  PEC placed for delusional behavior. Work-up significant for H/H 6.2/20 (baseline 9/30 through care everywhere). Rectal exam performed without evidence of blood or melena. FOBT positive.   alert, delusional. Refusing to give  personal belongings, and agitation with staff.  4 point restraints were applied. repeat CBC at 6.6 . transfusion with 1 unit of PRBC. GI recommends EGD and colonoscopy for further evaluation of iron deficiency anemia patient adamantly refuses exam and EGD/ colonoscopy  7/13 agitated overnight requesting cell phone.  Patient was placed 4 point  restraints hemoglobin at 8 3 status post 2 units of pack RBC transfusion . agrees for EGD/ colonoscopy.Patient off restraints.Continue home Abilify 10 mg and Remeron 15 mg nightly. Increased Zyprexa from 5 mg to 10 mg q8h IM PRN for agitation.daily EKG to monitor QTc- 447ms   7/14 Hb 8.1 --> 7.6. Requesting  her purse and  belongings she needs to get to her (Geovany Ring).  Clear liquids today and NPO from midnight EGD/colonoscopy in a.m. complains of abdominal, takes oxycodone 30 mg Q 6 hourly as per chart review (reviewed  last filled on 06/23).  Norco discontinued and started oxycodone 20 mg Q 6 hourly p.r.n.  7/15 refused to drink GoLYTELY overnight.  Hemoglobin stable at 7.8 leukocytosis of 15 but afebrile.  Transaminitis AST//112 with normal  bilirubin and mildly elevated alk-phos at 337.  Significant left upper quadrant abdominal pain prior to endoscopy today.  Endoscopy canceled.  CT abdomen with IV and oral contrast ordered.  General surgery consulted.  Started on Zosyn and vancomycin empirically.  Blood cultures x2 with no growth  7/22  Continued on ciprofloxacin and metronidazole since 07/16. refusal of taking medications and vitals, .As CEC expiring 7/26 , plan to pursue DELORES filing as pt remains delusional and uncooperative and needs psychiatric hospital placement. Forced med protocol with Haldol, Ativan and Ciarra. Aristada 441 mg IM injection once given 7/22, to be given [id number]. Started Atarax 50 mg nightly for insomnia  7/23 agreed for blood draw today hemoglobin repeated at 6 transfusion with 1 unit of pack RBC  7/24 purulent drainage from upper abdomen at tumor site. culture obtained .Hb 7.2 .  judicial commitment in process.  Patient's  okay with nursing home with hospice as the patient is noncompliant with medications.  does not want to be decision-maker.  7/25  Abdominal x-ray-  No convincing evidence of pneumoperitoneum on this limited single portable view up. abdominal fluid culture from 07/23 with no growth.  Wound Care consulted.  Hemoglobin at 6.7 transfusion with 1 unit of pack RBC today.  Discussed with .   mentions the patient's brother is a bad influence and only involved with the patient to obtain her money. he is okay with patient being DNR/ hospice placement.   7/26hemoglobin at 6.4--> 6.9 .  Transfusion with 1 unit of pack RBC today.  Wound cultures from 07/23 of the abdomen with no growth.  Discussed with psychiatry regarding 's wish for DNR/hospice with judicial commitment process.  discussed with Infectious Disease CT abdomen findings 7/15 .  Continue ciprofloxacin and metronidazole indefinitely. refuses rectal exam today and EGD. mentions she has brown bowel movement today. direct  antiglobulin positive. haptoglobin <10  and LDH ordered. Anemia likely from hemolysis. Discussed with  in detail and he agrees for DNR / hospice placement. he wants to visit her   7/27 Hb at 8.1 . hematology considering steroids. s/p wound care evaluation  7/28 refused EKG . Hb stable at 8.3  7/29  refusing labs and EKG . forced med protocol as ordered if pt refuses scheduled Abilify.  delusional today    Significant Labs:   A1C:   Recent Labs   Lab 07/11/20 1932   HGBA1C 6.3*     CBC:   Recent Labs   Lab 07/28/20  1035 07/30/20  0432   WBC 11.44 11.79  11.79  11.79   HGB 8.3* 7.1*  7.1*  7.1*   HCT 27.2* 23.4*  23.4*  23.4*   * 488*  488*  488*     CMP:   Recent Labs   Lab 07/28/20  1035 07/30/20  0432   * 134*  134*   K 4.4 3.8  3.8    103  103   CO2 29 28  28   * 285*  285*   BUN 5* 6  6   CREATININE 0.7 0.7  0.7   CALCIUM 7.9* 7.6*  7.6*   PROT 5.8* 5.7*  5.7*   ALBUMIN 2.0* 1.8*  1.8*   BILITOT 0.4 0.3  0.3   ALKPHOS 95 84  84   AST 13 13  13   ALT 7* 6*  6*   ANIONGAP 4* 3*  3*   EGFRNONAA >60.0 >60.0  >60.0     Magnesium:   Recent Labs   Lab 07/28/20  1035 07/30/20  0432   MG 1.8 1.8     POCT Glucose:   Recent Labs   Lab 07/29/20  1620 07/29/20  2215 07/30/20  0723   POCTGLUCOSE 246* 256* 304*     TSH:   Recent Labs   Lab 07/11/20 1932   TSH 3.160       Significant Imaging: I have reviewed and interpreted all pertinent imaging results/findings within the past 24 hours.    Assessment/Plan:      Active Diagnoses:    Diagnosis Date Noted POA    PRINCIPAL PROBLEM:  Psychological factors affecting medical condition [F54]  Yes    Palliative care encounter [Z51.5] 07/23/2020 Not Applicable    Advance care planning [Z71.89]  Not Applicable    Noncompliance with treatment [Z91.19] 07/20/2020 Not Applicable    Bipolar 1 disorder, mixed, moderate [F31.62]  Yes    Anemia [D64.9] 07/11/2020 Yes    Hypokalemia [E87.6] 07/11/2020 Yes    Acute deep vein  thrombosis (DVT) of popliteal vein of left lower extremity [I82.432] 05/29/2020 Yes    Delusional disorder [F22] 05/28/2020 Yes    Bipolar affective disorder, current episode manic [F31.10] 09/09/2019 Yes    Malignant gastrointestinal stromal tumor (GIST) of stomach [C49.A2] 09/09/2019 Yes    Type 2 diabetes mellitus without complication, without long-term current use of insulin [E11.9] 09/09/2019 Yes      Problems Resolved During this Admission:     Scheduled Meds:   ARIPiprazole lauroxil  441 mg Intramuscular Q30 Days    ARIPiprazole  30 mg Oral Daily    ciprofloxacin HCl  500 mg Oral Q12H    cyanocobalamin  1,000 mcg Subcutaneous Q7 Days    Followed by    [START ON 8/16/2020] cyanocobalamin  1,000 mcg Subcutaneous Q30 Days    hydrOXYzine HCL  50 mg Oral QHS    lidocaine  1 patch Transdermal Q24H    metroNIDAZOLE  500 mg Oral Q8H    mirtazapine  15 mg Oral QHS    pantoprazole  40 mg Oral BID    potassium chloride  30 mEq Oral Once    potassium chloride  40 mEq Oral Once    potassium chloride  40 mEq Oral Once     Continuous Infusions:  PRN Meds:.sodium chloride, sodium chloride, sodium chloride, sodium chloride, sodium chloride, acetaminophen, dextrose 50%, dextrose 50%, haloperidol lactate **AND** diphenhydrAMINE **AND** lorazepam, haloperidol lactate **AND** diphenhydrAMINE **AND** lorazepam, glucagon (human recombinant), glucose, glucose, HYDROmorphone, HYDROmorphone, insulin aspart U-100, iohexol, melatonin, metoclopramide HCl, naloxone, OLANZapine, ondansetron, promethazine, senna-docusate 8.6-50 mg, sodium chloride 0.9%, sodium chloride 0.9%    PLAN:    Delusional disorder   Bipolar disorder  - presenting to the ED via [facility] with OPC stating patient was at her estranged 's house  threatening she would kill the estranged .  PEC placed for delusional behavior. Work-up significant for H/H 6.2/20 (baseline 9/30 through care everywhere). Rectal exam performed without evidence of blood  or melena. FOBT positive.   alert, delusional.  - restraints in place due to severe agitation   - psychiatry consulted. apprec recs  --  Continue Abilify 30 mg daily  -- Forced med protocol with Haldol, Ativan and Benadryl as described in PRN below)  -- Continue home Remeron 15 mg nightly  -- Aristada 441 mg IM injection once given 7/22, to be given d4exnsf  -- Start Atarax 50 mg nightly for insomnia  -- First try: Haldol 5 mg PO, Benadryl 50 mg PO, Ativan 2 mg PO PRN for non-redirectable agitation  -- If refuses: Haldol 2 mg IV, Benadryl 50 mg IV, Ativan 2 mg IV PRN for non-redirectable agitation  -- Please obtain daily EKG to monitor QTc  - patient has very limited insight into her situation and remains gravely disabled as evidenced by her refusal of taking medications and vitals, ekg done.    -- Pt is however more calm and cooperative less irritable.   -- In lieu of pts CEC expiring will pursue DELORES filing as pt remains delusional and uncooperative with recommended treatment plans.     Symptomatic anemia   GIB- resolved    - GIB Pathway initiated  Likely bleeding from GIST  Hgb 6.2 on admit, down from 9.7 beginning of June  - H/H stable on 7/16  Check iron studies and hemolysis labs  GI consulted. apprec recs  -- Discussed with primary team that EGD in this patient would be high risk given worsening/progression of her cancer and that given the likely lower GI pathology that is improving the risks outweigh the benefits  -- continue PO PPI  -- trend Hgb, transfuse Hgb <7, Plt<50  -- would recommend heme/onc consult for evaluation/treatment of progressing tumor  -- if it is determined that tissue is needed, AES consult would be appropriate at that time.  - s/p 1 unit pRBC on 7/17. Patient denies blood in stool or melena   - On 7/26, hemoglobin at 6.4--> 6.9 .  Transfusion with 1 unit of pack RBC  - Wound cultures from 07/23 of the abdomen with no growth.  Discussed with psychiatry regarding 's wish for  DNR/hospice with judicial commitment process.    - discussed with Infectious Disease CT abdomen findings 7/15 .  Continue ciprofloxacin and metronidazole indefinitely. refuses rectal exam today and EGD. - mentions she has brown bowel movement  - direct antiglobulin positive. haptoglobin <10  and LDH ordered.  - Anemia likely from hemolysis. Discussed with  in detail and he agrees for DNR / hospice placement.   - s/p wound care evaluation    Malignant gastrointestinal stromal tumor (GIST) of stomach   Abdominal wound  - Follows with Hem[facility]Onc at [facility]  - On Sunitinib outpatient  - CT A/P showed increased size of mass  - 7/24 purulent drainage from upper abdomen at tumor site. culture obtained  - 7/25 Abdominal x-ray-  No convincing evidence of pneumoperitoneum on this limited single portable view up. abdominal fluid culture from 07/23 with no growth.  - Wound Care consulted  - 7/26 continue ciprofloxacin and metronidazole indefinitely per ID  - Heme/onc consulted. apprec recs   -- poor prognosis d/t mental health issue  -- will need to f/u with primary oncologist at discharge to obtain sunitinib   -- /social work consults for possible placement  -- hospice reasonable if patient has to be d/c to previous living situation    Leukocytosis  - WBC: 11 --> 15 --> 18 --> 17  - afebrile since 7/15   - denies cough or SOB  - Initially on empiric IV abx but switched to po cipro/flagyl due to patient refusing IV access   - plan to discontinue abx is remains afebrile for 24 hours  - bcx no growth to date  - U/A negative for UTI  - CBC daily     Transaminitis   - 7/15:  AST//112 with normal bilirubin and mildly elevated alk-phos at 337. Consider right upper quadrant sonogram if not trending down        Acute deep vein thrombosis (DVT) of popliteal vein of left lower extremity   - S/p IVC filter on 6/2020    Hypokalemia- resolved   - K 2.9-->3  - replete prn     B12 deficiency  - levels of 192 started on  vitamin B12 subcutaneous dissection     Type 2 diabetes mellitus without complication, without long-term current use of insulin  - Patient's FSGs are controlled on current hypoglycemics.  - Home DM regimen:  Metformin  - SSI with POCT accuchecks and Diabetic diet    Disposition   Palliative care encoutner  - Pallative care consulted.   - Discussing with palliative care and psychiatry to best discharge location for the patient   - psychiatry pursuing DELORES filing       VTE Risk Mitigation (From admission, onward)         Ordered     IP VTE HIGH RISK PATIENT  Once      07/11/20 2130     Place sequential compression device  Until discontinued      07/11/20 2130              Time spent in care of patient: > 35 minutes     Kahlil Banegas MD  Department of Hospital Medicine   [facility]

## 2020-07-31 NOTE — PLAN OF CARE
Fall precaution maintained this shift call bell in reach bed in low position. Instructed pt to call for assistance. Vs stable. Prn pain meds given as directed. Pt refused accucheck last night. Pt still expresses that she is  to lead ayoub of [employer] and believe he is going to visit her in the hospital.

## 2020-07-31 NOTE — ASSESSMENT & PLAN NOTE
ASSESSMENT     Violeta Boudreaux is a 54 y.o. female with a past psychiatric history of BPAD and delusional disorder, who [place] to the [facility] due to OPC from estranged  for threatening to kill him. Admitted to [facility] for symptomatic anemia. Per FACT team, patient non-compliant with medications, did not receive most recent scheduled Aristada BAUM. Received Aristada 441 mg BAUM on 7/22.    IMPRESSION  Bipolar affective disorder, current episode manic  Psychological factors affecting medical care  Cluster B personality d/o  Delusional disorder  Medication non-adherence    RECOMMENDATION(S)      1. Scheduled Medication(s):  - Continue Abilify from 30 mg daily   - Forced med protocol with Haldol, Ativan and Benadryl as described in PRN below  - Continue home Remeron 15 mg nightly  - Aristada 441 mg IM injection [id number], last given 7/22  - Continue Atarax 50 mg nightly for insomnia  - Optimize pain management regimen to ensure adequate pain control and encourage medication compliance    2. PRN Medication(s):  - First try: Haldol 5 mg PO, Benadryl 50 mg PO, Ativan 2 mg PO PRN for non-redirectable agitation  - If refuses: Haldol 2 mg IV, Benadryl 50 mg IV, Ativan 2 mg IV PRN for non-redirectable agitation    3.  Monitor:  - Please obtain daily EKG to monitor QTc    4. Legal Status/Precaution(s):  - Continue CEC (expires 7/26 @ 8:32 pm) as patient is in imminent danger of hurting self or others and is gravely disabled due to a psychiatric illness-- DELORES filed 7/23  - Maintain 1:1 sitter  - Continue working with next of kin (estranged  and brother) to help facilitate patient care and decision making, especially with regards to disposition -- would be best to come to a consensus with regards to her disposition  - Continue working with palliative care to discuss options, code status, and disposition    5. Capacity and Medical Decision Making  - Primary team worried about melena and potential need for EGD.  The patient is  "refusing procedures on the basis of it is "impossible for a blood count to drop in 24 hours."  She cannot explain the consequences or benefits of the procedure and continues to demonstrate delusional thought content.    - Recommend to contact the  for medical decision making and decisions regarding DNR, hospice care, procedures.    - Would carefully weight the risk vs. benefit of various procedure and hold off unless emergent given that the patient is adamantly against having any done forcing the patient to undergo a procedure will likely be difficult and traumatic for the patient  - Disposition: pending [facility] placement in nursing home with hospice vs. Inpatient hospice, NOT inpatient psychiatric hospitalization; patient does not have capacity to refuse placement  "

## 2020-07-31 NOTE — PLAN OF CARE
LEXY met with the patient's FACT CM, Carlos Sainz [phone] to discuss the patient's d/c plans. Per Carlos, she was coming to visit the patient to see how she was doing and if she could be any assistance to the patient. Per Carlos, the patient knew who she was and was receptive to her visit. LEXY provided Carlos with the patient Judical Commitment Court date of August 7, 2020. LEXY will continue to follow.     Sharee Rivas LMSW   - [facility]  Ext. [phone]

## 2020-07-31 NOTE — SUBJECTIVE & OBJECTIVE
"Interval History: As above    Family History     None        Tobacco Use    Smoking status: Current Every Day Smoker     Packs/day: 10.00     Types: Cigarettes    Smokeless tobacco: Never Used   Substance and Sexual Activity    Alcohol use: No    Drug use: No    Sexual activity: Not on file     Psychotherapeutics (From admission, onward)    Start     Stop Route Frequency Ordered    07/21/20 1300  ARIPiprazole lauroxil 441 mg/1.6 mL injection 441 mg     Question Answer Comment   Brand Name: ARISTADA (R)    Generic name: none    Form: Injectable    Length of Therapy: Indefinite    Reason for Non-Formulary: No equivalent formulary medication available    How soon needed? (if approved, may take up to 5 days to procure): 48-72 hrs    Requestor's Contact Information: Shirley Lopez or ON CALL psychiatry        -- IM Every 30 days 07/21/20 1219    07/20/20 1036  lorazepam injection 2 mg      -- IM Every 6 hours PRN 07/20/20 0946    07/20/20 1034  haloperidol lactate injection 5 mg      -- IM Every 6 hours PRN 07/20/20 0946    07/20/20 1031  lorazepam injection 2 mg      -- IV Every 6 hours PRN 07/20/20 0933    07/20/20 1030  haloperidol lactate injection 5 mg      -- IV Every 6 hours PRN 07/20/20 0933    07/20/20 1030  ARIPiprazole tablet 30 mg      -- Oral Daily 07/20/20 0927    07/16/20 1833  OLANZapine injection 10 mg      -- IM Every 8 hours PRN 07/16/20 1834    07/11/20 2245  mirtazapine tablet 15 mg      -- Oral Nightly 07/11/20 2132           Review of Systems       Pertinent items noted in HPI.    Objective:     Vital Signs (Most Recent):  Temp: 96.8 °F (36 °C) (07/31/20 0809)  Pulse: 88 (07/31/20 0809)  Resp: 20 (07/31/20 0926)  BP: (!) 106/56 (07/31/20 0809)  SpO2: 100 % (07/31/20 0809) Vital Signs (24h Range):  Temp:  [96.6 °F (35.9 °C)-98.6 °F (37 °C)] 96.8 °F (36 °C)  Pulse:  [82-99] 88  Resp:  [14-20] 20  SpO2:  [95 %-100 %] 100 %  BP: (101-116)/(54-64) 106/56     Height: 5' 7" (170.2 cm)  Weight: 70.1 kg " (154 lb 8.7 oz)  Body mass index is 24.2 kg/m².      Intake/Output Summary (Last 24 hours) at 7/31/2020 1030  Last data filed at 7/31/2020 0500  Gross per 24 hour   Intake 960 ml   Output --   Net 960 ml       Physical Exam         Physical Exam:  General appearance: NAD  Head: NCAT  Lungs: CTAB, no increased work of breath  Heart: RRR  Abdomen: soft, distended  Extremities: extremities normal, atraumatic  Skin: warm, dry    Mental Status Exam:  Appearance: older than stated age, disheveled, thin, pale  Behavior/Cooperation: normal eye contact, cooperative  Speech: normal rate, tone, volume  Mood: irritable, also tearful  Affect: mood congruent  Thought Process: linear  Thought Content: delusions: yes, erotomanic, persecutory, paranoia  Orientation: oriented to person, place, time  Memory: intact to conversation  Attention Span/Concentration: intact to conversation, WORLD forwards and backwards  Insight: poor but improving  Judgment: poor AEB refusal of medications         Significant Labs: All pertinent labs within the past 24 hours have been reviewed.    Significant Imaging: I have reviewed all pertinent imaging results/findings within the past 24 hours.

## 2020-07-31 NOTE — PLAN OF CARE
07/31/20 1548   Discharge Reassessment   Assessment Type Discharge Planning Reassessment   Discharge Plan A New Nursing Home placement - [facility];Other  (w/hospice)   Discharge Plan B Inpatient Hospice   DME Needed Upon Discharge  none   Anticipated Discharge Disposition halfway [facility]  (w/hospice)   Can the patient/caregiver answer the patient profile reliably? No, cognitively impaired   Describe the patient's ability to walk at the present time. No restrictions   How often would a person be available to care for the patient? Never   Number of comorbid conditions (as recorded on the chart) Four   Post-Acute Status   Post-Acute Authorization Placement   Post-Acute Placement Status   (awaiting Level II PASRR for NH w/hospice placement)     Patient pending [facility] placement in NH w/hospice. Dr. Shirley Lopez (psych) to attend the hearing on 8/7/2020 at 1100 via zoom. Level II PASRR initiated by LEXY Tolliver on 7/30/2020. Patient continues to be followed by psych, hem/onc, & wound care. Patient continues to refuse meds, labs, & EGD this AM.
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
## 2020-08-01 NOTE — PLAN OF CARE
Problem: Fall Injury Risk  Goal: Absence of Fall and Fall-Related Injury  Outcome: Ongoing, Progressing     Problem: Adjustment to Illness (Gastrointestinal Bleeding)  Goal: Optimal Coping with Acute Illness  Outcome: Ongoing, Progressing     Problem: Bleeding (Gastrointestinal Bleeding)  Goal: Hemostasis  Outcome: Ongoing, Progressing     Problem: Adult Inpatient Plan of Care  Goal: Plan of Care Review  Outcome: Ongoing, Progressing  Goal: Patient-Specific Goal (Individualization)  Outcome: Ongoing, Progressing  Goal: Absence of Hospital-Acquired Illness or Injury  Outcome: Ongoing, Progressing  Goal: Optimal Comfort and Wellbeing  Outcome: Ongoing, Progressing  Goal: Readiness for Transition of Care  Outcome: Ongoing, Progressing  Goal: Rounds/Family Conference  Outcome: Ongoing, Progressing     Problem: Diabetes Comorbidity  Goal: Blood Glucose Level Within Desired Range  Outcome: Ongoing, Progressing     Problem: Restraint, Nonbehavioral (Nonviolent)  Goal: Discontinuation Criteria Achieved  Outcome: Ongoing, Progressing  Goal: Personal Dignity and Safety Maintained  Outcome: Ongoing, Progressing     Problem: Coping Ineffective  Goal: Effective Coping  Outcome: Ongoing, Progressing     Problem: Pain Chronic (Persistent)  Goal: Acceptable Pain Control and Functional Ability  Outcome: Ongoing, Progressing     Problem: Wound  Goal: Optimal Wound Healing  Outcome: Ongoing, Progressing   Patient rested without event. Remains afebrile and asking for pain meds every 4 hours. Drsg to epigastric area changed with no drainage noted. Appears to have scabbed over and may benefit from being left open to air. Sitter remains at bedside. CB near.

## 2020-08-01 NOTE — SUBJECTIVE & OBJECTIVE
"  Family History     None        Tobacco Use    Smoking status: Current Every Day Smoker     Packs/day: 10.00     Types: Cigarettes    Smokeless tobacco: Never Used   Substance and Sexual Activity    Alcohol use: No    Drug use: No    Sexual activity: Not on file     Psychotherapeutics (From admission, onward)    Start     Stop Route Frequency Ordered    07/21/20 1300  ARIPiprazole lauroxil 441 mg/1.6 mL injection 441 mg     Question Answer Comment   Brand Name: ARISTADA (R)    Generic name: none    Form: Injectable    Length of Therapy: Indefinite    Reason for Non-Formulary: No equivalent formulary medication available    How soon needed? (if approved, may take up to 5 days to procure): 48-72 hrs    Requestor's Contact Information: Shirley Lopez or ON CALL psychiatry        -- IM Every 30 days 07/21/20 1219    07/20/20 1036  lorazepam injection 2 mg      -- IM Every 6 hours PRN 07/20/20 0946    07/20/20 1034  haloperidol lactate injection 5 mg      -- IM Every 6 hours PRN 07/20/20 0946    07/20/20 1031  lorazepam injection 2 mg      -- IV Every 6 hours PRN 07/20/20 0933    07/20/20 1030  haloperidol lactate injection 5 mg      -- IV Every 6 hours PRN 07/20/20 0933    07/20/20 1030  ARIPiprazole tablet 30 mg      -- Oral Daily 07/20/20 0927    07/16/20 1833  OLANZapine injection 10 mg      -- IM Every 8 hours PRN 07/16/20 1834    07/11/20 2245  mirtazapine tablet 15 mg      -- Oral Nightly 07/11/20 2132           Review of Systems  Objective:     Vital Signs (Most Recent):  Temp: 98.4 °F (36.9 °C) (08/01/20 0435)  Pulse: 92 (08/01/20 0833)  Resp: 16 (08/01/20 1016)  BP: (!) 109/54 (08/01/20 0833)  SpO2: 100 % (08/01/20 0833) Vital Signs (24h Range):  Temp:  [97.9 °F (36.6 °C)-98.8 °F (37.1 °C)] 98.4 °F (36.9 °C)  Pulse:  [88-97] 92  Resp:  [16-20] 16  SpO2:  [96 %-100 %] 100 %  BP: ()/(50-66) 109/54     Height: 5' 7" (170.2 cm)  Weight: 70.1 kg (154 lb 8.7 oz)  Body mass index is 24.2 " "kg/m².      Intake/Output Summary (Last 24 hours) at 8/1/2020 1021  Last data filed at 7/31/2020 1800  Gross per 24 hour   Intake 480 ml   Output --   Net 480 ml       Physical Exam  Psychiatric:      Comments: Physical Exam:  General appearance: alert and oriented, in no acute distress  Head: normocephalic, without obvious abnormality, atraumatic  Extremities: extremities normal, atraumatic, no cyanosis or edema  Skin: skin color, texture, turgor normal. No rashes or lesions    Mental Status Exam:  Appearance: good grooming, casually dressed, NAD, appears stated age  Behavior/Cooperation:  calm, cooperative, irritated  Language: fluent english  Speech: normal tone, normal rate, normal pitch, normal volume  Mood: "Horrible"   Affect: full, reactive, appropriate  Thought Process: goal directed, organized  Thought Content:  Denies SI/HI. Delusional content of Geovany Ring being , bodyguard -etc   Perception: denies AVH. No objective evidence of AVH   Orientation: oriented to person, place, year, month, situation, president  Memory: intact to conversation, remote intact, recent intact.   Attention Span/Concentration: intact to conversation. WORLD forwards and backwards  Fund of knowledge: appropriate for education level  Cognition: fair  Insight: limited  Judgment: limited    Neurological Exam:  I: Endorses intact smell  II: Visual fields full to confrontation  III,IV,VI: PERRLA, EOMI. No nystagmus   Other Cranial nerves grossly intact  Gait: normal and fluid            Significant Labs:   Last 24 Hours:   Recent Lab Results       08/01/20  0716   07/31/20  1653        POCT Glucose 211 219           Significant Imaging: I have reviewed all pertinent imaging results/findings within the past 24 hours.  "

## 2020-08-01 NOTE — PROGRESS NOTES
[facility] Hospital Medicine  Progress Note    Patient Name: Violeta Boudreaxu  MRN: [id number]  Patient Class: IP- Inpatient   Admission Date: 7/11/2020  Length of Stay: 15 days  Attending Physician: Kahlil Banegas MD  Primary Care Provider: Otf Brownlee MD    [facility] Medicine Team: [facility] MED B Kahlil Banegas MD    HPI:    Ms. Violeta Boudreaux is a 54 y.o. female with Bipolar Disorder, delusional disorder, GIST on Sunitinib, and recent LLE DVT s/p IVC filter (June 2020) who presents to the ER by [facility] for delusions.  Her estranged  reports that she showed up at his house, claiming that she was  to Geovany Ring from [employer] and he was being hid inside the estranged 's house. He reports that she is homeless, and hasn't been taking her psych medications.  She denies any complaints at this time.  Labs on arrival showed a Hemoglobin 6.2 down from 9.7 in June 2020.  She endorses midepigastric abdominal pain.  She denies any blood in her stools, dark stools, or vaginal bleeding.  She mentions that a few weeks ago, she was having bad headaches and took a lot of Advil, then causing her to have hematemesis.  She denies further bleeding or use of Aspirin or NSAIDS.  Of note, at the end of May, she was hospitalized at [facility] for delusional disorder.  At that time, she was found to have a Hemoglobin 6.4.  She was given blood and her hemolgobin improved to 9.7.  It was thought that her bleeding with 2/2 her GIST tumor. She was not evaluated by GI.  Also during that admission, she was found to have a LLE DVT.  HemeOnc suggested IVC filter, given her lack of consistency with medications.  She was [facility] to the [facility].  She was discharged on Abilify 10mg and Mirtazapine 15mg qHS.       In the ER, she was PECed for grave disability.  SUMIT was positive.  She was transfused 1 unit PRBCs.  She mentions that Bert Gorman will pay for every drop of blood we take from her, and that she wants  to return to her house in [place] when discharged.  She was admitted to Hospital Medicine for GI and Psych evaluations.    Subjective:     Principal Problem:Psychological factors affecting medical condition    Interval History: Patient lying in bed, no acute distress. Reports that her abdominal wound is improving but her abdominal pain remains poorly controlled. Continues to report no blood in stool or dark stools.     Review of Systems   Constitutional: Negative for chills and fever.   Eyes: Negative for visual disturbance.   Respiratory: Negative for cough and shortness of breath.    Gastrointestinal: Negative for abdominal distention, abdominal pain, nausea and vomiting.   Genitourinary: Negative for dysuria.   Neurological: Negative for weakness.   Psychiatric/Behavioral: Negative for suicidal ideas.        Objective:     Vital Signs (Most Recent):  Temp: 98.2 °F (36.8 °C) (08/01/20 0029)  Pulse: 88 (08/01/20 0029)  Resp: 18 (08/01/20 0057)  BP: 115/64 (08/01/20 0029)  SpO2: 99 % (08/01/20 0029) Vital Signs (24h Range):  Temp:  [96.8 °F (36 °C)-98.8 °F (37.1 °C)] 98.2 °F (36.8 °C)  Pulse:  [86-97] 88  Resp:  [18-20] 18  SpO2:  [96 %-100 %] 99 %  BP: ()/(50-66) 115/64     Weight: 70.1 kg (154 lb 8.7 oz)  Body mass index is 24.2 kg/m².    Intake/Output Summary (Last 24 hours) at 8/1/2020 0142  Last data filed at 7/31/2020 1800  Gross per 24 hour   Intake 960 ml   Output --   Net 960 ml        Physical Exam  HENT:      Head: Normocephalic.   Eyes:      Pupils: Pupils are equal, round, and reactive to light.   Neck:      Musculoskeletal: Normal range of motion.   Cardiovascular:      Rate and Rhythm: Normal rate and regular rhythm.      Pulses: Normal pulses.      Heart sounds: Normal heart sounds.   Pulmonary:      Effort: Pulmonary effort is normal.      Breath sounds: Normal breath sounds.   Abdominal:      General: Bowel sounds are normal. There is no distension.      Palpations: Abdomen is soft.       Tenderness: There is no abdominal tenderness.   Musculoskeletal: Normal range of motion.   Skin:     General: Skin is warm.   Neurological:      General: No focal deficit present.      Mental Status: She is alert.   Psychiatric:         Mood and Affect: Affect is labile.         Thought Content: Thought content is delusional.         Judgment: Judgment is inappropriate.           Overview/Hospital Course:     7/12   presenting to the ED via [facility] with [facility] stating patient was at her estranged 's house  threatening she would kill the estranged .  PEC placed for delusional behavior. Work-up significant for H/H 6.2/20 (baseline 9/30 through care everywhere). Rectal exam performed without evidence of blood or melena. FOBT positive.   alert, delusional. Refusing to give  personal belongings, and agitation with staff.  4 point restraints were applied. repeat CBC at 6.6 . transfusion with 1 unit of PRBC. GI recommends EGD and colonoscopy for further evaluation of iron deficiency anemia patient adamantly refuses exam and EGD/ colonoscopy  7/13 agitated overnight requesting cell phone.  Patient was placed 4 point  restraints hemoglobin at 8 3 status post 2 units of pack RBC transfusion . agrees for EGD/ colonoscopy.Patient off restraints.Continue home Abilify 10 mg and Remeron 15 mg nightly. Increased Zyprexa from 5 mg to 10 mg q8h IM PRN for agitation.daily EKG to monitor QTc- 447ms   7/14 Hb 8.1 --> 7.6. Requesting  her purse and  belongings she needs to get to her (Geovany Ring).  Clear liquids today and NPO from midnight EGD/colonoscopy in a.m. complains of abdominal, takes oxycodone 30 mg Q 6 hourly as per chart review (reviewed  last filled on 06/23).  Norco discontinued and started oxycodone 20 mg Q 6 hourly p.r.n.  7/15 refused to drink GoLYTELY overnight.  Hemoglobin stable at 7.8 leukocytosis of 15 but afebrile.  Transaminitis AST//112 with normal bilirubin and mildly elevated alk-phos  at 337.  Significant left upper quadrant abdominal pain prior to endoscopy today.  Endoscopy canceled.  CT abdomen with IV and oral contrast ordered.  General surgery consulted.  Started on Zosyn and vancomycin empirically.  Blood cultures x2 with no growth  7/22  Continued on ciprofloxacin and metronidazole since 07/16. refusal of taking medications and vitals, .As CEC expiring 7/26 , plan to pursue DELORES filing as pt remains delusional and uncooperative and needs psychiatric hospital placement. Forced med protocol with Haldol, Ativan and Ciarra. Aristada 441 mg IM injection once given 7/22, to be given [id number]. Started Atarax 50 mg nightly for insomnia  7/23 agreed for blood draw today hemoglobin repeated at 6 transfusion with 1 unit of pack RBC  7/24 purulent drainage from upper abdomen at tumor site. culture obtained .Hb 7.2 .  judicial commitment in process.  Patient's  okay with nursing home with hospice as the patient is noncompliant with medications.  does not want to be decision-maker.  7/25  Abdominal x-ray-  No convincing evidence of pneumoperitoneum on this limited single portable view up. abdominal fluid culture from 07/23 with no growth.  Wound Care consulted.  Hemoglobin at 6.7 transfusion with 1 unit of pack RBC today.  Discussed with .   mentions the patient's brother is a bad influence and only involved with the patient to obtain her money. he is okay with patient being DNR/ hospice placement.   7/26hemoglobin at 6.4--> 6.9 .  Transfusion with 1 unit of pack RBC today.  Wound cultures from 07/23 of the abdomen with no growth.  Discussed with psychiatry regarding 's wish for DNR/hospice with judicial commitment process.  discussed with Infectious Disease CT abdomen findings 7/15 .  Continue ciprofloxacin and metronidazole indefinitely. refuses rectal exam today and EGD. mentions she has brown bowel movement today. direct antiglobulin positive. haptoglobin <10  and  LDH ordered. Anemia likely from hemolysis. Discussed with  in detail and he agrees for DNR / hospice placement. he wants to visit her   7/27 Hb at 8.1 . hematology considering steroids. s/p wound care evaluation  7/28 refused EKG . Hb stable at 8.3  7/29  refusing labs and EKG . forced med protocol as ordered if pt refuses scheduled Abilify.  delusional today    Significant Labs:   A1C:   Recent Labs   Lab 07/11/20 1932   HGBA1C 6.3*     CBC:   Recent Labs   Lab 07/30/20 0432   WBC 11.79  11.79  11.79   HGB 7.1*  7.1*  7.1*   HCT 23.4*  23.4*  23.4*   *  488*  488*     CMP:   Recent Labs   Lab 07/30/20 0432   *  134*   K 3.8  3.8     103   CO2 28  28   *  285*   BUN 6  6   CREATININE 0.7  0.7   CALCIUM 7.6*  7.6*   PROT 5.7*  5.7*   ALBUMIN 1.8*  1.8*   BILITOT 0.3  0.3   ALKPHOS 84  84   AST 13  13   ALT 6*  6*   ANIONGAP 3*  3*   EGFRNONAA >60.0  >60.0     Magnesium:   Recent Labs   Lab 07/30/20 0432   MG 1.8     POCT Glucose:   Recent Labs   Lab 07/30/20  1704 07/31/20  0743 07/31/20  1653   POCTGLUCOSE 198* 259* 219*     TSH:   Recent Labs   Lab 07/11/20 1932   TSH 3.160       Significant Imaging: I have reviewed and interpreted all pertinent imaging results/findings within the past 24 hours.    Assessment/Plan:      Active Diagnoses:    Diagnosis Date Noted POA    PRINCIPAL PROBLEM:  Psychological factors affecting medical condition [F54]  Yes    Palliative care encounter [Z51.5] 07/23/2020 Not Applicable    Advance care planning [Z71.89]  Not Applicable    Noncompliance with treatment [Z91.19] 07/20/2020 Not Applicable    Bipolar 1 disorder, mixed, moderate [F31.62]  Yes    Anemia [D64.9] 07/11/2020 Yes    Hypokalemia [E87.6] 07/11/2020 Yes    Acute deep vein thrombosis (DVT) of popliteal vein of left lower extremity [I82.432] 05/29/2020 Yes    Delusional disorder [F22] 05/28/2020 Yes    Bipolar affective disorder, current episode manic  [F31.10] 09/09/2019 Yes    Malignant gastrointestinal stromal tumor (GIST) of stomach [C49.A2] 09/09/2019 Yes    Type 2 diabetes mellitus without complication, without long-term current use of insulin [E11.9] 09/09/2019 Yes      Problems Resolved During this Admission:     Scheduled Meds:   ARIPiprazole lauroxil  441 mg Intramuscular Q30 Days    ARIPiprazole  30 mg Oral Daily    ciprofloxacin HCl  500 mg Oral Q12H    cyanocobalamin  1,000 mcg Subcutaneous Q7 Days    Followed by    [START ON 8/16/2020] cyanocobalamin  1,000 mcg Subcutaneous Q30 Days    hydrOXYzine HCL  50 mg Oral QHS    lidocaine  1 patch Transdermal Q24H    metroNIDAZOLE  500 mg Oral Q8H    mirtazapine  15 mg Oral QHS    pantoprazole  40 mg Oral BID    potassium chloride  30 mEq Oral Once    potassium chloride  40 mEq Oral Once    potassium chloride  40 mEq Oral Once     Continuous Infusions:  PRN Meds:.sodium chloride, sodium chloride, sodium chloride, sodium chloride, sodium chloride, acetaminophen, dextrose 50%, dextrose 50%, haloperidol lactate **AND** diphenhydrAMINE **AND** lorazepam, haloperidol lactate **AND** diphenhydrAMINE **AND** lorazepam, glucagon (human recombinant), glucose, glucose, HYDROmorphone, HYDROmorphone, insulin aspart U-100, iohexol, melatonin, metoclopramide HCl, naloxone, OLANZapine, ondansetron, promethazine, senna-docusate 8.6-50 mg, sodium chloride 0.9%, sodium chloride 0.9%    PLAN:    Delusional disorder   Bipolar disorder  - presenting to the ED via [facility] with [facility] stating patient was at her estranged 's house  threatening she would kill the estranged .  PEC placed for delusional behavior. Work-up significant for H/H 6.2/20 (baseline 9/30 through care everywhere). Rectal exam performed without evidence of blood or melena. FOBT positive.   alert, delusional.  - restraints in place due to severe agitation   - psychiatry consulted. apprec recs  --  Continue Abilify 30 mg daily  -- Forced med  protocol with Haldol, Ativan and Benadryl as described in PRN below)  -- Continue home Remeron 15 mg nightly  -- Aristada 441 mg IM injection once given 7/22, to be given [id number]  -- Start Atarax 50 mg nightly for insomnia  -- First try: Haldol 5 mg PO, Benadryl 50 mg PO, Ativan 2 mg PO PRN for non-redirectable agitation  -- If refuses: Haldol 2 mg IV, Benadryl 50 mg IV, Ativan 2 mg IV PRN for non-redirectable agitation  -- Please obtain daily EKG to monitor QTc  - patient has very limited insight into her situation and remains gravely disabled as evidenced by her refusal of taking medications and vitals, ekg done.    -- Pt is however more calm and cooperative less irritable.   -- In lieu of pts CEC expiring will pursue DELORES filing as pt remains delusional and uncooperative with recommended treatment plans.     Symptomatic anemia   GIB- resolved    - GIB Pathway initiated  Likely bleeding from GIST  Hgb 6.2 on admit, down from 9.7 beginning of June  - H/H stable on 7/16  Check iron studies and hemolysis labs  GI consulted. apprec recs  -- Discussed with primary team that EGD in this patient would be high risk given worsening/progression of her cancer and that given the likely lower GI pathology that is improving the risks outweigh the benefits  -- continue PO PPI  -- trend Hgb, transfuse Hgb <7, Plt<50  -- would recommend heme/onc consult for evaluation/treatment of progressing tumor  -- if it is determined that tissue is needed, AES consult would be appropriate at that time.  - s/p 1 unit pRBC on 7/17. Patient denies blood in stool or melena   - On 7/26, hemoglobin at 6.4--> 6.9 .  Transfusion with 1 unit of pack RBC  - Wound cultures from 07/23 of the abdomen with no growth.  Discussed with psychiatry regarding 's wish for DNR/hospice with judicial commitment process.    - discussed with Infectious Disease CT abdomen findings 7/15 .  Continue ciprofloxacin and metronidazole indefinitely. refuses rectal exam  today and EGD. - mentions she has brown bowel movement  - direct antiglobulin positive. haptoglobin <10  and LDH ordered.  - Anemia likely from hemolysis. Discussed with  in detail and he agrees for DNR / hospice placement.   - s/p wound care evaluation    Malignant gastrointestinal stromal tumor (GIST) of stomach   Abdominal wound  - [facility]  - On Sunitinib outpatient  - CT A/P showed increased size of mass  - 7/24 purulent drainage from upper abdomen at tumor site. culture obtained  - 7/25 Abdominal x-ray-  No convincing evidence of pneumoperitoneum on this limited single portable view up. abdominal fluid culture from 07/23 with no growth.  - Wound Care consulted  - 7/26 continue ciprofloxacin and metronidazole indefinitely per ID  - Heme/onc consulted. apprec recs   -- poor prognosis d/t mental health issue  -- will need to f/u with primary oncologist at discharge to obtain sunitinib   -- /social work consults for possible placement  -- hospice reasonable if patient has to be d/c to previous living situation    Leukocytosis  - WBC: 11 --> 15 --> 18 --> 17  - afebrile since 7/15   - denies cough or SOB  - Initially on empiric IV abx but switched to po cipro/flagyl due to patient refusing IV access   - plan to discontinue abx is remains afebrile for 24 hours  - bcx no growth to date  - U/A negative for UTI  - CBC daily     Transaminitis   - 7/15:  AST//112 with normal bilirubin and mildly elevated alk-phos at 337. Consider right upper quadrant sonogram if not trending down        Acute deep vein thrombosis (DVT) of popliteal vein of left lower extremity   - S/p IVC filter on 6/2020    Hypokalemia- resolved   - K 2.9-->3  - replete prn     B12 deficiency  - levels of 192 started on vitamin B12 subcutaneous dissection     Type 2 diabetes mellitus without complication, without long-term current use of insulin  - Patient's FSGs are controlled on current hypoglycemics.  -  Home DM regimen:  Metformin  - SSI with POCT accuchecks and Diabetic diet    Disposition   Palliative care [place]  - Pallative care consulted.   - Discussing with palliative care and psychiatry to best discharge location for the patient   - psychiatry pursuing DELORES filing       VTE Risk Mitigation (From admission, onward)         Ordered     IP VTE HIGH RISK PATIENT  Once      07/11/20 2130     Place sequential compression device  Until discontinued      07/11/20 2130              Time spent in care of patient: > 35 minutes     Kahlil Banegas MD  Department of Hospital Medicine   [facility]

## 2020-08-01 NOTE — PROGRESS NOTES
[facility] Hospital Medicine  Progress Note    Patient Name: Violeta Boudreaux  MRN: [id number]  Patient Class: IP- Inpatient   Admission Date: 7/11/2020  Length of Stay: 15 days  Attending Physician: Kahlil Banegas MD  Primary Care Provider: Otf Brownlee MD    [facility] Medicine Team: [facility] HOSP MED B Kahlil Banegas MD    HPI:    Ms. Violeta Boudreaux is a 54 y.o. female with Bipolar Disorder, delusional disorder, GIST on Sunitinib, and recent LLE DVT s/p IVC filter (June 2020) who presents [place] the [place] [facility] for delusions.  Her estranged  reports that she showed up at his house, claiming that she was  to Geovany Ring from [employer] and he was being hid inside the estranged 's house. He reports that she is homeless, and hasn't been taking her psych medications.  She denies any complaints at this time.  Labs on arrival showed a Hemoglobin 6.2 down from 9.7 in June 2020.  She endorses midepigastric abdominal pain.  She denies any blood in her stools, dark stools, or vaginal bleeding.  She mentions that a few weeks ago, she was having bad headaches and took a lot of Advil, then causing her to have hematemesis.  She denies further bleeding or use of Aspirin or NSAIDS.  Of note, at the end of May, she was hospitalized at [facility] for delusional disorder.  At that time, she was found to have a Hemoglobin 6.4.  She was given blood and her hemolgobin improved to 9.7.  It was thought that her bleeding with 2/2 her GIST tumor. She was not evaluated by GI.  Also during that admission, she was found to have a LLE DVT.  HemeOnc suggested IVC filter, given her lack of consistency with medications.  She was [facility] to the [facility].  She was discharged on Abilify 10mg and Mirtazapine 15mg qHS.       In the ER, she was PECed for grave disability.  SUMIT was positive.  She was transfused 1 unit PRBCs.  She mentions that Bert Gorman will pay for every drop of blood we take from her, and that she wants  "to return to her house in [place] when discharged.  She was admitted to Hospital Medicine for GI and Psych evaluations.    Subjective:     Principal Problem:Psychological factors affecting medical condition    Interval History: Patient lying in bed, no acute distress. Reports abdominal pain remains poorly controlled. Continues to report no blood in stool or dark stools. Refusing to allow blood draws at this time. Continues to ask to be addressed as "Geovany Ring's wife".    Review of Systems   Constitutional: Negative for chills and fever.   Eyes: Negative for visual disturbance.   Respiratory: Negative for cough and shortness of breath.    Gastrointestinal: Positive for abdominal distention and abdominal pain. Negative for nausea and vomiting.   Genitourinary: Negative for dysuria.   Neurological: Negative for weakness.   Psychiatric/Behavioral: Negative for suicidal ideas.        Objective:     Vital Signs (Most Recent):  Temp: 98.4 °F (36.9 °C) (08/01/20 0435)  Pulse: 92 (08/01/20 0833)  Resp: 16 (08/01/20 0834)  BP: (!) 109/54 (08/01/20 0833)  SpO2: 100 % (08/01/20 0833) Vital Signs (24h Range):  Temp:  [97.9 °F (36.6 °C)-98.8 °F (37.1 °C)] 98.4 °F (36.9 °C)  Pulse:  [88-97] 92  Resp:  [16-20] 16  SpO2:  [96 %-100 %] 100 %  BP: ()/(50-66) 109/54     Weight: 70.1 kg (154 lb 8.7 oz)  Body mass index is 24.2 kg/m².    Intake/Output Summary (Last 24 hours) at 8/1/2020 0959  Last data filed at 7/31/2020 1800  Gross per 24 hour   Intake 480 ml   Output --   Net 480 ml        Physical Exam  HENT:      Head: Normocephalic.   Eyes:      Pupils: Pupils are equal, round, and reactive to light.   Neck:      Musculoskeletal: Normal range of motion.   Cardiovascular:      Rate and Rhythm: Normal rate and regular rhythm.      Pulses: Normal pulses.      Heart sounds: Normal heart sounds.   Pulmonary:      Effort: Pulmonary effort is normal.      Breath sounds: Normal breath sounds.   Abdominal:      General: Bowel " sounds are normal. There is no distension.      Palpations: Abdomen is soft.      Tenderness: There is no abdominal tenderness.   Musculoskeletal: Normal range of motion.   Skin:     General: Skin is warm.   Neurological:      General: No focal deficit present.      Mental Status: She is alert.   Psychiatric:         Mood and Affect: Affect is labile.         Thought Content: Thought content is delusional.         Judgment: Judgment is inappropriate.           Overview/Hospital Course:     7/12   presenting to the ED via [facility] with OPC stating patient was at her estranged 's house  threatening she would kill the estranged .  PEC placed for delusional behavior. Work-up significant for H/H 6.2/20 (baseline 9/30 through care everywhere). Rectal exam performed without evidence of blood or melena. FOBT positive.   alert, delusional. Refusing to give  personal belongings, and agitation with staff.  4 point restraints were applied. repeat CBC at 6.6 . transfusion with 1 unit of PRBC. GI recommends EGD and colonoscopy for further evaluation of iron deficiency anemia patient adamantly refuses exam and EGD/ colonoscopy  7/13 agitated overnight requesting cell phone.  Patient was placed 4 point  restraints hemoglobin at 8 3 status post 2 units of pack RBC transfusion . agrees for EGD/ colonoscopy.Patient off restraints.Continue home Abilify 10 mg and Remeron 15 mg nightly. Increased Zyprexa from 5 mg to 10 mg q8h IM PRN for agitation.daily EKG to monitor QTc- 447ms   7/14 Hb 8.1 --> 7.6. Requesting  her purse and  belongings she needs to get to her (Geovany Ring).  Clear liquids today and NPO from midnight EGD/colonoscopy in a.m. complains of abdominal, takes oxycodone 30 mg Q 6 hourly as per chart review (reviewed  last filled on 06/23).  Norco discontinued and started oxycodone 20 mg Q 6 hourly p.r.n.  7/15 refused to drink GoLYTELY overnight.  Hemoglobin stable at 7.8 leukocytosis of 15 but afebrile.   Transaminitis AST//112 with normal bilirubin and mildly elevated alk-phos at 337.  Significant left upper quadrant abdominal pain prior to endoscopy today.  Endoscopy canceled.  CT abdomen with IV and oral contrast ordered.  General surgery consulted.  Started on Zosyn and vancomycin empirically.  Blood cultures x2 with no growth  7/22  Continued on ciprofloxacin and metronidazole since 07/16. refusal of taking medications and vitals, .As CEC expiring 7/26 , plan to pursue DELORES filing as pt remains delusional and uncooperative and needs psychiatric hospital placement. Forced med protocol with Haldol, Ativan and Ciarra. Aristada 441 mg IM injection once given 7/22, to be given [id number]. Started Atarax 50 mg nightly for insomnia  7/23 agreed for blood draw today hemoglobin repeated at 6 transfusion with 1 unit of pack RBC  7/24 purulent drainage from upper abdomen at tumor site. culture obtained .Hb 7.2 .  judicial commitment in process.  Patient's  okay with nursing home with hospice as the patient is noncompliant with medications.  does not want to be decision-maker.  7/25  Abdominal x-ray-  No convincing evidence of pneumoperitoneum on this limited single portable view up. abdominal fluid culture from 07/23 with no growth.  Wound Care consulted.  Hemoglobin at 6.7 transfusion with 1 unit of pack RBC today.  Discussed with .   mentions the patient's brother is a bad influence and only involved with the patient to obtain her money. he is okay with patient being DNR/ hospice placement.   7/26hemoglobin at 6.4--> 6.9 .  Transfusion with 1 unit of pack RBC today.  Wound cultures from 07/23 of the abdomen with no growth.  Discussed with psychiatry regarding 's wish for DNR/hospice with judicial commitment process.  discussed with Infectious Disease CT abdomen findings 7/15 .  Continue ciprofloxacin and metronidazole indefinitely. refuses rectal exam today and EGD. mentions she has  brown bowel movement today. direct antiglobulin positive. haptoglobin <10  and LDH ordered. Anemia likely from hemolysis. Discussed with  in detail and he agrees for DNR / hospice placement. he wants to visit her   7/27 Hb at 8.1 . hematology considering steroids. s/p wound care evaluation  7/28 refused EKG . Hb stable at 8.3  7/29  refusing labs and EKG . forced med protocol as ordered if pt refuses scheduled Abilify.  delusional today    Significant Labs:   A1C:   Recent Labs   Lab 07/11/20 1932   HGBA1C 6.3*     CBC:   No results for input(s): WBC, HGB, HCT, PLT in the last 48 hours.  CMP:   No results for input(s): NA, K, CL, CO2, GLU, BUN, CREATININE, CALCIUM, PROT, ALBUMIN, BILITOT, ALKPHOS, AST, ALT, ANIONGAP, EGFRNONAA in the last 48 hours.    Invalid input(s): ESTGFAFRICA  Magnesium:   No results for input(s): MG in the last 48 hours.  POCT Glucose:   Recent Labs   Lab 07/31/20  0743 07/31/20  1653 08/01/20  0716   POCTGLUCOSE 259* 219* 211*     TSH:   Recent Labs   Lab 07/11/20 1932   TSH 3.160       Significant Imaging: I have reviewed and interpreted all pertinent imaging results/findings within the past 24 hours.    Assessment/Plan:      Active Diagnoses:    Diagnosis Date Noted POA    PRINCIPAL PROBLEM:  Psychological factors affecting medical condition [F54]  Yes    Palliative care encounter [Z51.5] 07/23/2020 Not Applicable    Advance care planning [Z71.89]  Not Applicable    Noncompliance with treatment [Z91.19] 07/20/2020 Not Applicable    Bipolar 1 disorder, mixed, moderate [F31.62]  Yes    Anemia [D64.9] 07/11/2020 Yes    Hypokalemia [E87.6] 07/11/2020 Yes    Acute deep vein thrombosis (DVT) of popliteal vein of left lower extremity [I82.432] 05/29/2020 Yes    Delusional disorder [F22] 05/28/2020 Yes    Bipolar affective disorder, current episode manic [F31.10] 09/09/2019 Yes    Malignant gastrointestinal stromal tumor (GIST) of stomach [C49.A2] 09/09/2019 Yes    Type 2  diabetes mellitus without complication, without long-term current use of insulin [E11.9] 09/09/2019 Yes      Problems Resolved During this Admission:     Scheduled Meds:   ARIPiprazole lauroxil  441 mg Intramuscular Q30 Days    ARIPiprazole  30 mg Oral Daily    ciprofloxacin HCl  500 mg Oral Q12H    cyanocobalamin  1,000 mcg Subcutaneous Q7 Days    Followed by    [START ON 8/16/2020] cyanocobalamin  1,000 mcg Subcutaneous Q30 Days    hydrOXYzine HCL  50 mg Oral QHS    lidocaine  1 patch Transdermal Q24H    metroNIDAZOLE  500 mg Oral Q8H    mirtazapine  15 mg Oral QHS    pantoprazole  40 mg Oral BID    potassium chloride  30 mEq Oral Once    potassium chloride  40 mEq Oral Once    potassium chloride  40 mEq Oral Once     Continuous Infusions:  PRN Meds:.sodium chloride, sodium chloride, sodium chloride, sodium chloride, sodium chloride, acetaminophen, dextrose 50%, dextrose 50%, haloperidol lactate **AND** diphenhydrAMINE **AND** lorazepam, haloperidol lactate **AND** diphenhydrAMINE **AND** lorazepam, glucagon (human recombinant), glucose, glucose, HYDROmorphone, HYDROmorphone, insulin aspart U-100, iohexol, melatonin, metoclopramide HCl, naloxone, OLANZapine, ondansetron, promethazine, senna-docusate 8.6-50 mg, sodium chloride 0.9%, sodium chloride 0.9%    PLAN:    Delusional disorder   Bipolar disorder  - presenting to the ED via [facility] with OPC stating patient was at her estranged 's house  threatening she would kill the estranged .  PEC placed for delusional behavior. Work-up significant for H/H 6.2/20 (baseline 9/30 through care everywhere). Rectal exam performed without evidence of blood or melena. FOBT positive.   alert, delusional.  - restraints in place due to severe agitation   - psychiatry consulted. apprec recs  --  Continue Abilify 30 mg daily  -- Forced med protocol with Haldol, Ativan and Benadryl as described in PRN below)  -- Continue home Remeron 15 mg nightly  -- Aristada  441 mg IM injection once given 7/22, to be given [id number]  -- Start Atarax 50 mg nightly for insomnia  -- First try: Haldol 5 mg PO, Benadryl 50 mg PO, Ativan 2 mg PO PRN for non-redirectable agitation  -- If refuses: Haldol 2 mg IV, Benadryl 50 mg IV, Ativan 2 mg IV PRN for non-redirectable agitation  -- Please obtain daily EKG to monitor QTc  - patient has very limited insight into her situation and remains gravely disabled as evidenced by her refusal of taking medications and vitals, ekg done.    -- Pt is however more calm and cooperative less irritable.   -- In lieu of pts CEC expiring will pursue DELORES filing as pt remains delusional and uncooperative with recommended treatment plans.     Symptomatic anemia   GIB- resolved    - GIB Pathway initiated  Likely bleeding from GIST  Hgb 6.2 on admit, down from 9.7 beginning of June  - H/H stable on 7/16  Check iron studies and hemolysis labs  GI consulted. apprec recs  -- Discussed with primary team that EGD in this patient would be high risk given worsening/progression of her cancer and that given the likely lower GI pathology that is improving the risks outweigh the benefits  -- continue PO PPI  -- trend Hgb, transfuse Hgb <7, Plt<50  -- would recommend heme/onc consult for evaluation/treatment of progressing tumor  -- if it is determined that tissue is needed, AES consult would be appropriate at that time.  - s/p 1 unit pRBC on 7/17. Patient denies blood in stool or melena   - On 7/26, hemoglobin at 6.4--> 6.9 .  Transfusion with 1 unit of pack RBC  - Wound cultures from 07/23 of the abdomen with no growth.  Discussed with psychiatry regarding 's wish for DNR/hospice with judicial commitment process.    - discussed with Infectious Disease CT abdomen findings 7/15 .  Continue ciprofloxacin and metronidazole indefinitely. refuses rectal exam today and EGD. - mentions she has brown bowel movement  - direct antiglobulin positive. haptoglobin <10  and LDH  ordered.  - Anemia likely from hemolysis. Discussed with  in detail and he agrees for DNR / hospice placement.   - s/p wound care evaluation    Malignant gastrointestinal stromal tumor (GIST) of stomach   Abdominal wound  - [facility]  - On Sunitinib outpatient  - CT A/P showed increased size of mass  - 7/24 purulent drainage from upper abdomen at tumor site. culture obtained  - 7/25 Abdominal x-ray-  No convincing evidence of pneumoperitoneum on this limited single portable view up. abdominal fluid culture from 07/23 with no growth.  - Wound Care consulted  - 7/26 continue ciprofloxacin and metronidazole indefinitely per ID  - Heme/onc consulted. apprec recs   -- poor prognosis d/t mental health issue  -- will need to f/u with primary oncologist at discharge to obtain sunitinib   -- /social work consults for possible placement  -- hospice reasonable if patient has to be d/c to previous living situation    Leukocytosis  - WBC: 11 --> 15 --> 18 --> 17  - afebrile since 7/15   - denies cough or SOB  - Initially on empiric IV abx but switched to po cipro/flagyl due to patient refusing IV access   - plan to discontinue abx is remains afebrile for 24 hours  - bcx no growth to date  - U/A negative for UTI  - CBC daily     Transaminitis   - 7/15:  AST//112 with normal bilirubin and mildly elevated alk-phos at 337. Consider right upper quadrant sonogram if not trending down        Acute deep vein thrombosis (DVT) of popliteal vein of left lower extremity   - S/p IVC filter on 6/2020    Hypokalemia- resolved   - K 2.9-->3  - replete prn     B12 deficiency  - levels of 192 started on vitamin B12 subcutaneous dissection     Type 2 diabetes mellitus without complication, without long-term current use of insulin  - Patient's FSGs are controlled on current hypoglycemics.  - Home DM regimen:  Metformin  - SSI with POCT accuchecks and Diabetic diet    Disposition   Palliative care  [place]  - Pallative care consulted.   - Discussing with palliative care and psychiatry to best discharge location for the patient   - psychiatry pursuing DELORES filing       VTE Risk Mitigation (From admission, onward)         Ordered     IP VTE HIGH RISK PATIENT  Once      07/11/20 2130     Place sequential compression device  Until discontinued      07/11/20 2130              Time spent in care of patient: > 35 minutes     Kahlil Banegas MD  Department of Hospital Medicine   [facility]

## 2020-08-01 NOTE — ASSESSMENT & PLAN NOTE
ASSESSMENT     Violeta Boudreaux is a 54 y.o. female with a past psychiatric history of BPAD and delusional disorder, who presented to the [facility] due to OPC from estranged  for threatening to kill him. Admitted to [facility] for symptomatic anemia. Per FACT team, patient non-compliant with medications, did not receive most recent scheduled Aristada BAUM. Received Aristada 441 mg BAUM on 7/22. Patient does appear mildly improved in terms of affect, however continues to report delusional thoughts.     IMPRESSION  Bipolar affective disorder, current episode manic  Psychological factors affecting medical care  Cluster B personality d/o  Delusional disorder  Medication non-adherence    RECOMMENDATION(S)      1. Scheduled Medication(s):  - Continue Abilify from 30 mg daily   - Forced med protocol with Haldol, Ativan and Benadryl as described in PRN below  - Continue home Remeron 15 mg nightly  - Aristada 441 mg IM injection [id number], last given 7/22  - Continue Atarax 50 mg nightly for insomnia  - Optimize pain management regimen to ensure adequate pain control and encourage medication compliance    2. PRN Medication(s):  - First try: Haldol 5 mg PO, Benadryl 50 mg PO, Ativan 2 mg PO PRN for non-redirectable agitation  - If refuses: Haldol 2 mg IV, Benadryl 50 mg IV, Ativan 2 mg IV PRN for non-redirectable agitation    3.  Monitor:  - Please obtain daily EKG to monitor QTc    4. Legal Status/Precaution(s):  - Continue CEC (expires 7/26 @ 8:32 pm) as patient is in imminent danger of hurting self or others and is gravely disabled due to a psychiatric illness-- DELORES filed 7/23  - Maintain 1:1 sitter  - Continue working with next of kin (estranged  and brother) to help facilitate patient care and decision making, especially with regards to disposition -- would be best to come to a consensus with regards to her disposition  - Continue working with palliative care to discuss options, code status, and disposition    5. Capacity and  "Medical Decision Making  - Primary team worried about melena and potential need for EGD.  The patient is refusing procedures on the basis of it is "impossible for a blood count to drop in 24 hours."  She cannot explain the consequences or benefits of the procedure and continues to demonstrate delusional thought content.    - Recommend to contact the  for medical decision making and decisions regarding DNR, hospice care, procedures.    - Would carefully weight the risk vs. benefit of various procedure and hold off unless emergent given that the patient is adamantly against having any done forcing the patient to undergo a procedure will likely be difficult and traumatic for the patient  - Disposition: pending DELORES placement in nursing home with hospice vs. Inpatient hospice, NOT inpatient psychiatric hospitalization; patient does not have capacity to refuse placement  "

## 2020-08-01 NOTE — PROGRESS NOTES
Patient states that she threw up and need medicine and something for pain. Patient pain med is not due until 10:19am. Gave PRN reglan. Was told by the sitter afterwards that the patient was in the bathroom forcing herself to throw up by putting her hand in her mouth. Will continue to monitor.

## 2020-08-01 NOTE — PROGRESS NOTES
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 hurting self or others and is gravely disabled due to a psychiatric illness-- DELORES filed 7/23  - Maintain 1:1 sitter  - Continue working with next of kin (estranged  and brother) to help facilitate patient care and decision making, especially with regards to disposition -- would be best to come to a consensus with regards to her disposition  - Continue working with palliative care to discuss options, code status, and disposition    5. Capacity and Medical Decision Making  - Primary team worried about melena and potential need for EGD.  The patient is refusing procedures on the basis of it is "impossible for a blood count to drop in 24 hours."  She cannot explain the consequences or benefits of the procedure and continues to demonstrate delusional thought content.    - Recommend to contact the  for medical decision making and decisions regarding DNR, hospice care, procedures.    - Would carefully weight the risk vs. benefit of various procedure and hold off unless emergent given that the patient is adamantly against having any done forcing the patient to undergo a procedure will likely be difficult and traumatic for the patient  - Disposition: pending DELORES placement in nursing home with hospice vs. Inpatient hospice, NOT inpatient psychiatric hospitalization; patient does not have capacity to refuse placement         Need for " Continued Hospitalization:   Psychiatric illness continues to pose a potential threat to life or bodily function, of self or others, thereby requiring the need for continued inpatient psychiatric hospitalization.    Anticipated Disposition: Hospice/Medical Facility     Total time:  15 with greater than 50% of this time spent in counseling and/or coordination of care.       Ross Wiedemann, MD   Psychiatry  [facility]

## 2020-08-02 NOTE — PROGRESS NOTES
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 delusional disorder, who presented to the [facility] due to OPC from estranged  for threatening to kill him. Admitted to [facility] for symptomatic anemia. Per FACT team, patient non-compliant with medications, did not receive most recent scheduled Aristada BAUM. Received Aristada 441 mg BAUM on 7/22. Patient does appear mildly improved in terms of affect, however continues to report delusional thoughts.     IMPRESSION  Bipolar affective disorder, current episode manic  Psychological factors affecting medical care  Cluster B personality d/o  Delusional disorder  Medication non-adherence    RECOMMENDATION(S)      1. Scheduled Medication(s):  - Continue Abilify from 30 mg daily   - Forced med protocol with Haldol, Ativan and Benadryl as described in PRN below  - Continue home Remeron 15 mg nightly  - Aristada 441 mg IM injection [id number], last given 7/22  - Continue Atarax 50 mg nightly for insomnia  - Optimize pain management regimen to ensure adequate pain control and encourage medication compliance    2. PRN Medication(s):  - First try: Haldol 5 mg PO, Benadryl 50 mg PO, Ativan 2 mg PO PRN for non-redirectable agitation  - If refuses: Haldol 2 mg IV, Benadryl 50 mg IV, Ativan 2 mg IV PRN for non-redirectable agitation    3.  Monitor:  - Please obtain daily EKG to monitor QTc    4.  "Legal Status/Precaution(s):  - Continue CEC (expires 7/26 @ 8:32 pm) as patient is in imminent danger of hurting self or others and is gravely disabled due to a psychiatric illness-- DELORES filed 7/23  - Maintain 1:1 sitter  - Continue working with next of kin (estranged  and brother) to help facilitate patient care and decision making, especially with regards to disposition -- would be best to come to a consensus with regards to her disposition  - Continue working with palliative care to discuss options, code status, and disposition    5. Capacity and Medical Decision Making  - Primary team worried about melena and potential need for EGD.  The patient is refusing procedures on the basis of it is "impossible for a blood count to drop in 24 hours."  She cannot explain the consequences or benefits of the procedure and continues to demonstrate delusional thought content.    - Recommend to contact the  for medical decision making and decisions regarding DNR, hospice care, procedures.    - Would carefully weight the risk vs. benefit of various procedure and hold off unless emergent given that the patient is adamantly against having any done forcing the patient to undergo a procedure will likely be difficult and traumatic for the patient  - Disposition: pending DELORES placement in nursing home with hospice vs. Inpatient hospice, NOT inpatient psychiatric hospitalization; patient does not have capacity to refuse placement          Need for Continued Hospitalization:   Psychiatric illness continues to pose a potential threat to life or bodily function, of self or others, thereby requiring the need for continued inpatient psychiatric hospitalization.     Anticipated Disposition: Hospice/Medical Facility      Total time:  15 with greater than 50% of this time spent in counseling and/or coordination of care.          Hilario William MD   Psychiatry  [facility]  "

## 2020-08-02 NOTE — PLAN OF CARE
Sitter at bedside. Side rails up x 3. Call light within reach. No acute events throughout shift. Patient verbally abusive to staff and manipulative. Pain uncontrolled by prn medication. Dressing to abdomen removed per patient request, no drainage noted. Wound dry and intact.

## 2020-08-02 NOTE — NURSING
Pt complaining of severe abdominal pain 10/10. Med team B notified. Advised to give pain medication early. Will continue to monitor.

## 2020-08-02 NOTE — PROGRESS NOTES
[facility] Hospital Medicine  Progress Note    Patient Name: Violeta Boudreaux  MRN: [id number]  Patient Class: IP- Inpatient   Admission Date: 7/11/2020  Length of Stay: 16 days  Attending Physician: Kahlil Banegas MD  Primary Care Provider: Otf Brownlee MD    [facility] Medicine Team: [facility] HOSP MED B Kahlil Banegas MD    HPI:    Ms. Violeta Boudreaux is a 54 y.o. female with Bipolar Disorder, delusional disorder, GIST on Sunitinib, and recent LLE DVT s/p IVC filter (June 2020) who presents to the ER by [facility] for delusions.  Her estranged  reports that she showed up at his house, claiming that she was  to Geovany Ring from [employer] and he was being hid inside the estranged 's house. He reports that she is homeless, and hasn't been taking her psych medications.  She denies any complaints at this time.  Labs on arrival showed a Hemoglobin 6.2 down from 9.7 in June 2020.  She endorses midepigastric abdominal pain.  She denies any blood in her stools, dark stools, or vaginal bleeding.  She mentions that a few weeks ago, she was having bad headaches and took a lot of Advil, then causing her to have hematemesis.  She denies further bleeding or use of Aspirin or NSAIDS.  Of note, at the end of May, she was hospitalized at [facility] for delusional disorder.  At that time, she was found to have a Hemoglobin 6.4.  She was given blood and her hemolgobin improved to 9.7.  It was thought that her bleeding with 2/2 her GIST tumor. She was not evaluated by GI.  Also during that admission, she was found to have a LLE DVT.  HemeOnc suggested IVC filter, given her lack of consistency with medications.  She was [facility] to the [facility].  She was discharged on Abilify 10mg and Mirtazapine 15mg qHS.       In the ER, she was PECed for grave disability.  SUMIT was positive.  She was transfused 1 unit PRBCs.  She mentions that Bert Gorman will pay for every drop of blood we take from her, and that she wants  to return to her house in [place] when discharged.  She was admitted to Hospital Medicine for GI and Psych evaluations.    Subjective:     Principal Problem:Psychological factors affecting medical condition    Interval History: Patient lying in bed, no acute distress. Reports abdominal pain remains poorly controlled. Also notes nausea and non bloody emesis. Denies blood in stool or melena.     Review of Systems   Constitutional: Negative for chills and fever.   Eyes: Negative for visual disturbance.   Respiratory: Negative for cough and shortness of breath.    Gastrointestinal: Positive for abdominal distention, abdominal pain, nausea and vomiting.   Genitourinary: Negative for dysuria.   Neurological: Negative for weakness.   Psychiatric/Behavioral: Negative for suicidal ideas.        Objective:     Vital Signs (Most Recent):  Temp: 98.2 °F (36.8 °C) (08/02/20 0841)  Pulse: 88 (08/02/20 0841)  Resp: 16 (08/02/20 0841)  BP: (!) 106/58 (08/02/20 0841)  SpO2: 99 % (08/02/20 0841) Vital Signs (24h Range):  Temp:  [96.6 °F (35.9 °C)-98.9 °F (37.2 °C)] 98.2 °F (36.8 °C)  Pulse:  [] 88  Resp:  [16-18] 16  SpO2:  [96 %-100 %] 99 %  BP: (104-113)/(52-65) 106/58     Weight: 70.1 kg (154 lb 8.7 oz)  Body mass index is 24.2 kg/m².    Intake/Output Summary (Last 24 hours) at 8/2/2020 0928  Last data filed at 8/2/2020 0600  Gross per 24 hour   Intake 480 ml   Output --   Net 480 ml        Physical Exam  HENT:      Head: Normocephalic.   Eyes:      Pupils: Pupils are equal, round, and reactive to light.   Neck:      Musculoskeletal: Normal range of motion.   Cardiovascular:      Rate and Rhythm: Normal rate and regular rhythm.      Pulses: Normal pulses.      Heart sounds: Normal heart sounds.   Pulmonary:      Effort: Pulmonary effort is normal.      Breath sounds: Normal breath sounds.   Abdominal:      General: Bowel sounds are normal. There is no distension.      Palpations: Abdomen is soft.      Tenderness: There  is no abdominal tenderness.   Musculoskeletal: Normal range of motion.   Skin:     General: Skin is warm.   Neurological:      General: No focal deficit present.      Mental Status: She is alert.   Psychiatric:         Mood and Affect: Affect is labile.         Thought Content: Thought content is delusional.         Judgment: Judgment is inappropriate.           Overview/Hospital Course:     7/12   presenting to the ED via [facility] with OPC stating patient was at her estranged 's house  threatening she would kill the estranged .  PEC placed for delusional behavior. Work-up significant for H/H 6.2/20 (baseline 9/30 through care everywhere). Rectal exam performed without evidence of blood or melena. FOBT positive.   alert, delusional. Refusing to give  personal belongings, and agitation with staff.  4 point restraints were applied. repeat CBC at 6.6 . transfusion with 1 unit of PRBC. GI recommends EGD and colonoscopy for further evaluation of iron deficiency anemia patient adamantly refuses exam and EGD/ colonoscopy  7/13 agitated overnight requesting cell phone.  Patient was placed 4 point  restraints hemoglobin at 8 3 status post 2 units of pack RBC transfusion . agrees for EGD/ colonoscopy.Patient off restraints.Continue home Abilify 10 mg and Remeron 15 mg nightly. Increased Zyprexa from 5 mg to 10 mg q8h IM PRN for agitation.daily EKG to monitor QTc- 447ms   7/14 Hb 8.1 --> 7.6. Requesting  her purse and  belongings she needs to get to her (Geovany Ring).  Clear liquids today and NPO from midnight EGD/colonoscopy in a.m. complains of abdominal, takes oxycodone 30 mg Q 6 hourly as per chart review (reviewed  last filled on 06/23).  Norco discontinued and started oxycodone 20 mg Q 6 hourly p.r.n.  7/15 refused to drink GoLYTELY overnight.  Hemoglobin stable at 7.8 leukocytosis of 15 but afebrile.  Transaminitis AST//112 with normal bilirubin and mildly elevated alk-phos at 337.  Significant  left upper quadrant abdominal pain prior to endoscopy today.  Endoscopy canceled.  CT abdomen with IV and oral contrast ordered.  General surgery consulted.  Started on Zosyn and vancomycin empirically.  Blood cultures x2 with no growth  7/22  Continued on ciprofloxacin and metronidazole since 07/16. refusal of taking medications and vitals, .As CEC expiring 7/26 , plan to pursue DELORES filing as pt remains delusional and uncooperative and needs psychiatric hospital placement. Forced med protocol with Haldol, Ativan and Benadry. Aristada 441 mg IM injection once given 7/22, to be given [id number]. Started Atarax 50 mg nightly for insomnia  7/23 agreed for blood draw today hemoglobin repeated at 6 transfusion with 1 unit of pack RBC  7/24 purulent drainage from upper abdomen at tumor site. culture obtained .Hb 7.2 .  judicial commitment in process.  Patient's  okay with nursing home with hospice as the patient is noncompliant with medications.  does not want to be decision-maker.  7/25  Abdominal x-ray-  No convincing evidence of pneumoperitoneum on this limited single portable view up. abdominal fluid culture from 07/23 with no growth.  Wound Care consulted.  Hemoglobin at 6.7 transfusion with 1 unit of pack RBC today.  Discussed with .   mentions the patient's brother is a bad influence and only involved with the patient to obtain her money. he is okay with patient being DNR/ hospice placement.   7/26hemoglobin at 6.4--> 6.9 .  Transfusion with 1 unit of pack RBC today.  Wound cultures from 07/23 of the abdomen with no growth.  Discussed with psychiatry regarding 's wish for DNR/hospice with judicial commitment process.  discussed with Infectious Disease CT abdomen findings 7/15 .  Continue ciprofloxacin and metronidazole indefinitely. refuses rectal exam today and EGD. mentions she has brown bowel movement today. direct antiglobulin positive. haptoglobin <10  and LDH ordered. Anemia  likely from hemolysis. Discussed with  in detail and he agrees for DNR / hospice placement. he wants to visit her   7/27 Hb at 8.1 . hematology considering steroids. s/p wound care evaluation  7/28 refused EKG . Hb stable at 8.3  7/29  refusing labs and EKG . forced med protocol as ordered if pt refuses scheduled Abilify.  delusional today    Significant Labs:   A1C:   Recent Labs   Lab 07/11/20 1932   HGBA1C 6.3*     CBC:   No results for input(s): WBC, HGB, HCT, PLT in the last 48 hours.  CMP:   No results for input(s): NA, K, CL, CO2, GLU, BUN, CREATININE, CALCIUM, PROT, ALBUMIN, BILITOT, ALKPHOS, AST, ALT, ANIONGAP, EGFRNONAA in the last 48 hours.    Invalid input(s): ESTGFAFRICA  Magnesium:   No results for input(s): MG in the last 48 hours.  POCT Glucose:   Recent Labs   Lab 08/01/20  0716 08/01/20  1628 08/01/20  2134   POCTGLUCOSE 211* 187* 269*     TSH:   Recent Labs   Lab 07/11/20 1932   TSH 3.160       Significant Imaging: I have reviewed and interpreted all pertinent imaging results/findings within the past 24 hours.    Assessment/Plan:      Active Diagnoses:    Diagnosis Date Noted POA    PRINCIPAL PROBLEM:  Psychological factors affecting medical condition [F54]  Yes    Palliative care encounter [Z51.5] 07/23/2020 Not Applicable    Advance care planning [Z71.89]  Not Applicable    Noncompliance with treatment [Z91.19] 07/20/2020 Not Applicable    Bipolar 1 disorder, mixed, moderate [F31.62]  Yes    Anemia [D64.9] 07/11/2020 Yes    Hypokalemia [E87.6] 07/11/2020 Yes    Acute deep vein thrombosis (DVT) of popliteal vein of left lower extremity [I82.432] 05/29/2020 Yes    Delusional disorder [F22] 05/28/2020 Yes    Bipolar affective disorder, current episode manic [F31.10] 09/09/2019 Yes    Malignant gastrointestinal stromal tumor (GIST) of stomach [C49.A2] 09/09/2019 Yes    Type 2 diabetes mellitus without complication, without long-term current use of insulin [E11.9] 09/09/2019 Yes       Problems Resolved During this Admission:     Scheduled Meds:   ARIPiprazole lauroxil  441 mg Intramuscular Q30 Days    ARIPiprazole  30 mg Oral Daily    ciprofloxacin HCl  500 mg Oral Q12H    cyanocobalamin  1,000 mcg Subcutaneous Q7 Days    Followed by    [START ON 8/16/2020] cyanocobalamin  1,000 mcg Subcutaneous Q30 Days    hydrOXYzine HCL  50 mg Oral QHS    lidocaine  1 patch Transdermal Q24H    metroNIDAZOLE  500 mg Oral Q8H    mirtazapine  15 mg Oral QHS    pantoprazole  40 mg Oral BID    potassium chloride  30 mEq Oral Once    potassium chloride  40 mEq Oral Once    potassium chloride  40 mEq Oral Once     Continuous Infusions:  PRN Meds:.sodium chloride, sodium chloride, sodium chloride, sodium chloride, sodium chloride, acetaminophen, dextrose 50%, dextrose 50%, haloperidol lactate **AND** diphenhydrAMINE **AND** lorazepam, haloperidol lactate **AND** diphenhydrAMINE **AND** lorazepam, glucagon (human recombinant), glucose, glucose, HYDROmorphone, insulin aspart U-100, iohexol, melatonin, metoclopramide HCl, naloxone, OLANZapine, ondansetron, promethazine, senna-docusate 8.6-50 mg, sodium chloride 0.9%, sodium chloride 0.9%    PLAN:    Delusional disorder   Bipolar disorder  - presenting to the ED via [facility] with OPC stating patient was at her estranged 's house  threatening she would kill the estranged .  PEC placed for delusional behavior. Work-up significant for H/H 6.2/20 (baseline 9/30 through care everywhere). Rectal exam performed without evidence of blood or melena. FOBT positive.   alert, delusional.  - restraints in place due to severe agitation   - psychiatry consulted. apprec recs  --  Continue Abilify 30 mg daily  -- Forced med protocol with Haldol, Ativan and Benadryl as described in PRN below)  -- Continue home Remeron 15 mg nightly  -- Aristada 441 mg IM injection once given 7/22, to be given [id number]  -- Start Atarax 50 mg nightly for insomnia  -- First try:  Haldol 5 mg PO, Benadryl 50 mg PO, Ativan 2 mg PO PRN for non-redirectable agitation  -- If refuses: Haldol 2 mg IV, Benadryl 50 mg IV, Ativan 2 mg IV PRN for non-redirectable agitation  -- Please obtain daily EKG to monitor QTc  - patient has very limited insight into her situation and remains gravely disabled as evidenced by her refusal of taking medications and vitals, ekg done.    -- Pt is however more calm and cooperative less irritable.   -- In lieu of pts CEC expiring will pursue DELORES filing as pt remains delusional and uncooperative with recommended treatment plans.     Symptomatic anemia   GIB- resolved    - GIB Pathway initiated  Likely bleeding from GIST  Hgb 6.2 on admit, down from 9.7 beginning of June  - H/H stable on 7/16  Check iron studies and hemolysis labs  GI consulted. apprec recs  -- Discussed with primary team that EGD in this patient would be high risk given worsening/progression of her cancer and that given the likely lower GI pathology that is improving the risks outweigh the benefits  -- continue PO PPI  -- trend Hgb, transfuse Hgb <7, Plt<50  -- would recommend heme/onc consult for evaluation/treatment of progressing tumor  -- if it is determined that tissue is needed, AES consult would be appropriate at that time.  - s/p 1 unit pRBC on 7/17. Patient denies blood in stool or melena   - On 7/26, hemoglobin at 6.4--> 6.9 .  Transfusion with 1 unit of pack RBC  - Wound cultures from 07/23 of the abdomen with no growth.  Discussed with psychiatry regarding 's wish for DNR/hospice with judicial commitment process.    - discussed with Infectious Disease CT abdomen findings 7/15 .  Continue ciprofloxacin and metronidazole indefinitely. refuses rectal exam today and EGD. - mentions she has brown bowel movement  - direct antiglobulin positive. haptoglobin <10  and LDH ordered.  - Anemia likely from hemolysis. Discussed with  in detail and he agrees for DNR / hospice placement.   - s/p  wound care evaluation    Malignant gastrointestinal stromal tumor (GIST) of stomach   Abdominal wound  - [facility]  - On Sunitinib outpatient  - CT A/P showed increased size of mass  - 7/24 purulent drainage from upper abdomen at tumor site. culture obtained  - 7/25 Abdominal x-ray-  No convincing evidence of pneumoperitoneum on this limited single portable view up. abdominal fluid culture from 07/23 with no growth.  - Wound Care consulted  - 7/26 continue ciprofloxacin and metronidazole indefinitely per ID  - Heme/onc consulted. apprec recs   -- poor prognosis d/t mental health issue  -- will need to f/u with primary oncologist at discharge to obtain sunitinib   -- /social work consults for possible placement  -- hospice reasonable if patient has to be d/c to previous living situation    Leukocytosis  - WBC: 11 --> 15 --> 18 --> 17  - afebrile since 7/15   - denies cough or SOB  - Initially on empiric IV abx but switched to po cipro/flagyl due to patient refusing IV access   - plan to discontinue abx is remains afebrile for 24 hours  - bcx no growth to date  - U/A negative for UTI  - CBC daily     Transaminitis   - 7/15:  AST//112 with normal bilirubin and mildly elevated alk-phos at 337. Consider right upper quadrant sonogram if not trending down        Acute deep vein thrombosis (DVT) of popliteal vein of left lower extremity   - S/p IVC filter on 6/2020    Hypokalemia- resolved   - K 2.9-->3  - replete prn     B12 deficiency  - levels of 192 started on vitamin B12 subcutaneous dissection     Type 2 diabetes mellitus without complication, without long-term current use of insulin  - Patient's FSGs are controlled on current hypoglycemics.  - Home DM regimen:  Metformin  - SSI with POCT accuchecks and Diabetic diet    Disposition   Palliative care [place]  - Pallative care consulted.   - Discussing with palliative care and psychiatry to best discharge location for the patient    - psychiatry pursuing DELORES filing       VTE Risk Mitigation (From admission, onward)         Ordered     IP VTE HIGH RISK PATIENT  Once      07/11/20 2130     Place sequential compression device  Until discontinued      07/11/20 2130              Time spent in care of patient: > 35 minutes     Kahlil Banegas MD  Department of Hospital Medicine   [facility]

## 2020-08-02 NOTE — SUBJECTIVE & OBJECTIVE
Interval History: As above    Family History     None        Tobacco Use    Smoking status: Current Every Day Smoker     Packs/day: 10.00     Types: Cigarettes    Smokeless tobacco: Never Used   Substance and Sexual Activity    Alcohol use: No    Drug use: No    Sexual activity: Not on file     Psychotherapeutics (From admission, onward)    Start     Stop Route Frequency Ordered    07/21/20 1300  ARIPiprazole lauroxil 441 mg/1.6 mL injection 441 mg     Question Answer Comment   Brand Name: ARISTADA (R)    Generic name: none    Form: Injectable    Length of Therapy: Indefinite    Reason for Non-Formulary: No equivalent formulary medication available    How soon needed? (if approved, may take up to 5 days to procure): 48-72 hrs    Requestor's Contact Information: Shirley Lopez or ON CALL psychiatry        -- IM Every 30 days 07/21/20 1219    07/20/20 1036  lorazepam injection 2 mg      -- IM Every 6 hours PRN 07/20/20 0946    07/20/20 1034  haloperidol lactate injection 5 mg      -- IM Every 6 hours PRN 07/20/20 0946    07/20/20 1031  lorazepam injection 2 mg      -- IV Every 6 hours PRN 07/20/20 0933    07/20/20 1030  haloperidol lactate injection 5 mg      -- IV Every 6 hours PRN 07/20/20 0933    07/20/20 1030  ARIPiprazole tablet 30 mg      -- Oral Daily 07/20/20 0927    07/16/20 1833  OLANZapine injection 10 mg      -- IM Every 8 hours PRN 07/16/20 1834    07/11/20 2245  mirtazapine tablet 15 mg      -- Oral Nightly 07/11/20 2132           Review of Systems   Constitutional: Positive for appetite change. Negative for activity change.        No appetite due to not liking the food   HENT: Negative for sore throat.    Respiratory: Negative for cough.    Cardiovascular: Negative for chest pain.   Gastrointestinal: Negative for abdominal pain.   Musculoskeletal: Positive for arthralgias, back pain and myalgias.   Skin: Negative for color change.   Neurological: Positive for headaches.   Psychiatric/Behavioral:  "Positive for agitation and dysphoric mood. Negative for confusion, self-injury and suicidal ideas. The patient is not nervous/anxious and is not hyperactive.         Objective:     Vital Signs (Most Recent):  Temp: 98.2 °F (36.8 °C) (08/02/20 0841)  Pulse: 88 (08/02/20 0841)  Resp: 16 (08/02/20 1419)  BP: (!) 106/58 (08/02/20 0841)  SpO2: 99 % (08/02/20 0841) Vital Signs (24h Range):  Temp:  [96.6 °F (35.9 °C)-98.9 °F (37.2 °C)] 98.2 °F (36.8 °C)  Pulse:  [85-90] 88  Resp:  [16-18] 16  SpO2:  [96 %-100 %] 99 %  BP: (104-109)/(52-65) 106/58     Height: 5' 7" (170.2 cm)  Weight: 70.1 kg (154 lb 8.7 oz)  Body mass index is 24.2 kg/m².      Intake/Output Summary (Last 24 hours) at 8/2/2020 1422  Last data filed at 8/2/2020 0600  Gross per 24 hour   Intake 480 ml   Output --   Net 480 ml       Physical Exam      Physical Exam  Psychiatric:      Comments: Physical Exam:  General appearance: alert and oriented, in no acute distress  Head: normocephalic, without obvious abnormality, atraumatic  Extremities: extremities normal, atraumatic, no cyanosis or edema  Skin: skin color, texture, turgor normal. No rashes or lesions    Mental Status Exam:  Appearance: good grooming, casually dressed, NAD, appears stated age  Behavior/Cooperation: irritated  Language: fluent english  Speech: normal tone, normal rate, normal pitch, normal volume  Mood: "I'm in pain"   Affect: full, reactive, appropriate  Thought Process: goal directed, organized  Thought Content:  Denies SI/HI. Delusional content of Geovany Ring being , bodyguard -etc   Perception: denies AVH. No objective evidence of AVH   Orientation: oriented to person, place, year, month, situation, president  Memory: intact to conversation, remote intact, recent intact.   Attention Span/Concentration: intact to conversation.  Fund of knowledge: appropriate for education level  Cognition: fair  Insight: limited  Judgment: limited    Neurological Exam:  I: Endorses intact " smell  II: Visual fields full to confrontation  III,IV,VI: PERRLA, EOMI. No nystagmus   Other Cranial nerves grossly intact  Gait: normal and fluid        Significant Labs:   Last 24 Hours:   Recent Lab Results       08/01/20  2134   08/01/20  1628        POCT Glucose 269 187           Significant Imaging: I have reviewed all pertinent imaging results/findings within the past 24 hours.  None

## 2020-08-03 NOTE — PLAN OF CARE
Ms Mcdaniel was very agitated on tonight, accused staff of tampering with pain medications. Pt request frequent visits from charge nurse. Prn haldol/benadryl/ativan given for non redirectable agitation with some relief. Refused most of po medications, refused ekg. Sitter at bedside. Safety measures maintained wctm

## 2020-08-03 NOTE — PROGRESS NOTES
[facility] Hospital Medicine  Progress Note    Patient Name: Violeta Boudreaux  MRN: [id number]  Patient Class: IP- Inpatient   Admission Date: 7/11/2020  Length of Stay: 17 days  Attending Physician: Kahlil Banegas MD  Primary Care Provider: Otf Brownlee MD    [facility] Medicine Team: [facility] [place] Kahlil Banegas MD    HPI:    Ms. Violeta Boudreaux is a 54 y.o. female with Bipolar Disorder, delusional disorder, GIST on Sunitinib, and recent LLE DVT s/p IVC filter (June 2020) who presents to the ER by [facility] for delusions.  Her estranged  reports that she showed up at his house, claiming that she was  to Geovany Ring from [employer] and he was being hid inside the estranged 's house. He reports that she is homeless, and hasn't been taking her psych medications.  She denies any complaints at this time.  Labs on arrival showed a Hemoglobin 6.2 down from 9.7 in June 2020.  She endorses midepigastric abdominal pain.  She denies any blood in her stools, dark stools, or vaginal bleeding.  She mentions that a few weeks ago, she was having bad headaches and took a lot of Advil, then causing her to have hematemesis.  She denies further bleeding or use of Aspirin or NSAIDS.  Of note, at the end of May, she was hospitalized at [facility] for delusional disorder.  At that time, she was found to have a Hemoglobin 6.4.  She was given blood and her hemolgobin improved to 9.7.  It was thought that her bleeding with 2/2 her GIST tumor. She was not evaluated by GI.  Also during that admission, she was found to have a LLE DVT.  HemeOnc suggested IVC filter, given her lack of consistency with medications.  She was [facility] to the [facility].  She was discharged on Abilify 10mg and Mirtazapine 15mg qHS.       In the ER, she was PECed for grave disability.  SUMIT was positive.  She was transfused 1 unit PRBCs.  She mentions that Bert Gorman will pay for every drop of blood we take from her, and that she wants  to return to her house in [place] when discharged.  She was admitted to Hospital Medicine for GI and Psych evaluations.    Subjective:     Principal Problem:Psychological factors affecting medical condition    Interval History: Patient lying in bed, no acute distress. Reports abdominal pain remains poorly controlled with associated nausea.     Review of Systems   Constitutional: Negative for chills and fever.   Eyes: Negative for visual disturbance.   Respiratory: Negative for cough and shortness of breath.    Gastrointestinal: Positive for abdominal distention, abdominal pain, nausea and vomiting.   Genitourinary: Negative for dysuria.   Neurological: Negative for weakness.   Psychiatric/Behavioral: Negative for suicidal ideas.        Objective:     Vital Signs (Most Recent):  Temp: 98.1 °F (36.7 °C) (08/03/20 0848)  Pulse: 101 (08/03/20 0848)  Resp: 17 (08/03/20 0852)  BP: 98/65 (08/03/20 0848)  SpO2: 100 % (08/03/20 0848) Vital Signs (24h Range):  Temp:  [97 °F (36.1 °C)-98.1 °F (36.7 °C)] 98.1 °F (36.7 °C)  Pulse:  [] 101  Resp:  [14-18] 17  SpO2:  [95 %-100 %] 100 %  BP: ()/(52-70) 98/65     Weight: 70.1 kg (154 lb 8.7 oz)  Body mass index is 24.2 kg/m².  No intake or output data in the 24 hours ending 08/03/20 1013     Physical Exam  HENT:      Head: Normocephalic.   Eyes:      Pupils: Pupils are equal, round, and reactive to light.   Neck:      Musculoskeletal: Normal range of motion.   Cardiovascular:      Rate and Rhythm: Normal rate and regular rhythm.      Pulses: Normal pulses.      Heart sounds: Normal heart sounds.   Pulmonary:      Effort: Pulmonary effort is normal.      Breath sounds: Normal breath sounds.   Abdominal:      General: Bowel sounds are normal. There is no distension.      Palpations: Abdomen is soft.      Tenderness: There is no abdominal tenderness.   Musculoskeletal: Normal range of motion.   Skin:     General: Skin is warm.   Neurological:      General: No focal  deficit present.      Mental Status: She is alert.   Psychiatric:         Mood and Affect: Affect is labile.         Thought Content: Thought content is delusional.         Judgment: Judgment is inappropriate.           Overview/Hospital Course:     7/12   presenting to the ED via [facility] with [facility] stating patient was at her estranged 's house  threatening she would kill the estranged .  PEC placed for delusional behavior. Work-up significant for H/H 6.2/20 (baseline 9/30 through care everywhere). Rectal exam performed without evidence of blood or melena. FOBT positive.   alert, delusional. Refusing to give  personal belongings, and agitation with staff.  4 point restraints were applied. repeat CBC at 6.6 . transfusion with 1 unit of PRBC. GI recommends EGD and colonoscopy for further evaluation of iron deficiency anemia patient adamantly refuses exam and EGD/ colonoscopy  7/13 agitated overnight requesting cell phone.  Patient was placed 4 point  restraints hemoglobin at 8 3 status post 2 units of pack RBC transfusion . agrees for EGD/ colonoscopy.Patient off restraints.Continue home Abilify 10 mg and Remeron 15 mg nightly. Increased Zyprexa from 5 mg to 10 mg q8h IM PRN for agitation.daily EKG to monitor QTc- 447ms   7/14 Hb 8.1 --> 7.6. Requesting  her purse and  belongings she needs to get to her (Geovany Ring).  Clear liquids today and NPO from midnight EGD/colonoscopy in a.m. complains of abdominal, takes oxycodone 30 mg Q 6 hourly as per chart review (reviewed  last filled on 06/23).  Norco discontinued and started oxycodone 20 mg Q 6 hourly p.r.n.  7/15 refused to drink GoLYTELY overnight.  Hemoglobin stable at 7.8 leukocytosis of 15 but afebrile.  Transaminitis AST//112 with normal bilirubin and mildly elevated alk-phos at 337.  Significant left upper quadrant abdominal pain prior to endoscopy today.  Endoscopy canceled.  CT abdomen with IV and oral contrast ordered.  General  surgery consulted.  Started on Zosyn and vancomycin empirically.  Blood cultures x2 with no growth  7/22  Continued on ciprofloxacin and metronidazole since 07/16. refusal of taking medications and vitals, .As CEC expiring 7/26 , plan to pursue DELORES filing as pt remains delusional and uncooperative and needs psychiatric hospital placement. Forced med protocol with Haldol, Ativan and Benadry. Aristada 441 mg IM injection once given 7/22, to be given [id number]. Started Atarax 50 mg nightly for insomnia  7/23 agreed for blood draw today hemoglobin repeated at 6 transfusion with 1 unit of pack RBC  7/24 purulent drainage from upper abdomen at tumor site. culture obtained .Hb 7.2 .  judicial commitment in process.  Patient's  okay with nursing home with hospice as the patient is noncompliant with medications.  does not want to be decision-maker.  7/25  Abdominal x-ray-  No convincing evidence of pneumoperitoneum on this limited single portable view up. abdominal fluid culture from 07/23 with no growth.  Wound Care consulted.  Hemoglobin at 6.7 transfusion with 1 unit of pack RBC today.  Discussed with .   mentions the patient's brother is a bad influence and only involved with the patient to obtain her money. he is okay with patient being DNR/ hospice placement.   7/26hemoglobin at 6.4--> 6.9 .  Transfusion with 1 unit of pack RBC today.  Wound cultures from 07/23 of the abdomen with no growth.  Discussed with psychiatry regarding 's wish for DNR/hospice with judicial commitment process.  discussed with Infectious Disease CT abdomen findings 7/15 .  Continue ciprofloxacin and metronidazole indefinitely. refuses rectal exam today and EGD. mentions she has brown bowel movement today. direct antiglobulin positive. haptoglobin <10  and LDH ordered. Anemia likely from hemolysis. Discussed with  in detail and he agrees for DNR / hospice placement. he wants to visit her   7/27 Hb at 8.1 .  hematology considering steroids. s/p wound care evaluation  7/28 refused EKG . Hb stable at 8.3  7/29  refusing labs and EKG . forced med protocol as ordered if pt refuses scheduled Abilify.  delusional today    Significant Labs:   A1C:   Recent Labs   Lab 07/11/20 1932   HGBA1C 6.3*     POCT Glucose:   Recent Labs   Lab 08/01/20  1628 08/01/20  2134 08/03/20  0753   POCTGLUCOSE 187* 269* 251*     TSH:   Recent Labs   Lab 07/11/20 1932   TSH 3.160       Significant Imaging: I have reviewed and interpreted all pertinent imaging results/findings within the past 24 hours.    Assessment/Plan:      Active Diagnoses:    Diagnosis Date Noted POA    PRINCIPAL PROBLEM:  Psychological factors affecting medical condition [F54]  Yes    Palliative care encounter [Z51.5] 07/23/2020 Not Applicable    Advance care planning [Z71.89]  Not Applicable    Noncompliance with treatment [Z91.19] 07/20/2020 Not Applicable    Bipolar 1 disorder, mixed, moderate [F31.62]  Yes    Anemia [D64.9] 07/11/2020 Yes    Hypokalemia [E87.6] 07/11/2020 Yes    Acute deep vein thrombosis (DVT) of popliteal vein of left lower extremity [I82.432] 05/29/2020 Yes    Delusional disorder [F22] 05/28/2020 Yes    Bipolar affective disorder, current episode manic [F31.10] 09/09/2019 Yes    Malignant gastrointestinal stromal tumor (GIST) of stomach [C49.A2] 09/09/2019 Yes    Type 2 diabetes mellitus without complication, without long-term current use of insulin [E11.9] 09/09/2019 Yes      Problems Resolved During this Admission:     Scheduled Meds:   ARIPiprazole lauroxil  441 mg Intramuscular Q30 Days    ARIPiprazole  30 mg Oral Daily    ciprofloxacin HCl  500 mg Oral Q12H    cyanocobalamin  1,000 mcg Subcutaneous Q7 Days    Followed by    [START ON 8/16/2020] cyanocobalamin  1,000 mcg Subcutaneous Q30 Days    hydrOXYzine HCL  50 mg Oral QHS    lidocaine  1 patch Transdermal Q24H    metroNIDAZOLE  500 mg Oral Q8H    mirtazapine  15 mg Oral  QHS    pantoprazole  40 mg Oral BID    potassium chloride  30 mEq Oral Once    potassium chloride  40 mEq Oral Once    potassium chloride  40 mEq Oral Once    sodium chloride 0.9%  500 mL Intravenous Once     Continuous Infusions:  PRN Meds:.sodium chloride, sodium chloride, sodium chloride, sodium chloride, sodium chloride, acetaminophen, dextrose 50%, dextrose 50%, haloperidol lactate **AND** diphenhydrAMINE **AND** lorazepam, haloperidol lactate **AND** diphenhydrAMINE **AND** lorazepam, glucagon (human recombinant), glucose, glucose, HYDROmorphone, insulin aspart U-100, iohexol, melatonin, metoclopramide HCl, naloxone, OLANZapine, ondansetron, prochlorperazine, promethazine, senna-docusate 8.6-50 mg, sodium chloride 0.9%, sodium chloride 0.9%    PLAN:    Delusional disorder   Bipolar disorder  - presenting to the ED via [facility] with [facility] stating patient was at her estranged 's house  threatening she would kill the estranged .  PEC placed for delusional behavior. Work-up significant for H/H 6.2/20 (baseline 9/30 through care everywhere). Rectal exam performed without evidence of blood or melena. FOBT positive.   alert, delusional.  - restraints in place due to severe agitation   - psychiatry consulted. apprec recs  --  Continue Abilify 30 mg daily  -- Forced med protocol with Haldol, Ativan and Benadryl as described in PRN below)  -- Continue home Remeron 15 mg nightly  -- Aristada 441 mg IM injection once given 7/22, to be given [id number]  -- Atarax 50 mg nightly for insomnia  -- First try: Haldol 5 mg PO, Benadryl 50 mg PO, Ativan 2 mg PO PRN for non-redirectable agitation  -- If refuses: Haldol 2 mg IV, Benadryl 50 mg IV, Ativan 2 mg IV PRN for non-redirectable agitation  -- Please obtain daily EKG to monitor QTc  - patient has very limited insight into her situation and remains gravely disabled as evidenced by her refusal of taking medications and vitals, ekg done.    -- Pt is however more calm and  cooperative less irritable.   -- In lieu of pts CEC expiring will pursue DELORES filing as pt remains delusional and uncooperative with recommended treatment plans.     Symptomatic anemia   GIB- resolved    - GIB Pathway initiated  Likely bleeding from GIST  Hgb 6.2 on admit, down from 9.7 beginning of June  - H/H stable on 7/16  Check iron studies and hemolysis labs  GI consulted. apprec recs  -- Discussed with primary team that EGD in this patient would be high risk given worsening/progression of her cancer and that given the likely lower GI pathology that is improving the risks outweigh the benefits  -- continue PO PPI  -- trend Hgb, transfuse Hgb <7, Plt<50  -- would recommend heme/onc consult for evaluation/treatment of progressing tumor  -- if it is determined that tissue is needed, AES consult would be appropriate at that time.  - s/p 1 unit pRBC on 7/17. Patient denies blood in stool or melena   - On 7/26, hemoglobin at 6.4--> 6.9 .  Transfusion with 1 unit of pack RBC  - Wound cultures from 07/23 of the abdomen with no growth.  Discussed with psychiatry regarding 's wish for DNR/hospice with judicial commitment process.    - discussed with Infectious Disease CT abdomen findings 7/15 .  Continue ciprofloxacin and metronidazole indefinitely. refuses rectal exam today and EGD. - mentions she has brown bowel movement  - direct antiglobulin positive. haptoglobin <10  and LDH ordered.  - Anemia likely from hemolysis. Discussed with  in detail and he agrees for DNR / hospice placement.   - s/p wound care evaluation    Malignant gastrointestinal stromal tumor (GIST) of stomach   Abdominal wound  - Follows with Efren at [facility]  - On Sunitinib outpatient  - CT A/P showed increased size of mass  - 7/24 purulent drainage from upper abdomen at tumor site. culture obtained  - 7/25 Abdominal x-ray-  No convincing evidence of pneumoperitoneum on this limited single portable view up. abdominal fluid culture from  07/23 with no growth.  - Wound Care consulted  - 7/26 continue ciprofloxacin and metronidazole indefinitely per ID  - Heme/onc consulted. apprec recs   -- poor prognosis d/t mental health issue  -- will need to f/u with primary oncologist at discharge to obtain sunitinib   -- /social work consults for possible placement  -- hospice reasonable if patient has to be d/c to previous living situation    Leukocytosis  - WBC: 11 --> 17 --> --> 11  - afebrile since 7/15   - denies cough or SOB  - Initially on empiric IV abx but switched to po cipro/flagyl due to patient refusing IV access   - plan to discontinue abx is remains afebrile for 24 hours  - bcx no growth to date  - U/A negative for UTI  - CBC daily     Transaminitis   - 7/15:  AST//112 with normal bilirubin and mildly elevated alk-phos at 337. Consider right upper quadrant sonogram if not trending down        Acute deep vein thrombosis (DVT) of popliteal vein of left lower extremity   - S/p IVC filter on 6/2020    Hypokalemia- resolved   - K 2.9-->3.8  - replete prn     B12 deficiency  - levels of 192 started on vitamin B12 subcutaneous dissection     Type 2 diabetes mellitus without complication, without long-term current use of insulin  - Patient's FSGs are controlled on current hypoglycemics.  - Home DM regimen:  Metformin  - SSI with POCT accuchecks and Diabetic diet    Disposition   Palliative care [place]outner  - Pallative care consulted.   - Discussing with palliative care and psychiatry to best discharge location for the patient   - psychiatry pursuing DELORES filing       VTE Risk Mitigation (From admission, onward)         Ordered     IP VTE HIGH RISK PATIENT  Once      07/11/20 2130     Place sequential compression device  Until discontinued      07/11/20 2130              Time spent in care of patient: > 35 minutes     Kahlil Banegas MD  Department of Hospital Medicine   [facility]

## 2020-08-03 NOTE — PROGRESS NOTES
"[facility]  Adult Nutrition  Progress Note    SUMMARY       Recommendations  1. Continue diabetic diet as tolerated. Encourage PO intake.   2. Add Boost Glucose Control TID (strawberry per pt preference) to aid in intake.   3. RD to monitor.    Goals: Consume 75% or more of all meals  Nutrition Goal Status: progressing towards goal  Communication of RD Recs: (plan of care)    Reason for Assessment    Reason For Assessment: RD follow-up  Diagnosis: (Psychological factors affecting medical condition)  Relevant Medical History: GI Hemorrhage, T2DM, Hypokalemia, GIST, DVT, Biplar Disorder  Interdisciplinary Rounds: did not attend  General Information Comments: Pt reports decreased appetite since admission and reports she has been consuming < 50% of meals. 50-75% intake per RN documentation with 25% recorded 7/30, previously consuming 80% of meals per last RD note. Pt reports #. 11.7% wt loss x 4 years per chart, not clinically significant. Pt continues to c/o N/V. Agreeable to ONS. NFPE completed today, mild/moderate wasting noted. Pt is at risk for malnutrition if decreased appetite continues.  Nutrition Discharge Planning: Adequate intake on Diabetic Diet    Nutrition Risk Screen    Nutrition Risk Screen: no indicators present    Nutrition/Diet History    Spiritual, Cultural Beliefs, Oriental orthodox Practices, Values that Affect Care: no  Factors Affecting Nutritional Intake: nausea/vomiting    Anthropometrics    Temp: 96.7 °F (35.9 °C)  Height Method: Stated  Height: 5' 7" (170.2 cm)  Height (inches): 67 in  Weight Method: Bed Scale  Weight: 70.1 kg (154 lb 8.7 oz)  Weight (lb): 154.54 lb  Ideal Body Weight (IBW), Female: 135 lb  % Ideal Body Weight, Female (lb): 114.47 %  BMI (Calculated): 24.2    Lab/Procedures/Meds    Pertinent Labs Reviewed: reviewed  Pertinent Labs Comments: Na 134, Glu 285, Ca 7.6, Alb 1.8, ALT 6  Pertinent Medications Reviewed: reviewed  Pertinent Medications Comments: " vitamin B12, insulin, metoclopramide, mirtazapine, pantoprazole, senna-docusate     Estimated/Assessed Needs    Weight Used For Calorie Calculations: 70.1 kg (154 lb 8.7 oz)  Energy Calorie Requirements (kcal): 1752 kcal (25 kcal/kg)  Energy Need Method: Kcal/kg  Protein Requirements: 70 g (1.0 g /kg)  Weight Used For Protein Calculations: 70.1 kg (154 lb 8.7 oz)  Fluid Requirements (mL): 1 mL per kcal or per MD     RDA Method (mL): 1752  CHO Requirement: 219 gm daily    Nutrition Prescription Ordered    Current Diet Order: Diabetic Diet    Evaluation of Received Nutrient/Fluid Intake    Comments: LBM 7/29  % Intake of Estimated Energy Needs: 50 - 75 %  % Meal Intake: 50 - 75 %    Nutrition Risk    Level of Risk/Frequency of Follow-up: low(1x/week)     Assessment and Plan    Nutrition Problem  1. Diabetes  2. Inadequate energy intake    Related to (etiology):   1. Undesirable food choices  2. Decreased appetite    Signs and Symptoms (as evidenced by):   1. Blood Glucose 206 mg/dL  2. Pt with variable intake < 75% meals    Interventions (treatment strategy):  Carbohydrate Modified diet  Collaboration with other providers  Commercial beverage    Nutrition Diagnosis Status:   1. Continues  2. New    Monitor and Evaluation    Food and Nutrient Intake: energy intake, food and beverage intake  Food and Nutrient Adminstration: diet order  Knowledge/Beliefs/Attitudes: food and nutrition knowledge/skill  Anthropometric Measurements: weight, weight change, body mass index  Biochemical Data, Medical Tests and Procedures: electrolyte and renal panel, glucose/endocrine profile  Nutrition-Focused Physical Findings: overall appearance     Malnutrition Assessment  Orbital Region (Subcutaneous Fat Loss): mild depletion  Upper Arm Region (Subcutaneous Fat Loss): mild depletion   Congregation Region (Muscle Loss): moderate depletion  Clavicle Bone Region (Muscle Loss): moderate depletion  Clavicle and Acromion Bone Region (Muscle Loss):  mild depletion  Dorsal Hand (Muscle Loss): well nourished  Anterior Thigh Region (Muscle Loss): mild depletion  Posterior Calf Region (Muscle Loss): moderate depletion     Nutrition Follow-Up    RD Follow-up?: Yes

## 2020-08-03 NOTE — PROGRESS NOTES
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           Confirmed By:Dylan OCHOA MD         Assessment/Plan:     Delusional disorder  - known history with consistent delusion  - consistent delusion of marriage to Geovany Ring    Bipolar affective disorder, current episode manic  ASSESSMENT     Violeta Boudreaux is a 54 y.o. female with a past psychiatric history of BPAD and delusional disorder, who [place] [facility] [place] to OPC from estranged  for threatening to kill him. Admitted to [facility] for symptomatic anemia. Per FACT team, patient non-compliant with medications, did not receive most recent scheduled Aristada BAUM. Received Aristada 441 mg BAUM on 7/22. Patient does appear mildly improved in terms of affect, however continues to report delusional thoughts.     IMPRESSION  Bipolar affective disorder, current episode manic  Psychological factors affecting medical care  Cluster B personality d/o  Delusional disorder  Medication non-adherence    RECOMMENDATION(S)      1. Scheduled Medication(s):  - Start Haldol 5 mg PO nightly  - Decrease Abilify from 30 mg to 15 mg PO daily   - Forced med protocol with Haldol, Ativan and Benadryl as described in PRN below  - Continue home Remeron 15 mg nightly  - Aristada 441 mg IM injection s2mbmrs, last given 7/22  - Continue Atarax 50 mg nightly for insomnia  - Optimize pain management regimen to ensure adequate pain control and  "encourage medication compliance    2. PRN Medication(s):  - First try: Haldol 5 mg PO, Benadryl 50 mg PO, Ativan 2 mg PO PRN for non-redirectable agitation  - If refuses: Haldol 2 mg IV, Benadryl 50 mg IV, Ativan 2 mg IV PRN for non-redirectable agitation    3.  Monitor:  - Please obtain daily EKG to monitor QTc    4. Legal Status/Precaution(s):  - Continue CEC (expires 7/26 @ 8:32 pm) as patient is in imminent danger of hurting self or others and is gravely disabled due to a psychiatric illness-- DELORES filed 7/23  - Maintain 1:1 sitter  - Continue working with next of kin (estranged  and brother) to help facilitate patient care and decision making, especially with regards to disposition -- would be best to come to a consensus with regards to her disposition  - Continue working with palliative care to discuss options, code status, and disposition    5. Capacity and Medical Decision Making  - Primary team worried about melena and potential need for EGD.  The patient is refusing procedures on the basis of it is "impossible for a blood count to drop in 24 hours."  She cannot explain the consequences or benefits of the procedure and continues to demonstrate delusional thought content.    - Recommend to contact the  for medical decision making and decisions regarding DNR, hospice care, procedures.    - Would carefully weight the risk vs. benefit of various procedure and hold off unless emergent given that the patient is adamantly against having any done forcing the patient to undergo a procedure will likely be difficult and traumatic for the patient  - Disposition: pending [facility] placement in nursing home with hospice vs. Inpatient hospice, NOT inpatient psychiatric hospitalization; patient does not have capacity to refuse placement         Need for Continued Hospitalization:   Psychiatric illness continues to pose a potential threat to life or bodily function, of self or others, thereby requiring the need for " continued inpatient psychiatric hospitalization.    Anticipated Disposition: As above -- likely [facility] placement in nursing home with hospice vs inpatient hospice, NOT inpatient psychiatric hospitalization    Total time:  15 with greater than 50% of this time spent in counseling and/or coordination of care.     Discussed case with staff Dr. Lopez.    Psychiatry will continue to follow.    Go Garcia MD   Psychiatry  [facility]

## 2020-08-03 NOTE — SUBJECTIVE & OBJECTIVE
"Interval History: As above    Family History     None        Tobacco Use    Smoking status: Current Every Day Smoker     Packs/day: 10.00     Types: Cigarettes    Smokeless tobacco: Never Used   Substance and Sexual Activity    Alcohol use: No    Drug use: No    Sexual activity: Not on file     Psychotherapeutics (From admission, onward)    Start     Stop Route Frequency Ordered    07/21/20 1300  ARIPiprazole lauroxil 441 mg/1.6 mL injection 441 mg     Question Answer Comment   Brand Name: ARISTADA (R)    Generic name: none    Form: Injectable    Length of Therapy: Indefinite    Reason for Non-Formulary: No equivalent formulary medication available    How soon needed? (if approved, may take up to 5 days to procure): 48-72 hrs    Requestor's Contact Information: Shirley Lopez or ON CALL psychiatry        -- IM Every 30 days 07/21/20 1219    07/20/20 1036  lorazepam injection 2 mg      -- IM Every 6 hours PRN 07/20/20 0946    07/20/20 1034  haloperidol lactate injection 5 mg      -- IM Every 6 hours PRN 07/20/20 0946    07/20/20 1031  lorazepam injection 2 mg      -- IV Every 6 hours PRN 07/20/20 0933    07/20/20 1030  haloperidol lactate injection 5 mg      -- IV Every 6 hours PRN 07/20/20 0933    07/20/20 1030  ARIPiprazole tablet 30 mg      -- Oral Daily 07/20/20 0927    07/16/20 1833  OLANZapine injection 10 mg      -- IM Every 8 hours PRN 07/16/20 1834    07/11/20 2245  mirtazapine tablet 15 mg      -- Oral Nightly 07/11/20 2132           Review of Systems       Pertinent items noted in HPI.    Objective:     Vital Signs (Most Recent):  Temp: 97.4 °F (36.3 °C) (08/03/20 1156)  Pulse: 94 (08/03/20 1156)  Resp: 17 (08/03/20 1249)  BP: (!) 105/58 (08/03/20 1156)  SpO2: 97 % (08/03/20 1156) Vital Signs (24h Range):  Temp:  [97 °F (36.1 °C)-98.1 °F (36.7 °C)] 97.4 °F (36.3 °C)  Pulse:  [] 94  Resp:  [14-18] 17  SpO2:  [95 %-100 %] 97 %  BP: ()/(52-70) 105/58     Height: 5' 7" (170.2 cm)  Weight: 70.1 " kg (154 lb 8.7 oz)  Body mass index is 24.2 kg/m².    No intake or output data in the 24 hours ending 08/03/20 1343    Physical Exam         Physical Exam:  General appearance: NAD  Head: NCAT  Lungs: CTAB, no increased work of breath  Heart: RRR  Abdomen: soft, distended  Extremities: extremities normal, atraumatic  Skin: warm, dry    Mental Status Exam:  Appearance: older than stated age, disheveled, thin, pale  Behavior/Cooperation: normal eye contact, cooperative  Speech: normal rate, tone, volume  Mood: fine  Affect: mood congruent  Thought Process: linear  Thought Content: delusions: yes, erotomanic, persecutory, paranoia  Sensorium: somewhat sedated but becoming more alert  Orientation: oriented to person, place, time  Memory: intact to conversation  Attention Span/Concentration: intact to conversation, WORLD forwards and backwards  Insight: poor but improving  Judgment: poor AEB refusal of medications         Significant Labs: All pertinent labs within the past 24 hours have been reviewed.    Significant Imaging: I have reviewed all pertinent imaging results/findings within the past 24 hours.     Results for orders placed or performed during the hospital encounter of 07/11/20   EKG 12-lead    Collection Time: 08/02/20  4:12 AM    Narrative    Test Reason : R94.31,    Vent. Rate : 094 BPM     Atrial Rate : 094 BPM     P-R Int : 136 ms          QRS Dur : 076 ms      QT Int : 374 ms       P-R-T Axes : 078 061 050 degrees     QTc Int : 467 ms    Normal sinus rhythm  Nonspecific T wave abnormality  Prolonged QT  Abnormal ECG  When compared with ECG of 31-JUL-2020 00:21,  No significant change was found  Confirmed by Dylan OCHOA MD ([id number]) on 8/2/2020 9:36:49 AM    Referred By: HALLE   SELF           Confirmed By:Dylan OCHOA MD

## 2020-08-03 NOTE — CONSULTS
Placed 20g, 8 cm Midline in right cephalic vein using u/s guidance.  Max dwell date 9/1/2020.  Lot # [id number].      MIDLINE placed for PVA

## 2020-08-03 NOTE — ASSESSMENT & PLAN NOTE
ASSESSMENT     Violeta Boudreaux is a 54 y.o. female with a past psychiatric history of BPAD and delusional disorder, who [place] [facility] [place] to [place] from estranged  for threatening to kill him. Ad[place] [facility] [place]. Per FACT team, patient non-compliant with medications, did not receive most recent scheduled Aristada BAUM. Received Aristada 441 mg BAUM on 7/22. Patient does appear mildly improved in terms of affect, however continues to report delusional thoughts.     IMPRESSION  Bipolar affective disorder, current episode manic  Psychological factors affecting medical care  Cluster B personality d/o  Delusional disorder  Medication non-adherence    RECOMMENDATION(S)      1. Scheduled Medication(s):  - Start Haldol 5 mg PO nightly  - Decrease Abilify from 30 mg to 15 mg PO daily   - Forced med protocol with Haldol, Ativan and Benadryl as described in PRN below  - Continue home Remeron 15 mg nightly  - Aristada 441 mg IM injection [id number], last given 7/22  - Continue Atarax 50 mg nightly for insomnia  - Optimize pain management regimen to ensure adequate pain control and encourage medication compliance    2. PRN Medication(s):  - First try: Haldol 5 mg PO, Benadryl 50 mg PO, Ativan 2 mg PO PRN for non-redirectable agitation  - If refuses: Haldol 2 mg IV, Benadryl 50 mg IV, Ativan 2 mg IV PRN for non-redirectable agitation    3.  Monitor:  - Please obtain daily EKG to monitor QTc    4. Legal Status/Precaution(s):  - Continue CEC (expires 7/26 @ 8:32 pm) as patient is in imminent danger of hurting self or others and is gravely disabled due to a psychiatric illness-- DELORES filed 7/23  - Maintain 1:1 sitter  - Continue working with next of kin (estranged  and brother) to help facilitate patient care and decision making, especially with regards to disposition -- would be best to come to a consensus with regards to her disposition  - Continue working with palliative care to discuss options, code  "status, and disposition    5. Capacity and Medical Decision Making  - Primary team worried about melena and potential need for EGD.  The patient is refusing procedures on the basis of it is "impossible for a blood count to drop in 24 hours."  She cannot explain the consequences or benefits of the procedure and continues to demonstrate delusional thought content.    - Recommend to contact the  for medical decision making and decisions regarding DNR, hospice care, procedures.    - Would carefully weight the risk vs. benefit of various procedure and hold off unless emergent given that the patient is adamantly against having any done forcing the patient to undergo a procedure will likely be difficult and traumatic for the patient  - Disposition: pending [facility] placement in nursing home with hospice vs. Inpatient hospice, NOT inpatient psychiatric hospitalization; patient does not have capacity to refuse placement  "

## 2020-08-04 NOTE — PLAN OF CARE
08/04/20 0757   Discharge Reassessment   Assessment Type Discharge Planning Reassessment   Discharge Plan A New Nursing Home placement - [facility] care facility   Discharge Plan B New Nursing Home placement - [facility] care facility   DME Needed Upon Discharge  none   Anticipated Discharge Disposition [facility]   Can the patient/caregiver answer the patient profile reliably? No, cognitively impaired   Describe the patient's ability to walk at the present time. No restrictions   How often would a person be available to care for the patient? Never   Number of comorbid conditions (as recorded on the chart) Four   Post-Acute Status   Post-Acute Authorization Placement   Post-Acute Placement Status   (awaiting judicial committment)     Dr. Shirley Lopez (psych) to be present at the hearing on August 7th at 11AM via zoom regarding petition for judicial commitment. Patient will need NH placement w/hospice after judicial commitment is obtained. Level II PASRR was submitted 7/30/2020.     1200  RISA was informed by LEXY Tolliver that the Office of Behavioral Health would like to have a zoom meeting with the patient today at 1300 to evaluate for the Level II PASRR. RISA informed Dr. Banegas of above. MD verbalized understanding & stated that the patient should be able to participate.     1310  Zoom meeting with the Office of Behavioral Health conducted at the patient's bedside via LEXY Tolliver's cell phone with RISA simpson, & LEXY present.     Will continue to follow.

## 2020-08-04 NOTE — PLAN OF CARE
LEXY received a call from Mayra from the Office of Behavioral Health [phone] to setup a video call for the patient. Per Mayra, the video call is scheduled for 1pm with Jenifer. Mayra requested additional clinicals to be faxed to [phone]. LEXY faxed the requested clinicals. LEXY updated the patient's CM.     Sharee Rivas LMSW   - [facility]  Ext. [phone]

## 2020-08-04 NOTE — ASSESSMENT & PLAN NOTE
ASSESSMENT     Violeta Boudreaux is a 54 y.o. female with a past psychiatric history of BPAD and delusional disorder, who [place] [facility] [place]PC from estranged  for threatening to kill him. [place] [facility] [place]. Per FACT team, patient non-compliant with medications, did not receive most recent scheduled Aristada BAUM. Received Aristada 441 mg BAUM on 7/22. Patient improved in terms of affect, however continues to report delusional thoughts. Selectively compliant with medications.    IMPRESSION  Bipolar affective disorder, current episode manic  Psychological factors affecting medical care  Cluster B personality d/o (r/o antisocial)  Delusional disorder  Medication non-adherence    RECOMMENDATION(S)      1. Scheduled Medication(s):  - Discontinue Haldol given QTc prolonged  - Continue Abilify 15 mg PO daily   - Forced med protocol with Haldol, Ativan and Benadryl as described in PRN below  - Continue home Remeron 15 mg nightly  - Aristada 441 mg IM injection [id number], last given 7/22  - Continue Atarax 50 mg nightly for insomnia  - Optimize pain management regimen to ensure adequate pain control and encourage medication compliance    2. PRN Medication(s):  - First try: Haldol 5 mg PO, Benadryl 50 mg PO, Ativan 2 mg PO PRN for non-redirectable agitation  - If refuses: Haldol 2 mg IV, Benadryl 50 mg IV, Ativan 2 mg IV PRN for non-redirectable agitation    3.  Monitor:  - Please obtain daily EKG to monitor QTc    4. Legal Status/Precaution(s):  - Continue CEC (expires 7/26 @ 8:32 pm) as patient is in imminent danger of hurting self or others and is gravely disabled due to a psychiatric illness-- DELORES filed 7/23, court date 8/7  - Maintain 1:1 sitter  - Continue working with next of kin (estranged  and brother) to help facilitate patient care and decision making, especially with regards to disposition -- would be best to come to a consensus with regards to her disposition  - Continue  "working with palliative care to discuss options, code status, and disposition    5. Capacity and Medical Decision Making  - Primary team worried about melena and potential need for EGD.  The patient is refusing procedures on the basis of it is "impossible for a blood count to drop in 24 hours."  She cannot explain the consequences or benefits of the procedure and continues to demonstrate delusional thought content.    - Recommend to contact the  for medical decision making and decisions regarding DNR, hospice care, procedures.    - Would carefully weight the risk vs. benefit of various procedure and hold off unless emergent given that the patient is adamantly against having any done forcing the patient to undergo a procedure will likely be difficult and traumatic for the patient  - Disposition: pending [facility] placement in nursing home with hospice vs. inpatient hospice, NOT inpatient psychiatric hospitalization; patient does not have capacity to refuse placement  "

## 2020-08-04 NOTE — PROGRESS NOTES
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 within the past 24 hours.     Results for orders placed or performed during the hospital encounter of 07/11/20   EKG 12-lead    Collection Time: 08/04/20 12:38 AM    Narrative    Test Reason : R94.31,    Vent. Rate : 100 BPM     Atrial Rate : 100 BPM     P-R Int : 134 ms          QRS Dur : 074 ms      QT Int : 454 ms       P-R-T Axes : 061 060 025 degrees     QTc Int : 585 ms    Normal sinus rhythm  Low voltage QRS  Prolonged QT  Abnormal ECG  When compared with ECG of 02-AUG-2020 04:12,  QT has lengthened    Referred By: [email or web address]   SELF           Confirmed By:          Assessment/Plan:     Delusional disorder  - known history with consistent delusion  - consistent delusion of marriage to eGovany Ring    Bipolar affective disorder, current episode manic  ASSESSMENT     Violeta Boudreaux is a 54 y.o. female with a past psychiatric history of BPAD and delusional disorder, who [place] [facility] [place] from estranged  for threatening to kill him. [place] [facility] [place]. Per FACT team, patient non-compliant with medications, did not receive most recent scheduled Aristada BAUM. Received Aristada 441 mg BAUM on 7/22. Patient improved in terms of affect, however continues to report delusional thoughts. Selectively compliant with  "medications.    IMPRESSION  Bipolar affective disorder, current episode manic  Psychological factors affecting medical care  Cluster B personality d/o (r/o antisocial)  Delusional disorder  Medication non-adherence    RECOMMENDATION(S)      1. Scheduled Medication(s):  - Discontinue Haldol given QTc prolonged  - Continue Abilify 15 mg PO daily   - Forced med protocol with Haldol, Ativan and Benadryl as described in PRN below  - Continue home Remeron 15 mg nightly  - Aristada 441 mg IM injection [id number], last given 7/22  - Continue Atarax 50 mg nightly for insomnia  - Optimize pain management regimen to ensure adequate pain control and encourage medication compliance    2. PRN Medication(s):  - First try: Haldol 5 mg PO, Benadryl 50 mg PO, Ativan 2 mg PO PRN for non-redirectable agitation  - If refuses: Haldol 2 mg IV, Benadryl 50 mg IV, Ativan 2 mg IV PRN for non-redirectable agitation    3.  Monitor:  - Please obtain daily EKG to monitor QTc    4. Legal Status/Precaution(s):  - Continue CEC (expires 7/26 @ 8:32 pm) as patient is in imminent danger of hurting self or others and is gravely disabled due to a psychiatric illness-- DELORES filed 7/23, court date 8/7  - Maintain 1:1 sitter  - Continue working with next of kin (estranged  and brother) to help facilitate patient care and decision making, especially with regards to disposition -- would be best to come to a consensus with regards to her disposition  - Continue working with palliative care to discuss options, code status, and disposition    5. Capacity and Medical Decision Making  - Primary team worried about melena and potential need for EGD.  The patient is refusing procedures on the basis of it is "impossible for a blood count to drop in 24 hours."  She cannot explain the consequences or benefits of the procedure and continues to demonstrate delusional thought content.    - Recommend to contact the  for medical decision making and decisions " regarding DNR, hospice care, procedures.    - Would carefully weight the risk vs. benefit of various procedure and hold off unless emergent given that the patient is adamantly against having any done forcing the patient to undergo a procedure will likely be difficult and traumatic for the patient  - Disposition: pending DELORES placement in nursing home with hospice vs. inpatient hospice, NOT inpatient psychiatric hospitalization; patient does not have capacity to refuse placement         Need for Continued Hospitalization:   Psychiatric illness continues to pose a potential threat to life or bodily function, of self or others, thereby requiring the need for continued inpatient psychiatric hospitalization.    Anticipated Disposition: pending DELORES, see above     Total time:  15 with greater than 50% of this time spent in counseling and/or coordination of care.     Case discussed with staff Dr. Lopez.    Go Garcia MD   Psychiatry  [facility]

## 2020-08-04 NOTE — SUBJECTIVE & OBJECTIVE
Interval History: As above    Family History     None        Tobacco Use    Smoking status: Current Every Day Smoker     Packs/day: 10.00     Types: Cigarettes    Smokeless tobacco: Never Used   Substance and Sexual Activity    Alcohol use: No    Drug use: No    Sexual activity: Not on file     Psychotherapeutics (From admission, onward)    Start     Stop Route Frequency Ordered    08/04/20 0900  ARIPiprazole tablet 15 mg      -- Oral Daily 08/03/20 1358    08/03/20 2100  haloperidoL tablet 5 mg      -- Oral Nightly 08/03/20 1358    07/21/20 1300  ARIPiprazole lauroxil 441 mg/1.6 mL injection 441 mg     Question Answer Comment   Brand Name: ARISTADA (R)    Generic name: none    Form: Injectable    Length of Therapy: Indefinite    Reason for Non-Formulary: No equivalent formulary medication available    How soon needed? (if approved, may take up to 5 days to procure): 48-72 hrs    Requestor's Contact Information: Shirley Lopez or ON CALL psychiatry        -- IM Every 30 days 07/21/20 1219    07/20/20 1036  lorazepam injection 2 mg      -- IM Every 6 hours PRN 07/20/20 0946    07/20/20 1034  haloperidol lactate injection 5 mg      -- IM Every 6 hours PRN 07/20/20 0946    07/20/20 1031  lorazepam injection 2 mg      -- IV Every 6 hours PRN 07/20/20 0933    07/20/20 1030  haloperidol lactate injection 5 mg      -- IV Every 6 hours PRN 07/20/20 0933    07/16/20 1833  OLANZapine injection 10 mg      -- IM Every 8 hours PRN 07/16/20 1834    07/11/20 2245  mirtazapine tablet 15 mg      -- Oral Nightly 07/11/20 2132           Review of Systems   Constitutional: Negative for chills and fever.   Respiratory: Negative for shortness of breath.    Cardiovascular: Negative for chest pain.        Pertinent items noted in HPI.    Objective:     Vital Signs (Most Recent):  Temp: 98.1 °F (36.7 °C) (08/04/20 0827)  Pulse: 97 (08/04/20 0827)  Resp: 16 (08/04/20 0900)  BP: (!) 108/59 (08/04/20 0827)  SpO2: 100 % (08/04/20 0827) Vital  "Signs (24h Range):  Temp:  [96.7 °F (35.9 °C)-98.8 °F (37.1 °C)] 98.1 °F (36.7 °C)  Pulse:  [] 97  Resp:  [14-18] 16  SpO2:  [96 %-100 %] 100 %  BP: (102-111)/(54-59) 108/59     Height: 5' 7" (170.2 cm)  Weight: 70.1 kg (154 lb 8.7 oz)  Body mass index is 24.2 kg/m².    No intake or output data in the 24 hours ending 08/04/20 0910    Physical Exam  Psychiatric:      Comments: Mental Status Exam:  Appearance: older than stated age, disheveled, thin, pale  Behavior/Cooperation: normal eye contact, minimally cooperative  Speech: normal rate, tone, volume; sarcastic  Mood: fine  Affect: mood congruent  Thought Process: linear  Thought Content: delusions: yes, erotomanic, persecutory, paranoia  Sensorium: alert  Orientation: oriented to person, place, time  Memory: intact to conversation  Attention Span/Concentration: intact to conversation, WORLD forwards and backwards  Insight: poor but improving  Judgment: poor AEB refusal of medications            Physical Exam:  General appearance: NAD  Head: NCAT  Lungs: CTAB, no increased work of breath  Heart: RRR  Abdomen: soft, distended  Extremities: extremities normal, atraumatic  Skin: warm, dry         Significant Labs: All pertinent labs within the past 24 hours have been reviewed.    Significant Imaging: I have reviewed all pertinent imaging results/findings within the past 24 hours.     Results for orders placed or performed during the hospital encounter of 07/11/20   EKG 12-lead    Collection Time: 08/04/20 12:38 AM    Narrative    Test Reason : R94.31,    Vent. Rate : 100 BPM     Atrial Rate : 100 BPM     P-R Int : 134 ms          QRS Dur : 074 ms      QT Int : 454 ms       P-R-T Axes : 061 060 025 degrees     QTc Int : 585 ms    Normal sinus rhythm  Low voltage QRS  Prolonged QT  Abnormal ECG  When compared with ECG of 02-AUG-2020 04:12,  QT has lengthened    Referred By: [email or web address]   SELF           Confirmed By:        "

## 2020-08-04 NOTE — PLAN OF CARE
Ms Mcdaniel was less agitated on tonight. She continues with iv dilaudid and prn n/v medication. Did not rest much. Sitter remains at bedside. Safety measures maintained. Wctm

## 2020-08-04 NOTE — PROGRESS NOTES
[facility] Hospital Medicine  Progress Note    Patient Name: Violeta Boudreaux  MRN: [id number]  Patient Class: IP- Inpatient   Admission Date: 7/11/2020  Length of Stay: 18 days  Attending Physician: Kahlil Banegas MD  Primary Care Provider: Otf Brownlee MD    [facility] Medicine Team: [facility] [place] [facility] Kahlil Banegas MD    HPI:    Ms. Violeta Boudreaux is a 54 y.o. female with Bipolar Disorder, delusional disorder, GIST on Sunitinib, and recent LLE DVT s/p IVC filter (June 2020) who presents to the ER by [facility] [place] [place] for delusions.  Her estranged  reports that she showed up at his house, claiming that she was  to Geovany Ring from [employer] and he was being hid inside the estranged 's house. He reports that she is homeless, and hasn't been taking her psych medications.  She denies any complaints at this time.  Labs on arrival showed a Hemoglobin 6.2 down from 9.7 in June 2020.  She endorses midepigastric abdominal pain.  She denies any blood in her stools, dark stools, or vaginal bleeding.  She mentions that a few weeks ago, she was having bad headaches and took a lot of Advil, then causing her to have hematemesis.  She denies further bleeding or use of Aspirin or NSAIDS.  Of note, at the end of May, she was hospitalized at [facility] for delusional disorder.  At that time, she was found to have a Hemoglobin 6.4.  She was given blood and her hemolgobin improved to 9.7.  It was thought that her bleeding with 2/2 her GIST tumor. She was not evaluated by GI.  Also during that admission, she was found to have a LLE DVT.  HemeOnc suggested IVC filter, given her lack of consistency with medications.  She was discharged to the [facility].  She was discharged on Abilify 10mg and Mirtazapine 15mg qHS.       In the ER, she was PECed for grave disability.  SUMIT was positive.  She was transfused 1 unit PRBCs.  She mentions that Bertjohnna Gorman will pay for every drop of blood we take from her, and that she wants  to return to her house in [place] when discharged.  She was admitted to Hospital Medicine for GI and Psych evaluations.    Subjective:     Principal Problem:Psychological factors affecting medical condition    Interval History: Patient lying in bed, no acute distress. Reports that she will no longer agree to blood draws. Also requesting to start her home chemotherapy. Reports abdominal pain not adequately controlled despite IV narcotics.     Review of Systems   Constitutional: Negative for chills and fever.   Eyes: Negative for visual disturbance.   Respiratory: Negative for cough and shortness of breath.    Gastrointestinal: Positive for abdominal distention, abdominal pain, nausea and vomiting.   Genitourinary: Negative for dysuria.   Neurological: Negative for weakness.   Psychiatric/Behavioral: Negative for suicidal ideas.        Objective:     Vital Signs (Most Recent):  Temp: 98.1 °F (36.7 °C) (08/04/20 0827)  Pulse: 97 (08/04/20 0827)  Resp: 16 (08/04/20 0900)  BP: (!) 108/59 (08/04/20 0827)  SpO2: 100 % (08/04/20 0827) Vital Signs (24h Range):  Temp:  [96.7 °F (35.9 °C)-98.8 °F (37.1 °C)] 98.1 °F (36.7 °C)  Pulse:  [] 97  Resp:  [14-18] 16  SpO2:  [96 %-100 %] 100 %  BP: (102-111)/(54-59) 108/59     Weight: 70.1 kg (154 lb 8.7 oz)  Body mass index is 24.2 kg/m².  No intake or output data in the 24 hours ending 08/04/20 0941     Physical Exam  HENT:      Head: Normocephalic.   Eyes:      Pupils: Pupils are equal, round, and reactive to light.   Neck:      Musculoskeletal: Normal range of motion.   Cardiovascular:      Rate and Rhythm: Normal rate and regular rhythm.      Pulses: Normal pulses.      Heart sounds: Normal heart sounds.   Pulmonary:      Effort: Pulmonary effort is normal.      Breath sounds: Normal breath sounds.   Abdominal:      General: Bowel sounds are normal. There is no distension.      Palpations: Abdomen is soft.      Tenderness: There is no abdominal tenderness.    Musculoskeletal: Normal range of motion.   Skin:     General: Skin is warm.   Neurological:      General: No focal deficit present.      Mental Status: She is alert.   Psychiatric:         Mood and Affect: Affect is labile.         Thought Content: Thought content is delusional.         Judgment: Judgment is inappropriate.           Overview/Hospital Course:     7/12   presenting to the ED via [facility] with OPC stating patient was at her estranged 's house  threatening she would kill the estranged .  PEC placed for delusional behavior. Work-up significant for H/H 6.2/20 (baseline 9/30 through care everywhere). Rectal exam performed without evidence of blood or melena. FOBT positive.   alert, delusional. Refusing to give  personal belongings, and agitation with staff.  4 point restraints were applied. repeat CBC at 6.6 . transfusion with 1 unit of PRBC. GI recommends EGD and colonoscopy for further evaluation of iron deficiency anemia patient adamantly refuses exam and EGD/ colonoscopy  7/13 agitated overnight requesting cell phone.  Patient was placed 4 point  restraints hemoglobin at 8 3 status post 2 units of pack RBC transfusion . agrees for EGD/ colonoscopy.Patient off restraints.Continue home Abilify 10 mg and Remeron 15 mg nightly. Increased Zyprexa from 5 mg to 10 mg q8h IM PRN for agitation.daily EKG to monitor QTc- 447ms   7/14 Hb 8.1 --> 7.6. Requesting  her purse and  belongings she needs to get to her (Geovany Ring).  Clear liquids today and NPO from midnight EGD/colonoscopy in a.m. complains of abdominal, takes oxycodone 30 mg Q 6 hourly as per chart review (reviewed  last filled on 06/23).  Norco discontinued and started oxycodone 20 mg Q 6 hourly p.r.n.  7/15 refused to drink GoLYTELY overnight.  Hemoglobin stable at 7.8 leukocytosis of 15 but afebrile.  Transaminitis AST//112 with normal bilirubin and mildly elevated alk-phos at 337.  Significant left upper quadrant  abdominal pain prior to endoscopy today.  Endoscopy canceled.  CT abdomen with IV and oral contrast ordered.  General surgery consulted.  Started on Zosyn and vancomycin empirically.  Blood cultures x2 with no growth  7/22  Continued on ciprofloxacin and metronidazole since 07/16. refusal of taking medications and vitals, .As CEC expiring 7/26 , plan to pursue DELORES filing as pt remains delusional and uncooperative and needs psychiatric hospital placement. Forced med protocol with Haldol, Ativan and Benadry. Aristada 441 mg IM injection once given 7/22, to be given [id number]. Started Atarax 50 mg nightly for insomnia  7/23 agreed for blood draw today hemoglobin repeated at 6 transfusion with 1 unit of pack RBC  7/24 purulent drainage from upper abdomen at tumor site. culture obtained .Hb 7.2 .  judicial commitment in process.  Patient's  okay with nursing home with hospice as the patient is noncompliant with medications.  does not want to be decision-maker.  7/25  Abdominal x-ray-  No convincing evidence of pneumoperitoneum on this limited single portable view up. abdominal fluid culture from 07/23 with no growth.  Wound Care consulted.  Hemoglobin at 6.7 transfusion with 1 unit of pack RBC today.  Discussed with .   mentions the patient's brother is a bad influence and only involved with the patient to obtain her money. he is okay with patient being DNR/ hospice placement.   7/26hemoglobin at 6.4--> 6.9 .  Transfusion with 1 unit of pack RBC today.  Wound cultures from 07/23 of the abdomen with no growth.  Discussed with psychiatry regarding 's wish for DNR/hospice with judicial commitment process.  discussed with Infectious Disease CT abdomen findings 7/15 .  Continue ciprofloxacin and metronidazole indefinitely. refuses rectal exam today and EGD. mentions she has brown bowel movement today. direct antiglobulin positive. haptoglobin <10  and LDH ordered. Anemia likely from hemolysis.  Discussed with  in detail and he agrees for DNR / hospice placement. he wants to visit her   7/27 Hb at 8.1 . hematology considering steroids. s/p wound care evaluation  7/28 refused EKG . Hb stable at 8.3  7/29  refusing labs and EKG . forced med protocol as ordered if pt refuses scheduled Abilify.  delusional today    Significant Labs:   A1C:   Recent Labs   Lab 07/11/20 1932   HGBA1C 6.3*     POCT Glucose:   Recent Labs   Lab 08/03/20  1253 08/03/20  1800 08/04/20  0827   POCTGLUCOSE 249* 144* 277*     TSH:   Recent Labs   Lab 07/11/20 1932   TSH 3.160       Significant Imaging: I have reviewed and interpreted all pertinent imaging results/findings within the past 24 hours.    Assessment/Plan:      Active Diagnoses:    Diagnosis Date Noted POA    PRINCIPAL PROBLEM:  Psychological factors affecting medical condition [F54]  Yes    Palliative care encounter [Z51.5] 07/23/2020 Not Applicable    Advance care planning [Z71.89]  Not Applicable    Noncompliance with treatment [Z91.19] 07/20/2020 Not Applicable    Bipolar 1 disorder, mixed, moderate [F31.62]  Yes    Anemia [D64.9] 07/11/2020 Yes    Hypokalemia [E87.6] 07/11/2020 Yes    Acute deep vein thrombosis (DVT) of popliteal vein of left lower extremity [I82.432] 05/29/2020 Yes    Delusional disorder [F22] 05/28/2020 Yes    Bipolar affective disorder, current episode manic [F31.10] 09/09/2019 Yes    Malignant gastrointestinal stromal tumor (GIST) of stomach [C49.A2] 09/09/2019 Yes    Type 2 diabetes mellitus without complication, without long-term current use of insulin [E11.9] 09/09/2019 Yes      Problems Resolved During this Admission:     Scheduled Meds:   ARIPiprazole lauroxil  441 mg Intramuscular Q30 Days    ARIPiprazole  15 mg Oral Daily    ciprofloxacin HCl  500 mg Oral Q12H    cyanocobalamin  1,000 mcg Subcutaneous Q7 Days    Followed by    [START ON 8/16/2020] cyanocobalamin  1,000 mcg Subcutaneous Q30 Days    haloperidoL  5 mg  Oral QHS    hydrOXYzine HCL  50 mg Oral QHS    lidocaine  1 patch Transdermal Q24H    metroNIDAZOLE  500 mg Oral Q8H    mirtazapine  15 mg Oral QHS    pantoprazole  40 mg Oral BID    potassium chloride  30 mEq Oral Once    potassium chloride  40 mEq Oral Once    potassium chloride  40 mEq Oral Once     Continuous Infusions:  PRN Meds:.sodium chloride, sodium chloride, sodium chloride, sodium chloride, sodium chloride, acetaminophen, dextrose 50%, dextrose 50%, haloperidol lactate **AND** diphenhydrAMINE **AND** lorazepam, haloperidol lactate **AND** diphenhydrAMINE **AND** lorazepam, glucagon (human recombinant), glucose, glucose, HYDROmorphone, insulin aspart U-100, iohexol, melatonin, metoclopramide HCl, naloxone, OLANZapine, ondansetron, prochlorperazine, promethazine, senna-docusate 8.6-50 mg, sodium chloride 0.9%, sodium chloride 0.9%    PLAN:    Delusional disorder   Bipolar disorder  - presenting to the ED via [facility] with OPC stating patient was at her estranged 's house  threatening she would kill the estranged .  PEC placed for delusional behavior. Work-up significant for H/H 6.2/20 (baseline 9/30 through care everywhere). Rectal exam performed without evidence of blood or melena. FOBT positive.   alert, delusional.  - restraints in place due to severe agitation   - psychiatry consulted. apprec recs  --  Continue Abilify 30 mg daily  -- Forced med protocol with Haldol, Ativan and Benadryl as described in PRN below)  -- Continue home Remeron 15 mg nightly  -- Aristada 441 mg IM injection once given 7/22, to be given [id number]  -- Atarax 50 mg nightly for insomnia  -- First try: Haldol 5 mg PO, Benadryl 50 mg PO, Ativan 2 mg PO PRN for non-redirectable agitation  -- If refuses: Haldol 2 mg IV, Benadryl 50 mg IV, Ativan 2 mg IV PRN for non-redirectable agitation  -- Please obtain daily EKG to monitor QTc  - patient has very limited insight into her situation and remains gravely disabled as  evidenced by her refusal of taking medications and vitals, ekg done.    -- Pt is however more calm and cooperative less irritable.   -- In lieu of pts CEC expiring will pursue DELORES filing as pt remains delusional and uncooperative with recommended treatment plans.     Symptomatic anemia   GIB- resolved    - GIB Pathway initiated  Likely bleeding from GIST  Hgb 6.2 on admit, down from 9.7 beginning of June  - H/H stable on 7/16  Check iron studies and hemolysis labs  GI consulted. apprec recs  -- Discussed with primary team that EGD in this patient would be high risk given worsening/progression of her cancer and that given the likely lower GI pathology that is improving the risks outweigh the benefits  -- continue PO PPI  -- trend Hgb, transfuse Hgb <7, Plt<50  -- would recommend heme/onc consult for evaluation/treatment of progressing tumor  -- if it is determined that tissue is needed, AES consult would be appropriate at that time.  - s/p 1 unit pRBC on 7/17. Patient denies blood in stool or melena   - On 7/26, hemoglobin at 6.4--> 6.9 .  Transfusion with 1 unit of pack RBC  - Wound cultures from 07/23 of the abdomen with no growth.  Discussed with psychiatry regarding 's wish for DNR/hospice with judicial commitment process.    - discussed with Infectious Disease CT abdomen findings 7/15 .  Continue ciprofloxacin and metronidazole indefinitely. refuses rectal exam today and EGD. - mentions she has brown bowel movement  - direct antiglobulin positive. haptoglobin <10  and LDH ordered.  - Anemia likely from hemolysis. Discussed with  in detail and he agrees for DNR / hospice placement.   - s/p wound care evaluation    Malignant gastrointestinal stromal tumor (GIST) of stomach   Abdominal wound  - [facility]  - On Sunitinib outpatient  - CT A/P showed increased size of mass  - 7/24 purulent drainage from upper abdomen at tumor site. culture obtained  - 7/25 Abdominal x-ray-  No convincing  evidence of pneumoperitoneum on this limited single portable view up. abdominal fluid culture from 07/23 with no growth.  - Wound Care consulted  - 7/26 continue ciprofloxacin and metronidazole indefinitely per ID  - Heme/onc consulted. apprec recs   -- poor prognosis d/t mental health issue  -- will need to f/u with primary oncologist at discharge to obtain sunitinib   -- /social work consults for possible placement  -- hospice reasonable if patient has to be d/c to previous living situation    Leukocytosis  - WBC: 11 --> 17 --> --> 11  - afebrile since 7/15   - denies cough or SOB  - Initially on empiric IV abx but switched to po cipro/flagyl due to patient refusing IV access   - plan to discontinue abx is remains afebrile for 24 hours  - bcx no growth to date  - U/A negative for UTI  - CBC daily     Transaminitis   - 7/15:  AST//112 with normal bilirubin and mildly elevated alk-phos at 337. Consider right upper quadrant sonogram if not trending down        Acute deep vein thrombosis (DVT) of popliteal vein of left lower extremity   - S/p IVC filter on 6/2020    Hypokalemia- resolved   - K 2.9-->3.8  - replete prn     B12 deficiency  - levels of 192 started on vitamin B12 subcutaneous dissection     Type 2 diabetes mellitus without complication, without long-term current use of insulin  - Patient's FSGs are controlled on current hypoglycemics.  - Home DM regimen:  Metformin  - SSI with POCT accuchecks and Diabetic diet    Disposition   Palliative care encoutner  - Pallative care consulted.   - Discussing with palliative care and psychiatry to best discharge location for the patient   - psychiatry pursuing DELORES filing       VTE Risk Mitigation (From admission, onward)         Ordered     IP VTE HIGH RISK PATIENT  Once      07/11/20 2130     Place sequential compression device  Until discontinued      07/11/20 2130              Time spent in care of patient: > 35 minutes     Kahlil Banegas,  MD  Department of Hospital Medicine   [facility]

## 2020-08-05 NOTE — ASSESSMENT & PLAN NOTE
ASSESSMENT     Violeta Boudreaux is a 54 y.o. female with a past psychiatric history of BPAD and delusional disorder, who presented to the [facility] due to OPC from estranged  for threatening to kill him. Admitted to [facility] for symptomatic anemia. Per FACT team, patient non-compliant with medications, did not receive most recent scheduled Aristada BAUM. Received Aristada 441 mg BAUM on 7/22. Patient improved in terms of affect, however continues to report delusional thoughts. Selectively compliant with medications.    IMPRESSION  Bipolar affective disorder, current episode manic  Psychological factors affecting medical care  Cluster B personality d/o (r/o antisocial)  Delusional disorder  Medication non-adherence    RECOMMENDATION(S)      1. Scheduled Medication(s):  - Discontinue Haldol given QTc prolonged  - Continue Abilify 15 mg PO daily   - Forced med protocol with Haldol, Ativan and Benadryl as described in PRN below  - Continue home Remeron 15 mg nightly  - Aristada 441 mg IM injection q0qgfnd, last given 7/22  - Continue Atarax 50 mg nightly for insomnia  - Optimize pain management regimen to ensure adequate pain control and encourage medication compliance    2. PRN Medication(s):  - First try: Haldol 5 mg PO, Benadryl 50 mg PO, Ativan 2 mg PO PRN for non-redirectable agitation  - If refuses: Haldol 2 mg IV, Benadryl 50 mg IV, Ativan 2 mg IV PRN for non-redirectable agitation    3.  Monitor:  - Please obtain daily EKG to monitor QTc  - Will check CK level    4. Legal Status/Precaution(s):  - Continue CEC (expires 7/26 @ 8:32 pm) as patient is in imminent danger of hurting self or others and is gravely disabled due to a psychiatric illness-- DELORES filed 7/23, court date 8/7  - Maintain 1:1 sitter  - Continue working with next of kin (estranged  and brother) to help facilitate patient care and decision making, especially with regards to disposition -- would be best to come to a consensus with regards to her  "disposition  - Continue working with palliative care to discuss options, code status, and disposition    5. Capacity and Medical Decision Making  - Primary team worried about melena and potential need for EGD.  The patient is refusing procedures on the basis of it is "impossible for a blood count to drop in 24 hours."  She cannot explain the consequences or benefits of the procedure and continues to demonstrate delusional thought content.    - Recommend to contact the  for medical decision making and decisions regarding DNR, hospice care, procedures.    - Would carefully weight the risk vs. benefit of various procedure and hold off unless emergent given that the patient is adamantly against having any done forcing the patient to undergo a procedure will likely be difficult and traumatic for the patient  - Disposition: pending DELORES placement in nursing home with hospice vs. inpatient hospice, NOT inpatient psychiatric hospitalization; patient does not have capacity to refuse placement  "

## 2020-08-05 NOTE — PLAN OF CARE
SW and CM met with patient at bedside for Zoom meeting with Jenifer from the Office of Behavioral Health to complete her assessment. Patient was pleasant and  participated in the assessment. SW will continue to follow.     Sharee Rivas LMSW   - [facility]  Ext. [phone]

## 2020-08-05 NOTE — PROGRESS NOTES
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  General appearance: NAD  Head: NCAT  Lungs: CTAB, no increased work of breath  Heart: RRR  Abdomen: soft, distended  Extremities: extremities normal, atraumatic  Skin: warm, dry         Significant Labs: All pertinent labs within the past 24 hours have been reviewed.    Significant Imaging: I have reviewed all pertinent imaging results/findings within the past 24 hours.     Results for orders placed or performed during the hospital encounter of 07/11/20   EKG 12-lead    Collection Time: 08/04/20 12:38 AM    Narrative    Test Reason : R94.31,    Vent. Rate : 100 BPM     Atrial Rate : 100 BPM     P-R Int : 134 ms          QRS Dur : 074 ms      QT Int : 454 ms       P-R-T Axes : 061 060 025 degrees     QTc Int : 585 ms    Normal sinus rhythm  Low voltage QRS  Nonspecific T wave abnormality  Prolonged QT  Abnormal ECG  When compared with ECG of 02-AUG-2020 04:12,  QT has lengthened  Confirmed by MEAGHAN SAENZ MD ([id number]) on 8/5/2020 9:45:17 AM    Referred By: [email or web address]   SELF           Confirmed By:MEAGHAN SAENZ MD         Assessment/Plan:     Delusional disorder  - known history with consistent delusion  - consistent delusion of marriage to Geovany Ring    Bipolar affective disorder, current episode manic  ASSESSMENT     Violeta Boudreaux is a 54 y.o. female with a past psychiatric  history of BPAD and delusional disorder, who presented to the [facility] due to OPC from estranged  for threatening to kill him. Admitted to [facility] for symptomatic anemia. Per FACT team, patient non-compliant with medications, did not receive most recent scheduled Aristada BAUM. Received Aristada 441 mg BAUM on 7/22. Patient improved in terms of affect, however continues to report delusional thoughts. Selectively compliant with medications.    IMPRESSION  Bipolar affective disorder, current episode manic  Psychological factors affecting medical care  Cluster B personality d/o (r/o antisocial)  Delusional disorder  Medication non-adherence    RECOMMENDATION(S)      1. Scheduled Medication(s):  - Discontinue Haldol given QTc prolonged  - Continue Abilify 15 mg PO daily   - Forced med protocol with Haldol, Ativan and Benadryl as described in PRN below  - Continue home Remeron 15 mg nightly  - Aristada 441 mg IM injection [id number], last given 7/22  - Continue Atarax 50 mg nightly for insomnia  - Optimize pain management regimen to ensure adequate pain control and encourage medication compliance    2. PRN Medication(s):  - First try: Haldol 5 mg PO, Benadryl 50 mg PO, Ativan 2 mg PO PRN for non-redirectable agitation  - If refuses: Haldol 2 mg IV, Benadryl 50 mg IV, Ativan 2 mg IV PRN for non-redirectable agitation    3.  Monitor:  - Please obtain daily EKG to monitor QTc  - Will check CK level    4. Legal Status/Precaution(s):  - Continue CEC (expires 7/26 @ 8:32 pm) as patient is in imminent danger of hurting self or others and is gravely disabled due to a psychiatric illness-- DELORES filed 7/23, court date 8/7  - Maintain 1:1 sitter  - Continue working with next of kin (estranged  and brother) to help facilitate patient care and decision making, especially with regards to disposition -- would be best to come to a consensus with regards to her disposition  - Continue working with palliative care to discuss options, code  "status, and disposition    5. Capacity and Medical Decision Making  - Primary team worried about melena and potential need for EGD.  The patient is refusing procedures on the basis of it is "impossible for a blood count to drop in 24 hours."  She cannot explain the consequences or benefits of the procedure and continues to demonstrate delusional thought content.    - Recommend to contact the  for medical decision making and decisions regarding DNR, hospice care, procedures.    - Would carefully weight the risk vs. benefit of various procedure and hold off unless emergent given that the patient is adamantly against having any done forcing the patient to undergo a procedure will likely be difficult and traumatic for the patient  - Disposition: pending DELORES placement in nursing home with hospice vs. inpatient hospice, NOT inpatient psychiatric hospitalization; patient does not have capacity to refuse placement         Need for Continued Hospitalization:   Requires ongoing hospitalization for stabilization of medications.    Anticipated Disposition: pending DELORES placement to nursing home with hospice vs. Inpatient hospice, NOT inpatient psychiatric hospitalization     Total time:  15 with greater than 50% of this time spent in counseling and/or coordination of care.     Psychiatry will follow.    Discussed with staff attending Dr. Lopez.    Go Garcia MD   Psychiatry  [facility]  "

## 2020-08-05 NOTE — NURSING
Pt refused Hydroxyzine overnight and Lidocaine patch. Pt refused EKG and labs this morning. Pt stating that someone came into room last night and took all the blood they needed from her and also stuck her without her permission. Tried to reorient patient that she had a low grade fever overnight and that blood cultures were drawn but patient refused to listen. Pt given a dose of Ibuprofen overnight for temp due to refusing Tylenol. Pt took abx overnight and this morning but did state they make her nauseous. Antiemetics given. Monitoring.

## 2020-08-05 NOTE — PROGRESS NOTES
[facility] Hospital Medicine  Progress Note    Patient Name: Violeta Boudreaux  MRN: [id number]  Patient Class: IP- Inpatient   Admission Date: 7/11/2020  Length of Stay: 19 days  Attending Physician: Kahlil Banegas MD  Primary Care Provider: Otf Brownlee MD    [facility] Medicine Team: [facility] Kahlil Banegas MD    HPI:    Ms. Violeta Boudreaux is a 54 y.o. female with Bipolar Disorder, delusional disorder, GIST on Sunitinib, and recent LLE DVT s/p IVC filter (June 2020) who presents to the ER by [facility] for delusions.  Her estranged  reports that she showed up at his house, claiming that she was  to Geovany Ring from [employer] and he was being hid inside the estranged 's house. He reports that she is homeless, and hasn't been taking her psych medications.  She denies any complaints at this time.  Labs on arrival showed a Hemoglobin 6.2 down from 9.7 in June 2020.  She endorses midepigastric abdominal pain.  She denies any blood in her stools, dark stools, or vaginal bleeding.  She mentions that a few weeks ago, she was having bad headaches and took a lot of Advil, then causing her to have hematemesis.  She denies further bleeding or use of Aspirin or NSAIDS.  Of note, at the end of May, she was hospitalized at [facility] for delusional disorder.  At that time, she was found to have a Hemoglobin 6.4.  She was given blood and her hemolgobin improved to 9.7.  It was thought that her bleeding with 2/2 her GIST tumor. She was not evaluated by GI.  Also during that admission, she was found to have a LLE DVT.  HemeOnc suggested IVC filter, given her lack of consistency with medications.  She was [facility] to the [facility].  She was discharged on Abilify 10mg and Mirtazapine 15mg qHS.       In the ER, she was PECed for grave disability.  SUMIT was positive.  She was transfused 1 unit PRBCs.  She mentions that Bert Gorman will pay for every drop of blood we take from her, and that she wants  to return to her house in [place] when discharged.  She was admitted to Hospital Medicine for GI and Psych evaluations.    Subjective:     Principal Problem:Psychological factors affecting medical condition    Interval History: Patient lying in bed, no acute distress. Reports abdominal pain slightly improved today. Reports insomnia and fatigue.     Review of Systems   Constitutional: Negative for chills and fever.   Eyes: Negative for visual disturbance.   Respiratory: Negative for cough and shortness of breath.    Gastrointestinal: Positive for abdominal distention, abdominal pain, nausea and vomiting.   Genitourinary: Negative for dysuria.   Neurological: Negative for weakness.   Psychiatric/Behavioral: Negative for suicidal ideas.        Objective:     Vital Signs (Most Recent):  Temp: 98.4 °F (36.9 °C) (08/05/20 0458)  Pulse: 99 (08/05/20 0458)  Resp: 14 (08/05/20 0501)  BP: (!) 112/53 (08/05/20 0458)  SpO2: 96 % (08/05/20 0458) Vital Signs (24h Range):  Temp:  [98.1 °F (36.7 °C)-100.1 °F (37.8 °C)] 98.4 °F (36.9 °C)  Pulse:  [] 99  Resp:  [14-18] 14  SpO2:  [94 %-97 %] 96 %  BP: (102-118)/(53-63) 112/53     Weight: 70.1 kg (154 lb 8.7 oz)  Body mass index is 24.2 kg/m².    Intake/Output Summary (Last 24 hours) at 8/5/2020 0849  Last data filed at 8/4/2020 1800  Gross per 24 hour   Intake 550 ml   Output --   Net 550 ml        Physical Exam  HENT:      Head: Normocephalic.   Eyes:      Pupils: Pupils are equal, round, and reactive to light.   Neck:      Musculoskeletal: Normal range of motion.   Cardiovascular:      Rate and Rhythm: Normal rate and regular rhythm.      Pulses: Normal pulses.      Heart sounds: Normal heart sounds.   Pulmonary:      Effort: Pulmonary effort is normal.      Breath sounds: Normal breath sounds.   Abdominal:      General: Bowel sounds are normal. There is no distension.      Palpations: Abdomen is soft.      Tenderness: There is no abdominal tenderness.    Musculoskeletal: Normal range of motion.   Skin:     General: Skin is warm.   Neurological:      General: No focal deficit present.      Mental Status: She is alert.   Psychiatric:         Mood and Affect: Affect is labile.         Thought Content: Thought content is delusional.         Judgment: Judgment is inappropriate.           Overview/Hospital Course:     7/12   presenting to the ED via [facility] with OPC stating patient was at her estranged 's house  threatening she would kill the estranged .  PEC placed for delusional behavior. Work-up significant for H/H 6.2/20 (baseline 9/30 through care everywhere). Rectal exam performed without evidence of blood or melena. FOBT positive.   alert, delusional. Refusing to give  personal belongings, and agitation with staff.  4 point restraints were applied. repeat CBC at 6.6 . transfusion with 1 unit of PRBC. GI recommends EGD and colonoscopy for further evaluation of iron deficiency anemia patient adamantly refuses exam and EGD/ colonoscopy  7/13 agitated overnight requesting cell phone.  Patient was placed 4 point  restraints hemoglobin at 8 3 status post 2 units of pack RBC transfusion . agrees for EGD/ colonoscopy.Patient off restraints.Continue home Abilify 10 mg and Remeron 15 mg nightly. Increased Zyprexa from 5 mg to 10 mg q8h IM PRN for agitation.daily EKG to monitor QTc- 447ms   7/14 Hb 8.1 --> 7.6. Requesting  her purse and  belongings she needs to get to her (Geovany Ring).  Clear liquids today and NPO from midnight EGD/colonoscopy in a.m. complains of abdominal, takes oxycodone 30 mg Q 6 hourly as per chart review (reviewed  last filled on 06/23).  Norco discontinued and started oxycodone 20 mg Q 6 hourly p.r.n.  7/15 refused to drink GoLYTELY overnight.  Hemoglobin stable at 7.8 leukocytosis of 15 but afebrile.  Transaminitis AST//112 with normal bilirubin and mildly elevated alk-phos at 337.  Significant left upper quadrant  abdominal pain prior to endoscopy today.  Endoscopy canceled.  CT abdomen with IV and oral contrast ordered.  General surgery consulted.  Started on Zosyn and vancomycin empirically.  Blood cultures x2 with no growth  7/22  Continued on ciprofloxacin and metronidazole since 07/16. refusal of taking medications and vitals, .As CEC expiring 7/26 , plan to pursue DELORES filing as pt remains delusional and uncooperative and needs psychiatric hospital placement. Forced med protocol with Haldol, Ativan and Benadry. Aristada 441 mg IM injection once given 7/22, to be given [id number]. Started Atarax 50 mg nightly for insomnia  7/23 agreed for blood draw today hemoglobin repeated at 6 transfusion with 1 unit of pack RBC  7/24 purulent drainage from upper abdomen at tumor site. culture obtained .Hb 7.2 .  judicial commitment in process.  Patient's  okay with nursing home with hospice as the patient is noncompliant with medications.  does not want to be decision-maker.  7/25  Abdominal x-ray-  No convincing evidence of pneumoperitoneum on this limited single portable view up. abdominal fluid culture from 07/23 with no growth.  Wound Care consulted.  Hemoglobin at 6.7 transfusion with 1 unit of pack RBC today.  Discussed with .   mentions the patient's brother is a bad influence and only involved with the patient to obtain her money. he is okay with patient being DNR/ hospice placement.   7/26hemoglobin at 6.4--> 6.9 .  Transfusion with 1 unit of pack RBC today.  Wound cultures from 07/23 of the abdomen with no growth.  Discussed with psychiatry regarding 's wish for DNR/hospice with judicial commitment process.  discussed with Infectious Disease CT abdomen findings 7/15 .  Continue ciprofloxacin and metronidazole indefinitely. refuses rectal exam today and EGD. mentions she has brown bowel movement today. direct antiglobulin positive. haptoglobin <10  and LDH ordered. Anemia likely from hemolysis.  Discussed with  in detail and he agrees for DNR / hospice placement. he wants to visit her   7/27 Hb at 8.1 . hematology considering steroids. s/p wound care evaluation  7/28 refused EKG . Hb stable at 8.3  7/29  refusing labs and EKG . forced med protocol as ordered if pt refuses scheduled Abilify.  delusional today    Significant Labs:   A1C:   Recent Labs   Lab 07/11/20 1932   HGBA1C 6.3*     POCT Glucose:   Recent Labs   Lab 08/04/20  0827 08/04/20  1246 08/04/20 2031   POCTGLUCOSE 277* 224* 229*     TSH:   Recent Labs   Lab 07/11/20 1932   TSH 3.160       Significant Imaging: I have reviewed and interpreted all pertinent imaging results/findings within the past 24 hours.    Assessment/Plan:      Active Diagnoses:    Diagnosis Date Noted POA    PRINCIPAL PROBLEM:  Psychological factors affecting medical condition [F54]  Yes    Palliative care encounter [Z51.5] 07/23/2020 Not Applicable    Advance care planning [Z71.89]  Not Applicable    Noncompliance with treatment [Z91.19] 07/20/2020 Not Applicable    Bipolar 1 disorder, mixed, moderate [F31.62]  Yes    Anemia [D64.9] 07/11/2020 Yes    Hypokalemia [E87.6] 07/11/2020 Yes    Acute deep vein thrombosis (DVT) of popliteal vein of left lower extremity [I82.432] 05/29/2020 Yes    Delusional disorder [F22] 05/28/2020 Yes    Bipolar affective disorder, current episode manic [F31.10] 09/09/2019 Yes    Malignant gastrointestinal stromal tumor (GIST) of stomach [C49.A2] 09/09/2019 Yes    Type 2 diabetes mellitus without complication, without long-term current use of insulin [E11.9] 09/09/2019 Yes      Problems Resolved During this Admission:     Scheduled Meds:   ARIPiprazole lauroxil  441 mg Intramuscular Q30 Days    ARIPiprazole  15 mg Oral Daily    ciprofloxacin HCl  500 mg Oral Q12H    cyanocobalamin  1,000 mcg Subcutaneous Q7 Days    Followed by    [START ON 8/16/2020] cyanocobalamin  1,000 mcg Subcutaneous Q30 Days    hydrOXYzine HCL  50  mg Oral QHS    lidocaine  1 patch Transdermal Q24H    metroNIDAZOLE  500 mg Oral Q8H    mirtazapine  15 mg Oral QHS    pantoprazole  40 mg Oral BID    potassium chloride  30 mEq Oral Once    potassium chloride  40 mEq Oral Once    potassium chloride  40 mEq Oral Once     Continuous Infusions:  PRN Meds:.sodium chloride, sodium chloride, sodium chloride, sodium chloride, sodium chloride, acetaminophen, dextrose 50%, dextrose 50%, haloperidol lactate **AND** diphenhydrAMINE **AND** lorazepam, haloperidol lactate **AND** diphenhydrAMINE **AND** lorazepam, glucagon (human recombinant), glucose, glucose, HYDROmorphone, ibuprofen, insulin aspart U-100, iohexol, melatonin, metoclopramide HCl, naloxone, OLANZapine, ondansetron, prochlorperazine, promethazine, senna-docusate 8.6-50 mg, sodium chloride 0.9%, sodium chloride 0.9%    PLAN:    Delusional disorder   Bipolar disorder  - presenting to the ED via [facility] with OPC stating patient was at her estranged 's house  threatening she would kill the estranged .  PEC placed for delusional behavior. Work-up significant for H/H 6.2/20 (baseline 9/30 through care everywhere). Rectal exam performed without evidence of blood or melena. FOBT positive.   alert, delusional.  - restraints in place due to severe agitation   - psychiatry consulted. apprec recs  --  Continue Abilify 30 mg daily  -- Forced med protocol with Haldol, Ativan and Benadryl as described in PRN below)  -- Continue home Remeron 15 mg nightly  -- Aristada 441 mg IM injection once given 7/22, to be given [id number]  -- Atarax 50 mg nightly for insomnia  -- First try: Haldol 5 mg PO, Benadryl 50 mg PO, Ativan 2 mg PO PRN for non-redirectable agitation  -- If refuses: Haldol 2 mg IV, Benadryl 50 mg IV, Ativan 2 mg IV PRN for non-redirectable agitation  -- Please obtain daily EKG to monitor QTc  - patient has very limited insight into her situation and remains gravely disabled as evidenced by her refusal  of taking medications and vitals, ekg done.    -- Pt is however more calm and cooperative less irritable.   -- In lieu of pts CEC expiring will pursue DELORES filing as pt remains delusional and uncooperative with recommended treatment plans.     Symptomatic anemia   GIB- resolved    - GIB Pathway initiated  Likely bleeding from GIST  Hgb 6.2 on admit, down from 9.7 beginning of June  - H/H stable on 7/16  Check iron studies and hemolysis labs  GI consulted. apprec recs  -- Discussed with primary team that EGD in this patient would be high risk given worsening/progression of her cancer and that given the likely lower GI pathology that is improving the risks outweigh the benefits  -- continue PO PPI  -- trend Hgb, transfuse Hgb <7, Plt<50  -- would recommend heme/onc consult for evaluation/treatment of progressing tumor  -- if it is determined that tissue is needed, AES consult would be appropriate at that time.  - s/p 1 unit pRBC on 7/17. Patient denies blood in stool or melena   - On 7/26, hemoglobin at 6.4--> 6.9 .  Transfusion with 1 unit of pack RBC  - Wound cultures from 07/23 of the abdomen with no growth.  Discussed with psychiatry regarding 's wish for DNR/hospice with judicial commitment process.    - discussed with Infectious Disease CT abdomen findings 7/15 .  Continue ciprofloxacin and metronidazole indefinitely. refuses rectal exam today and EGD. - mentions she has brown bowel movement  - direct antiglobulin positive. haptoglobin <10  and LDH ordered.  - Anemia likely from hemolysis. Discussed with  in detail and he agrees for DNR / hospice placement.   - s/p wound care evaluation    Malignant gastrointestinal stromal tumor (GIST) of stomach   Abdominal wound  - [facility]  - On Sunitinib outpatient  - CT A/P showed increased size of mass  - 7/24 purulent drainage from upper abdomen at tumor site. culture obtained  - 7/25 Abdominal x-ray-  No convincing evidence of  pneumoperitoneum on this limited single portable view up. abdominal fluid culture from 07/23 with no growth.  - Wound Care consulted  - 7/26 continue ciprofloxacin and metronidazole indefinitely per ID  - Heme/onc consulted. apprec recs   -- poor prognosis d/t mental health issue  -- will need to f/u with primary oncologist at discharge to obtain sunitinib   -- /social work consults for possible placement  -- hospice reasonable if patient has to be d/c to previous living situation    Leukocytosis  - WBC: 11 --> 17 --> --> 11  - afebrile since 7/15   - denies cough or SOB  - Initially on empiric IV abx but switched to po cipro/flagyl due to patient refusing IV access   - plan to discontinue abx is remains afebrile for 24 hours  - bcx no growth to date  - U/A negative for UTI  - CBC daily     Transaminitis   - 7/15:  AST//112 with normal bilirubin and mildly elevated alk-phos at 337. Consider right upper quadrant sonogram if not trending down        Acute deep vein thrombosis (DVT) of popliteal vein of left lower extremity   - S/p IVC filter on 6/2020    Hypokalemia- resolved   - K 2.9-->3.8  - replete prn     B12 deficiency  - levels of 192 started on vitamin B12 subcutaneous dissection     Type 2 diabetes mellitus without complication, without long-term current use of insulin  - Patient's FSGs are controlled on current hypoglycemics.  - Home DM regimen:  Metformin  - SSI with POCT accuchecks and Diabetic diet    Disposition   Palliative care [place]  - Pallative care consulted.   - Discussing with palliative care and psychiatry to best discharge location for the patient   - psychiatry pursuing DELORES filing       VTE Risk Mitigation (From admission, onward)         Ordered     IP VTE HIGH RISK PATIENT  Once      07/11/20 2130     Place sequential compression device  Until discontinued      07/11/20 2130              Time spent in care of patient: > 35 minutes     Kahlil Banegas MD  Department of  Hospital Medicine [facility]

## 2020-08-05 NOTE — SUBJECTIVE & OBJECTIVE
Interval History: As above    Family History     None        Tobacco Use    Smoking status: Current Every Day Smoker     Packs/day: 10.00     Types: Cigarettes    Smokeless tobacco: Never Used   Substance and Sexual Activity    Alcohol use: No    Drug use: No    Sexual activity: Not on file     Psychotherapeutics (From admission, onward)    Start     Stop Route Frequency Ordered    08/04/20 0900  ARIPiprazole tablet 15 mg      -- Oral Daily 08/03/20 1358    07/21/20 1300  ARIPiprazole lauroxil 441 mg/1.6 mL injection 441 mg     Question Answer Comment   Brand Name: ARISTADA (R)    Generic name: none    Form: Injectable    Length of Therapy: Indefinite    Reason for Non-Formulary: No equivalent formulary medication available    How soon needed? (if approved, may take up to 5 days to procure): 48-72 hrs    Requestor's Contact Information: Shirley Lopez or ON CALL psychiatry        -- IM Every 30 days 07/21/20 1219    07/20/20 1036  lorazepam injection 2 mg      -- IM Every 6 hours PRN 07/20/20 0946    07/20/20 1034  haloperidol lactate injection 5 mg      -- IM Every 6 hours PRN 07/20/20 0946    07/20/20 1031  lorazepam injection 2 mg      -- IV Every 6 hours PRN 07/20/20 0933    07/20/20 1030  haloperidol lactate injection 5 mg      -- IV Every 6 hours PRN 07/20/20 0933    07/16/20 1833  OLANZapine injection 10 mg      -- IM Every 8 hours PRN 07/16/20 1834    07/11/20 2245  mirtazapine tablet 15 mg      -- Oral Nightly 07/11/20 2132           Review of Systems   Constitutional: Negative for chills and fever.   Respiratory: Negative for shortness of breath.    Cardiovascular: Negative for chest pain.        Pertinent items noted in HPI.    Objective:     Vital Signs (Most Recent):  Temp: 98.4 °F (36.9 °C) (08/05/20 0800)  Pulse: 92 (08/05/20 0800)  Resp: 16 (08/05/20 0928)  BP: 109/60 (08/05/20 0800)  SpO2: 96 % (08/05/20 0800) Vital Signs (24h Range):  Temp:  [98.1 °F (36.7 °C)-100.1 °F (37.8 °C)] 98.4 °F (36.9  "°C)  Pulse:  [] 92  Resp:  [14-18] 16  SpO2:  [94 %-97 %] 96 %  BP: (102-118)/(53-63) 109/60     Height: 5' 7" (170.2 cm)  Weight: 70.1 kg (154 lb 8.7 oz)  Body mass index is 24.2 kg/m².      Intake/Output Summary (Last 24 hours) at 8/5/2020 1049  Last data filed at 8/4/2020 1800  Gross per 24 hour   Intake 550 ml   Output --   Net 550 ml       Physical Exam  Psychiatric:      Comments: Mental Status Exam:  Appearance: older than stated age, disheveled, thin, pale  Behavior/Cooperation: normal eye contact, minimally cooperative  Speech: normal rate, tone, volume; sarcastic  Mood: irritable  Affect: mood congruent  Thought Process: linear  Thought Content: delusions: yes, erotomanic, persecutory, paranoia  Sensorium: alert  Orientation: oriented to person, place, time  Memory: intact to conversation  Attention Span/Concentration: intact to conversation, WORLD forwards and backwards  Insight: poor but improving  Judgment: poor AEB intermittently refusing medications            Physical Exam:  General appearance: NAD  Head: NCAT  Lungs: CTAB, no increased work of breath  Heart: RRR  Abdomen: soft, distended  Extremities: extremities normal, atraumatic  Skin: warm, dry         Significant Labs: All pertinent labs within the past 24 hours have been reviewed.    Significant Imaging: I have reviewed all pertinent imaging results/findings within the past 24 hours.     Results for orders placed or performed during the hospital encounter of 07/11/20   EKG 12-lead    Collection Time: 08/04/20 12:38 AM    Narrative    Test Reason : R94.31,    Vent. Rate : 100 BPM     Atrial Rate : 100 BPM     P-R Int : 134 ms          QRS Dur : 074 ms      QT Int : 454 ms       P-R-T Axes : 061 060 025 degrees     QTc Int : 585 ms    Normal sinus rhythm  Low voltage QRS  Nonspecific T wave abnormality  Prolonged QT  Abnormal ECG  When compared with ECG of 02-AUG-2020 04:12,  QT has lengthened  Confirmed by MEAGHAN SAENZ MD ([id number]) on " 8/5/2020 9:45:17 AM    Referred By: [email or web address]   SELF           Confirmed By:MEAGHAN SAENZ MD

## 2020-08-06 NOTE — ASSESSMENT & PLAN NOTE
ASSESSMENT     Violeta Boudreaux is a 54 y.o. female with a past psychiatric history of BPAD and delusional disorder, who presented to the [facility] due to OPC from estranged  for threatening to kill him. Admitted to [facility] for symptomatic anemia. Per FACT team, patient non-compliant with medications, did not receive most recent scheduled Aristada BAUM. Received Aristada 441 mg BAUM on 7/22. Patient improved in terms of affect, however continues to report delusional thoughts. Selectively compliant with medications.    IMPRESSION  Bipolar affective disorder, current episode manic  Psychological factors affecting medical care  Cluster B personality d/o (r/o antisocial)  Delusional disorder  Medication non-adherence    RECOMMENDATION(S)      1. Scheduled Medication(s):  - Continue Abilify 15 mg PO daily   - Forced med protocol with Haldol, Ativan and Benadryl as described in PRN below  - Continue home Remeron 15 mg nightly  - Aristada 441 mg IM injection [id number], last given 7/22  - Continue Atarax 50 mg nightly for insomnia  - Optimize pain management regimen to ensure adequate pain control and encourage medication compliance    2. PRN Medication(s):  - First try: Haldol 5 mg PO, Benadryl 50 mg PO, Ativan 2 mg PO PRN for non-redirectable agitation  - If refuses: Haldol 2 mg IV, Benadryl 50 mg IV, Ativan 2 mg IV PRN for non-redirectable agitation    3.  Monitor:  - Please obtain daily EKG to monitor QTc    4. Legal Status/Precaution(s):  - Continue CEC (expires 7/26 @ 8:32 pm) as patient is in imminent danger of hurting self or others and is gravely disabled due to a psychiatric illness-- DELORES filed 7/23, court date 8/7  - Maintain 1:1 sitter  - Continue working with next of kin (estranged  and brother) to help facilitate patient care and decision making, especially with regards to disposition -- would be best to come to a consensus with regards to her disposition  - Continue working with palliative care to discuss  "options, code status, and disposition    5. Capacity and Medical Decision Making  - Primary team worried about melena and potential need for EGD.  The patient is refusing procedures on the basis of it is "impossible for a blood count to drop in 24 hours."  She cannot explain the consequences or benefits of the procedure and continues to demonstrate delusional thought content.    - Recommend to contact the  for medical decision making and decisions regarding DNR, hospice care, procedures.    - Would carefully weight the risk vs. benefit of various procedure and hold off unless emergent given that the patient is adamantly against having any done forcing the patient to undergo a procedure will likely be difficult and traumatic for the patient  - Disposition: pending [facility]ORES placement in nursing home with hospice vs. inpatient hospice, NOT inpatient psychiatric hospitalization; patient does not have capacity to refuse placement  "

## 2020-08-06 NOTE — PROGRESS NOTES
Pt refusing to eat breakfast until she gets her pain medication @8:38a. Pt refusing to take insulin at this time.

## 2020-08-06 NOTE — PROGRESS NOTES
Pt requesting IV dilaudid. Informed that it isn't due again until 0101. Pt refused to believe it. IMB paged and JOJO Alatorre NP notified of pt request. Ok to give IV Dilaudid early. Will cont to monitor pt.

## 2020-08-06 NOTE — NURSING
"Patient refused hydroxyzine, metronidazole and ciprofloxacin despite reminding her of the importance. She also refused accuchecks and said that's "bullshit". Will continue to monitor  "

## 2020-08-06 NOTE — PROGRESS NOTES
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 erotomanic, persecutory, paranoia  Sensorium: alert  Orientation: oriented to person, place, time  Memory: intact to conversation  Attention Span/Concentration: intact to conversation, WORLD forwards and backwards  Insight: poor but improving  Judgment: poor AEB intermittently refusing medications            Physical Exam:  General appearance: NAD  Head: NCAT  Lungs: CTAB, no increased work of breath  Heart: RRR  Abdomen: soft, distended  Extremities: extremities normal, atraumatic  Skin: warm, dry         Significant Labs: All pertinent labs within the past 24 hours have been reviewed.    Significant Imaging: I have reviewed all pertinent imaging results/findings within the past 24 hours.     Results for orders placed or performed during the hospital encounter of 07/11/20   EKG 12-lead    Collection Time: 08/06/20  5:25 AM    Narrative    Test Reason : R94.31,    Vent. Rate : 100 BPM     Atrial Rate : 100 BPM     P-R Int : 130 ms          QRS Dur : 074 ms      QT Int : 366 ms       P-R-T Axes : 084 063 036 degrees     QTc Int : 472 ms    Normal sinus rhythm  Low voltage QRS  Nonspecific T wave abnormality  Abnormal ECG  When compared with ECG of 04-AUG-2020 00:38,  Nonspecific T wave abnormality, worse in Anterior leads  QT has shortened    Referred By: AAAREFERR   SELF           Confirmed By:          Assessment/Plan:     Delusional disorder  - known  history with consistent delusion  - consistent delusion of marriage to Geovany Ring    Bipolar affective disorder, current episode manic  ASSESSMENT     Violeta Boudreaux is a 54 y.o. female with a past psychiatric history of BPAD and delusional disorder, who presented to the [facility] due to OPC from estranged  for threatening to kill him. Admitted to [facility] for symptomatic anemia. Per FACT team, patient non-compliant with medications, did not receive most recent scheduled Aristada BAUM. Received Aristada 441 mg BAUM on 7/22. Patient improved in terms of affect, however continues to report delusional thoughts. Selectively compliant with medications.    IMPRESSION  Bipolar affective disorder, current episode manic  Psychological factors affecting medical care  Cluster B personality d/o (r/o antisocial)  Delusional disorder  Medication non-adherence    RECOMMENDATION(S)      1. Scheduled Medication(s):  - Continue Abilify 15 mg PO daily   - Forced med protocol with Haldol, Ativan and Benadryl as described in PRN below  - Continue home Remeron 15 mg nightly  - Aristada 441 mg IM injection [id number], last given 7/22  - Continue Atarax 50 mg nightly for insomnia  - Optimize pain management regimen to ensure adequate pain control and encourage medication compliance    2. PRN Medication(s):  - First try: Haldol 5 mg PO, Benadryl 50 mg PO, Ativan 2 mg PO PRN for non-redirectable agitation  - If refuses: Haldol 2 mg IV, Benadryl 50 mg IV, Ativan 2 mg IV PRN for non-redirectable agitation    3.  Monitor:  - Please obtain daily EKG to monitor QTc    4. Legal Status/Precaution(s):  - Continue CEC (expires 7/26 @ 8:32 pm) as patient is in imminent danger of hurting self or others and is gravely disabled due to a psychiatric illness-- DELORES filed 7/23, court date 8/7  - Maintain 1:1 sitter  - Continue working with next of kin (estranged  and brother) to help facilitate patient care and decision making, especially with regards to  "disposition -- would be best to come to a consensus with regards to her disposition  - Continue working with palliative care to discuss options, code status, and disposition    5. Capacity and Medical Decision Making  - Primary team worried about melena and potential need for EGD.  The patient is refusing procedures on the basis of it is "impossible for a blood count to drop in 24 hours."  She cannot explain the consequences or benefits of the procedure and continues to demonstrate delusional thought content.    - Recommend to contact the  for medical decision making and decisions regarding DNR, hospice care, procedures.    - Would carefully weight the risk vs. benefit of various procedure and hold off unless emergent given that the patient is adamantly against having any done forcing the patient to undergo a procedure will likely be difficult and traumatic for the patient  - Disposition: pending DELORES placement in nursing home with hospice vs. inpatient hospice, NOT inpatient psychiatric hospitalization; patient does not have capacity to refuse placement         Need for Continued Hospitalization:   Requires ongoing hospitalization for stabilization of medications.    Anticipated Disposition: pending DELORES placement in nursing home with hospice vs. inpatient hospice, NOT inpatient psychiatric hospitalization; patient does not have capacity to refuse placement     Total time:  15 with greater than 50% of this time spent in counseling and/or coordination of care.       Psychiatry will continue to follow.    Case discussed with staff attending Dr. Lopez.    Go Garcia MD   Psychiatry  [facility]  "

## 2020-08-06 NOTE — PLAN OF CARE
"  Problem: Fall Injury Risk  Goal: Absence of Fall and Fall-Related Injury  Outcome: Ongoing, Progressing     Problem: Adjustment to Illness (Gastrointestinal Bleeding)  Goal: Optimal Coping with Acute Illness  Outcome: Ongoing, Progressing     Problem: Bleeding (Gastrointestinal Bleeding)  Goal: Hemostasis  Outcome: Ongoing, Progressing     Problem: Adult Inpatient Plan of Care  Goal: Plan of Care Review  Outcome: Ongoing, Progressing  Goal: Patient-Specific Goal (Individualization)  Outcome: Ongoing, Progressing  Goal: Absence of Hospital-Acquired Illness or Injury  Outcome: Ongoing, Progressing  Goal: Optimal Comfort and Wellbeing  Outcome: Ongoing, Progressing  Goal: Readiness for Transition of Care  Outcome: Ongoing, Progressing  Goal: Rounds/Family Conference  Outcome: Ongoing, Progressing     Pt AAOx4. Pt free of falls/trauma/injuries. Vital signs are in stable condition. Bed in low position, wheels locked, and call light within reach. Skin integrity remains unchanged. Pt turned/ambulated/ rotated Q2 hours. Pain has constant abdominal/stomach/back pain. Pt also has nausea related to the pain. Pt states "the pain medication and the nausea medication doesn't work." Called MD Metzger about changing pt medications to IV instead of IM since pt has a midline. No distress noted/reported at this time. WCTM.   "

## 2020-08-06 NOTE — PLAN OF CARE
Problem: Fall Injury Risk  Goal: Absence of Fall and Fall-Related Injury  Outcome: Ongoing, Progressing     Problem: Adjustment to Illness (Gastrointestinal Bleeding)  Goal: Optimal Coping with Acute Illness  Outcome: Ongoing, Progressing     Problem: Bleeding (Gastrointestinal Bleeding)  Goal: Hemostasis  Outcome: Ongoing, Progressing     Problem: Adult Inpatient Plan of Care  Goal: Plan of Care Review  Outcome: Ongoing, Progressing  Goal: Patient-Specific Goal (Individualization)  Outcome: Ongoing, Progressing  Goal: Optimal Comfort and Wellbeing  Outcome: Ongoing, Progressing     Problem: Diabetes Comorbidity  Goal: Blood Glucose Level Within Desired Range  Outcome: Ongoing, Progressing     Problem: Restraint, Nonbehavioral (Nonviolent)  Goal: Discontinuation Criteria Achieved  Outcome: Ongoing, Progressing  Goal: Personal Dignity and Safety Maintained  Outcome: Ongoing, Progressing     Problem: Coping Ineffective  Goal: Effective Coping  Outcome: Ongoing, Progressing     Problem: Pain Chronic (Persistent)  Goal: Acceptable Pain Control and Functional Ability  Outcome: Ongoing, Progressing     Problem: Wound  Goal: Optimal Wound Healing  Outcome: Ongoing, Progressing     Problem: Infection  Goal: Infection Symptom Resolution  Outcome: Ongoing, Progressing    Patient is awake and alert but disoriented to situation. Uncooperative. Sitter by bedside. Safety maintained, no trauma or falls. Is ambulatory to bathroom and turns and repositions self in bed independently. Will continue to monitor.

## 2020-08-06 NOTE — SUBJECTIVE & OBJECTIVE
Interval History: As above    Family History     None        Tobacco Use    Smoking status: Current Every Day Smoker     Packs/day: 10.00     Types: Cigarettes    Smokeless tobacco: Never Used   Substance and Sexual Activity    Alcohol use: No    Drug use: No    Sexual activity: Not on file     Psychotherapeutics (From admission, onward)    Start     Stop Route Frequency Ordered    08/04/20 0900  ARIPiprazole tablet 15 mg      -- Oral Daily 08/03/20 1358    07/21/20 1300  ARIPiprazole lauroxil 441 mg/1.6 mL injection 441 mg     Question Answer Comment   Brand Name: ARISTADA (R)    Generic name: none    Form: Injectable    Length of Therapy: Indefinite    Reason for Non-Formulary: No equivalent formulary medication available    How soon needed? (if approved, may take up to 5 days to procure): 48-72 hrs    Requestor's Contact Information: Shirley Lopez or ON CALL psychiatry        -- IM Every 30 days 07/21/20 1219    07/20/20 1036  lorazepam injection 2 mg      -- IM Every 6 hours PRN 07/20/20 0946    07/20/20 1034  haloperidol lactate injection 5 mg      -- IM Every 6 hours PRN 07/20/20 0946    07/20/20 1031  lorazepam injection 2 mg      -- IV Every 6 hours PRN 07/20/20 0933    07/20/20 1030  haloperidol lactate injection 5 mg      -- IV Every 6 hours PRN 07/20/20 0933    07/16/20 1833  OLANZapine injection 10 mg      -- IM Every 8 hours PRN 07/16/20 1834    07/11/20 2245  mirtazapine tablet 15 mg      -- Oral Nightly 07/11/20 2132           Review of Systems   Constitutional: Negative for chills and fever.   Respiratory: Negative for shortness of breath.    Cardiovascular: Negative for chest pain.        Pertinent items noted in HPI.    Objective:     Vital Signs (Most Recent):  Temp: 98.2 °F (36.8 °C) (08/06/20 0802)  Pulse: 100 (08/06/20 0802)  Resp: 18 (08/06/20 0838)  BP: (!) 109/56 (08/06/20 0802)  SpO2: 99 % (08/06/20 0802) Vital Signs (24h Range):  Temp:  [98.2 °F (36.8 °C)-99.1 °F (37.3 °C)] 98.2 °F  "(36.8 °C)  Pulse:  [] 100  Resp:  [15-18] 18  SpO2:  [96 %-100 %] 99 %  BP: (101-111)/(56-59) 109/56     Height: 5' 7" (170.2 cm)  Weight: 70.1 kg (154 lb 8.7 oz)  Body mass index is 24.2 kg/m².      Intake/Output Summary (Last 24 hours) at 8/6/2020 1101  Last data filed at 8/6/2020 0900  Gross per 24 hour   Intake 720 ml   Output --   Net 720 ml       Physical Exam  Psychiatric:      Comments: Mental Status Exam:  Appearance: older than stated age, disheveled, thin, pale  Behavior/Cooperation: normal eye contact, minimally cooperative  Speech: normal rate, tone, volume; sarcastic  Mood: irritable  Affect: mood congruent  Thought Process: linear  Thought Content: delusions: yes, erotomanic, persecutory, paranoia  Sensorium: alert  Orientation: oriented to person, place, time  Memory: intact to conversation  Attention Span/Concentration: intact to conversation, WORLD forwards and backwards  Insight: poor but improving  Judgment: poor AEB intermittently refusing medications            Physical Exam:  General appearance: NAD  Head: NCAT  Lungs: CTAB, no increased work of breath  Heart: RRR  Abdomen: soft, distended  Extremities: extremities normal, atraumatic  Skin: warm, dry         Significant Labs: All pertinent labs within the past 24 hours have been reviewed.    Significant Imaging: I have reviewed all pertinent imaging results/findings within the past 24 hours.     Results for orders placed or performed during the hospital encounter of 07/11/20   EKG 12-lead    Collection Time: 08/06/20  5:25 AM    Narrative    Test Reason : R94.31,    Vent. Rate : 100 BPM     Atrial Rate : 100 BPM     P-R Int : 130 ms          QRS Dur : 074 ms      QT Int : 366 ms       P-R-T Axes : 084 063 036 degrees     QTc Int : 472 ms    Normal sinus rhythm  Low voltage QRS  Nonspecific T wave abnormality  Abnormal ECG  When compared with ECG of 04-AUG-2020 00:38,  Nonspecific T wave abnormality, worse in Anterior leads  QT has " shortened    Referred By: AAAREFERR   SELF           Confirmed By:

## 2020-08-06 NOTE — PROGRESS NOTES
Pt refused antibiotic. Will re-ask pt later about medication. MD Metzger notified and aware of situation.

## 2020-08-07 PROBLEM — Z51.5 COMFORT MEASURES ONLY STATUS: Status: ACTIVE | Noted: 2020-01-01

## 2020-08-07 NOTE — CODE/ RAPID DOCUMENTATION
Rapid Response Respiratory Therapy Note      Code Status: DNR   : 1965  Bed: 1145/1145 A:   MRN: [id number]  Time page Received: 912  Time Rapid Response RT at Bedside: 910  Time Rapid Response RT left Bedside: 945  Report given to: Susie RRT    SITUATION     Evaluated patient for: code blue initially paged then rapid    BACKGROUND     Patient has no past medical history on file.    ASSESSMENT/INTERVENTIONS     Upon arrival in room RT Rita using ambu bag as blow by on patient who had desated to 70's after falling. Placed patient on NRB on 15L.  Patient sats increased to 100% and patient was tachypneic and SOB. Patient taken off of NRB and palced on 4L nasal cannula where her sats maintained at 100%.    Pulse: 110 Respiratory rate: 26 BP: BP: (!) 79/53 SpO2:76%  Level of Consciousness: Level of Consciousness (AVPU): alert  Respiratory Effort: Respiratory Effort: Short of breath  Expansion/Accessory Muscle Usage: Expansion/Accessory Muscles/Retractions: accessory muscle use  All Lung Field Breath Sounds: All Lung Fields Breath Sounds: Anterior:, Lateral:, clear, equal bilaterally  O2 Device/Concentration: ambu bag/ 15L  Most recent blood gas: No results for input(s): PH, PCO2, PO2, HCO3, POCSATURATED, BE, LACTATE in the last 72 hours.  Current Respiratory Care Orders:   20 1048  Oxygen Continuous Start: 20 1048, Continuous, Until Specified, Routine      Dipesh Burns NP Acknowledge New      Administrations with Cosign Requests       None   Respiratory Orders  (From admission, onward)  Start   Ordered   20 1048  Oxygen Continuous Continuous     References: Oxygen Titration Protocol   Question Answer Comment   Device type: Low flow    Device: Nasal Cannula (1- 5 Liters)    LPM: 1-5    Titrate O2 per Oxygen Titration Protocol: Yes    To maintain SpO2 goal of: >= 90%    Notify MD of: Inability to achieve desired SpO2; Sudden change in patient status and requires 20% increase in  FiO2; Patient requires >60% FiO2        08/07/20 1047   07/15/20 0800  Pulse Oximetry Q4H Every 4 hours (143 of 166 released)    Release    07/15/20 0611          NIPPV: No Surgical airway: No Vent: No  ETCO2 monitored:    Ambu at bedside: Ambu bag with the patient?: Yes, Adult Ambu    RECOMMENDATIONS   ?  We recommend: XRAY ordered and continue to monitor respiratory status.     ESCALATION      Discussed plan of care with Dr. Metzger and primary RT, Susie HERNANDEZ.     FOLLOW-UP     Disposition: Remain in room 1145.    Please call back the Rapid Response RT, Cha Yun, RRT at x [phone] for any questions or concerns.

## 2020-08-07 NOTE — PROGRESS NOTES
Pt up multiple times to the toilet this morning with both bowel movements and urinating. Pt started feeling short of breath, with notable expiratory wheezing . Pt o2 sats @ 100%. Pt HR was 127 initially and then 112 after she calmed down. And back into bed. Pt . /55. 97.5 axillary temp.   Rapid team called to assess.  Spoke with MD Metzger. notified him of situation. He said to give morning medications and to monitor pt

## 2020-08-07 NOTE — PROGRESS NOTES
Rand called to inform me that pt CO2 is 9 taken @ 1330.   MD Metzger called and notified. STAT lab orders put into system.    Called, notified, updated CASH Valdivia with Rapid team.

## 2020-08-07 NOTE — NURSING
Pt refused her 6 a.m metronidazole. She also said she didn't want the transfusion any more and that it was making her sick. Explained the reason for the transfusion but she said she doesn't care.Charge nurse aware. Transfusion stopped. Amount remaining approx 50cc

## 2020-08-07 NOTE — PLAN OF CARE
08/07/20 0915   Discharge Reassessment   Assessment Type Discharge Planning Reassessment   Discharge Plan A New Nursing Home placement - retirement care facility  (w/hospice)   Discharge Plan B New Nursing Home placement - retirement care facility  (w/hospice)   DME Needed Upon Discharge  none   Anticipated Discharge Disposition Home   Can the patient/caregiver answer the patient profile reliably? No, cognitively impaired   Describe the patient's ability to walk at the present time. No restrictions     Rapid Response this AM due to syncopal episode w/hypoxia.     Patient received 1U PRBC on 8/6/2020 for H&H 6.3/21.4.     Dr. Shirley Lopez (psych) to be present at the hearing today at 11AM via zoom regarding petition for judicial commitment.    CM updated the patient's spouse, Gregorio Boudreaux ([phone]), regarding discharge status. Gregorio verbalized understanding.     1755  CM received a call from Dr. Metzger questioning if the [facility] bed at [facility] is available. RISA was informed by Michaela aleman/Marivel that it is currently in use but that beds are available at their [facility] on [facility]. CM informed Dr. Metzger of above. MD stated that he will order a morphine gtt for comfort overnight, potentially transfer pt to in-pt [facility] in the morning, & notify the patient's estranged spouse, Gregorio Boudreaux ([phone]), of the patient's status. Referral sent to [facility] via [employer]. CM informed the patient's nurse, Celina, of discharge plan.     Will continue to follow.

## 2020-08-07 NOTE — PROGRESS NOTES
[facility] Hospital Medicine  Progress Note    Patient Name: Violeta Boudreaux  MRN: [id number]  Patient Class: IP- Inpatient   Admission Date: 7/11/2020  Length of Stay: 20 days  Attending Physician: Khoi Metzger MD  Primary Care Provider: Otf Brownlee MD    [facility] Medicine Team: [facility] HOSP MED B Kahlil Banegas MD    HPI:    Ms. Violeta Boudreaux is a 54 y.o. female with Bipolar Disorder, delusional disorder, GIST on Sunitinib, and recent LLE DVT s/p IVC filter (June 2020) who presents to the ER by [facility] for delusions.  Her estranged  reports that she showed up at his house, claiming that she was  to Geovany Ring from [employer] and he was being hid inside the estranged 's house. He reports that she is homeless, and hasn't been taking her psych medications.  She denies any complaints at this time.  Labs on arrival showed a Hemoglobin 6.2 down from 9.7 in June 2020.  She endorses midepigastric abdominal pain.  She denies any blood in her stools, dark stools, or vaginal bleeding.  She mentions that a few weeks ago, she was having bad headaches and took a lot of Advil, then causing her to have hematemesis.  She denies further bleeding or use of Aspirin or NSAIDS.  Of note, at the end of May, she was hospitalized at [facility] for delusional disorder.  At that time, she was found to have a Hemoglobin 6.4.  She was given blood and her hemolgobin improved to 9.7.  It was thought that her bleeding with 2/2 her GIST tumor. She was not evaluated by GI.  Also during that admission, she was found to have a LLE DVT.  HemeOnc suggested IVC filter, given her lack of consistency with medications.  She was discharged to the [facility].  She was discharged on Abilify 10mg and Mirtazapine 15mg qHS.       In the ER, she was PECed for grave disability.  SUMIT was positive.  She was transfused 1 unit PRBCs.  She mentions that Bert Velasquezs will pay for every drop of blood we take from her, and that she wants to  return to her house in [place] when discharged.  She was admitted to Hospital Medicine for GI and Psych evaluations.    Hospital course which is documented    On 7/12 presenting to the ED via [facility] with [facility] stating patient was at her estranged 's house  threatening she would kill the estranged .  PEC placed for delusional behavior. Work-up significant for H/H 6.2/20 (baseline 9/30 through care everywhere). Rectal exam performed without evidence of blood or melena. FOBT positive.   alert, delusional. Refusing to give  personal belongings, and agitation with staff.  4 point restraints were applied. repeat CBC at 6.6 . transfusion with 1 unit of PRBC. GI recommends EGD and colonoscopy for further evaluation of iron deficiency anemia patient adamantly refuses exam and EGD/ colonoscopy  7/13 agitated overnight requesting cell phone.  Patient was placed 4 point  restraints hemoglobin at 8 3 status post 2 units of pack RBC transfusion . agrees for EGD/ colonoscopy.Patient off restraints.Continue home Abilify 10 mg and Remeron 15 mg nightly. Increased Zyprexa from 5 mg to 10 mg q8h IM PRN for agitation.daily EKG to monitor QTc- 447ms   7/14 Hb 8.1 --> 7.6. Requesting  her purse and  belongings she needs to get to her (Geovany Ring).  Clear liquids today and NPO from midnight EGD/colonoscopy in a.m. complains of abdominal, takes oxycodone 30 mg Q 6 hourly as per chart review (reviewed  last filled on 06/23).  Norco discontinued and started oxycodone 20 mg Q 6 hourly p.r.n.  7/15 refused to drink GoLYTELY overnight.  Hemoglobin stable at 7.8 leukocytosis of 15 but afebrile.  Transaminitis AST//112 with normal bilirubin and mildly elevated alk-phos at 337.  Significant left upper quadrant abdominal pain prior to endoscopy today.  Endoscopy canceled.  CT abdomen with IV and oral contrast ordered.  General surgery consulted.  Started on Zosyn and vancomycin empirically.  Blood cultures x2 with  no growth  7/22  Continued on ciprofloxacin and metronidazole since 07/16. refusal of taking medications and vitals, .As CEC expiring 7/26 , plan to pursue DELORES filing as pt remains delusional and uncooperative and needs psychiatric hospital placement. Forced med protocol with Haldol, Ativan and Ciarra. Aristada 441 mg IM injection once given 7/22, to be given [id number]. Started Atarax 50 mg nightly for insomnia  7/23 agreed for blood draw today hemoglobin repeated at 6 transfusion with 1 unit of pack RBC  7/24 purulent drainage from upper abdomen at tumor site. culture obtained .Hb 7.2 .  judicial commitment in process.  Patient's  okay with nursing home with hospice as the patient is noncompliant with medications.  does not want to be decision-maker.  7/25  Abdominal x-ray-  No convincing evidence of pneumoperitoneum on this limited single portable view up. abdominal fluid culture from 07/23 with no growth.  Wound Care consulted.  Hemoglobin at 6.7 transfusion with 1 unit of pack RBC today.  Discussed with .   mentions the patient's brother is a bad influence and only involved with the patient to obtain her money. he is okay with patient being DNR/ hospice placement.   7/26hemoglobin at 6.4--> 6.9 .  Transfusion with 1 unit of pack RBC today.  Wound cultures from 07/23 of the abdomen with no growth.  Discussed with psychiatry regarding 's wish for DNR/hospice with judicial commitment process.  discussed with Infectious Disease CT abdomen findings 7/15 .  Continue ciprofloxacin and metronidazole indefinitely. refuses rectal exam today and EGD. mentions she has brown bowel movement today. direct antiglobulin positive. haptoglobin <10  and LDH ordered. Anemia likely from hemolysis. Discussed with  in detail and he agrees for DNR / hospice placement. he wants to visit her   7/27 Hb at 8.1 . hematology considering steroids. s/p wound care evaluation  7/28 refused EKG . Hb stable  at 8.3  7/29  refusing labs and EKG . forced med protocol as ordered if pt refuses scheduled Abilify.  delusional today  8/6 - refused her IV abx and KCL today    Subjective:     Principal Problem:Psychological factors affecting medical condition    Interval History: Patient lying in bed, no acute distress. No issues other than belly pain endorsed today. However reports abdominal pain slightly improved today. No other issues. Ate well. Normal BM.     Review of Systems   Constitutional: Negative for chills and fever.   Eyes: Negative for visual disturbance.   Respiratory: Negative for cough and shortness of breath.    Gastrointestinal: Positive for abdominal distention, abdominal pain, nausea and vomiting.   Genitourinary: Negative for dysuria.   Neurological: Negative for weakness.   Psychiatric/Behavioral: Negative for suicidal ideas.        Objective:     Vital Signs (Most Recent):  Temp: 98.7 °F (37.1 °C) (08/06/20 1936)  Pulse: 101 (08/06/20 1936)  Resp: 18 (08/06/20 1936)  BP: (!) 119/56 (08/06/20 1936)  SpO2: 97 % (08/06/20 1936) Vital Signs (24h Range):  Temp:  [96.1 °F (35.6 °C)-98.7 °F (37.1 °C)] 98.7 °F (37.1 °C)  Pulse:  [] 101  Resp:  [16-18] 18  SpO2:  [96 %-99 %] 97 %  BP: (101-119)/(56-58) 119/56     Weight: 70.1 kg (154 lb 8.7 oz)  Body mass index is 24.2 kg/m².    Intake/Output Summary (Last 24 hours) at 8/6/2020 1944  Last data filed at 8/6/2020 1700  Gross per 24 hour   Intake 240 ml   Output --   Net 240 ml        Physical Exam  HENT:      Head: Normocephalic.   Eyes:      Pupils: Pupils are equal, round, and reactive to light.   Neck:      Musculoskeletal: Normal range of motion.   Cardiovascular:      Rate and Rhythm: Normal rate and regular rhythm.      Pulses: Normal pulses.      Heart sounds: Normal heart sounds.   Pulmonary:      Effort: Pulmonary effort is normal.      Breath sounds: Normal breath sounds.   Abdominal:      General: Bowel sounds are normal. There is no distension.       Palpations: Abdomen is soft.      Tenderness: There is no abdominal tenderness.   Musculoskeletal: Normal range of motion.   Skin:     General: Skin is warm.   Neurological:      General: No focal deficit present.      Mental Status: She is alert.   Psychiatric:         Mood and Affect: Affect is labile.         Thought Content: Thought content is delusional.         Judgment: Judgment is inappropriate.         Significant Labs:   A1C:   Recent Labs   Lab 07/11/20 1932   HGBA1C 6.3*     POCT Glucose:   Recent Labs   Lab 08/06/20  0737 08/06/20  1144 08/06/20  1634   POCTGLUCOSE 202* 214* 210*     TSH:   Recent Labs   Lab 07/11/20 1932   TSH 3.160       Significant Imaging: I have reviewed and interpreted all pertinent imaging results/findings within the past 24 hours.    Assessment/Plan:      Active Diagnoses:    Diagnosis Date Noted POA    PRINCIPAL PROBLEM:  Psychological factors affecting medical condition [F54]  Yes    Palliative care encounter [Z51.5] 07/23/2020 Not Applicable    Advance care planning [Z71.89]  Not Applicable    Noncompliance with treatment [Z91.19] 07/20/2020 Not Applicable    Bipolar 1 disorder, mixed, moderate [F31.62]  Yes    Anemia [D64.9] 07/11/2020 Yes    Hypokalemia [E87.6] 07/11/2020 Yes    Acute deep vein thrombosis (DVT) of popliteal vein of left lower extremity [I82.432] 05/29/2020 Yes    Delusional disorder [F22] 05/28/2020 Yes    Bipolar affective disorder, current episode manic [F31.10] 09/09/2019 Yes    Malignant gastrointestinal stromal tumor (GIST) of stomach [C49.A2] 09/09/2019 Yes    Type 2 diabetes mellitus without complication, without long-term current use of insulin [E11.9] 09/09/2019 Yes      Problems Resolved During this Admission:     Scheduled Meds:   ARIPiprazole lauroxil  441 mg Intramuscular Q30 Days    ARIPiprazole  15 mg Oral Daily    ciprofloxacin HCl  500 mg Oral Q12H    cyanocobalamin  1,000 mcg Subcutaneous Q7 Days    Followed by     [START ON 8/16/2020] cyanocobalamin  1,000 mcg Subcutaneous Q30 Days    hydrOXYzine HCL  50 mg Oral QHS    lidocaine  1 patch Transdermal Q24H    metroNIDAZOLE  500 mg Oral Q8H    mirtazapine  15 mg Oral QHS    pantoprazole  40 mg Oral BID    potassium chloride  30 mEq Oral Once    potassium chloride  40 mEq Oral Once    potassium chloride  40 mEq Oral Once     Continuous Infusions:  PRN Meds:.sodium chloride, sodium chloride, sodium chloride, sodium chloride, sodium chloride, acetaminophen, dextrose 50%, dextrose 50%, haloperidol lactate **AND** diphenhydrAMINE **AND** lorazepam, haloperidol lactate **AND** diphenhydrAMINE **AND** lorazepam, glucagon (human recombinant), glucose, glucose, HYDROmorphone, ibuprofen, insulin aspart U-100, iohexol, melatonin, metoclopramide HCl, naloxone, OLANZapine, ondansetron, prochlorperazine, promethazine (PHENERGAN) IVPB, senna-docusate 8.6-50 mg, sodium chloride 0.9%, sodium chloride 0.9%    PLAN:    Delusional disorder   Bipolar disorder  - presenting to the ED via [facility] with [facility] stating patient was at her estranged 's house  threatening she would kill the estranged .  PEC placed for delusional behavior. Work-up significant for H/H 6.2/20 (baseline 9/30 through care everywhere). Rectal exam performed without evidence of blood or melena. FOBT positive.   alert, delusional.  - restraints in place due to severe agitation   - psychiatry consulted. apprec recs  --  Continue Abilify 30 mg daily  -- Forced med protocol with Haldol, Ativan and Benadryl as described in PRN below)  -- Continue home Remeron 15 mg nightly  -- Aristada 441 mg IM injection once given 7/22, to be given [id number]  -- Atarax 50 mg nightly for insomnia  -- First try: Haldol 5 mg PO, Benadryl 50 mg PO, Ativan 2 mg PO PRN for non-redirectable agitation  -- If refuses: Haldol 2 mg IV, Benadryl 50 mg IV, Ativan 2 mg IV PRN for non-redirectable agitation  -- Please obtain daily EKG to monitor QTc  -  patient has very limited insight into her situation and remains gravely disabled as evidenced by her refusal of taking medications and vitals, ekg done.    -- Pt is however more calm and cooperative less irritable.   -- In lieu of pts CEC expiring will pursue DELORES filing as pt remains delusional and uncooperative with recommended treatment plans.     Symptomatic anemia   GIB- resolved    - GIB Pathway initiated  Likely bleeding from GIST  Hgb 6.2 on admit, down from 9.7 beginning of June  - H/H stable on 7/16  Check iron studies and hemolysis labs  GI consulted. apprec recs  -- Discussed with primary team that EGD in this patient would be high risk given worsening/progression of her cancer and that given the likely lower GI pathology that is improving the risks outweigh the benefits  -- continue PO PPI  -- trend Hgb, transfuse Hgb <7, Plt<50  -- would recommend heme/onc consult for evaluation/treatment of progressing tumor  -- if it is determined that tissue is needed, AES consult would be appropriate at that time.  - s/p 1 unit pRBC on 7/17. Patient denies blood in stool or melena   - On 7/26, hemoglobin at 6.4--> 6.9 .  Transfusion with 1 unit of pack RBC  - Wound cultures from 07/23 of the abdomen with no growth.  Discussed with psychiatry regarding 's wish for DNR/hospice with judicial commitment process.    - discussed with Infectious Disease CT abdomen findings 7/15 .  Continue ciprofloxacin and metronidazole indefinitely. refuses rectal exam today and EGD. - mentions she has brown bowel movement  - direct antiglobulin positive. haptoglobin <10  and LDH ordered.  - Anemia likely from hemolysis. Discussed with  in detail and he agrees for DNR / hospice placement.   - s/p wound care evaluation  - Another unit of pRBC ordered 8/6 for hgb of 6.6     Malignant gastrointestinal stromal tumor (GIST) of stomach   Abdominal wound  - [facility]  - On Sunitinib outpatient  - CT A/P showed  increased size of mass  - 7/24 purulent drainage from upper abdomen at tumor site. culture obtained  - 7/25 Abdominal x-ray-  No convincing evidence of pneumoperitoneum on this limited single portable view up. abdominal fluid culture from 07/23 with no growth.  - Wound Care consulted  - 7/26 continue ciprofloxacin and metronidazole indefinitely per ID  - Heme/onc consulted. apprec recs   -- poor prognosis d/t mental health issue  -- will need to f/u with primary oncologist at discharge to obtain sunitinib   -- /social work consults for possible placement  -- hospice reasonable if patient has to be d/c to previous living situation    Leukocytosis - trending up  - WBC: 11 --> 13 --> 18 in the last few days  - afebrile since 7/15   - denies cough or SOB  - Initially on empiric IV abx but switched to po cipro/flagyl due to patient refusing IV access   - plan to discontinue abx is remains afebrile for 24 hours  - bcx no growth to date  - U/A negative for UTI  - CBC daily     Transaminitis   - 7/15:  AST//112 with normal bilirubin and mildly elevated alk-phos at 337  - all improving      Acute deep vein thrombosis (DVT) of popliteal vein of left lower extremity   - S/p IVC filter on 6/2020    Hypokalemia  - replete prn     B12 deficiency  - levels of 192 started on vitamin B12 subcutaneous dissection     Type 2 diabetes mellitus without complication, without long-term current use of insulin  - Patient's FSGs are controlled on current hypoglycemics.  - Home DM regimen:  Metformin  - SSI with POCT accuchecks and Diabetic diet    Disposition   Palliative care [place]outner  - Pallative care consulted.   - Discussing with palliative care and psychiatry to best discharge location for the patient   - psychiatry pursuing DELORES filing   - DNR at this time  - Eventual plan is NH with hospice        VTE Risk Mitigation (From admission, onward)         Ordered     IP VTE HIGH RISK PATIENT  Once      07/11/20 8440      Place sequential compression device  Until discontinued      07/11/20 2634              Time spent in care of patient: > 35 minutes     Khoi Metzger MD  Department of Hospital Medicine   [facility]

## 2020-08-07 NOTE — PLAN OF CARE
SW faxed blast referrals to NH for NH with Hospice placement via  for review. LEXY will continue to follow.        08/07/20 1434   Post-Acute Status   Post-Acute Authorization Placement   Post-Acute Placement Status Referrals Sent       Sharee Rivas LMSW   - [facility]  Ext. [phone]

## 2020-08-07 NOTE — CODE/ RAPID DOCUMENTATION
"RAPID RESPONSE NURSE NOTE     Admit Date: 2020  LOS: 21  Code Status: DNR   Date of Consult: 2020  : 1965  Age: 54 y.o.  Weight:   Wt Readings from Last 1 Encounters:   20 70.1 kg (154 lb 8.7 oz)     Sex: female  Race: White   Bed: [facility] A:   MRN: [id number]  Time Rapid Response Team page Received: 0910  Time Rapid Response Team at Bedside: 09  Time Rapid Response Team left Bedside: 09  Was the patient discharged from an ICU this admission?   no  Was the patient discharged from a PACU within last 24 hours?  no  Did the patient receive conscious sedation/general anesthesia within last 24 hours?  no  Was the patient in the ED within the past 24 hours?  no  Was the patient started on NIPPV within the past 24 hours?  no  Did this progress into an ARC or CPA:  no  Attending Physician: Khoi Metzger MD  Primary Service: [facility] HOSP MED B  Consult Requested By: Khoi Metzger MD     SITUATION     Notified by code pager.  Reason for alert: syncopal episode, hypoxia  Called to evaluate the patient for Respiratory    BACKGROUND     Why is the patient in the hospital?: Psychological factors affecting medical condition. Pt has bipolar and delusional disorder. Admitted with delusions.    Patient has no past medical history on file. Pt had IVC filter placed 2020 for LLE DVT. Has abd malignancy with associated pain.     ASSESSMENT/INTERVENTIONS     BP (!) 122/55 (BP Location: Left arm, Patient Position: Sitting)   Pulse (!) 112   Temp 97.5 °F (36.4 °C) (Axillary)   Resp 18   Ht 5' 7" (1.702 m)   Wt 70.1 kg (154 lb 8.7 oz)   SpO2 (!) 72%   Breastfeeding No   BMI 24.20 kg/m²     What did you find: Upon arrival to bedside, pt sitting up in bed w/ NRB satting 100%, alert, SOB, tachypneic, tachycardic, BP WNL, afebrile. Pt was given PRN dose of IV dilaudid at 0900, upon attempting to get out of bed to get to bedside commode had passed out onto floor. No reports of hitting head, pt fell onto " backside. PRBC transfusion overnight stopped after pt refused to continue. H/H 6.6/23.   Pt weaned to NC satting 100%, BP 70's/50's    RECOMMENDATIONS     We recommend: Chest xray, fluid bolus, decrease dilaudid dose per physician order. POC is NH with hospice once stable.     FOLLOW-UP/CONTINGENCY PLAN     Will follow up on BP after bolus given. Call the Rapid Response Nurse, Skip Angela RN at x [phone] for additional questions or concerns.    PHYSICIAN ESCALATION     Orders received and case discussed with Dr. Metzger.    Disposition: Remain in room 1145.

## 2020-08-07 NOTE — SUBJECTIVE & OBJECTIVE
Interval History: As above    Family History     None        Tobacco Use    Smoking status: Current Every Day Smoker     Packs/day: 10.00     Types: Cigarettes    Smokeless tobacco: Never Used   Substance and Sexual Activity    Alcohol use: No    Drug use: No    Sexual activity: Not on file     Psychotherapeutics (From admission, onward)    Start     Stop Route Frequency Ordered    07/21/20 1300  ARIPiprazole lauroxil 441 mg/1.6 mL injection 441 mg     Question Answer Comment   Brand Name: ARISTADA (R)    Generic name: none    Form: Injectable    Length of Therapy: Indefinite    Reason for Non-Formulary: No equivalent formulary medication available    How soon needed? (if approved, may take up to 5 days to procure): 48-72 hrs    Requestor's Contact Information: Shirley Lopez or ON CALL psychiatry        -- IM Every 30 days 07/21/20 1219    07/20/20 1036  lorazepam injection 2 mg      -- IM Every 6 hours PRN 07/20/20 0946    07/20/20 1034  haloperidol lactate injection 5 mg      -- IM Every 6 hours PRN 07/20/20 0946    07/20/20 1031  lorazepam injection 2 mg      -- IV Every 6 hours PRN 07/20/20 0933    07/20/20 1030  haloperidol lactate injection 5 mg      -- IV Every 6 hours PRN 07/20/20 0933    07/16/20 1833  OLANZapine injection 10 mg      -- IM Every 8 hours PRN 07/16/20 1834           Review of Systems   Respiratory: Positive for shortness of breath.    Cardiovascular: Positive for chest pain.   Gastrointestinal: Positive for nausea.   Skin: Positive for pallor.   Neurological: Positive for light-headedness.        Pertinent items noted in HPI.    Objective:     Vital Signs (Most Recent):  Temp: 97.5 °F (36.4 °C) (08/07/20 1215)  Pulse: 109 (08/07/20 1215)  Resp: (!) 24 (08/07/20 1215)  BP: (!) 99/56 (08/07/20 1215)  SpO2: 100 % (08/07/20 1215) Vital Signs (24h Range):  Temp:  [97.5 °F (36.4 °C)-98.8 °F (37.1 °C)] 97.5 °F (36.4 °C)  Pulse:  [] 109  Resp:  [17-26] 24  SpO2:  [72 %-100 %] 100 %  BP:  "()/(42-57) 99/56     Height: 5' 7" (170.2 cm)  Weight: 70.1 kg (154 lb 8.7 oz)  Body mass index is 24.2 kg/m².      Intake/Output Summary (Last 24 hours) at 8/7/2020 1305  Last data filed at 8/6/2020 1700  Gross per 24 hour   Intake 120 ml   Output --   Net 120 ml       Physical Exam  Psychiatric:      Comments: Mental Status Exam:  Appearance: older than stated age, disheveled, thin, pale  Behavior/Cooperation: normal eye contact, minimally cooperative  Speech: normal rate, tone, volume; sarcastic  Mood: terrible, but largely unchanged from day before  Affect: mood congruent  Thought Process: linear  Thought Content: delusions: yes, erotomanic, persecutory, paranoia  Sensorium: alert  Orientation: oriented to person, place, time  Memory: intact to conversation  Attention Span/Concentration: intact to conversation, WORLD forwards and backwards  Insight: poor but improving   Judgment: poor AEB intermittently refusing medications            Physical Exam:  General appearance: NAD  Head: NCAT  Lungs: increased work of breath  Heart: no displacement of PMI  Abdomen: soft, distended  Extremities: extremities normal, atraumatic  Skin: warm, dry         Significant Labs: All pertinent labs within the past 24 hours have been reviewed.    Significant Imaging: I have reviewed all pertinent imaging results/findings within the past 24 hours.     Results for orders placed or performed during the hospital encounter of 07/11/20   EKG 12-lead    Collection Time: 08/07/20  8:37 AM    Narrative    Test Reason : R00.0,    Vent. Rate : 119 BPM     Atrial Rate : 119 BPM     P-R Int : 154 ms          QRS Dur : 068 ms      QT Int : 330 ms       P-R-T Axes : 084 054 049 degrees     QTc Int : 464 ms    Sinus tachycardia  Nonspecific ST abnormality  Abnormal ECG  When compared with ECG of 07-AUG-2020 05:50,  No significant change was found    Referred By: [email or web address]   SELF           Confirmed By:      "

## 2020-08-07 NOTE — NURSING
Pt given 2mg of dilaudid for pain. Found sitting on the bathroom floor and said she felt dizzy and weak. Attempted to get her up with the sitter but she said she wants to sit there. Charge nurse informed and talked to her, and got her up to the toilet. Pt says she wants to spend some time on the toilet seat. Patient being monitored.

## 2020-08-07 NOTE — PROGRESS NOTES
"Pt states she's SOB.   Respiratory tech to check on pt. Resp tech states pt having a "panic attack."    Checked on Pt. Pt angry/ upset, wants pain & nausea medication.     Explained to her that her BP is not above 100. Pt BP 93/72.P.109. O@ @ 100% on 1 L NC.      "

## 2020-08-07 NOTE — PROGRESS NOTES
"Spoke with MD Metzger. He said to "hold oxy because pt systolic pressure under 100." Pt bp 92/44  "

## 2020-08-07 NOTE — PROGRESS NOTES
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 99/56 (08/07/20 1215)  SpO2: 100 % (08/07/20 1215) Vital Signs (24h Range):  Temp:  [97.5 °F (36.4 °C)-98.8 °F (37.1 °C)] 97.5 °F (36.4 °C)  Pulse:  [] 109  Resp:  [17-26] 24  SpO2:  [72 %-100 %] 100 %  BP: ()/(42-57) 99/56     Height: 5' 7" (170.2 cm)  Weight: 70.1 kg (154 lb 8.7 oz)  Body mass index is 24.2 kg/m².      Intake/Output Summary (Last 24 hours) at 8/7/2020 1305  Last data filed at 8/6/2020 1700  Gross per 24 hour   Intake 120 ml   Output --   Net 120 ml       Physical Exam  Psychiatric:      Comments: Mental Status Exam:  Appearance: older than stated age, disheveled, thin, pale  Behavior/Cooperation: normal eye contact, minimally cooperative  Speech: normal rate, tone, volume; sarcastic  Mood: terrible, but largely unchanged from day before  Affect: mood congruent  Thought Process: linear  Thought Content: delusions: yes, erotomanic, persecutory, paranoia  Sensorium: alert  Orientation: oriented to person, place, time  Memory: intact to conversation  Attention Span/Concentration: intact to conversation, WORLD forwards and backwards  Insight: poor but improving   Judgment: poor AEB intermittently refusing medications            Physical Exam:  General appearance: NAD  Head: NCAT  Lungs: increased work of breath  Heart: no displacement of PMI  Abdomen: soft, distended  Extremities: extremities normal, atraumatic  Skin: warm, dry     "     Significant Labs: All pertinent labs within the past 24 hours have been reviewed.    Significant Imaging: I have reviewed all pertinent imaging results/findings within the past 24 hours.     Results for orders placed or performed during the hospital encounter of 07/11/20   EKG 12-lead    Collection Time: 08/07/20  8:37 AM    Narrative    Test Reason : R00.0,    Vent. Rate : 119 BPM     Atrial Rate : 119 BPM     P-R Int : 154 ms          QRS Dur : 068 ms      QT Int : 330 ms       P-R-T Axes : 084 054 049 degrees     QTc Int : 464 ms    Sinus tachycardia  Nonspecific ST abnormality  Abnormal ECG  When compared with ECG of 07-AUG-2020 05:50,  No significant change was found    Referred By: [email or web address]   SELF           Confirmed By:      Assessment/Plan:     Delusional disorder  - known history with consistent delusion  - consistent delusion of marriage to Geovany Ring    Bipolar affective disorder, current episode manic  ASSESSMENT     Violeta Boudreaux is a 54 y.o. female with a past psychiatric history of BPAD and delusional disorder, who [place] [facility] [place] [facility] from estranged  for threatening to kill him. Ad[place] [facility] [place] symptomatic anemia. Per FACT team, patient non-compliant with medications, did not receive most recent scheduled Aristada BAUM. Received Aristada 441 mg BAUM on 7/22. Patient improved in terms of affect, however continues to report delusional thoughts. Selectively compliant with medications.    IMPRESSION  Psychological factors affecting medical care  Bipolar affective disorder, MRE manic  Antisocial personality disorder  Delusional disorder  Medication non-adherence    RECOMMENDATION(S)      1. Scheduled Medication(s):  - Discontinue Abilify 15 mg PO daily    - No forced meds unless Abilify is resumed  - Discontinue home Remeron 15 mg nightly  - Aristada 441 mg IM injection [id number], last given 7/22, next due 8/19  - Continue Atarax 50 mg nightly for insomnia  - Optimize pain  "management regimen to ensure adequate pain control and encourage medication compliance    2. PRN Medication(s):  - First try: Haldol 5 mg PO, Benadryl 50 mg PO, Ativan 2 mg PO PRN for non-redirectable agitation  - If refuses: Haldol 2 mg IV, Benadryl 50 mg IV, Ativan 2 mg IV PRN for non-redirectable agitation    3.  Monitor:  - Please obtain daily EKG to monitor QTc    4. Legal Status/Precaution(s):  - Continue DELORES (started 8/7) as patient is in imminent danger of hurting self or others and is gravely disabled due to a psychiatric illness  - Maintain 1:1 sitter  - Continue working with next of kin (estranged  and brother) to help facilitate patient care and decision making, especially with regards to disposition -- would be best to come to a consensus with regards to her disposition  - Continue working with palliative care to discuss options, code status (DNR), and disposition (NH)    5. Capacity and Medical Decision Making  - Primary team worried about melena and potential need for EGD.  The patient is refusing procedures on the basis of it is "impossible for a blood count to drop in 24 hours."  She cannot explain the consequences or benefits of the procedure and continues to demonstrate delusional thought content.    - Recommend to contact the  for medical decision making and decisions regarding DNR, hospice care, procedures.    - Would carefully weight the risk vs. benefit of various procedure and hold off unless emergent given that the patient is adamantly against having any done forcing the patient to undergo a procedure will likely be difficult and traumatic for the patient  - Disposition: DELORES with nursing home placement NOT inpatient psychiatric hospitalization; patient does not have capacity to refuse placement         Need for Continued Hospitalization:   Requires ongoing hospitalization for stabilization of medications.    Anticipated Disposition: DELORES with nursing home placement NOT inpatient " psychiatric hospitalization; patient does not have capacity to refuse placement     Total time:  35 with greater than 50% of this time spent in counseling and/or coordination of care.     Case discussed with staff attending Dr. Lopez.    Psychiatry will follow.    Go Garcia MD   Psychiatry  [facility]

## 2020-08-07 NOTE — PROGRESS NOTES
[facility] Hospital Medicine  Progress Note    Patient Name: Violeta Boudreaux  MRN: [id number]  Patient Class: IP- Inpatient   Admission Date: 7/11/2020  Length of Stay: 21 days  Attending Physician: Khoi Metzger MD  Primary Care Provider: Otf Brownlee MD    [facility] Medicine Team: [facility] [place]    HPI:  Ms. Violeta Boudreaux is a 54 y.o. female with Bipolar Disorder, delusional disorder, GIST on Sunitinib, and recent LLE DVT s/p IVC filter (June 2020) who presents to the ER by [facility] for delusions.  Her estranged  reports that she showed up at his house, claiming that she was  to Geovany Ring from [employer] and he was being hid inside the estranged 's house. He reports that she is homeless, and hasn't been taking her psych medications.  She denies any complaints at this time.  Labs on arrival showed a Hemoglobin 6.2 down from 9.7 in June 2020.  She endorses midepigastric abdominal pain.  She denies any blood in her stools, dark stools, or vaginal bleeding.  She mentions that a few weeks ago, she was having bad headaches and took a lot of Advil, then causing her to have hematemesis.  She denies further bleeding or use of Aspirin or NSAIDS.  Of note, at the end of May, she was hospitalized at [facility] for delusional disorder.  At that time, she was found to have a Hemoglobin 6.4.  She was given blood and her hemolgobin improved to 9.7.  It was thought that her bleeding with 2/2 her GIST tumor. She was not evaluated by GI.  Also during that admission, she was found to have a LLE DVT.  HemeOnc suggested IVC filter, given her lack of consistency with medications.  She was discharged to the [facility].  She was discharged on Abilify 10mg and Mirtazapine 15mg qHS.       In the ER, she was PECed for grave disability.  SUMIT was positive.  She was transfused 1 unit PRBCs.  She mentions that Bert Gorman will pay for every drop of blood we take from her, and that she wants to return to her house in  [place] when discharged.  She was admitted to Hospital Medicine for GI and Psych evaluations.    Hospital course which is documented    On 7/12 presenting to the ED via [facility] with OPC stating patient was at her estranged 's house  threatening she would kill the estranged .  PEC placed for delusional behavior. Work-up significant for H/H 6.2/20 (baseline 9/30 through care everywhere). Rectal exam performed without evidence of blood or melena. FOBT positive.   alert, delusional. Refusing to give  personal belongings, and agitation with staff.  4 point restraints were applied. repeat CBC at 6.6 . transfusion with 1 unit of PRBC. GI recommends EGD and colonoscopy for further evaluation of iron deficiency anemia patient adamantly refuses exam and EGD/ colonoscopy  7/13 agitated overnight requesting cell phone.  Patient was placed 4 point  restraints hemoglobin at 8 3 status post 2 units of pack RBC transfusion . agrees for EGD/ colonoscopy.Patient off restraints.Continue home Abilify 10 mg and Remeron 15 mg nightly. Increased Zyprexa from 5 mg to 10 mg q8h IM PRN for agitation.daily EKG to monitor QTc- 447ms   7/14 Hb 8.1 --> 7.6. Requesting  her purse and  belongings she needs to get to her (Geovany Ring).  Clear liquids today and NPO from midnight EGD/colonoscopy in a.m. complains of abdominal, takes oxycodone 30 mg Q 6 hourly as per chart review (reviewed  last filled on 06/23).  Norco discontinued and started oxycodone 20 mg Q 6 hourly p.r.n.  7/15 refused to drink GoLYTELY overnight.  Hemoglobin stable at 7.8 leukocytosis of 15 but afebrile.  Transaminitis AST//112 with normal bilirubin and mildly elevated alk-phos at 337.  Significant left upper quadrant abdominal pain prior to endoscopy today.  Endoscopy canceled.  CT abdomen with IV and oral contrast ordered.  General surgery consulted.  Started on Zosyn and vancomycin empirically.  Blood cultures x2 with no  growth  7/22  Continued on ciprofloxacin and metronidazole since 07/16. refusal of taking medications and vitals, .As CEC expiring 7/26 , plan to pursue DELORES filing as pt remains delusional and uncooperative and needs psychiatric hospital placement. Forced med protocol with Haldol, Ativan and Ciarra. Aristada 441 mg IM injection once given 7/22, to be given [id number]. Started Atarax 50 mg nightly for insomnia  7/23 agreed for blood draw today hemoglobin repeated at 6 transfusion with 1 unit of pack RBC  7/24 purulent drainage from upper abdomen at tumor site. culture obtained .Hb 7.2 .  judicial commitment in process.  Patient's  okay with nursing home with hospice as the patient is noncompliant with medications.  does not want to be decision-maker.  7/25  Abdominal x-ray-  No convincing evidence of pneumoperitoneum on this limited single portable view up. abdominal fluid culture from 07/23 with no growth.  Wound Care consulted.  Hemoglobin at 6.7 transfusion with 1 unit of pack RBC today.  Discussed with .   mentions the patient's brother is a bad influence and only involved with the patient to obtain her money. he is okay with patient being DNR/ hospice placement.   7/26hemoglobin at 6.4--> 6.9 .  Transfusion with 1 unit of pack RBC today.  Wound cultures from 07/23 of the abdomen with no growth.  Discussed with psychiatry regarding 's wish for DNR/hospice with judicial commitment process.  discussed with Infectious Disease CT abdomen findings 7/15 .  Continue ciprofloxacin and metronidazole indefinitely. refuses rectal exam today and EGD. mentions she has brown bowel movement today. direct antiglobulin positive. haptoglobin <10  and LDH ordered. Anemia likely from hemolysis. Discussed with  in detail and he agrees for DNR / hospice placement. he wants to visit her   7/27 Hb at 8.1 . hematology considering steroids. s/p wound care evaluation  7/28 refused EKG . Hb stable at  8.3  7/29  refusing labs and EKG . forced med protocol as ordered if pt refuses scheduled Abilify.  delusional today  8/6 - refused her IV abx and KCL today, 1 unit of pRBC transfused however patient only accepted half      Patient is currently DNR, and planned for NH with hospice. Judical committee meeting was held today 8/7 and judicially committed her to NH. Unfortunately patient was hypotension multiple times today despite IVF bolus and syncopized x 2 in the past 24 hrs. LA 12. Bicarb 11. Dicussed with  and will initiate comfort measures only. Morphine drip started for tn. Plan to transition her to inpatient hospice if able tomorrow.        Subjective:     Principal Problem:Psychological factors affecting medical condition    Interval History: Syncopized last night. Rapid called this morning for fainting episodes with one min unresponsiveness, then was alert and oriented. BP 80/60, bolus given. Labs noting LA 12 with Bicarb 11. Dicussed with  and will initiate comfort measures only. Morphine drip started for tn. Plan to transition her to inpatient hospice if able tomorrow.    She is comfortable at this time. Poor prognosis overall.       Review of Systems   Constitutional: Negative for chills and fever.   Eyes: Negative for visual disturbance.   Respiratory: Negative for cough and shortness of breath.    Gastrointestinal: Positive for abdominal distention, abdominal pain, nausea and vomiting.   Genitourinary: Negative for dysuria.   Neurological: Negative for weakness.   Psychiatric/Behavioral: Negative for suicidal ideas.        Objective:     Vital Signs (Most Recent):  Temp: 97.8 °F (36.6 °C) (08/07/20 1521)  Pulse: 105 (08/07/20 1611)  Resp: 17 (08/07/20 1611)  BP: (!) 95/54 (08/07/20 1521)  SpO2: 99 % (08/07/20 1611) Vital Signs (24h Range):  Temp:  [97.5 °F (36.4 °C)-98.8 °F (37.1 °C)] 97.8 °F (36.6 °C)  Pulse:  [] 105  Resp:  [17-26] 17  SpO2:  [72 %-100 %] 99 %  BP: ()/(42-57)  95/54     Weight: 70.1 kg (154 lb 8.7 oz)  Body mass index is 24.2 kg/m².  No intake or output data in the 24 hours ending 08/07/20 1834     Physical Exam  HENT:      Head: Normocephalic.   Eyes:      Pupils: Pupils are equal, round, and reactive to light.   Neck:      Musculoskeletal: Normal range of motion.   Cardiovascular:      Rate and Rhythm: Normal rate and regular rhythm.      Pulses: Normal pulses.      Heart sounds: Normal heart sounds.   Pulmonary:      Effort: Pulmonary effort is normal.      Breath sounds: Normal breath sounds.   Abdominal:      General: Bowel sounds are normal. There is no distension.      Palpations: Abdomen is soft.      Tenderness: There is no abdominal tenderness.   Musculoskeletal: Normal range of motion.   Skin:     General: Skin is warm.   Neurological:      General: No focal deficit present.      Mental Status: She is alert.   Psychiatric:         Mood and Affect: Affect is labile.         Thought Content: Thought content is delusional.         Judgment: Judgment is inappropriate.         Significant Labs:   A1C:   Recent Labs   Lab 07/11/20 1932   HGBA1C 6.3*     POCT Glucose:   Recent Labs   Lab 08/07/20  0752 08/07/20  1208 08/07/20  1652   POCTGLUCOSE 341* 314* 318*     TSH:   Recent Labs   Lab 07/11/20 1932   TSH 3.160       Significant Imaging: I have reviewed and interpreted all pertinent imaging results/findings within the past 24 hours.    Assessment/Plan:      Active Diagnoses:    Diagnosis Date Noted POA    PRINCIPAL PROBLEM:  Psychological factors affecting medical condition [F54]  Yes    Comfort measures only status [Z51.5] 08/07/2020 Not Applicable    Palliative care encounter [Z51.5] 07/23/2020 Not Applicable    Advance care planning [Z71.89]  Not Applicable    Noncompliance with treatment [Z91.19] 07/20/2020 Not Applicable    Bipolar 1 disorder, mixed, moderate [F31.62]  Yes    Anemia [D64.9] 07/11/2020 Yes    Hypokalemia [E87.6] 07/11/2020 Yes     Acute deep vein thrombosis (DVT) of popliteal vein of left lower extremity [I82.432] 05/29/2020 Yes    Delusional disorder [F22] 05/28/2020 Yes    Bipolar affective disorder, current episode manic [F31.10] 09/09/2019 Yes    Malignant gastrointestinal stromal tumor (GIST) of stomach [C49.A2] 09/09/2019 Yes    Type 2 diabetes mellitus without complication, without long-term current use of insulin [E11.9] 09/09/2019 Yes      Problems Resolved During this Admission:     Scheduled Meds:   ARIPiprazole lauroxil  441 mg Intramuscular Q30 Days    ciprofloxacin HCl  500 mg Oral Q12H    cyanocobalamin  1,000 mcg Subcutaneous Q7 Days    Followed by    [START ON 8/16/2020] cyanocobalamin  1,000 mcg Subcutaneous Q30 Days    hydrOXYzine HCL  50 mg Oral QHS    lidocaine  1 patch Transdermal Q24H    metroNIDAZOLE  500 mg Oral Q8H    oxyCODONE  5 mg Oral Q8H    pantoprazole  40 mg Oral BID    potassium chloride  30 mEq Oral Once    potassium chloride  40 mEq Oral Once    potassium chloride  40 mEq Oral Once     Continuous Infusions:   morphine       PRN Meds:.sodium chloride, acetaminophen, dextrose 50%, dextrose 50%, haloperidol lactate **AND** diphenhydrAMINE **AND** lorazepam, haloperidol lactate **AND** diphenhydrAMINE **AND** lorazepam, glucagon (human recombinant), glucose, glucose, HYDROmorphone, ibuprofen, insulin aspart U-100, iohexol, melatonin, metoclopramide HCl, naloxone, OLANZapine, ondansetron, prochlorperazine, promethazine (PHENERGAN) IVPB, senna-docusate 8.6-50 mg, sodium chloride 0.9%, sodium chloride 0.9%    PLAN:    Delusional disorder   Bipolar disorder  - presenting to the ED via [facility] with OPC stating patient was at her estranged 's house  threatening she would kill the estranged .  PEC placed for delusional behavior. Work-up significant for H/H 6.2/20 (baseline 9/30 through care everywhere). Rectal exam performed without evidence of blood or melena. FOBT positive.   alert,  delusional.  - restraints in place due to severe agitation   - psychiatry consulted. apprec recs  --  Continue Abilify 30 mg daily  -- Forced med protocol with Haldol, Ativan and Benadryl as described in PRN below)  -- Continue home Remeron 15 mg nightly  -- Aristada 441 mg IM injection once given 7/22, to be given q6uyhmn  -- Atarax 50 mg nightly for insomnia  -- First try: Haldol 5 mg PO, Benadryl 50 mg PO, Ativan 2 mg PO PRN for non-redirectable agitation  -- If refuses: Haldol 2 mg IV, Benadryl 50 mg IV, Ativan 2 mg IV PRN for non-redirectable agitation  -- Please obtain daily EKG to monitor QTc  - patient has very limited insight into her situation and remains gravely disabled as evidenced by her refusal of taking medications and vitals, ekg done.    -- Pt is however more calm and cooperative less irritable.   -- In lieu of pts CEC expiring will pursue DELORES filing as pt remains delusional and uncooperative with recommended treatment plans.     Symptomatic anemia   GIB- resolved    - GIB Pathway initiated  Likely bleeding from GIST  Hgb 6.2 on admit, down from 9.7 beginning of June  - H/H stable on 7/16  Check iron studies and hemolysis labs  GI consulted. apprec recs  -- Discussed with primary team that EGD in this patient would be high risk given worsening/progression of her cancer and that given the likely lower GI pathology that is improving the risks outweigh the benefits  -- continue PO PPI  -- trend Hgb, transfuse Hgb <7, Plt<50  -- would recommend heme/onc consult for evaluation/treatment of progressing tumor  -- if it is determined that tissue is needed, AES consult would be appropriate at that time.  - s/p 1 unit pRBC on 7/17. Patient denies blood in stool or melena   - On 7/26, hemoglobin at 6.4--> 6.9 .  Transfusion with 1 unit of pack RBC  - Wound cultures from 07/23 of the abdomen with no growth.  Discussed with psychiatry regarding 's wish for DNR/hospice with judicial commitment process.     - discussed with Infectious Disease CT abdomen findings 7/15 .  Continue ciprofloxacin and metronidazole indefinitely. refuses rectal exam today and EGD. - mentions she has brown bowel movement  - direct antiglobulin positive. haptoglobin <10  and LDH ordered.  - Anemia likely from hemolysis. Discussed with  in detail and he agrees for DNR / hospice placement.   - s/p wound care evaluation  - Another unit of pRBC ordered 8/6 for hgb of 6.6     Malignant gastrointestinal stromal tumor (GIST) of stomach   Abdominal wound  - Follows with [facility] at [facility]  - On Sunitinib outpatient  - CT A/P showed increased size of mass  - 7/24 purulent drainage from upper abdomen at tumor site. culture obtained  - 7/25 Abdominal x-ray-  No convincing evidence of pneumoperitoneum on this limited single portable view up. abdominal fluid culture from 07/23 with no growth.  - Wound Care consulted  - 7/26 continue ciprofloxacin and metronidazole indefinitely per ID  - Heme/onc consulted. apprec recs   -- poor prognosis d/t mental health issue  -- will need to f/u with primary oncologist at discharge to obtain sunitinib   -- /social work consults for possible placement  -- hospice reasonable if patient has to be d/c to previous living situation    Leukocytosis - trending up  - WBC: 11 --> 13 --> 18 in the last few days  - afebrile since 7/15   - denies cough or SOB  - Initially on empiric IV abx but switched to po cipro/flagyl due to patient refusing IV access   - plan to discontinue abx is remains afebrile for 24 hours  - bcx no growth to date  - U/A negative for UTI  - CBC daily     Transaminitis   - 7/15:  AST//112 with normal bilirubin and mildly elevated alk-phos at 337  - all improving      Acute deep vein thrombosis (DVT) of popliteal vein of left lower extremity   - S/p IVC filter on 6/2020    Hypokalemia  - replete prn     B12 deficiency  - levels of 192 started on vitamin B12 subcutaneous  dissection     Type 2 diabetes mellitus without complication, without long-term current use of insulin  - Patient's FSGs are controlled on current hypoglycemics.  - Home DM regimen:  Metformin  - SSI with POCT accuchecks and Diabetic diet    Disposition   Palliative care [place]ner  - Pallative care consulted.   - Discussing with palliative care and psychiatry to best discharge location for the patient   - psychiatry pursuing DELORES filing   - DNR at this time  - Eventual plan is NH with hospice however now inpatient hospice hopefully tomorrow         VTE Risk Mitigation (From admission, onward)         Ordered     IP VTE HIGH RISK PATIENT  Once      07/11/20 2130     Place sequential compression device  Until discontinued      07/11/20 2130              Time spent in care of patient: > 35 minutes     Khoi Metzger MD  Department of Hospital Medicine   [facility]

## 2020-08-07 NOTE — PROGRESS NOTES
Pt demanded pain medication. I explained to her the situation that happened with her today with the code/rapid that was called. I explained to the pt that MD Metzger stated she cannot have pain medication since her BP is low.    Pt calmed down. Heat pack given to pt for back pain. Blinds lowered to promote decreased stimulation, relaxation, and rest.

## 2020-08-07 NOTE — NURSING
Transfusion of RBC commenced. Vital signs stable. Patient demanded dilaudid before blood administration, given 2mg IV. Also given prochlorperazine 5mg for nausea. Will continue to monitor.

## 2020-08-07 NOTE — CARE UPDATE
Rapid Response Nurse Follow-up Note     Followed up with patient for proactive rounding.   No acute issues or additional concerns at this time. Reviewed plan of care with bedside Celina JUAREZ.   Please call Rapid Response RN, Skip Angela RN if any additional monitoring required or questions or concerns arise at [phone].

## 2020-08-07 NOTE — NURSING
Pt keeps on pausing blood administration. Explained that the administration is timed and requested her to let it run without interruptions even when she is using the bathroom. She said she understood. NACHO

## 2020-08-07 NOTE — ASSESSMENT & PLAN NOTE
ASSESSMENT     Violeta Boudreaux is a 54 y.o. female with a past psychiatric history of BPAD and delusional disorder, who presented to the [facility] due to OPC from estranged  for threatening to kill him. Admitted to [facility] for symptomatic anemia. Per FACT team, patient non-compliant with medications, did not receive most recent scheduled Aristada BAUM. Received Aristada 441 mg BAUM on 7/22. Patient improved in terms of affect, however continues to report delusional thoughts. Selectively compliant with medications.    IMPRESSION  Psychological factors affecting medical care  Bipolar affective disorder, MRE manic  Antisocial personality disorder  Delusional disorder  Medication non-adherence    RECOMMENDATION(S)      1. Scheduled Medication(s):  - Discontinue Abilify 15 mg PO daily    - No forced meds unless Abilify is resumed  - Discontinue home Remeron 15 mg nightly  - Aristada 441 mg IM injection [id number], last given 7/22, next due 8/19  - Continue Atarax 50 mg nightly for insomnia  - Optimize pain management regimen to ensure adequate pain control and encourage medication compliance    2. PRN Medication(s):  - First try: Haldol 5 mg PO, Benadryl 50 mg PO, Ativan 2 mg PO PRN for non-redirectable agitation  - If refuses: Haldol 2 mg IV, Benadryl 50 mg IV, Ativan 2 mg IV PRN for non-redirectable agitation    3.  Monitor:  - Please obtain daily EKG to monitor QTc    4. Legal Status/Precaution(s):  - Continue DELORES (started 8/7) as patient is in imminent danger of hurting self or others and is gravely disabled due to a psychiatric illness  - Maintain 1:1 sitter  - Continue working with next of kin (estranged  and brother) to help facilitate patient care and decision making, especially with regards to disposition -- would be best to come to a consensus with regards to her disposition  - Continue working with palliative care to discuss options, code status (DNR), and disposition (NH)    5. Capacity and Medical Decision  "Making  - Primary team worried about melena and potential need for EGD.  The patient is refusing procedures on the basis of it is "impossible for a blood count to drop in 24 hours."  She cannot explain the consequences or benefits of the procedure and continues to demonstrate delusional thought content.    - Recommend to contact the  for medical decision making and decisions regarding DNR, hospice care, procedures.    - Would carefully weight the risk vs. benefit of various procedure and hold off unless emergent given that the patient is adamantly against having any done forcing the patient to undergo a procedure will likely be difficult and traumatic for the patient  - Disposition: DELORES with nursing home placement NOT inpatient psychiatric hospitalization; patient does not have capacity to refuse placement  "

## 2020-08-08 NOTE — CARE UPDATE
notified of patient's passing. Condolences offered.    Vick Monge PAGEOFF  [facility] Medicine

## 2020-08-08 NOTE — PROGRESS NOTES
"MD Metzger notified of critical value of pt lactic acid above 12.     MD Metzger ordered a bolus of fluid as well as blood. He stated to "do fluid bolus first." then blood.  "

## 2020-08-08 NOTE — PROGRESS NOTES
"MD Metzger and charge nurse Ruthy at pt bedside. IV fluids started. MD said to D/C blood transfusion since pt will be on "comfort measures only."  MD Metzger called Pt "signicant other." and said he is "allowed to come and visit pt for a little while."  Pt sitter will be at bedside.   Nightshift Charge nurse and nurse aware of situation as well as.  "

## 2020-08-08 NOTE — PLAN OF CARE
20 0707   Final Note   Assessment Type Final Discharge Note       Patient  on 2020. Message sent to [facility] informing of patient's status.     8/10/2020 0902  Discharge summary faxed to [employer] [facility] [employer].

## 2020-08-08 NOTE — SIGNIFICANT EVENT
Called into room by bedside sitter. Upon entering room, patient noted to be laying in bed, eyes open, pupils dilated, no respirations, no pulse. Notified charge nurse Yolanda. Charge nurse notified TYE Monge NP with medicine team B. Told that physician will be up to bedside.

## 2020-08-08 NOTE — PROGRESS NOTES
"Pt AAOx3. Bed in low position, wheels locked, and call light within reach. Skin integrity remains unchanged. Pt rotated/turned Q2 hours. Pt had multiple BMs throughout the day.     Pt did have an episode of unconsciousness today at 9:10am.   At bedside at 9:11. Updated team on pt status.  MD Metzger called and notified.   Stayed with pt until 1015 when pt was more stable and alert.     Rechecked blood pressure various times throughout the day.    Pt report of pain. MD called and came and spoke with pt about "comfort measures only" and the IV morphine drip.   Pt "signinficant other" at bedside. Sitter aware of situation and is aware to stay in room. Security on floor aware of situation.    IV Morphine drip brought up by pharmacy.    Nightshift charge nurse Yolanda has the pain medication for pt.   Weighting for lock box to give medication.  Nurse Simeon aware of situation and is aware of where the medication is.  "

## 2020-08-08 NOTE — SIGNIFICANT EVENT
Notified by nurse that patient became unresponsive at 3:15 am. Patient under comfort care for terminal gastrointestinal stromal tumor (GIST). During brief bedside exam patient is unresponsive without spontaneous pulse, heart sounds or respiration.    Time of Death: 3:15 am.  Cause of Death: Gastrointestinal stromal tumor (GIST)    Family notified about patient's passing.     Primary provider (Dr. Khoi Metzger) to complete death certificate and discharge summary in am.    Bob Trivedi DO.  [facility] Medicine.

## 2020-08-09 LAB — BACTERIA BLD CULT: NORMAL

## 2020-08-09 NOTE — DISCHARGE SUMMARY
[facility]  Discharge Summary  Hospital Medicine    Patient Name: Violeta Boudreaux  YOB: 1965    Admit Date: 7/11/2020    Discharge Date and Time: 8/8/2020  6:26 AM    Discharge Attending Physician: Khoi Metzger MD     Team: [facility]    Reason for Admission:   Chief Complaint   Patient presents with    Mental Health Problem     OPC, brought in with [facility], pt apparantly homeless and hx of bipolar and schizophrenia, denies SI/HI       Active Hospital Problems    Diagnosis  POA    *Psychological factors affecting medical condition [F54]  Yes    Comfort measures only status [Z51.5]  Not Applicable    Palliative care encounter [Z51.5]  Not Applicable    Advance care planning [Z71.89]  Not Applicable    Noncompliance with treatment [Z91.19]  Not Applicable    Bipolar 1 disorder, mixed, moderate [F31.62]  Yes    Anemia [D64.9]  Yes    Hypokalemia [E87.6]  Yes    Acute deep vein thrombosis (DVT) of popliteal vein of left lower extremity [I82.432]  Yes     S/p IVC filter June 2020      Delusional disorder [F22]  Yes    Bipolar affective disorder, current episode manic [F31.10]  Yes    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       HPI:   Ms. Violeta Boudreaux is a 54 y.o. female with Bipolar Disorder, delusional disorder, GIST on Sunitinib, and recent LLE DVT s/p IVC filter (June 2020) who presents to the ER by SO for delusions.  Her estranged  reports that she showed up at his house, claiming that she was  to Geovany Ring from [employer] and he was being hid inside the estranged 's house. He reports that she is homeless, and hasn't been taking her psych medications.  She denies any complaints at this time.  Labs on arrival showed a Hemoglobin 6.2 down from 9.7 in  June 2020.  She endorses midepigastric abdominal pain.  She denies any blood in her stools, dark stools, or vaginal bleeding.  She mentions that a few weeks ago, she was having bad headaches and took a lot of Advil, then causing her to have hematemesis.  She denies further bleeding or use of Aspirin or NSAIDS.  Of note, at the end of May, she was hospitalized at [facility] for delusional disorder.  At that time, she was found to have a Hemoglobin 6.4.  She was given blood and her hemolgobin improved to 9.7.  It was thought that her bleeding with 2/2 her GIST tumor. She was not evaluated by GI.  Also during that admission, she was found to have a LLE DVT.  HemeOn suggested IVC filter, given her lack of consistency with medications.  She was discharged to the [facility].  She was discharged on Abilify 10mg and Mirtazapine 15mg qHS.       In the ER, she was PECed for grave disability.  SUMIT was positive.  She was transfused 1 unit PRBCs.  She mentions that Bert Gorman will pay for every drop of blood we take from her, and that she wants to return to her house in [place] when discharged.  She was admitted to Hospital Medicine for GI and Psych evaluations.     Hospital Course:     Hospital course which is documented     On 7/12 presenting to the ED via [facility] with OPC stating patient was at her estranged 's house  threatening she would kill the estranged .  PEC placed for delusional behavior. Work-up significant for H/H 6.2/20 (baseline 9/30 through care everywhere). Rectal exam performed without evidence of blood or melena. FOBT positive.   alert, delusional. Refusing to give  personal belongings, and agitation with staff.  4 point restraints were applied. repeat CBC at 6.6 . transfusion with 1 unit of PRBC. GI recommends EGD and colonoscopy for further evaluation of iron deficiency anemia patient adamantly refuses exam and EGD/ colonoscopy  7/13 agitated overnight requesting cell phone.  Patient was placed 4 point   restraints hemoglobin at 8 3 status post 2 units of pack RBC transfusion . agrees for EGD/ colonoscopy.Patient off restraints.Continue home Abilify 10 mg and Remeron 15 mg nightly. Increased Zyprexa from 5 mg to 10 mg q8h IM PRN for agitation.daily EKG to monitor QTc- 447ms   7/14 Hb 8.1 --> 7.6. Requesting  her purse and  belongings she needs to get to her (Geovany Ring).  Clear liquids today and NPO from midnight EGD/colonoscopy in a.m. complains of abdominal, takes oxycodone 30 mg Q 6 hourly as per chart review (reviewed  last filled on 06/23).  Norco discontinued and started oxycodone 20 mg Q 6 hourly p.r.n.  7/15 refused to drink GoLYTELY overnight.  Hemoglobin stable at 7.8 leukocytosis of 15 but afebrile.  Transaminitis AST//112 with normal bilirubin and mildly elevated alk-phos at 337.  Significant left upper quadrant abdominal pain prior to endoscopy today.  Endoscopy canceled.  CT abdomen with IV and oral contrast ordered.  General surgery consulted.  Started on Zosyn and vancomycin empirically.  Blood cultures x2 with no growth  7/22  Continued on ciprofloxacin and metronidazole since 07/16. refusal of taking medications and vitals, .As CEC expiring 7/26 , plan to pursue DELORES filing as pt remains delusional and uncooperative and needs psychiatric hospital placement. Forced med protocol with Haldol, Ativan and Benadry. Aristada 441 mg IM injection once given 7/22, to be given [id number]. Started Atarax 50 mg nightly for insomnia  7/23 agreed for blood draw today hemoglobin repeated at 6 transfusion with 1 unit of pack RBC  7/24 purulent drainage from upper abdomen at tumor site. culture obtained .Hb 7.2 .  judicial commitment in process.  Patient's  okay with nursing home with hospice as the patient is noncompliant with medications.  does not want to be decision-maker.  7/25  Abdominal x-ray-  No convincing evidence of pneumoperitoneum on this limited single portable view up.  abdominal fluid culture from 07/23 with no growth.  Wound Care consulted.  Hemoglobin at 6.7 transfusion with 1 unit of pack RBC today.  Discussed with .   mentions the patient's brother is a bad influence and only involved with the patient to obtain her money. he is okay with patient being DNR/ hospice placement.   7/26hemoglobin at 6.4--> 6.9 .  Transfusion with 1 unit of pack RBC today.  Wound cultures from 07/23 of the abdomen with no growth.  Discussed with psychiatry regarding 's wish for DNR/hospice with judicial commitment process.  discussed with Infectious Disease CT abdomen findings 7/15 .  Continue ciprofloxacin and metronidazole indefinitely. refuses rectal exam today and EGD. mentions she has brown bowel movement today. direct antiglobulin positive. haptoglobin <10  and LDH ordered. Anemia likely from hemolysis. Discussed with  in detail and he agrees for DNR / hospice placement. he wants to visit her   7/27 Hb at 8.1 . hematology considering steroids. s/p wound care evaluation  7/28 refused EKG . Hb stable at 8.3  7/29  refusing labs and EKG . forced med protocol as ordered if pt refuses scheduled Abilify.  delusional today  8/6 - refused her IV abx and KCL today, 1 unit of pRBC transfused however patient only accepted half        Patient is currently DNR, and planned for NH with hospice. Judical committee meeting was held 8/7 and judicially committed her to NH. Unfortunately patient was hypotension multiple times today despite IVF bolus and syncopized x 2 in the past 24 hrs. LA 12. Bicarb 11. Dicussed with  and will initiate comfort measures only. Morphine drip started for tn. Plan to transition her to inpatient hospice if able next day. Patient 8/8/20 at 3:15 AM.     Principal Problem: Psychological factors affecting medical condition    Other Problems Addressed:  Delusional disorder   Bipolar disorder  Symptomatic anemia   GIB   Malignant gastrointestinal stromal  "tumor (GIST) of stomach   Abdominal wound  Leukocytosis - trending up  Transaminitis   Acute deep vein thrombosis (DVT) of popliteal vein of left lower extremity   Hypokalemia  B12 deficiency  Type 2 diabetes mellitus without complication, without long-term current use of   Palliative care encoutner    Procedures Performed: Procedure(s) (LRB):  EGD (ESOPHAGOGASTRODUODENOSCOPY) (N/A)    Special Care, Treatment, and Services Provided: none    Consults: wound care, palliative med, hem/onc, psych, surgery, GI    Significant Diagnostic Studies:  No results found for: EF  Hemoglobin A1C   Date Value Ref Range Status   2020 6.3 (H) 4.0 - 5.6 % Final     Comment:     ADA Screening Guidelines:  5.7-6.4%  Consistent with prediabetes  >or=6.5%  Consistent with diabetes  High levels of fetal hemoglobin interfere with the HbA1C  assay. Heterozygous hemoglobin variants (HbS, HgC, etc)do  not significantly interfere with this assay.   However, presence of multiple variants may affect accuracy.       All work up reviewed     Final Diagnoses: Same as principal problem.    Discharged Condition:   Face to face services were provided on 2020   Time Spent:  I spent > 30 minutes on the discharge, which included reviewing hospital course with patient/family, reviewing discharge medications, and arranging follow-up care.  Physical Exam on 2020:  BP (!) 94/50   Pulse (!) 113   Temp 97.7 °F (36.5 °C) (Oral)   Resp (!) 0   Ht 5' 7" (1.702 m)   Wt 70.1 kg (154 lb 8.7 oz)   SpO2 97%   Breastfeeding No   BMI 24.20 kg/m²       Disposition:  in Medical Facility        Khoi Metzger MD  Department of Hospital Medicine    "

## 2020-08-10 LAB
BLD PROD TYP BPU: NORMAL
BLOOD UNIT EXPIRATION DATE: NORMAL
BLOOD UNIT TYPE CODE: 5100
BLOOD UNIT TYPE: NORMAL
CODING SYSTEM: NORMAL
DISPENSE STATUS: NORMAL
TRANS ERYTHROCYTES VOL PATIENT: NORMAL ML
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
